# Patient Record
Sex: FEMALE | Race: WHITE | NOT HISPANIC OR LATINO | Employment: OTHER | URBAN - METROPOLITAN AREA
[De-identification: names, ages, dates, MRNs, and addresses within clinical notes are randomized per-mention and may not be internally consistent; named-entity substitution may affect disease eponyms.]

---

## 2024-07-03 ENCOUNTER — HOSPITAL ENCOUNTER (OUTPATIENT)
Dept: RADIOLOGY | Facility: CLINIC | Age: 84
Discharge: HOME | End: 2024-07-03
Payer: MEDICARE

## 2024-07-03 ENCOUNTER — OFFICE VISIT (OUTPATIENT)
Dept: ORTHOPEDIC SURGERY | Facility: CLINIC | Age: 84
End: 2024-07-03
Payer: MEDICARE

## 2024-07-03 DIAGNOSIS — M25.562 ACUTE PAIN OF LEFT KNEE: ICD-10-CM

## 2024-07-03 PROCEDURE — 99213 OFFICE O/P EST LOW 20 MIN: CPT | Performed by: ORTHOPAEDIC SURGERY

## 2024-07-03 PROCEDURE — 73564 X-RAY EXAM KNEE 4 OR MORE: CPT | Mod: LT

## 2024-07-03 PROCEDURE — 99204 OFFICE O/P NEW MOD 45 MIN: CPT | Performed by: ORTHOPAEDIC SURGERY

## 2024-07-03 NOTE — PROGRESS NOTES
"    History: Savita \"FILOMENA\" is here for her left knee.  She has had pain for several years.  She has done well from a right total knee arthroplasty 9 years ago.  She had a fall couple months ago and landed on the knee.  This is caused increased pain.  She has tried bracing, cortisone and viscosupplementation without significant relief.  She does take Tylenol and has used Voltaren gel.  She cannot take anti-inflammatories as she is on Eliquis.    Past medical history: Multiple  Medications: Multiple  Allergies: No known drug allergies    Please refer to the intake H&P regarding the patient's review of systems, family history and social history as was done today    HEENT: Normal  Lungs: Clear to auscultation  Heart: RRR  Abdomen: Soft, nontender  Skin: clear  Extremity: She has tenderness both medially and laterally to the knee.  There is 1+ crepitus with range of motion.  She has pain to palpation around the patella.  Mild laxity with varus stress.  Negative Lachman and posterior drawer.  She denies any numbness or tingling distally.  Contralateral exam is normal for strength, motion, stability and neurovascular assessment.    Radiographs: 4 view x-rays show moderate to severe medial joint space narrowing with spurring around the patella.  Alignment is fairly neutral.    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  She has not gotten significant relief with other modalities in the past but we did discuss repeat injection.  All questions were answered today with the patient.    For complete plan and/or surgical details, please refer to Dr. Clay's portion of this split/shared dictation.     Vita Gaines PA-C    In a face-to-face encounter today, MARILUZ Clay MD performed a history and physical examination, discussed pertinent diagnostic " studies if indicated, and discussed diagnosis and management strategies with both the patient and the midlevel provider.  I reviewed the midlevel's note and agree with the documented findings and plan of care.  Greater than 50% of the evaluation and treatment decision was performed by the physician myself during today's visit.    She is having persistent pain and feels like the knee wants to give out.  She has known arthritis.  We discussed several options including knee arthroplasty but she feels given her age she is not yet sure she wants to undergo surgery.  She understands extensive rehab needed with knee replacement.  She would like to pursue viscosupplementation again to try to get some improvement in the meantime.  She has a brace to wear for support.  Will see her back upon approval for her injections.  This note was generated with voice recognition software and may contain grammatical errors.

## 2024-07-15 ENCOUNTER — OFFICE VISIT (OUTPATIENT)
Dept: ORTHOPEDIC SURGERY | Facility: CLINIC | Age: 84
End: 2024-07-15
Payer: MEDICARE

## 2024-07-15 DIAGNOSIS — M17.12 PRIMARY OSTEOARTHRITIS OF LEFT KNEE: Primary | ICD-10-CM

## 2024-07-15 PROCEDURE — 99211 OFF/OP EST MAY X REQ PHY/QHP: CPT | Performed by: PHYSICIAN ASSISTANT

## 2024-07-15 PROCEDURE — 2500000004 HC RX 250 GENERAL PHARMACY W/ HCPCS (ALT 636 FOR OP/ED): Mod: JZ | Performed by: ORTHOPAEDIC SURGERY

## 2024-07-15 PROCEDURE — 20610 DRAIN/INJ JOINT/BURSA W/O US: CPT | Mod: LT | Performed by: ORTHOPAEDIC SURGERY

## 2024-07-15 NOTE — PROGRESS NOTES
"    Savita \"FILOMENA\" is here for a left knee Synvisc One injection.    L Inj/Asp: L knee on 7/15/2024 1:09 PM  Indications: pain  Details: 18 G needle, lateral approach  Medications: 48 mg hylan 48 mg/6 mL  Outcome: tolerated well, no immediate complications  Procedure, treatment alternatives, risks and benefits explained, specific risks discussed. Consent was given by the patient. Immediately prior to procedure a time out was called to verify the correct patient, procedure, equipment, support staff and site/side marked as required. Patient was prepped and draped in the usual sterile fashion.         She will ice and work on range of motion and exercises as directed.    She will follow up on an as-needed basis.  "

## 2024-08-26 DIAGNOSIS — M17.12 PRIMARY OSTEOARTHRITIS OF LEFT KNEE: ICD-10-CM

## 2024-09-06 ENCOUNTER — HOSPITAL ENCOUNTER (OUTPATIENT)
Facility: HOSPITAL | Age: 84
Setting detail: OUTPATIENT SURGERY
End: 2024-09-06
Attending: ORTHOPAEDIC SURGERY | Admitting: ORTHOPAEDIC SURGERY
Payer: MEDICARE

## 2024-09-06 PROBLEM — M17.12 UNILATERAL PRIMARY OSTEOARTHRITIS, LEFT KNEE: Status: ACTIVE | Noted: 2024-09-06

## 2024-09-06 PROBLEM — M25.562 PAIN IN LEFT KNEE: Status: ACTIVE | Noted: 2024-09-06

## 2024-09-12 ENCOUNTER — HOSPITAL ENCOUNTER (OUTPATIENT)
Dept: RADIOLOGY | Facility: CLINIC | Age: 84
Discharge: HOME | End: 2024-09-12
Payer: MEDICARE

## 2024-09-12 DIAGNOSIS — M17.12 PRIMARY OSTEOARTHRITIS OF LEFT KNEE: ICD-10-CM

## 2024-09-12 PROCEDURE — 73721 MRI JNT OF LWR EXTRE W/O DYE: CPT | Mod: LT

## 2024-10-02 RX ORDER — CEFAZOLIN SODIUM 2 G/50ML
2 SOLUTION INTRAVENOUS ONCE
OUTPATIENT
Start: 2024-10-02 | End: 2024-10-02

## 2024-10-02 RX ORDER — ACETAMINOPHEN 325 MG/1
975 TABLET ORAL ONCE
OUTPATIENT
Start: 2024-10-02 | End: 2024-10-02

## 2024-10-02 RX ORDER — SODIUM CHLORIDE, SODIUM LACTATE, POTASSIUM CHLORIDE, CALCIUM CHLORIDE 600; 310; 30; 20 MG/100ML; MG/100ML; MG/100ML; MG/100ML
20 INJECTION, SOLUTION INTRAVENOUS CONTINUOUS
OUTPATIENT
Start: 2024-10-02

## 2024-10-02 RX ORDER — GABAPENTIN 300 MG/1
600 CAPSULE ORAL ONCE
OUTPATIENT
Start: 2024-10-02 | End: 2024-10-02

## 2024-10-02 RX ORDER — TRANEXAMIC ACID 650 MG/1
1950 TABLET ORAL ONCE
OUTPATIENT
Start: 2024-10-02 | End: 2024-10-02

## 2024-10-02 RX ORDER — CELECOXIB 200 MG/1
200 CAPSULE ORAL ONCE
OUTPATIENT
Start: 2024-10-02 | End: 2024-10-02

## 2024-10-08 ENCOUNTER — PRE-ADMISSION TESTING (OUTPATIENT)
Dept: PREADMISSION TESTING | Facility: HOSPITAL | Age: 84
End: 2024-10-08
Payer: MEDICARE

## 2024-10-08 ENCOUNTER — HOSPITAL ENCOUNTER (OUTPATIENT)
Dept: CARDIOLOGY | Facility: HOSPITAL | Age: 84
Discharge: HOME | End: 2024-10-08
Payer: MEDICARE

## 2024-10-08 VITALS
BODY MASS INDEX: 36.01 KG/M2 | HEART RATE: 77 BPM | TEMPERATURE: 97.5 F | OXYGEN SATURATION: 97 % | WEIGHT: 196.87 LBS | RESPIRATION RATE: 20 BRPM | SYSTOLIC BLOOD PRESSURE: 143 MMHG | DIASTOLIC BLOOD PRESSURE: 57 MMHG

## 2024-10-08 DIAGNOSIS — M25.562 PAIN IN LEFT KNEE: ICD-10-CM

## 2024-10-08 DIAGNOSIS — M17.12 UNILATERAL PRIMARY OSTEOARTHRITIS, LEFT KNEE: ICD-10-CM

## 2024-10-08 LAB
ALBUMIN SERPL BCP-MCNC: 4 G/DL (ref 3.4–5)
ALP SERPL-CCNC: 57 U/L (ref 33–136)
ALT SERPL W P-5'-P-CCNC: 11 U/L (ref 7–45)
ANION GAP SERPL CALC-SCNC: 15 MMOL/L (ref 10–20)
AST SERPL W P-5'-P-CCNC: 11 U/L (ref 9–39)
BASOPHILS # BLD AUTO: 0.06 X10*3/UL (ref 0–0.1)
BASOPHILS NFR BLD AUTO: 0.7 %
BILIRUB SERPL-MCNC: 0.4 MG/DL (ref 0–1.2)
BUN SERPL-MCNC: 59 MG/DL (ref 6–23)
CALCIUM SERPL-MCNC: 8.1 MG/DL (ref 8.6–10.3)
CHLORIDE SERPL-SCNC: 109 MMOL/L (ref 98–107)
CO2 SERPL-SCNC: 22 MMOL/L (ref 21–32)
CREAT SERPL-MCNC: 2.45 MG/DL (ref 0.5–1.05)
EGFRCR SERPLBLD CKD-EPI 2021: 19 ML/MIN/1.73M*2
EOSINOPHIL # BLD AUTO: 0.23 X10*3/UL (ref 0–0.4)
EOSINOPHIL NFR BLD AUTO: 2.6 %
ERYTHROCYTE [DISTWIDTH] IN BLOOD BY AUTOMATED COUNT: 13.5 % (ref 11.5–14.5)
EST. AVERAGE GLUCOSE BLD GHB EST-MCNC: 123 MG/DL
GLUCOSE SERPL-MCNC: 155 MG/DL (ref 74–99)
HBA1C MFR BLD: 5.9 %
HCT VFR BLD AUTO: 35 % (ref 36–46)
HGB BLD-MCNC: 10.9 G/DL (ref 12–16)
IMM GRANULOCYTES # BLD AUTO: 0.05 X10*3/UL (ref 0–0.5)
IMM GRANULOCYTES NFR BLD AUTO: 0.6 % (ref 0–0.9)
INR PPP: 1.3 (ref 0.9–1.1)
LYMPHOCYTES # BLD AUTO: 0.73 X10*3/UL (ref 0.8–3)
LYMPHOCYTES NFR BLD AUTO: 8.2 %
MCH RBC QN AUTO: 30.3 PG (ref 26–34)
MCHC RBC AUTO-ENTMCNC: 31.1 G/DL (ref 32–36)
MCV RBC AUTO: 97 FL (ref 80–100)
MONOCYTES # BLD AUTO: 0.48 X10*3/UL (ref 0.05–0.8)
MONOCYTES NFR BLD AUTO: 5.4 %
NEUTROPHILS # BLD AUTO: 7.33 X10*3/UL (ref 1.6–5.5)
NEUTROPHILS NFR BLD AUTO: 82.5 %
NRBC BLD-RTO: 0 /100 WBCS (ref 0–0)
PLATELET # BLD AUTO: 143 X10*3/UL (ref 150–450)
POTASSIUM SERPL-SCNC: 4.4 MMOL/L (ref 3.5–5.3)
PROT SERPL-MCNC: 6.9 G/DL (ref 6.4–8.2)
PROTHROMBIN TIME: 14.5 SECONDS (ref 9.8–12.8)
RBC # BLD AUTO: 3.6 X10*6/UL (ref 4–5.2)
SODIUM SERPL-SCNC: 142 MMOL/L (ref 136–145)
WBC # BLD AUTO: 8.9 X10*3/UL (ref 4.4–11.3)

## 2024-10-08 PROCEDURE — 83036 HEMOGLOBIN GLYCOSYLATED A1C: CPT | Mod: ELYLAB

## 2024-10-08 PROCEDURE — 85025 COMPLETE CBC W/AUTO DIFF WBC: CPT

## 2024-10-08 PROCEDURE — 85610 PROTHROMBIN TIME: CPT

## 2024-10-08 PROCEDURE — 87081 CULTURE SCREEN ONLY: CPT | Mod: ELYLAB

## 2024-10-08 PROCEDURE — 93010 ELECTROCARDIOGRAM REPORT: CPT | Performed by: INTERNAL MEDICINE

## 2024-10-08 PROCEDURE — 84075 ASSAY ALKALINE PHOSPHATASE: CPT

## 2024-10-08 PROCEDURE — 93005 ELECTROCARDIOGRAM TRACING: CPT

## 2024-10-08 PROCEDURE — 36415 COLL VENOUS BLD VENIPUNCTURE: CPT

## 2024-10-08 RX ORDER — FUROSEMIDE 20 MG/1
40 TABLET ORAL
COMMUNITY
Start: 2024-07-31

## 2024-10-08 RX ORDER — SODIUM ZIRCONIUM CYCLOSILICATE 5 G/5G
5 POWDER, FOR SUSPENSION ORAL DAILY
COMMUNITY

## 2024-10-08 RX ORDER — PREGABALIN 75 MG/1
75 CAPSULE ORAL 3 TIMES DAILY
COMMUNITY
Start: 2023-12-19 | End: 2024-11-17

## 2024-10-08 RX ORDER — LOSARTAN POTASSIUM 25 MG/1
25 TABLET ORAL 2 TIMES DAILY
COMMUNITY
Start: 2024-03-25

## 2024-10-08 RX ORDER — DAPAGLIFLOZIN 5 MG/1
5 TABLET, FILM COATED ORAL DAILY
COMMUNITY
Start: 2024-06-25

## 2024-10-08 RX ORDER — ACETAMINOPHEN 500 MG
2000 TABLET ORAL DAILY
COMMUNITY

## 2024-10-08 RX ORDER — AMLODIPINE BESYLATE 5 MG/1
5 TABLET ORAL
COMMUNITY
Start: 2024-01-17

## 2024-10-08 RX ORDER — ACETAMINOPHEN 500 MG
500 TABLET ORAL EVERY 8 HOURS PRN
COMMUNITY

## 2024-10-08 RX ORDER — METOPROLOL TARTRATE 25 MG/1
12.5 TABLET, FILM COATED ORAL 2 TIMES DAILY
COMMUNITY
Start: 2023-12-19

## 2024-10-08 RX ORDER — LATANOPROST 50 UG/ML
1 SOLUTION/ DROPS OPHTHALMIC
COMMUNITY
Start: 2024-01-10

## 2024-10-08 RX ORDER — LANOLIN ALCOHOL/MO/W.PET/CERES
1000 CREAM (GRAM) TOPICAL
COMMUNITY

## 2024-10-08 ASSESSMENT — PAIN - FUNCTIONAL ASSESSMENT: PAIN_FUNCTIONAL_ASSESSMENT: 0-10

## 2024-10-08 ASSESSMENT — PAIN SCALES - GENERAL: PAINLEVEL_OUTOF10: 8

## 2024-10-08 NOTE — PREPROCEDURE INSTRUCTIONS

## 2024-10-10 LAB
ATRIAL RATE: 71 BPM
P AXIS: 68 DEGREES
P OFFSET: 208 MS
P ONSET: 138 MS
PR INTERVAL: 172 MS
Q ONSET: 224 MS
QRS COUNT: 12 BEATS
QRS DURATION: 78 MS
QT INTERVAL: 388 MS
QTC CALCULATION(BAZETT): 421 MS
QTC FREDERICIA: 410 MS
R AXIS: 75 DEGREES
STAPHYLOCOCCUS SPEC CULT: ABNORMAL
T AXIS: 37 DEGREES
T OFFSET: 418 MS
VENTRICULAR RATE: 71 BPM

## 2024-10-12 DIAGNOSIS — Z01.818 PRE-OPERATIVE CLEARANCE: Primary | ICD-10-CM

## 2024-10-12 RX ORDER — CHLORHEXIDINE GLUCONATE ORAL RINSE 1.2 MG/ML
15 SOLUTION DENTAL DAILY
Qty: 30 ML | Refills: 0 | Status: SHIPPED | OUTPATIENT
Start: 2024-10-12 | End: 2024-10-14

## 2024-10-14 ENCOUNTER — APPOINTMENT (OUTPATIENT)
Dept: ORTHOPEDIC SURGERY | Facility: CLINIC | Age: 84
End: 2024-10-14
Payer: MEDICARE

## 2024-10-14 ENCOUNTER — OFFICE VISIT (OUTPATIENT)
Dept: ORTHOPEDIC SURGERY | Facility: CLINIC | Age: 84
End: 2024-10-14
Payer: MEDICARE

## 2024-10-14 DIAGNOSIS — M17.12 PRIMARY OSTEOARTHRITIS OF LEFT KNEE: Primary | ICD-10-CM

## 2024-10-14 PROCEDURE — 99211 OFF/OP EST MAY X REQ PHY/QHP: CPT | Performed by: PHYSICIAN ASSISTANT

## 2024-10-14 NOTE — PROGRESS NOTES
Savita Snell is here today for a pre-op visit of her left knee.  She is scheduled for left total knee arthroplasty.  She has developed a venous ulcer which she has a history of.    This is a pre-op visit to discuss the risks and benefits of surgery. The risks and benefits were fully explained to the patient. The patient wishes to proceed.     With any surgery, infection is a risk; usually 1-2%. It can become higher for individuals with diabetes, rheumatoid arthritis, previous surgery, individuals on oral steroids or immunosuppressants, or obese individuals. All of these issues were properly addressed and considered. Reassured all sterile techniques would be followed.  Severe infections may require removal of any prosthesis (if applicable). Cadaver/allograft tissues may be used for certain surgeries.  Although extremely rare, there is a less than 0.5% risk of disease transmission.     The patient will be given preop antibiotics. It was also explained to the patient that there will be some blood loss during the procedure, but that blood transfusion is usually not necessary.  If applicable, it is also noted that a tourniquet may be applied to lower the amount of blood loss during the case.    Pulmonary embolism and blood clots were also discussed with the patient. These can have fatal complications. It is explained to the patient that for certain surgeries the use of GLORIA hose, foot pumps, incentive spirometers, quick mobility and the use of blood thinners/Aspirin is the standard preventative care.     It was explained that surgery is often used to decrease pain, provide stability, and improve strength but individuals will have varying results postoperatively. There can be variation in motion and a risk of stiffness. It is also stated that occasionally we will have to manipulate an extremity if pain and stiffness persist. We also discussed there are limitations with some motions after certain surgeries.      Fracture, though rare, may also occur intraoperatively. There is also the possibility of nerve or vascular injuries as well. There is potential for continued numbness or tingling. The benefits of anesthesia were explained to the patient.     All of the patient's questions were answered.     Results/Imaging: Previous x-rays show moderate to severe medial joint space narrowing with spurring around the patella.    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 This report is scanned into the electronic medical record. I have considered the risks of abuse, dependence, addiction, and diversion.     She has a 3 x 3 cm venous ulcer on the medial side of her lower leg.  She typically sees her podiatrist for treatment of this area and she is going to get back into see them as we cannot proceed with a  total knee with an open wound on her leg.  She can call back to reschedule once her wound is healed.    This note was generated with voice recognition software and may contain grammatical errors.

## 2024-11-04 ENCOUNTER — APPOINTMENT (OUTPATIENT)
Dept: ORTHOPEDIC SURGERY | Facility: CLINIC | Age: 84
End: 2024-11-04
Payer: MEDICARE

## 2024-11-05 NOTE — PROGRESS NOTES
"    History: Savita \"FILOMENA\" is here for her left leg.  She was scheduled for total knee arthroplasty but was found to have a venous ulcer at her preop visit.  We had to postpone the surgery given her open wound and she has been treating this with zinc oxide and petroleum dressing changes.  It is healing nicely and she is ready to proceed.    Past medical history: Multiple  Medications: Multiple  Allergies: No known drug allergies    Please refer to the intake H&P regarding the patient's review of systems, family history and social history as was done today    HEENT: Normal  Lungs: Clear to auscultation  Heart: RRR  Abdomen: Soft, nontender  Skin: clear  Extremity: The previous ulcer in the medial lower leg has resolved.  There is some mild erythema over the healed area with no open wound or drainage.  Swelling in the lower extremity has improved as well.  She denies any numbness or tingling.  Contralateral exam is normal for strength, motion, stability and neurovascular assessment.    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  The ulcer has resolved and she is now ready to proceed with surgery.  Risks and benefits were again discussed with the patient.  We did discuss given her history of swelling and ulcers the increased risk of infection postoperatively.  We will see her back 2 weeks postoperatively for a wound check with 2 view left knee x-rays.  All questions were answered today with the patient.    For complete plan and/or surgical details, please refer to Dr. Clay's portion of this split/shared dictation.     Vita Gaines PA-C    In a face-to-face encounter today, MARILUZ Clay MD performed a history and physical examination, discussed pertinent diagnostic studies if indicated, and " discussed diagnosis and management strategies with both the patient and the midlevel provider.  I reviewed the midlevel's note and agree with the documented findings and plan of care.  Greater than 50% of the evaluation and treatment decision was performed by the physician myself during today's visit.    Cristiane has done a good job with her lower extremity edema.  Her open wound is now healed and her skin is intact.  She has known arthritis and would like to proceed with knee arthroplasty.  We can set this up at her discretion.  She is already done her preoperative visit and we can see her back 2 weeks postoperatively for wound check with x-rays.  She understands need to continue with her edema program and minimize swelling and skin changes even after the knee replacement surgery as it does increase her risk of infection and complication.      This note was generated with voice recognition software and may contain grammatical errors.

## 2024-11-06 ENCOUNTER — OFFICE VISIT (OUTPATIENT)
Dept: ORTHOPEDIC SURGERY | Facility: CLINIC | Age: 84
End: 2024-11-06
Payer: MEDICARE

## 2024-11-06 DIAGNOSIS — M17.12 PRIMARY OSTEOARTHRITIS OF LEFT KNEE: Primary | ICD-10-CM

## 2024-11-06 PROCEDURE — 99213 OFFICE O/P EST LOW 20 MIN: CPT | Performed by: ORTHOPAEDIC SURGERY

## 2024-11-06 PROCEDURE — 1036F TOBACCO NON-USER: CPT | Performed by: ORTHOPAEDIC SURGERY

## 2024-11-06 PROCEDURE — 1159F MED LIST DOCD IN RCRD: CPT | Performed by: ORTHOPAEDIC SURGERY

## 2024-11-12 NOTE — H&P (VIEW-ONLY)
PRE-OPERATIVE EVALUATION    SURGEON:  Dr. MIRANDA Clay  PRE-OP DATE:  November 12, 2024  SURGERY DATE:  November 26, 2024L TKR,     PRE-OPERATIVE DIAGNOSIS:     L knee OA  PRIMARY PROCEDURE:  L TKR    Recommendations provided at the request of the above surgeon and will be communicated via shared electronic record.     HPI: Savita Snell is a 84 year old female here for evaluation prior to L TKR, completed pre-op labs on f.   UTD lab monitoring and specialist care/stable, DM2 A1C 6.1, CKD stage 4 GFR 19, anemia HGB 11.  3/22/24 Routine EKG NSR  3/6/24 perfusion stress EF 67%, negative.     Latest Ref Rng 10/17/2024   Albumin 3.9 - 4.9 g/dL 4.1    Calcium 8.5 - 10.2 mg/dL 8.1 (L)    Phosphorus 2.7 - 4.8 mg/dL 5.7 (H)    Glucose 74 - 99 mg/dL 121 (H)    BUN 7 - 21 mg/dL 59 (H)    Creatinine 0.58 - 0.96 mg/dL 2.45 (H)    Sodium 136 - 144 mmol/L 143    Potassium 3.7 - 5.1 mmol/L 4.7    Chloride 98 - 107 mmol/L 107    CO2 22 - 30 mmol/L 22    Anion Gap 8 - 15 mmol/L 14    eGFR >=60 mL/min/1.73m² 19 (L)    WBC 3.70 - 11.00 k/uL 8.92    RBC 3.90 - 5.20 m/uL 3.63 (L)    Hemoglobin 11.5 - 15.5 g/dL 11.0 (L)    Hematocrit 36.0 - 46.0 % 35.7 (L)    MCV 80.0 - 100.0 fL 98.3    MCH 26.0 - 34.0 pg 30.3    MCHC 30.5 - 36.0 g/dL 30.8    RDW-CV 11.5 - 15.0 % 13.5    Platelet Count 150 - 400 k/uL 190    MPV 9.0 - 12.7 fL 12.1    Absolute nRBC <0.01 k/uL <0.01    Cholesterol, Total <200 mg/dL 142    Triglyceride <150 mg/dL 140    HDL Cholesterol >39 mg/dL 37 (L)    Non HDL Cholesterol <130 mg/dL 105    Fasting Time hrs 12    VLDL Cholesterol <30 mg/dL 28    TC:HDL Ratio <5.10  3.84    LDL Cholesterol <100 mg/dL 77    LDL:HDL Ratio <2.54  2.08    Hemoglobin A1C 4.3 - 5.6 % 6.1 (H)    Estimated Average Glucose mg/dL 128    PTH, Intact 15 - 65 pg/mL 87 (H)    Vitamin D 25 Hydroxy 31.0 - 80.0 ng/mL 43.5       Functional Capacity:  Do heavy work around the house, such as scrubbing floors, lifting or moving heavy furniture (8.00 METs) ,  limited by L knee pain.     Last NSAID use: none  Last ASA use:  none   Patient takes herbal medications:  No  ANESTHESIA COMPLICATIONS: no reported complications    PMH:   PAST MEDICAL HISTORY   Diagnosis Date   • Asymptomatic postmenopausal status (age-related) (natural)     2013 DEXA Tscore WNL, 2008 DEXA no sign change except R total hip, Tscore spine -0.5/hip -1.4, DEXA 2006 Tscore -1.3, Age late 40's, no HRT   • CKD (chronic kidney disease) stage 3, GFR 30-59 ml/min ([facility])     Dr. 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 knee, needs TKR 11/2015   • Dry eye syndrome 01/03/2023   • Essential hypertension, benign 2002 2/2002 EKG WNL   • Other specified glaucoma 2002   • Other specified idiopathic peripheral neuropathy 1990's    Dr. Wilson/annually, 2/13 EMG no evidence overlying Cervical root or CTS/mild progression? neuropathy, Dr. Wilson, used to see Bej, neuropathy painful/numbness/tingling feet>hands, reports testing-controlled with pm neurontin   • Personal history of malignant neoplasm of other female genital organs 2002    Endometrial cancer, no longer sees GYN   • Stasis dermatitis     no longer sees Dr. Nieves   • Type II or unspecified type diabetes mellitus without mention of complication, not stated as uncontrolled 2004    Dr. Lala, Dr. Wilson, Dr. Gao, dx'd 1998, neuropathy, no retinopathy     ACTIVE PROBLEM LIST  Neuropathy  Essential Hypertension, Benign  Asymptomatic Postmenopausal Status (Age-Related) (Natural)  Personal History of Malignant Neoplasm of Other Female Genital Organs  Dizziness and Giddiness  Other Specified Glaucoma  Lumbago  Abnormality of Gait  Achilles Tendon Tear  Preglaucoma, unspecified - Both Eyes  Lens replaced by other means - Both Eyes  Primary Osteoarthritis of Right Knee  Glaucoma Suspect of Both Eyes  Ptosis of  Both Eyelids  Pseudophakia of Both Eyes  History of Strabismus Surgery  Cogan's Corneal Dystrophy  Dry Eye Syndrome  Mixed Hyperlipidemia  Hammer Toes of Both Feet  Secondary Renal Hyperparathyroidism (Hcc)  Hyperkalemia  Anemia in Stage 4 Chronic Kidney Disease (Hcc)  (Hcc)  Type 2 Dm With Ckd Stage 4 and Hypertension (Hcc)  Right-Sided Low Back Pain Without Sciatica  Esophageal Dysphagia  Oag (Open Angle Glaucoma) Suspect, Low Risk, Bilateral  Cup to Disc Asymmetry, Bilateral  Family History of Glaucoma in Mother  After-Cataract Obscuring Vision, Bilateral  Pvd (Posterior Vitreous Detachment), Bilateral  Ckd (Chronic Kidney Disease) Stage 4, Gfr 15-29 Ml/Min (Hcc)  Type 2 Diabetes Mellitus With Stage 4 Chronic Kidney Disease, With Long-Term Current Use of Insulin (Hcc)  Obesity (Bmi 30-39.9)  B12 Neuropathy (Hcc)  Osteopenia, Senile  C. Difficile Colitis  Atrial Fibrillation (Hcc)  (Hfpef) Heart Failure With Preserved Ejection Fraction ([facility])      PSH:   PAST SURGICAL HISTORY   Procedure Laterality Date   • CHOLECYSTECTOMY  2002    Cholecystectomy   • COLONOSCOPY  6/06    Cscope Dr. Vasquez neg/done for diarrhea   • COLONOSCOPY  10/12/16    /Hemorrhoids/Normal/Rpt in 10 yrs   • CORNEAL EPITHELIAL DEBRIDEMENT (THERAPEUTIC) OD (RIGHT EYE) Right 1/2010    for cogans   • CORNEAL EPITHELIAL DEBRIDEMENT (THERAPEUTIC) OS (LEFT EYE) Left 2/2010    for cogans   • EGD  09/26/2018    Speedy- normal, dilation   • OOPHORECTOMY PARTIAL/TOTAL UNI/BI  1967    Oophorectomy, ovarian rupture   • PAST SURGICAL HISTORY OF  at age 5    strabismus sx at age 5 done in [facility] D.Giovanni   • PAST SURGICAL HISTORY OF  11/5/15    R TKR, Dr. Tejada   • SALPINGO-OOPHORECTOMY COMPL/PRTL UNI/BI SPX  2002    Salpingo-oophorectomy   • TENOTOMY OPN HAMSTRING KNEE HIP MULTIPLE 1 LEG  1975    Injury to tendon, had subsequent R knee sg for tendonitis   • TOTAL ABDOMINAL HYSTERECT W/WO RMVL TUBE OVARY  2002    Hysterectomy, ADENIKE   • XCAPSL CTRC RMVL  "INSJ IO LENS PROSTH W/O ECP  OD 4/15/10    Cataract Extraction with PC IOL  OD by Dr. Gao   • XCAPSL CTRC RMVL INSJ IO LENS PROSTH W/O ECP  OS 4/22/10    Cataract Extraction with PC IOL  OS by Dr. Gao     Family History:   FAMILY HISTORY    Problem Relation Age of Onset   • Coronary Artery Disease Mother          CHF/?MI, dementia, possibly neuropathy   • Glaucoma Mother    • Macular Degen Mother    • Stroke Father          CVA age 77, HTN   • Hypertension Sister         Stroke with complications, HTN, Hyperlipidemia, neuropathy, fibromyalgia,   • Blood Disease Sister         Mutation Detected heterozygous   • Glaucoma Sister    • Diabetes Maternal Grandmother         DM2     Social History:   Social History     Tobacco Use   • Smoking status: Former     Current packs/day: 0.00     Average packs/day: 0.5 packs/day for 18.0 years (9.0 ttl pk-yrs)     Types: Cigarettes     Start date: 1958     Quit date: 1976     Years since quittin.8     Passive exposure: Never   • Smokeless tobacco: Never   Substance Use Topics   • Alcohol use: No   • Drug use: No     ALLERGIES:    ALLERGIES   Allergen Reactions   • Bactrim [Sulfametho* Contraindication-Medical Surgical     Increase in cr and hyperkalemia   • Keflex [Cephalexin]  Other: See Comments     Caused C-diff   • Penicillins               States severe FH PCN allergy-has never had PCN herself     LATEX ALLERGY: No    REVIEW OF SYSTEMS:    REVIEW OF SYSTEMS  As per hpi.    Last 3 Encounter BP Readings:     Date:        BP:     2024   152/76     10/28/2024   138/70     10/14/2024   144/77      OBJECTIVE:    /76 (BP Site: Right Arm, BP Position: Sitting, BP Cuff Size: Large Adult)   Pulse (!) 57   Resp 18   Ht 157.5 cm (5' 2\")   Wt 89.3 kg (196 lb 13.9 oz)   SpO2 100%   BMI 36.01 kg/m²   Repeat BP  /79   Pulse (!) 57   Resp 18   Ht 157.5 cm (5' 2\")   Wt 89.3 kg (196 lb 13.9 oz)   SpO2 100%   BMI 36.01 kg/m²    Lungs clear " to auscultation  Heart regular rate and rhythm     Latest Ref Rn 10/17/2024   Albumin 3.9 - 4.9 g/dL 4.1    Calcium 8.5 - 10.2 mg/dL 8.1 (L)    Phosphorus 2.7 - 4.8 mg/dL 5.7 (H)    Glucose 74 - 99 mg/dL 121 (H)    BUN 7 - 21 mg/dL 59 (H)    Creatinine 0.58 - 0.96 mg/dL 2.45 (H)    Sodium 136 - 144 mmol/L 143    Potassium 3.7 - 5.1 mmol/L 4.7    Chloride 98 - 107 mmol/L 107    CO2 22 - 30 mmol/L 22    Anion Gap 8 - 15 mmol/L 14    eGFR >=60 mL/min/1.73m² 19 (L)    WBC 3.70 - 11.00 k/uL 8.92    RBC 3.90 - 5.20 m/uL 3.63 (L)    Hemoglobin 11.5 - 15.5 g/dL 11.0 (L)    Hematocrit 36.0 - 46.0 % 35.7 (L)    MCV 80.0 - 100.0 fL 98.3    MCH 26.0 - 34.0 pg 30.3    MCHC 30.5 - 36.0 g/dL 30.8    RDW-CV 11.5 - 15.0 % 13.5    Platelet Count 150 - 400 k/uL 190    MPV 9.0 - 12.7 fL 12.1    Absolute nRBC <0.01 k/uL <0.01    Cholesterol, Total <200 mg/dL 142    Triglyceride <150 mg/dL 140    HDL Cholesterol >39 mg/dL 37 (L)    Non HDL Cholesterol <130 mg/dL 105    Fasting Time hrs 12    VLDL Cholesterol <30 mg/dL 28    TC:HDL Ratio <5.10  3.84    LDL Cholesterol <100 mg/dL 77    LDL:HDL Ratio <2.54  2.08    Hemoglobin A1C 4.3 - 5.6 % 6.1 (H)    Estimated Average Glucose mg/dL 128    PTH, Intact 15 - 65 pg/mL 87 (H)    Vitamin D 25 Hydroxy 31.0 - 80.0 ng/mL 43.5      ASSESSMENT/PLAN:   - Savita Snell is a 84 year old female who is acceptable risk for surgery.   - Savita Snell is optimally prepared for surgery.      No new symptoms for infectious etiology, compliant with and stable with medications and lab testing, well controlled blood sugars, and blood pressures stable GFR 19, chronic mild anemia HGB 11.  Plan anticoagulation hold and management per Surgery (eliquis).  Advised to hold farxiga 3-4 days prior to surgery-last dose on 11/22, and ok to continue use of januvia.     Jeanna Quintanilla MD  Medical Decision Making:     Problems:   Moderate:  2+ stable chronic illnesses  Data:   Unique test result(s) reviewed:   3+  Risk:   Moderate:  Drug management    Medical Decision Making  Level: 4 - Moderate

## 2024-11-19 ENCOUNTER — LAB (OUTPATIENT)
Dept: LAB | Facility: HOSPITAL | Age: 84
End: 2024-11-19
Payer: MEDICARE

## 2024-11-19 DIAGNOSIS — M17.12 UNILATERAL PRIMARY OSTEOARTHRITIS, LEFT KNEE: ICD-10-CM

## 2024-11-19 LAB
ALBUMIN SERPL BCP-MCNC: 4.1 G/DL (ref 3.4–5)
ALP SERPL-CCNC: 64 U/L (ref 33–136)
ALT SERPL W P-5'-P-CCNC: 13 U/L (ref 7–45)
ANION GAP SERPL CALC-SCNC: 13 MMOL/L (ref 10–20)
APTT PPP: 36 SECONDS (ref 27–38)
AST SERPL W P-5'-P-CCNC: 12 U/L (ref 9–39)
BASOPHILS # BLD AUTO: 0.03 X10*3/UL (ref 0–0.1)
BASOPHILS NFR BLD AUTO: 0.3 %
BILIRUB SERPL-MCNC: 0.3 MG/DL (ref 0–1.2)
BUN SERPL-MCNC: 61 MG/DL (ref 6–23)
CALCIUM SERPL-MCNC: 8.2 MG/DL (ref 8.6–10.3)
CHLORIDE SERPL-SCNC: 105 MMOL/L (ref 98–107)
CO2 SERPL-SCNC: 27 MMOL/L (ref 21–32)
CREAT SERPL-MCNC: 2.34 MG/DL (ref 0.5–1.05)
EGFRCR SERPLBLD CKD-EPI 2021: 20 ML/MIN/1.73M*2
EOSINOPHIL # BLD AUTO: 0.24 X10*3/UL (ref 0–0.4)
EOSINOPHIL NFR BLD AUTO: 2.7 %
ERYTHROCYTE [DISTWIDTH] IN BLOOD BY AUTOMATED COUNT: 13.8 % (ref 11.5–14.5)
GLUCOSE SERPL-MCNC: 125 MG/DL (ref 74–99)
HCT VFR BLD AUTO: 37.7 % (ref 36–46)
HGB BLD-MCNC: 11.6 G/DL (ref 12–16)
IMM GRANULOCYTES # BLD AUTO: 0.04 X10*3/UL (ref 0–0.5)
IMM GRANULOCYTES NFR BLD AUTO: 0.5 % (ref 0–0.9)
INR PPP: 1.1 (ref 0.9–1.1)
LYMPHOCYTES # BLD AUTO: 1.21 X10*3/UL (ref 0.8–3)
LYMPHOCYTES NFR BLD AUTO: 13.8 %
MCH RBC QN AUTO: 29.9 PG (ref 26–34)
MCHC RBC AUTO-ENTMCNC: 30.8 G/DL (ref 32–36)
MCV RBC AUTO: 97 FL (ref 80–100)
MONOCYTES # BLD AUTO: 0.57 X10*3/UL (ref 0.05–0.8)
MONOCYTES NFR BLD AUTO: 6.5 %
NEUTROPHILS # BLD AUTO: 6.7 X10*3/UL (ref 1.6–5.5)
NEUTROPHILS NFR BLD AUTO: 76.2 %
NRBC BLD-RTO: 0 /100 WBCS (ref 0–0)
PLATELET # BLD AUTO: 184 X10*3/UL (ref 150–450)
POTASSIUM SERPL-SCNC: 4.3 MMOL/L (ref 3.5–5.3)
PROT SERPL-MCNC: 6.9 G/DL (ref 6.4–8.2)
PROTHROMBIN TIME: 12.2 SECONDS (ref 9.8–12.8)
RBC # BLD AUTO: 3.88 X10*6/UL (ref 4–5.2)
SODIUM SERPL-SCNC: 141 MMOL/L (ref 136–145)
WBC # BLD AUTO: 8.8 X10*3/UL (ref 4.4–11.3)

## 2024-11-19 PROCEDURE — 36415 COLL VENOUS BLD VENIPUNCTURE: CPT

## 2024-11-19 PROCEDURE — 84075 ASSAY ALKALINE PHOSPHATASE: CPT

## 2024-11-19 PROCEDURE — 85025 COMPLETE CBC W/AUTO DIFF WBC: CPT

## 2024-11-19 PROCEDURE — 85610 PROTHROMBIN TIME: CPT

## 2024-11-19 PROCEDURE — 87081 CULTURE SCREEN ONLY: CPT | Mod: ELYLAB

## 2024-11-19 RX ORDER — CHLORHEXIDINE GLUCONATE ORAL RINSE 1.2 MG/ML
SOLUTION DENTAL
Status: ON HOLD | COMMUNITY
Start: 2024-10-13 | End: 2024-11-26 | Stop reason: ALTCHOICE

## 2024-11-19 NOTE — PREPROCEDURE INSTRUCTIONS
KNEE & HIP JOINT REPLACEMENT PRE-OPERATIVE INSTRUCTIONS     You will receive notification one business day prior to your surgery to confirm your arrival time and any additional information between 2 P.M. - 5 P.M. It is important that you answer your phone and/or check your messages during this time.     You may see in [facility] your surgery start time change several times even up to the day before your procedure. Please disregard those times and only follow the time given by the  who will be notifying you via phone and not a text.     Please arrive at your scheduled time to avoid delay or cancelled surgery.     Please enter the building through either the Main Entrance in front of the hospital or from the Parking Garage Walk Way Bridge. From the parking garage, which is free, take the 2nd Floor Walkway Bridge into the hospital and check in at the [facility] Outpatient Desk as you enter the hospital directly in front of you. If you enter through the Main Entrance take the elevator off the lobby on the right labeled “A” to the 2nd floor and check in at the [facility] Outpatient Surgery desk as you exit the elevator. Wheelchairs are available for use if using the Main Entrance.     Handicap parking in the land lot, in front of hospital by main entrance, wheelchairs are available at this entrance     ?   INSTRUCTIONS:   Please contact your doctor’s office, who is doing procedure, about any changes in your health, bad cold, fever, sore throat, or COVID within last 4 weeks     Talk to your surgeon for instructions if you should stop your aspirin, blood thinner, diabetes medicines, weight loss medications, multivitamins or over the counter supplements since many surgeons have you adjust or stop these medications prior to procedure. The doctor’s office may have you contact the prescribing doctor for medication adjustments for your surgery.     If you take certain medications like Beta Blocker or Anti-Seizure medication, you may have  to take them the morning of procedure with a sip of water. If this is the case your surgeons office should let you know, and the PAT nurses will follow up when they speak to you to verify you are aware.     If not staying overnight after your surgery, and you are receiving any type of anesthesia with your surgery, you must have an adult (age 18 or older) immediately available to drive you home after surgery or your procedure will be cancelled. You may be discharged home after surgery per an Uber, Lyft, Taxi or any other transportation service as long as the responsible adult (18 or older) is in the vehicle with you at time of discharge. The  of these transportation services is not responsible for your care and cannot be consider a responsible adult. We also highly recommend you have someone stay with you for the entire day and night of your surgery.     All jewelry and piercings must be removed. If you are unable to remove an item or have a dermal piercing, please be sure to tell the nurse when you arrive for surgery.     Nail polish must be removed off one finger of each hand     Make-up or other beauty products (lotion, deodorant, hairspray, perfume, etc.) must be removed or not used for day of surgery.     Avoid smoking, consuming alcohol, or any medical or recreational drug use for 24 hours before surgery.     Do not wear contacts to hospital, bring your glasses and a case     Leave valuables at home except photo ID, insurance card and any co-payment that has been requested by hospital.     For adults who are unable to consent or make medical decisions for themselves, a legal guardian or Power of  must accompany them to the surgery center. If this is not possible, please call [phone] to make additional arrangements.  If there is any guardianship or legal Power of  paperwork, please bring them the day of surgery.     Wear comfortable, loose fitting clothing into the procedure.  While  your admitted you are asked to bring short sleeve shirts, shorts (loose like pajamas, sweat shorts), and tennis shoes/sneakers.  No slip on shoes.     Please bring your 2-Wheeled Walker with you the day of surgery and the [place] [facility] Booklet that was given to you during your PAT appointment.     The nurse practitioners, , , physical therapist, and occupational therapist will all discuss and work with you during your stay to help with discharge, physical therapy and any other needs. They also may discuss a program called Meds to Beds, where postoperative medications prescribed to you after surgery will be filled and delivered to your beside before you leave, so that you do not need to stop at the pharmacy on the way home    If you use a CPAP or BIPAP, please make sure the PAT nurse was able to get the settings from you, if not please inform the nurses the day of surgery of your settings you use at home.     Additional instructions about eating and drinking before surgery:     Do not eat any solid foods?or drink anything after midnight the night prior to your procedure. Milk, nutritional drinks/supplements, and infant formula are considered solid foods.  This also includes no gum, candy, lozenges, ice, or any other oral consumption.     CHG SOAP & ORAL RINSE   In regards to bathing, please follow the instructions explained to you at the PAT appointment about taking a showering with the CHG soap the 5 nights prior to your surgery.       During your PAT Appointment you were given [employer] CHG Wash Soap (bottles of bubble gum pink soap) to use before procedure.  Begin using the CHG soap 5 days before your scheduled surgery.  Be sure to sleep on clean sheets - change your sheets the first night you do this cleansing process (you don't not need to change them every night).     CHG SOAP INSTRUCTIONS:  Begin using the CHG soap 5 days prior to your scheduled surgery.  You will wash and  rinse your face and genitals with normal soap.  The night before surgery is the ONLY TIME you use the CHG SOAP for your hair, and do NOT use conditioner after washing with the CHG Soap.  For the rest of the showers the 5 days prior to surgery you will use normal shampoo.  Hair extensions should be removed.  Then apply CHG soap solution to a clean wet washcloth.     Turn the water off or move away from the water spray to avoid premature rinsing of the CHG soap as you apply it.   Avoid getting the CHG soap in your ears, eyes, and mouth.  Apply the CHG soap to entire body from the neck down EXCEPT your face and genitals.  Allow the CHG soap to sit for 3 minutes on your skin.  Then turn on water and rinse the CHG solution off your body completely.    DO NOT wash with regular soap after you have used the CHG soap   Pat yourself dry with a clean, fresh-laundered towel when you get out of the shower and clean Pj's  DO NOT apply any powders, deodorants, or lotions after shower   Be aware the CHG soap will stain fabrics if you wash them with bleach after use.   Make sure to pay special attention to cleaning area(s) where your incision(s) will be located. Avoid scrubbing your skin to hard.  You will repeat this same process steps 1-12 for each shower you take prior to surgery.  The morning of  the surgery is the fifth day.      ORAL RINSE   You will receive a call or notification from your pharmacy to  a prescription prior to procedure.  Be sure to  the prescription oral rinse from your local pharmacy.   You will be using the oral rinse the night before and the morning of surgery.    Take a cap full of the solution, 15mL   Swish (gargle if you can) the mouthwash in your mouth for at least 30 seconds, (DO NOT SWALLOW), and spit out   After the oral CHG rinse, do not rinse your mouth with water, drink, or eat.      If you have to take a medication the morning of surgery as instructed by your surgeon- please take  that medication with a sip of water prior to doing the oral rinse the day of surgery.       ?If you have any questions or concerns, please call Pre-Admission Testing at [phone] or your Physician’s office Dr. Hi Clay  [phone]  [place] [facility] General Line: [phone]

## 2024-11-21 LAB — STAPHYLOCOCCUS SPEC CULT: NORMAL

## 2024-11-25 ENCOUNTER — ANESTHESIA EVENT (OUTPATIENT)
Dept: OPERATING ROOM | Facility: HOSPITAL | Age: 84
End: 2024-11-25
Payer: MEDICARE

## 2024-11-26 ENCOUNTER — APPOINTMENT (OUTPATIENT)
Dept: RADIOLOGY | Facility: HOSPITAL | Age: 84
End: 2024-11-26
Payer: MEDICARE

## 2024-11-26 ENCOUNTER — ANESTHESIA (OUTPATIENT)
Dept: OPERATING ROOM | Facility: HOSPITAL | Age: 84
End: 2024-11-26
Payer: MEDICARE

## 2024-11-26 ENCOUNTER — HOSPITAL ENCOUNTER (OUTPATIENT)
Facility: HOSPITAL | Age: 84
Discharge: NURSING, REHAB OR LONG-TERM CARE | End: 2024-11-27
Attending: ORTHOPAEDIC SURGERY | Admitting: ORTHOPAEDIC SURGERY
Payer: MEDICARE

## 2024-11-26 DIAGNOSIS — M17.12 UNILATERAL PRIMARY OSTEOARTHRITIS, LEFT KNEE: Primary | ICD-10-CM

## 2024-11-26 PROBLEM — Z96.652 S/P TKR (TOTAL KNEE REPLACEMENT), LEFT: Status: ACTIVE | Noted: 2024-11-26

## 2024-11-26 LAB
GLUCOSE BLD MANUAL STRIP-MCNC: 110 MG/DL (ref 74–99)
GLUCOSE BLD MANUAL STRIP-MCNC: 144 MG/DL (ref 74–99)

## 2024-11-26 PROCEDURE — 7100000011 HC EXTENDED STAY RECOVERY HOURLY - NURSING UNIT

## 2024-11-26 PROCEDURE — 2500000005 HC RX 250 GENERAL PHARMACY W/O HCPCS: Performed by: PHYSICIAN ASSISTANT

## 2024-11-26 PROCEDURE — 2500000004 HC RX 250 GENERAL PHARMACY W/ HCPCS (ALT 636 FOR OP/ED): Performed by: ANESTHESIOLOGY

## 2024-11-26 PROCEDURE — 73560 X-RAY EXAM OF KNEE 1 OR 2: CPT | Mod: LEFT SIDE | Performed by: RADIOLOGY

## 2024-11-26 PROCEDURE — 27447 TOTAL KNEE ARTHROPLASTY: CPT | Performed by: ORTHOPAEDIC SURGERY

## 2024-11-26 PROCEDURE — 3600000017 HC OR TIME - EACH INCREMENTAL 1 MINUTE - PROCEDURE LEVEL SIX: Performed by: ORTHOPAEDIC SURGERY

## 2024-11-26 PROCEDURE — 2500000004 HC RX 250 GENERAL PHARMACY W/ HCPCS (ALT 636 FOR OP/ED): Performed by: PHYSICIAN ASSISTANT

## 2024-11-26 PROCEDURE — 2500000001 HC RX 250 WO HCPCS SELF ADMINISTERED DRUGS (ALT 637 FOR MEDICARE OP)

## 2024-11-26 PROCEDURE — 2780000003 HC OR 278 NO HCPCS: Performed by: ORTHOPAEDIC SURGERY

## 2024-11-26 PROCEDURE — 2500000004 HC RX 250 GENERAL PHARMACY W/ HCPCS (ALT 636 FOR OP/ED): Mod: JZ

## 2024-11-26 PROCEDURE — 2500000001 HC RX 250 WO HCPCS SELF ADMINISTERED DRUGS (ALT 637 FOR MEDICARE OP): Performed by: PHYSICIAN ASSISTANT

## 2024-11-26 PROCEDURE — 82947 ASSAY GLUCOSE BLOOD QUANT: CPT

## 2024-11-26 PROCEDURE — 2500000004 HC RX 250 GENERAL PHARMACY W/ HCPCS (ALT 636 FOR OP/ED): Performed by: NURSE ANESTHETIST, CERTIFIED REGISTERED

## 2024-11-26 PROCEDURE — 2500000004 HC RX 250 GENERAL PHARMACY W/ HCPCS (ALT 636 FOR OP/ED): Performed by: ORTHOPAEDIC SURGERY

## 2024-11-26 PROCEDURE — 73560 X-RAY EXAM OF KNEE 1 OR 2: CPT | Mod: LT

## 2024-11-26 PROCEDURE — 7100000001 HC RECOVERY ROOM TIME - INITIAL BASE CHARGE: Performed by: ORTHOPAEDIC SURGERY

## 2024-11-26 PROCEDURE — 2500000005 HC RX 250 GENERAL PHARMACY W/O HCPCS: Performed by: ORTHOPAEDIC SURGERY

## 2024-11-26 PROCEDURE — 2500000002 HC RX 250 W HCPCS SELF ADMINISTERED DRUGS (ALT 637 FOR MEDICARE OP, ALT 636 FOR OP/ED): Mod: MUE | Performed by: ORTHOPAEDIC SURGERY

## 2024-11-26 PROCEDURE — C1713 ANCHOR/SCREW BN/BN,TIS/BN: HCPCS | Performed by: ORTHOPAEDIC SURGERY

## 2024-11-26 PROCEDURE — 2500000001 HC RX 250 WO HCPCS SELF ADMINISTERED DRUGS (ALT 637 FOR MEDICARE OP): Performed by: ORTHOPAEDIC SURGERY

## 2024-11-26 PROCEDURE — C1776 JOINT DEVICE (IMPLANTABLE): HCPCS | Performed by: ORTHOPAEDIC SURGERY

## 2024-11-26 PROCEDURE — 27447 TOTAL KNEE ARTHROPLASTY: CPT | Performed by: PHYSICIAN ASSISTANT

## 2024-11-26 PROCEDURE — 7100000002 HC RECOVERY ROOM TIME - EACH INCREMENTAL 1 MINUTE: Performed by: ORTHOPAEDIC SURGERY

## 2024-11-26 PROCEDURE — 2500000004 HC RX 250 GENERAL PHARMACY W/ HCPCS (ALT 636 FOR OP/ED): Mod: JZ | Performed by: PHYSICIAN ASSISTANT

## 2024-11-26 PROCEDURE — 2720000007 HC OR 272 NO HCPCS: Performed by: ORTHOPAEDIC SURGERY

## 2024-11-26 PROCEDURE — 3700000001 HC GENERAL ANESTHESIA TIME - INITIAL BASE CHARGE: Performed by: ORTHOPAEDIC SURGERY

## 2024-11-26 PROCEDURE — 3600000018 HC OR TIME - INITIAL BASE CHARGE - PROCEDURE LEVEL SIX: Performed by: ORTHOPAEDIC SURGERY

## 2024-11-26 PROCEDURE — 3700000002 HC GENERAL ANESTHESIA TIME - EACH INCREMENTAL 1 MINUTE: Performed by: ORTHOPAEDIC SURGERY

## 2024-11-26 PROCEDURE — 2500000002 HC RX 250 W HCPCS SELF ADMINISTERED DRUGS (ALT 637 FOR MEDICARE OP, ALT 636 FOR OP/ED): Performed by: PHYSICIAN ASSISTANT

## 2024-11-26 DEVICE — IMPLANTABLE DEVICE
Type: IMPLANTABLE DEVICE | Site: KNEE | Status: FUNCTIONAL
Brand: PERSONA™

## 2024-11-26 DEVICE — IMPLANTABLE DEVICE
Type: IMPLANTABLE DEVICE | Site: KNEE | Status: FUNCTIONAL
Brand: PERSONA® NATURAL TIBIA®

## 2024-11-26 DEVICE — IMPLANTABLE DEVICE
Type: IMPLANTABLE DEVICE | Site: KNEE | Status: FUNCTIONAL
Brand: BIOMET® BONE CEMENT R

## 2024-11-26 DEVICE — IMPLANTABLE DEVICE: Type: IMPLANTABLE DEVICE | Site: KNEE | Status: FUNCTIONAL

## 2024-11-26 DEVICE — IMPLANTABLE DEVICE
Type: IMPLANTABLE DEVICE | Site: KNEE | Status: FUNCTIONAL
Brand: PERSONA®

## 2024-11-26 DEVICE — DRILL PIN, TROCAR, HEADLESS: Type: IMPLANTABLE DEVICE | Site: KNEE | Status: NON-FUNCTIONAL

## 2024-11-26 DEVICE — SCREW, FEMALE 25MM, PSN 2.5MM: Type: IMPLANTABLE DEVICE | Site: KNEE | Status: NON-FUNCTIONAL

## 2024-11-26 RX ORDER — AMLODIPINE BESYLATE 5 MG/1
5 TABLET ORAL
Status: DISCONTINUED | OUTPATIENT
Start: 2024-11-27 | End: 2024-11-27 | Stop reason: HOSPADM

## 2024-11-26 RX ORDER — OXYCODONE HYDROCHLORIDE 5 MG/1
5 TABLET ORAL EVERY 4 HOURS PRN
Status: DISCONTINUED | OUTPATIENT
Start: 2024-11-26 | End: 2024-11-27 | Stop reason: HOSPADM

## 2024-11-26 RX ORDER — METOPROLOL TARTRATE 1 MG/ML
5 INJECTION, SOLUTION INTRAVENOUS ONCE
Status: DISCONTINUED | OUTPATIENT
Start: 2024-11-26 | End: 2024-11-26 | Stop reason: HOSPADM

## 2024-11-26 RX ORDER — ROPIVACAINE/EPI/CLONIDINE/KET 2.46-0.005
SYRINGE (ML) INJECTION AS NEEDED
Status: DISCONTINUED | OUTPATIENT
Start: 2024-11-26 | End: 2024-11-26 | Stop reason: HOSPADM

## 2024-11-26 RX ORDER — SODIUM CHLORIDE, SODIUM LACTATE, POTASSIUM CHLORIDE, CALCIUM CHLORIDE 600; 310; 30; 20 MG/100ML; MG/100ML; MG/100ML; MG/100ML
50 INJECTION, SOLUTION INTRAVENOUS CONTINUOUS
Status: DISCONTINUED | OUTPATIENT
Start: 2024-11-26 | End: 2024-11-27

## 2024-11-26 RX ORDER — ASPIRIN 81 MG/1
81 TABLET ORAL 2 TIMES DAILY
Status: DISCONTINUED | OUTPATIENT
Start: 2024-11-26 | End: 2024-11-27

## 2024-11-26 RX ORDER — LATANOPROST 50 UG/ML
1 SOLUTION/ DROPS OPHTHALMIC NIGHTLY
Status: DISCONTINUED | OUTPATIENT
Start: 2024-11-26 | End: 2024-11-27 | Stop reason: HOSPADM

## 2024-11-26 RX ORDER — PROPOFOL 10 MG/ML
INJECTION, EMULSION INTRAVENOUS CONTINUOUS PRN
Status: DISCONTINUED | OUTPATIENT
Start: 2024-11-26 | End: 2024-11-26

## 2024-11-26 RX ORDER — OXYCODONE HYDROCHLORIDE 5 MG/1
5 TABLET ORAL EVERY 4 HOURS PRN
Status: DISCONTINUED | OUTPATIENT
Start: 2024-11-26 | End: 2024-11-26 | Stop reason: HOSPADM

## 2024-11-26 RX ORDER — SODIUM CHLORIDE 0.9 G/100ML
IRRIGANT IRRIGATION AS NEEDED
Status: DISCONTINUED | OUTPATIENT
Start: 2024-11-26 | End: 2024-11-26 | Stop reason: HOSPADM

## 2024-11-26 RX ORDER — ACETAMINOPHEN 325 MG/1
650 TABLET ORAL EVERY 4 HOURS PRN
Status: DISCONTINUED | OUTPATIENT
Start: 2024-11-26 | End: 2024-11-26 | Stop reason: HOSPADM

## 2024-11-26 RX ORDER — GABAPENTIN 300 MG/1
600 CAPSULE ORAL ONCE
Status: COMPLETED | OUTPATIENT
Start: 2024-11-26 | End: 2024-11-26

## 2024-11-26 RX ORDER — ACETAMINOPHEN 325 MG/1
975 TABLET ORAL ONCE
Status: COMPLETED | OUTPATIENT
Start: 2024-11-26 | End: 2024-11-26

## 2024-11-26 RX ORDER — BUPIVACAINE HYDROCHLORIDE 7.5 MG/ML
INJECTION, SOLUTION INTRASPINAL AS NEEDED
Status: DISCONTINUED | OUTPATIENT
Start: 2024-11-26 | End: 2024-11-26

## 2024-11-26 RX ORDER — CYCLOBENZAPRINE HCL 10 MG
10 TABLET ORAL 3 TIMES DAILY PRN
Status: DISCONTINUED | OUTPATIENT
Start: 2024-11-26 | End: 2024-11-27 | Stop reason: HOSPADM

## 2024-11-26 RX ORDER — POLYETHYLENE GLYCOL 3350 17 G/17G
17 POWDER, FOR SOLUTION ORAL DAILY
Status: DISCONTINUED | OUTPATIENT
Start: 2024-11-26 | End: 2024-11-27 | Stop reason: HOSPADM

## 2024-11-26 RX ORDER — FUROSEMIDE 40 MG/1
40 TABLET ORAL
Status: DISCONTINUED | OUTPATIENT
Start: 2024-11-27 | End: 2024-11-27 | Stop reason: HOSPADM

## 2024-11-26 RX ORDER — CEFAZOLIN SODIUM 2 G/100ML
2 INJECTION, SOLUTION INTRAVENOUS ONCE
Status: COMPLETED | OUTPATIENT
Start: 2024-11-26 | End: 2024-11-26

## 2024-11-26 RX ORDER — DIPHENHYDRAMINE HYDROCHLORIDE 50 MG/ML
12.5 INJECTION INTRAMUSCULAR; INTRAVENOUS ONCE AS NEEDED
Status: DISCONTINUED | OUTPATIENT
Start: 2024-11-26 | End: 2024-11-26 | Stop reason: HOSPADM

## 2024-11-26 RX ORDER — PROPOFOL 10 MG/ML
INJECTION, EMULSION INTRAVENOUS AS NEEDED
Status: DISCONTINUED | OUTPATIENT
Start: 2024-11-26 | End: 2024-11-26

## 2024-11-26 RX ORDER — TRANEXAMIC ACID 650 MG/1
1950 TABLET ORAL ONCE
Status: COMPLETED | OUTPATIENT
Start: 2024-11-26 | End: 2024-11-26

## 2024-11-26 RX ORDER — SODIUM CHLORIDE, SODIUM LACTATE, POTASSIUM CHLORIDE, CALCIUM CHLORIDE 600; 310; 30; 20 MG/100ML; MG/100ML; MG/100ML; MG/100ML
100 INJECTION, SOLUTION INTRAVENOUS CONTINUOUS
Status: DISCONTINUED | OUTPATIENT
Start: 2024-11-26 | End: 2024-11-26 | Stop reason: HOSPADM

## 2024-11-26 RX ORDER — ONDANSETRON HYDROCHLORIDE 2 MG/ML
4 INJECTION, SOLUTION INTRAVENOUS ONCE AS NEEDED
Status: DISCONTINUED | OUTPATIENT
Start: 2024-11-26 | End: 2024-11-26 | Stop reason: HOSPADM

## 2024-11-26 RX ORDER — MEPERIDINE HYDROCHLORIDE 25 MG/ML
12.5 INJECTION INTRAMUSCULAR; INTRAVENOUS; SUBCUTANEOUS EVERY 10 MIN PRN
Status: DISCONTINUED | OUTPATIENT
Start: 2024-11-26 | End: 2024-11-26 | Stop reason: HOSPADM

## 2024-11-26 RX ORDER — CEFAZOLIN SODIUM 2 G/50ML
2 SOLUTION INTRAVENOUS EVERY 8 HOURS
Status: DISCONTINUED | OUTPATIENT
Start: 2024-11-26 | End: 2024-11-26

## 2024-11-26 RX ORDER — FENTANYL CITRATE 50 UG/ML
25 INJECTION, SOLUTION INTRAMUSCULAR; INTRAVENOUS EVERY 5 MIN PRN
Status: DISCONTINUED | OUTPATIENT
Start: 2024-11-26 | End: 2024-11-26 | Stop reason: HOSPADM

## 2024-11-26 RX ORDER — CEFAZOLIN SODIUM 2 G/100ML
2 INJECTION, SOLUTION INTRAVENOUS EVERY 8 HOURS
Status: COMPLETED | OUTPATIENT
Start: 2024-11-26 | End: 2024-11-27

## 2024-11-26 RX ORDER — LIDOCAINE HYDROCHLORIDE 10 MG/ML
0.1 INJECTION, SOLUTION EPIDURAL; INFILTRATION; INTRACAUDAL; PERINEURAL ONCE
Status: DISCONTINUED | OUTPATIENT
Start: 2024-11-26 | End: 2024-11-26 | Stop reason: HOSPADM

## 2024-11-26 RX ORDER — LOSARTAN POTASSIUM 25 MG/1
25 TABLET ORAL DAILY
Status: DISCONTINUED | OUTPATIENT
Start: 2024-11-26 | End: 2024-11-27 | Stop reason: HOSPADM

## 2024-11-26 RX ORDER — NALOXONE HYDROCHLORIDE 1 MG/ML
0.2 INJECTION INTRAMUSCULAR; INTRAVENOUS; SUBCUTANEOUS EVERY 5 MIN PRN
Status: DISCONTINUED | OUTPATIENT
Start: 2024-11-26 | End: 2024-11-27 | Stop reason: HOSPADM

## 2024-11-26 RX ORDER — VIT C/E/ZN/COPPR/LUTEIN/ZEAXAN 250MG-90MG
1000 CAPSULE ORAL DAILY
Status: DISCONTINUED | OUTPATIENT
Start: 2024-11-27 | End: 2024-11-27 | Stop reason: HOSPADM

## 2024-11-26 RX ORDER — OXYCODONE HYDROCHLORIDE 5 MG/1
10 TABLET ORAL EVERY 4 HOURS PRN
Status: DISCONTINUED | OUTPATIENT
Start: 2024-11-26 | End: 2024-11-26 | Stop reason: HOSPADM

## 2024-11-26 RX ORDER — ONDANSETRON 4 MG/1
4 TABLET, ORALLY DISINTEGRATING ORAL EVERY 8 HOURS PRN
Status: DISCONTINUED | OUTPATIENT
Start: 2024-11-26 | End: 2024-11-27 | Stop reason: HOSPADM

## 2024-11-26 RX ORDER — MORPHINE SULFATE 2 MG/ML
2 INJECTION, SOLUTION INTRAMUSCULAR; INTRAVENOUS EVERY 2 HOUR PRN
Status: DISCONTINUED | OUTPATIENT
Start: 2024-11-26 | End: 2024-11-27 | Stop reason: HOSPADM

## 2024-11-26 RX ORDER — METOPROLOL TARTRATE 25 MG/1
12.5 TABLET, FILM COATED ORAL 2 TIMES DAILY
Status: DISCONTINUED | OUTPATIENT
Start: 2024-11-26 | End: 2024-11-27 | Stop reason: HOSPADM

## 2024-11-26 RX ORDER — OXYCODONE HYDROCHLORIDE 5 MG/1
10 TABLET ORAL EVERY 6 HOURS PRN
Status: DISCONTINUED | OUTPATIENT
Start: 2024-11-26 | End: 2024-11-27 | Stop reason: HOSPADM

## 2024-11-26 RX ORDER — ONDANSETRON HYDROCHLORIDE 2 MG/ML
4 INJECTION, SOLUTION INTRAVENOUS EVERY 8 HOURS PRN
Status: DISCONTINUED | OUTPATIENT
Start: 2024-11-26 | End: 2024-11-27 | Stop reason: HOSPADM

## 2024-11-26 RX ORDER — CHOLECALCIFEROL (VITAMIN D3) 25 MCG
2000 TABLET ORAL DAILY
Status: DISCONTINUED | OUTPATIENT
Start: 2024-11-27 | End: 2024-11-27 | Stop reason: HOSPADM

## 2024-11-26 RX ORDER — DIPHENHYDRAMINE HCL 12.5MG/5ML
12.5 LIQUID (ML) ORAL EVERY 6 HOURS PRN
Status: DISCONTINUED | OUTPATIENT
Start: 2024-11-26 | End: 2024-11-27 | Stop reason: HOSPADM

## 2024-11-26 RX ORDER — TRANEXAMIC ACID 650 MG/1
1950 TABLET ORAL ONCE
Status: COMPLETED | OUTPATIENT
Start: 2024-11-27 | End: 2024-11-27

## 2024-11-26 RX ORDER — DAPAGLIFLOZIN 5 MG/1
5 TABLET, FILM COATED ORAL DAILY
Status: DISCONTINUED | OUTPATIENT
Start: 2024-11-26 | End: 2024-11-27

## 2024-11-26 RX ORDER — ALBUTEROL SULFATE 0.83 MG/ML
2.5 SOLUTION RESPIRATORY (INHALATION) ONCE AS NEEDED
Status: DISCONTINUED | OUTPATIENT
Start: 2024-11-26 | End: 2024-11-26 | Stop reason: HOSPADM

## 2024-11-26 RX ORDER — BISACODYL 5 MG
10 TABLET, DELAYED RELEASE (ENTERIC COATED) ORAL DAILY PRN
Status: DISCONTINUED | OUTPATIENT
Start: 2024-11-26 | End: 2024-11-27 | Stop reason: HOSPADM

## 2024-11-26 RX ORDER — ACETAMINOPHEN 325 MG/1
650 TABLET ORAL EVERY 6 HOURS SCHEDULED
Status: DISCONTINUED | OUTPATIENT
Start: 2024-11-26 | End: 2024-11-27 | Stop reason: HOSPADM

## 2024-11-26 SDOH — ECONOMIC STABILITY: HOUSING INSECURITY: AT ANY TIME IN THE PAST 12 MONTHS, WERE YOU HOMELESS OR LIVING IN A SHELTER (INCLUDING NOW)?: NO

## 2024-11-26 SDOH — SOCIAL STABILITY: SOCIAL INSECURITY: WITHIN THE LAST YEAR, HAVE YOU BEEN AFRAID OF YOUR PARTNER OR EX-PARTNER?: PATIENT DECLINED

## 2024-11-26 SDOH — ECONOMIC STABILITY: HOUSING INSECURITY: IN THE LAST 12 MONTHS, WAS THERE A TIME WHEN YOU WERE NOT ABLE TO PAY THE MORTGAGE OR RENT ON TIME?: NO

## 2024-11-26 SDOH — ECONOMIC STABILITY: INCOME INSECURITY
IN THE PAST 12 MONTHS HAS THE ELECTRIC, GAS, OIL, OR WATER COMPANY THREATENED TO SHUT OFF SERVICES IN YOUR HOME?: PATIENT DECLINED

## 2024-11-26 SDOH — HEALTH STABILITY: MENTAL HEALTH: CURRENT SMOKER: 0

## 2024-11-26 SDOH — SOCIAL STABILITY: SOCIAL INSECURITY: DOES ANYONE TRY TO KEEP YOU FROM HAVING/CONTACTING OTHER FRIENDS OR DOING THINGS OUTSIDE YOUR HOME?: NO

## 2024-11-26 SDOH — SOCIAL STABILITY: SOCIAL INSECURITY: DO YOU FEEL ANYONE HAS EXPLOITED OR TAKEN ADVANTAGE OF YOU FINANCIALLY OR OF YOUR PERSONAL PROPERTY?: NO

## 2024-11-26 SDOH — SOCIAL STABILITY: SOCIAL INSECURITY
WITHIN THE LAST YEAR, HAVE YOU BEEN HUMILIATED OR EMOTIONALLY ABUSED IN OTHER WAYS BY YOUR PARTNER OR EX-PARTNER?: PATIENT DECLINED

## 2024-11-26 SDOH — ECONOMIC STABILITY: FOOD INSECURITY: HOW HARD IS IT FOR YOU TO PAY FOR THE VERY BASICS LIKE FOOD, HOUSING, MEDICAL CARE, AND HEATING?: NOT HARD AT ALL

## 2024-11-26 SDOH — ECONOMIC STABILITY: FOOD INSECURITY
WITHIN THE PAST 12 MONTHS, YOU WORRIED THAT YOUR FOOD WOULD RUN OUT BEFORE YOU GOT THE MONEY TO BUY MORE.: PATIENT DECLINED

## 2024-11-26 SDOH — SOCIAL STABILITY: SOCIAL INSECURITY: WERE YOU ABLE TO COMPLETE ALL THE BEHAVIORAL HEALTH SCREENINGS?: YES

## 2024-11-26 SDOH — SOCIAL STABILITY: SOCIAL INSECURITY: ARE YOU OR HAVE YOU BEEN THREATENED OR ABUSED PHYSICALLY, EMOTIONALLY, OR SEXUALLY BY ANYONE?: NO

## 2024-11-26 SDOH — SOCIAL STABILITY: SOCIAL INSECURITY: ABUSE: ADULT

## 2024-11-26 SDOH — ECONOMIC STABILITY: HOUSING INSECURITY: IN THE PAST 12 MONTHS, HOW MANY TIMES HAVE YOU MOVED WHERE YOU WERE LIVING?: 0

## 2024-11-26 SDOH — ECONOMIC STABILITY: TRANSPORTATION INSECURITY: IN THE PAST 12 MONTHS, HAS LACK OF TRANSPORTATION KEPT YOU FROM MEDICAL APPOINTMENTS OR FROM GETTING MEDICATIONS?: NO

## 2024-11-26 SDOH — SOCIAL STABILITY: SOCIAL INSECURITY: HAS ANYONE EVER THREATENED TO HURT YOUR FAMILY OR YOUR PETS?: NO

## 2024-11-26 SDOH — SOCIAL STABILITY: SOCIAL INSECURITY
WITHIN THE LAST YEAR, HAVE YOU BEEN RAPED OR FORCED TO HAVE ANY KIND OF SEXUAL ACTIVITY BY YOUR PARTNER OR EX-PARTNER?: PATIENT DECLINED

## 2024-11-26 SDOH — SOCIAL STABILITY: SOCIAL INSECURITY: HAVE YOU HAD ANY THOUGHTS OF HARMING ANYONE ELSE?: NO

## 2024-11-26 SDOH — ECONOMIC STABILITY: FOOD INSECURITY: WITHIN THE PAST 12 MONTHS, THE FOOD YOU BOUGHT JUST DIDN'T LAST AND YOU DIDN'T HAVE MONEY TO GET MORE.: PATIENT DECLINED

## 2024-11-26 SDOH — SOCIAL STABILITY: SOCIAL INSECURITY: HAVE YOU HAD THOUGHTS OF HARMING ANYONE ELSE?: NO

## 2024-11-26 SDOH — SOCIAL STABILITY: SOCIAL INSECURITY: ARE THERE ANY APPARENT SIGNS OF INJURIES/BEHAVIORS THAT COULD BE RELATED TO ABUSE/NEGLECT?: NO

## 2024-11-26 SDOH — SOCIAL STABILITY: SOCIAL INSECURITY: DO YOU FEEL UNSAFE GOING BACK TO THE PLACE WHERE YOU ARE LIVING?: NO

## 2024-11-26 SDOH — SOCIAL STABILITY: SOCIAL INSECURITY
WITHIN THE LAST YEAR, HAVE YOU BEEN KICKED, HIT, SLAPPED, OR OTHERWISE PHYSICALLY HURT BY YOUR PARTNER OR EX-PARTNER?: PATIENT DECLINED

## 2024-11-26 ASSESSMENT — PAIN DESCRIPTION - DESCRIPTORS
DESCRIPTORS: ACHING
DESCRIPTORS: SORE
DESCRIPTORS: SORE;ACHING

## 2024-11-26 ASSESSMENT — PAIN - FUNCTIONAL ASSESSMENT
PAIN_FUNCTIONAL_ASSESSMENT: 0-10
PAIN_FUNCTIONAL_ASSESSMENT: WONG-BAKER FACES
PAIN_FUNCTIONAL_ASSESSMENT: UNABLE TO SELF-REPORT
PAIN_FUNCTIONAL_ASSESSMENT: 0-10
PAIN_FUNCTIONAL_ASSESSMENT: UNABLE TO SELF-REPORT
PAIN_FUNCTIONAL_ASSESSMENT: 0-10
PAIN_FUNCTIONAL_ASSESSMENT: UNABLE TO SELF-REPORT
PAIN_FUNCTIONAL_ASSESSMENT: 0-10

## 2024-11-26 ASSESSMENT — ACTIVITIES OF DAILY LIVING (ADL)
TOILETING: INDEPENDENT
BATHING: INDEPENDENT
GROOMING: INDEPENDENT
FEEDING YOURSELF: INDEPENDENT
HEARING - RIGHT EAR: FUNCTIONAL
JUDGMENT_ADEQUATE_SAFELY_COMPLETE_DAILY_ACTIVITIES: YES
LACK_OF_TRANSPORTATION: NO
HEARING - LEFT EAR: FUNCTIONAL
ADEQUATE_TO_COMPLETE_ADL: YES
WALKS IN HOME: INDEPENDENT
ASSISTIVE_DEVICE: WALKER
PATIENT'S MEMORY ADEQUATE TO SAFELY COMPLETE DAILY ACTIVITIES?: YES
DRESSING YOURSELF: INDEPENDENT
LACK_OF_TRANSPORTATION: NO

## 2024-11-26 ASSESSMENT — COGNITIVE AND FUNCTIONAL STATUS - GENERAL
TOILETING: A LOT
DRESSING REGULAR LOWER BODY CLOTHING: A LOT
WALKING IN HOSPITAL ROOM: TOTAL
DRESSING REGULAR UPPER BODY CLOTHING: A LITTLE
MOBILITY SCORE: 10
PERSONAL GROOMING: A LITTLE
TURNING FROM BACK TO SIDE WHILE IN FLAT BAD: A LOT
CLIMB 3 TO 5 STEPS WITH RAILING: TOTAL
EATING MEALS: A LITTLE
MOVING TO AND FROM BED TO CHAIR: A LOT
MOVING FROM LYING ON BACK TO SITTING ON SIDE OF FLAT BED WITH BEDRAILS: A LOT
DAILY ACTIVITIY SCORE: 16
PATIENT BASELINE BEDBOUND: NO
STANDING UP FROM CHAIR USING ARMS: A LOT
HELP NEEDED FOR BATHING: A LITTLE

## 2024-11-26 ASSESSMENT — PAIN SCALES - GENERAL
PAINLEVEL_OUTOF10: 9
PAINLEVEL_OUTOF10: 0 - NO PAIN
PAINLEVEL_OUTOF10: 7
PAINLEVEL_OUTOF10: 5 - MODERATE PAIN
PAINLEVEL_OUTOF10: 5 - MODERATE PAIN
PAINLEVEL_OUTOF10: 8
PAINLEVEL_OUTOF10: 0 - NO PAIN
PAINLEVEL_OUTOF10: 0 - NO PAIN
PAINLEVEL_OUTOF10: 5 - MODERATE PAIN
PAINLEVEL_OUTOF10: 7

## 2024-11-26 ASSESSMENT — COLUMBIA-SUICIDE SEVERITY RATING SCALE - C-SSRS
2. HAVE YOU ACTUALLY HAD ANY THOUGHTS OF KILLING YOURSELF?: NO
6. HAVE YOU EVER DONE ANYTHING, STARTED TO DO ANYTHING, OR PREPARED TO DO ANYTHING TO END YOUR LIFE?: NO
1. IN THE PAST MONTH, HAVE YOU WISHED YOU WERE DEAD OR WISHED YOU COULD GO TO SLEEP AND NOT WAKE UP?: NO

## 2024-11-26 ASSESSMENT — PATIENT HEALTH QUESTIONNAIRE - PHQ9
SUM OF ALL RESPONSES TO PHQ9 QUESTIONS 1 & 2: 0
1. LITTLE INTEREST OR PLEASURE IN DOING THINGS: NOT AT ALL
2. FEELING DOWN, DEPRESSED OR HOPELESS: NOT AT ALL

## 2024-11-26 ASSESSMENT — LIFESTYLE VARIABLES
HOW MANY STANDARD DRINKS CONTAINING ALCOHOL DO YOU HAVE ON A TYPICAL DAY: PATIENT DOES NOT DRINK
HOW OFTEN DO YOU HAVE 6 OR MORE DRINKS ON ONE OCCASION: NEVER
AUDIT-C TOTAL SCORE: 0
SKIP TO QUESTIONS 9-10: 1
AUDIT-C TOTAL SCORE: 0
HOW OFTEN DO YOU HAVE A DRINK CONTAINING ALCOHOL: NEVER

## 2024-11-26 ASSESSMENT — PAIN DESCRIPTION - ORIENTATION
ORIENTATION: LEFT
ORIENTATION: LEFT

## 2024-11-26 ASSESSMENT — PAIN DESCRIPTION - LOCATION
LOCATION: KNEE
LOCATION: KNEE

## 2024-11-26 NOTE — ANESTHESIA PROCEDURE NOTES
Peripheral Block    Patient location during procedure: pre-op  Start time: 11/26/2024 1:59 PM  Reason for block: procedure for pain, at surgeon's request and post-op pain management  Staffing  Performed: attending   Authorized by: Annika Corrales MD    Performed by: Annika Corrales MD  Preanesthetic Checklist  Completed: patient identified, IV checked, site marked, risks and benefits discussed, surgical consent, monitors and equipment checked, pre-op evaluation and timeout performed   Timeout performed at: 11/26/2024 1:54 AM  Peripheral Block  Prep: ChloraPrep  Patient monitoring: cardiac monitor, continuous pulse ox and heart rate  Block type: other  Laterality: left  Injection technique: single-shot  Guidance: ultrasound guided  Local infiltration: lidocaine  Infiltration strength: 1 %  Dose: 4 mL  Needle  Needle gauge: 21 G  Needle localization: ultrasound guidance  Needle insertion depth: 7 cm  Test dose: negative  Assessment  Injection assessment: negative aspiration for heme, no paresthesia on injection, incremental injection and local visualized surrounding nerve on ultrasound  Heart rate change: no  Slow fractionated injection: no  Additional Notes  20 Ccs 0.5% ropivicaine + epi 1:200,000 + 5 mg dexamethasone

## 2024-11-26 NOTE — ANESTHESIA PROCEDURE NOTES
Peripheral Block    Start time: 11/26/2024 3:05 PM  Reason for block: procedure for pain, at surgeon's request and post-op pain management  Staffing  Performed: attending   Authorized by: Annika Corrales MD    Performed by: Annika Corrales MD  Preanesthetic Checklist  Completed: patient identified, IV checked, site marked, risks and benefits discussed, surgical consent, monitors and equipment checked, pre-op evaluation and timeout performed   Timeout performed at: 11/26/2024 1:54 PM  Peripheral Block  Patient position: laying flat  Prep: ChloraPrep  Patient monitoring: heart rate, cardiac monitor and continuous pulse ox  Block type: adductor canal  Laterality: left  Injection technique: single-shot  Guidance: Doppler guided  Local infiltration: lidocaine  Infiltration strength: 1 %  Dose: 4 mL  Needle  Needle gauge: 21 G  Needle length: 10 cm  Needle localization: ultrasound guidance  Needle insertion depth: 7 cm  Test dose: negative  Assessment  Injection assessment: negative aspiration for heme, no paresthesia on injection, incremental injection and local visualized surrounding nerve on ultrasound  Paresthesia pain: none  Heart rate change: no  Slow fractionated injection: no  Additional Notes  20 Ccs 0.5% ropivicaine + epi 1:200,000 + 5 mg dexamethasone

## 2024-11-26 NOTE — ANESTHESIA POSTPROCEDURE EVALUATION
"Patient: Savita Snell \"FILOMENA\"    Procedure Summary       Date: 11/26/24 Room / Location: [facility] / [facility]    Anesthesia Start: 1407 Anesthesia Stop: 1547    Procedure: UNICOMPARTMENTAL KNEE VERSES TOTAL KNEE ARTHROPLASTY (Left: Knee) Diagnosis:       Pain in left knee      Unilateral primary osteoarthritis, left knee      (Pain in left knee [M25.562])      (Unilateral primary osteoarthritis, left knee [M17.12])    Surgeons: Hi Clay MD Responsible Provider: Qamar Johnson MD    Anesthesia Type: general ASA Status: 3            Anesthesia Type: general    Anesthesia Post Evaluation    Patient location during evaluation: PACU  Patient participation: complete - patient participated  Level of consciousness: awake and alert  Pain management: adequate  Airway patency: patent  Cardiovascular status: acceptable  Respiratory status: acceptable  Hydration status: acceptable  Postoperative Nausea and Vomiting: none    No notable events documented.    "

## 2024-11-26 NOTE — ANESTHESIA PREPROCEDURE EVALUATION
"Patient: Savita Snell \"FILOMENA\"    Procedure Information       Date/Time: 11/26/24 1245    Procedure: UNICOMPARTMENTAL KNEE VERSES TOTAL KNEE ARTHROPLASTY (Left: Knee) - 23HR/OBS BEDDED, DIABETIC, ANDREW PSI    Location: [facility]    Surgeons: Hi Clay MD            Relevant Problems   Musculoskeletal   (+) Unilateral primary osteoarthritis, left knee       Clinical information reviewed:   Tobacco  Allergies  Meds  Problems  Med Hx  Surg Hx   Fam Hx  Soc   Hx        NPO Detail:  NPO/Void Status  Carbohydrate Drink Given Prior to Surgery? : N  Date of Last Liquid: 11/25/24  Time of Last Liquid: 1800  Date of Last Solid: 11/25/24  Time of Last Solid: 1800  Last Intake Type: Clear fluids  Time of Last Void: 0630         Physical Exam    Airway  Mallampati: II  TM distance: >3 FB  Neck ROM: full     Cardiovascular - normal exam     Dental    Pulmonary - normal exam     Abdominal - normal exam             Anesthesia Plan    History of general anesthesia?: yes  History of complications of general anesthesia?: no    ASA 3     general   (Adductor canal and IPACK blocks for postop pain management)  The patient is not a current smoker.  Patient did not smoke on day of procedure.    intravenous induction   Anesthetic plan and risks discussed with patient.    Plan discussed with CRNA and attending.      "

## 2024-11-26 NOTE — PERIOPERATIVE NURSING NOTE
Patient sleeping. When she is awakened, she states her knee hurts-falls back to sleep with no intervention. Explained to her that no further IV pain med will be given in PACU and suggested oral pain meds to nurse with report. She agrees with plan.

## 2024-11-26 NOTE — ANESTHESIA PROCEDURE NOTES
Spinal Block    Patient location during procedure: OR  Start time: 11/26/2024 2:10 PM  End time: 11/26/2024 2:16 PM  Reason for block: primary anesthetic  Staffing  Performed: SRNA   Authorized by: YAHIR Davis    Performed by: YAHIR Davis    Preanesthetic Checklist  Completed: patient identified, IV checked, risks and benefits discussed, surgical consent, monitors and equipment checked, pre-op evaluation, timeout performed and sterile techniques followed  Block Timeout  RN/Licensed healthcare professional reads aloud to the Anesthesia provider and entire team: Patient identity, procedure with side and site, patient position, and as applicable the availability of implants/special equipment/special requirements.  Patient on coagulant treatment: no  Timeout performed at: 11/26/2024 2:09 PM  Spinal Block  Patient position: sitting  Prep: Betadine  Sterility prep: gloves, hand hygiene, mask, drape and cap  Sedation level: no sedation  Patient monitoring: blood pressure, continuous pulse oximetry, EKG, ETCO2 and heart rate  Approach: midline  Vertebral space: L4-5  Injection technique: single-shot  Needle  Needle type: Quincke   Needle gauge: 22 G  Needle length: 3.5 in  Free flowing CSF: yes    Assessment  Sensory level: T10  Block outcome: patient comfortable  Procedure assessment: patient tolerated procedure well with no immediate complications

## 2024-11-26 NOTE — OP NOTE
UNICOMPARTMENTAL KNEE VERSES TOTAL KNEE ARTHROPLASTY (L) Operative Note    Preop diagnosis: Left knee DJD    Postop diagnosis: Same    Procedure: Left total knee arthroplasty using a Regla size 5 cemented persona posterior stabilized femoral component, size D cemented persona tibial tray with 12 mm persona posterior stabilized polyethylene spacer and 26 mm all poly patellar inset button    Surgeon: Hi Clay MD  Assistant: Vita Gaines PA-C      Findings: see procedure details    EBL: minimal    Procedure Details:     INDICATIONS:   The patient had progressive knee pain, which has been refractory to conservative treatment. The patient has decided to undergo elective left total knee replacement using a cemented total knee replacement. The risks, benefits, outcomes and postoperative course were fully explained to the patient. We discussed wear, loosening, infection, blood clot, loss of life, loss of limb, nerve damage, numbness, failure of procedure, stiffness, progressive arthritis and the need for potential revision knee replacement. The patient understands the procedure, risks and benefits, and consents to the surgical intervention.     OPERATION AND FINDINGS:   The patient was brought to the operating room and placed on the surgical table in supine position whereupon adequate anesthesia was then obtained. Surgical time-out was performed. The patient received preoperative antibiotics. The patient was prepped and draped in the usual sterile manner. The left leg was exsanguinated using Esmarch bandage and a tourniquet was applied at 300 mmHg. A linear midline incision made from the superior pole of the patella to the tibial tubercle identifying the entire extensor mechanism. A medial parapatellar arthrotomy was performed and the patella was everted. Fat pad was excised and once this was complete the patient was noted to have significant hypertrophic synovium with degenerative joint disease and large  osteophyte formation. Medial and lateral femoral retractors were inserted. intramedullary femoral guide was placed into position. Once this was complete the guide was removed and the distal femoral resection was performed. With the distal femoral section complete the pins were inserted over the distal femur and the multipurpose cutting guide was applied and screwed in position. With the guide in position, the anterior, posterior and chamfer cuts were made without difficulty. Once the femoral cuts were complete a posterior retractor was inserted. The medial and lateral tibial retractors were inserted and the meniscal remnants were excised. The extramedullary tibial guide for the tibia was pinned in position. The guide was removed and the tibial resection was made after checking the extramedullary alignment with the extramedullary alignment device. Once the tibial resection was complete, the trial components were inserted. Patellar reaming was performed. Peg holes were drilled in the patella and trial patella was applied. The knee was placed through range of motion and once appropriate stability was obtained, trial components were removed. All bony surfaces were irrigated with a copious amount of saline irrigation. Components were cemented in position as indicated. All marginal cement was removed and deep drain placed in the knee joint. The capsule was closed using interrupted #1 Vicryl and additional #2 Ethibond sutures. Subcutaneous tissues were closed using 2-0 Vicryl. Skin was closed with absorbable suture and surgical glue. A dry sterile bulky dressing was applied. Patient tolerated the procedure well and was taken to the PACU in good condition without complication.     Vita Gaines PA-C was present throughout the entire case. Given the nature of the disease process and the procedure, a skilled surgical first assistant was necessary during the case. The assistant was necessary to hold retractors and to  manipulate the extremity during the procedure. A certified scrub tech was at the back table managing the instruments and supplies for the surgical case.    Complications:  None; patient tolerated the procedure well.    Disposition: PACU - hemodynamically stable.  Condition: stable         Hi Clay  Phone Number: [phone]

## 2024-11-26 NOTE — PERIOPERATIVE NURSING NOTE
Patient very sleepy after IV pain meds. When she awakens she rates her pain a 7 but falls back to sleep with no intervention. Will hold IV pain  meds at this time and report to floor to start oral meds.

## 2024-11-27 ENCOUNTER — PHARMACY VISIT (OUTPATIENT)
Dept: PHARMACY | Facility: CLINIC | Age: 84
End: 2024-11-27
Payer: COMMERCIAL

## 2024-11-27 VITALS
HEIGHT: 62 IN | RESPIRATION RATE: 16 BRPM | WEIGHT: 193.78 LBS | BODY MASS INDEX: 35.66 KG/M2 | DIASTOLIC BLOOD PRESSURE: 73 MMHG | OXYGEN SATURATION: 95 % | TEMPERATURE: 97.2 F | SYSTOLIC BLOOD PRESSURE: 168 MMHG | HEART RATE: 70 BPM

## 2024-11-27 LAB
ANION GAP SERPL CALC-SCNC: 12 MMOL/L (ref 10–20)
BUN SERPL-MCNC: 66 MG/DL (ref 6–23)
CALCIUM SERPL-MCNC: 7.9 MG/DL (ref 8.6–10.3)
CHLORIDE SERPL-SCNC: 106 MMOL/L (ref 98–107)
CO2 SERPL-SCNC: 26 MMOL/L (ref 21–32)
CREAT SERPL-MCNC: 2.46 MG/DL (ref 0.5–1.05)
EGFRCR SERPLBLD CKD-EPI 2021: 19 ML/MIN/1.73M*2
ERYTHROCYTE [DISTWIDTH] IN BLOOD BY AUTOMATED COUNT: 13.5 % (ref 11.5–14.5)
GLUCOSE SERPL-MCNC: 171 MG/DL (ref 74–99)
HCT VFR BLD AUTO: 34.5 % (ref 36–46)
HGB BLD-MCNC: 10.7 G/DL (ref 12–16)
MCH RBC QN AUTO: 30.5 PG (ref 26–34)
MCHC RBC AUTO-ENTMCNC: 31 G/DL (ref 32–36)
MCV RBC AUTO: 98 FL (ref 80–100)
NRBC BLD-RTO: 0 /100 WBCS (ref 0–0)
PLATELET # BLD AUTO: 147 X10*3/UL (ref 150–450)
POTASSIUM SERPL-SCNC: 5.4 MMOL/L (ref 3.5–5.3)
RBC # BLD AUTO: 3.51 X10*6/UL (ref 4–5.2)
SODIUM SERPL-SCNC: 139 MMOL/L (ref 136–145)
WBC # BLD AUTO: 10.6 X10*3/UL (ref 4.4–11.3)

## 2024-11-27 PROCEDURE — 36415 COLL VENOUS BLD VENIPUNCTURE: CPT | Performed by: ORTHOPAEDIC SURGERY

## 2024-11-27 PROCEDURE — 97110 THERAPEUTIC EXERCISES: CPT | Mod: GP,CQ

## 2024-11-27 PROCEDURE — 2500000004 HC RX 250 GENERAL PHARMACY W/ HCPCS (ALT 636 FOR OP/ED): Performed by: PHYSICIAN ASSISTANT

## 2024-11-27 PROCEDURE — 51701 INSERT BLADDER CATHETER: CPT

## 2024-11-27 PROCEDURE — 7100000011 HC EXTENDED STAY RECOVERY HOURLY - NURSING UNIT

## 2024-11-27 PROCEDURE — 2500000002 HC RX 250 W HCPCS SELF ADMINISTERED DRUGS (ALT 637 FOR MEDICARE OP, ALT 636 FOR OP/ED): Performed by: ORTHOPAEDIC SURGERY

## 2024-11-27 PROCEDURE — 97116 GAIT TRAINING THERAPY: CPT | Mod: GP,CQ

## 2024-11-27 PROCEDURE — 2500000004 HC RX 250 GENERAL PHARMACY W/ HCPCS (ALT 636 FOR OP/ED): Mod: JZ

## 2024-11-27 PROCEDURE — 97161 PT EVAL LOW COMPLEX 20 MIN: CPT | Mod: GP | Performed by: PHYSICAL THERAPIST

## 2024-11-27 PROCEDURE — 2500000001 HC RX 250 WO HCPCS SELF ADMINISTERED DRUGS (ALT 637 FOR MEDICARE OP)

## 2024-11-27 PROCEDURE — 2500000004 HC RX 250 GENERAL PHARMACY W/ HCPCS (ALT 636 FOR OP/ED): Performed by: ORTHOPAEDIC SURGERY

## 2024-11-27 PROCEDURE — 2500000001 HC RX 250 WO HCPCS SELF ADMINISTERED DRUGS (ALT 637 FOR MEDICARE OP): Performed by: ORTHOPAEDIC SURGERY

## 2024-11-27 PROCEDURE — 2500000001 HC RX 250 WO HCPCS SELF ADMINISTERED DRUGS (ALT 637 FOR MEDICARE OP): Performed by: PHYSICIAN ASSISTANT

## 2024-11-27 PROCEDURE — 80048 BASIC METABOLIC PNL TOTAL CA: CPT | Performed by: ORTHOPAEDIC SURGERY

## 2024-11-27 PROCEDURE — 2500000002 HC RX 250 W HCPCS SELF ADMINISTERED DRUGS (ALT 637 FOR MEDICARE OP, ALT 636 FOR OP/ED)

## 2024-11-27 PROCEDURE — 97165 OT EVAL LOW COMPLEX 30 MIN: CPT | Mod: GO

## 2024-11-27 PROCEDURE — 85027 COMPLETE CBC AUTOMATED: CPT | Performed by: ORTHOPAEDIC SURGERY

## 2024-11-27 PROCEDURE — 97530 THERAPEUTIC ACTIVITIES: CPT | Mod: GP,CQ

## 2024-11-27 RX ORDER — CYCLOBENZAPRINE HCL 10 MG
10 TABLET ORAL 3 TIMES DAILY PRN
Qty: 21 TABLET | Refills: 0 | Status: ON HOLD | OUTPATIENT
Start: 2024-11-27

## 2024-11-27 RX ORDER — DAPAGLIFLOZIN 5 MG/1
5 TABLET, FILM COATED ORAL DAILY
Status: DISCONTINUED | OUTPATIENT
Start: 2024-11-28 | End: 2024-11-27 | Stop reason: HOSPADM

## 2024-11-27 RX ORDER — OXYCODONE HYDROCHLORIDE 5 MG/1
5 TABLET ORAL EVERY 6 HOURS PRN
Qty: 28 TABLET | Refills: 0 | Status: ON HOLD | OUTPATIENT
Start: 2024-11-27

## 2024-11-27 RX ORDER — ONDANSETRON 4 MG/1
4 TABLET, ORALLY DISINTEGRATING ORAL EVERY 8 HOURS PRN
Qty: 20 TABLET | Refills: 0 | Status: ON HOLD | OUTPATIENT
Start: 2024-11-27

## 2024-11-27 ASSESSMENT — COGNITIVE AND FUNCTIONAL STATUS - GENERAL
MOVING FROM LYING ON BACK TO SITTING ON SIDE OF FLAT BED WITH BEDRAILS: A LOT
DAILY ACTIVITIY SCORE: 15
STANDING UP FROM CHAIR USING ARMS: A LITTLE
WALKING IN HOSPITAL ROOM: A LOT
MOBILITY SCORE: 14
STANDING UP FROM CHAIR USING ARMS: A LITTLE
HELP NEEDED FOR BATHING: A LITTLE
DRESSING REGULAR LOWER BODY CLOTHING: A LITTLE
EATING MEALS: A LITTLE
DRESSING REGULAR UPPER BODY CLOTHING: A LITTLE
MOVING TO AND FROM BED TO CHAIR: A LITTLE
PERSONAL GROOMING: A LITTLE
PERSONAL GROOMING: A LITTLE
TURNING FROM BACK TO SIDE WHILE IN FLAT BAD: A LITTLE
CLIMB 3 TO 5 STEPS WITH RAILING: TOTAL
CLIMB 3 TO 5 STEPS WITH RAILING: A LITTLE
TOILETING: A LOT
TURNING FROM BACK TO SIDE WHILE IN FLAT BAD: A LOT
MOVING FROM LYING ON BACK TO SITTING ON SIDE OF FLAT BED WITH BEDRAILS: A LOT
STANDING UP FROM CHAIR USING ARMS: A LOT
MOVING FROM LYING ON BACK TO SITTING ON SIDE OF FLAT BED WITH BEDRAILS: A LITTLE
DAILY ACTIVITIY SCORE: 18
TOILETING: A LITTLE
DRESSING REGULAR LOWER BODY CLOTHING: A LOT
MOBILITY SCORE: 11
MOVING TO AND FROM BED TO CHAIR: A LITTLE
CLIMB 3 TO 5 STEPS WITH RAILING: TOTAL
TURNING FROM BACK TO SIDE WHILE IN FLAT BAD: A LOT
MOVING TO AND FROM BED TO CHAIR: A LOT
HELP NEEDED FOR BATHING: A LOT
MOBILITY SCORE: 18
WALKING IN HOSPITAL ROOM: A LITTLE
DRESSING REGULAR UPPER BODY CLOTHING: A LOT
WALKING IN HOSPITAL ROOM: A LITTLE

## 2024-11-27 ASSESSMENT — PAIN SCALES - GENERAL
PAINLEVEL_OUTOF10: 2
PAINLEVEL_OUTOF10: 7
PAINLEVEL_OUTOF10: 3
PAINLEVEL_OUTOF10: 2
PAINLEVEL_OUTOF10: 5 - MODERATE PAIN
PAINLEVEL_OUTOF10: 2

## 2024-11-27 ASSESSMENT — PAIN - FUNCTIONAL ASSESSMENT
PAIN_FUNCTIONAL_ASSESSMENT: 0-10

## 2024-11-27 ASSESSMENT — ACTIVITIES OF DAILY LIVING (ADL)
LACK_OF_TRANSPORTATION: NO
BATHING_ASSISTANCE: MODERATE

## 2024-11-27 NOTE — PROGRESS NOTES
"Occupational Therapy    Evaluation/Treatment    Patient Name: Savita Snell \"FILOMENA\"  MRN: [id number]  : 1940  Today's Date: 24  Time Calculation  Start Time: 723  Stop Time: 754  Time Calculation (min): 31 min     602/602-A    Assessment:  OT Assessment: pt presentes with decreased safety with functional mobility, decreased indep with aDLS, will benefit from con't skilled OT to address impairments  Prognosis: Good  Barriers to Discharge: None  OT Assessment Results: Decreased ADL status, Decreased endurance, Decreased functional mobility  Prognosis: Good  Barriers to Discharge: None    Plan:  Treatment Interventions: ADL retraining, Functional transfer training, Endurance training, Patient/family training  OT Frequency: 2 times per week  OT Discharge Recommendations: Moderate intensity level of continued care  OT Recommended Transfer Status: Moderate assist  OT - OK to Discharge: Yes  Treatment Interventions: ADL retraining, Functional transfer training, Endurance training, Patient/family training  Subjective     Current Problem:  1. Unilateral primary osteoarthritis, left knee              General:   OT Received On: 24  General  Reason for Referral: L TKA 24  Referred By: MERLY Clay PT/OT eval and tx 24  Past Medical History Relevant to Rehab: HTN, neuropathy, arthritis, afib, DM, falls, cdiff, appey, covid 19, glaucoma, denisha, hyster  Family/Caregiver Present: No  Co-Treatment: PT  Co-Treatment Reason: to maximize pt/staff safety  Patient Position Received: Bed, 3 rail up, Alarm on (purewick external catheter, 2L02)  General Comment: pt adm for elective L TKA 24    Precautions:  LE Weight Bearing Status:  (WBAT L LE)  Medical Precautions: Fall precautions  Precautions Comment: KI until + SLR,  KI maintained throughout eval    Vital Signs:  SpO2:  (pt on 2Lo2, spo2 88% on room air after transfer to BSC and chair, 2lo2 reapplied. Spo2 increased to 93% after O2 reapplied. Pt " does not use O2 at home)    Pain:  Pain Assessment  Pain Assessment: 0-10  0-10 (Numeric) Pain Score: 2  Pain Type: Surgical pain  Pain Location: Knee  Pain Orientation: Left  Pain Interventions: Cold applied  Objective     Cognition:  Overall Cognitive Status: Impaired             Home Living:  Home Living Comments: Pt lives with spouse, 1 story, 2 RAJENDRA with HR, walk in shower with seat and grab bar.    Prior Function:  Prior Function Comments: pt reports indep with aDLS, med mgmt, shares IADLS with spouse. Does not drive, spouse drives, denies falls in past 3 months. Amb with rollator         Activities of Daily Living:   Eating Assistance: Independent  Grooming Assistance: Stand by  Bathing Assistance: Moderate  UE Dressing Assistance: Stand by  LE Dressing Assistance: Moderate  Toileting Assistance with Device: Moderate  Functional Assistance:  (pt ambulated with mod A with ww 3' x 2. Pt limited by myoclonic jerking in B LE's)                         Activity Tolerance:  Endurance:  (fair)           Bed Mobility/Transfers: Bed Mobility  Bed Mobility:  (sup to sit min A to initiate, max A to scoot hips to EOB)  Transfers  Transfer:  (sit to stand from EOB mod A x 2)                Balance:  Static Sitting: good  Dynamic Sitting: good  Static Standing: fair   Dynamic Standing: fair -         Vision:Vision - Basic Assessment  Current Vision: Wears glasses all the time        Sensation:  Light Touch: No apparent deficits    Strength:  Strength Comments: B UE 4/5 throughout          Extremities: RUE   RUE : Within Functional Limits and LUE   LUE: Within Functional Limits    Outcome Measures: [facility] Daily Activity  Putting on and taking off regular lower body clothing: A lot  Bathing (including washing, rinsing, drying): A lot  Putting on and taking off regular upper body clothing: A lot  Toileting, which includes using toilet, bedpan or urinal: A lot  Taking care of personal grooming such as brushing teeth: A  little  Eating Meals: None  Daily Activity - Total Score: 15    Education Documentation  ADL Training, taught by Tea Khan, OT at 11/27/2024  9:48 AM.  Learner: Patient  Readiness: Acceptance  Method: Explanation  Response: Verbalizes Understanding, Needs Reinforcement          EDUCATION:  Education  Education Comment: OT educated pt on LB dressign strategies and safe toilet transfers, pt receptive to education, will benefit from con't skilled OT at dc    Goals:  Encounter Problems       Encounter Problems (Active)       OT Goals       pt will dress LB with modified indep (Progressing)       Start:  11/27/24    Expected End:  12/11/24            Pt will demo fair + dyn std balance with aDLS (Progressing)       Start:  11/27/24    Expected End:  12/11/24            Pt will transfer to bed, chair, toilet with modified indep (Progressing)       Start:  11/27/24    Expected End:  12/11/24            Pt will tolerate 10 minutes of functional activity with one rest break (Progressing)       Start:  11/27/24    Expected End:  12/11/24

## 2024-11-27 NOTE — PROGRESS NOTES
"Physical Therapy    Physical Therapy    Physical Therapy Treatment    Patient Name: Savita Snell \"FILOMENA\"  MRN: [id number]  Today's Date: 11/27/2024  Time Calculation  Start Time: 0930  Stop Time: 1010  Time Calculation (min): 40 min       Assessment/Plan   PT Assessment  PT Assessment Results: Decreased range of motion, Decreased strength, Impaired balance, Decreased mobility, Decreased cognition, Impaired judgement, Decreased safety awareness  Rehab Prognosis: Good  Barriers to Discharge: Limted assistance at home  Evaluation/Treatment Tolerance: Patient tolerated treatment well  End of Session Communication: Bedside nurse  Assessment Comment:  (Patient will benefti from additional PT to increase ROM, strength, mobiltiy and activity tolerance. Progress ambulation)  End of Session Patient Position: Up in chair, Alarm on     PT Plan  Treatment/Interventions: Bed mobility, Transfer training, Gait training, Strengthening, Range of motion, Therapeutic activity, Home exercise program  PT Frequency: BID  PT Discharge Recommendations:  (Moderate intensity at moderate frequency with 24/7 care)  Equipment Recommended upon Discharge: Wheeled walker  PT Recommended Transfer Status: Assist x2      Current Problem:  1. Unilateral primary osteoarthritis, left knee  cyclobenzaprine (Flexeril) 10 mg tablet    ondansetron ODT (Zofran-ODT) 4 mg disintegrating tablet    oxyCODONE (Roxicodone) 5 mg immediate release tablet          General Visit Information:   PT  Visit  PT Received On: 11/27/24  General  Reason for Referral: L TKR  Prior to Session Communication: Bedside nurse  Patient Position Received: Up in chair, Alarm on  Preferred Learning Style: verbal, written  General Comment:  (Pt sitting up in recliner- ice to L knee with KI in place. Tremors noted in UEs throughout session.  Pt demonstrates delay process with instructions requiring v/c for performing tasks.)  Subjective     Precautions:  Precautions  LE Weight Bearing " Status: Weight Bearing as Tolerated  Medical Precautions: Fall precautions  Braces Applied: Knee immobilizer in place  Precautions Comment: knee immobilizer until SLR and/or no buckling         Objective     Pain:  Pain Assessment  Pain Assessment: 0-10  0-10 (Numeric) Pain Score: 5 - Moderate pain  Pain Type: Surgical pain, Acute pain  Pain Location: Knee  Pain Orientation: Left  Pain Interventions: Cold applied      Extremity/Trunk Assessments:           AROM LLE (degrees)  L Knee Flexion 0-130: 75 (Heel slides on EOC   +  SLR without Ki)  L Knee Extension 0-130: 5 (Passive stretching in longsitting position.)      Treatments:  Therapeutic Exercise  Therapeutic Exercise Performed: Yes  Therapeutic Exercise Activity 1: Pt performed supine ther ex including ankle pumps, quad sets, glut sets B/L LEs and heel slides and SAQ L  LE 10 reps x1, SLR 5 reps x2. Cues for technique and breathing.  Therapeutic Exercise Activity 2: Pt performed seated ther ex including heel/toe raises and hip ABD, LAQ, heel slides, and marches 10 reps x1. Reviewed home exercise program. Educated on goal for 2-3 sets of 20 reps to tolerance. Pt with good understanding.           Ambulation/Gait Training  Ambulation/Gait Training Performed: Yes  Ambulation/Gait Training 1  Surface 1: Level tile  Device 1: Rolling walker  Assistance 1: Minimum assistance  Quality of Gait 1: Inconsistent stride length, Decreased step length  Comments/Distance (ft) 1:  (Pt ambulates with WW  WBAT without Ki with small, slow uneven strides with tendency to slide feet forward instead of picking up  her  feet clayton 15 ft with min A.  Pt require v/c for proper technique.)  Transfers  Transfer: Yes  Transfer 1  Technique 1: Sit to stand, Stand to sit  Transfer Device 1: Walker  Transfer Level of Assistance 1: Minimum assistance  Trials/Comments 1:  (Pt transfers with WW  WBQAT without Ki with min A with v/c for proper technique/ hand placement- tremors noted in UEs  throughout session.)          Outcome Measures:  [facility] Basic Mobility  Turning from your back to your side while in a flat bed without using bedrails: A lot  Moving from lying on your back to sitting on the side of a flat bed without using bedrails: A lot  Moving to and from bed to chair (including a wheelchair): A little  Standing up from a chair using your arms (e.g. wheelchair or bedside chair): A little  To walk in hospital room: A little  Climbing 3-5 steps with railing: Total  Basic Mobility - Total Score: 14  Education Documentation  No documentation found.  Education Comments  No comments found.          Individual(s) Educated: Patient  Education Provided: Body Mechanics, Fall Risk, Home Exercise Program, Home Safety  Education Comment:  (Pt issued written HEP for discharge.)  Encounter Problems       Encounter Problems (Active)       PT Problem       AAROM Left knee 5-90 (Progressing)       Start:  11/27/24    Expected End:  12/11/24            Slr LLE x 5 independent  (Met)       Start:  11/27/24    Expected End:  12/11/24    Resolved:  11/27/24         Bed mobility supine <> sit + 1 minimal assist (Not Progressing)       Start:  11/27/24    Expected End:  12/11/24            Transfers sit <> stand at ww + 1 minimal assist (Progressing)       Start:  11/27/24    Expected End:  12/11/24            Patient to ambulate with 50' + 1 minimal assist  (Progressing)       Start:  11/27/24    Expected End:  12/11/24

## 2024-11-27 NOTE — PROGRESS NOTES
"Physical Therapy    Physical Therapy Evaluation    Patient Name: Savita Snell \"FILOMENA\"  MRN: [id number]  Department: [facility]  Room: [address] [place] [address]  Today's Date: 11/27/2024   Time Calculation  Start Time: 0722  Stop Time: 0754  Time Calculation (min): 32 min    Assessment/Plan   PT Assessment  PT Assessment Results: Decreased range of motion, Decreased strength, Impaired balance, Decreased mobility, Decreased cognition, Impaired judgement, Decreased safety awareness  Rehab Prognosis: Good  Barriers to Discharge: Limted assistance at home  Evaluation/Treatment Tolerance: Patient tolerated treatment well  Assessment Comment:  (Patient will benefti from additional PT to increase ROM, strength, mobiltiy and activity tolerance. Progress ambulation)  End of Session Patient Position: Up in chair, Alarm on (LEs elevated)  IP OR SWING BED PT PLAN  Inpatient or Swing Bed: Inpatient  PT Plan  Treatment/Interventions: Bed mobility, Transfer training, Gait training, Strengthening, Range of motion, Therapeutic activity, Home exercise program  PT Frequency: BID  PT Discharge Recommendations:  (Moderate intensity at moderate frequency with 24/7 care)  Equipment Recommended upon Discharge: Wheeled walker  PT Recommended Transfer Status: Assist x2  PT - OK to Discharge:  (once deemed medically appropriate with cointinued PT in 24/7 setting)    Subjective   General Visit Information:  General  Reason for Referral: s/p Left TKA 11/26/24  Referred By: PT/OT 11/26/24 MIRANDA NIÑO  Past Medical History Relevant to Rehab: HTN, neuropathy, arthritis, afib, DM, falls, cdiff, appey, covid 19, glaucoma, denisha, hyster  Co-Treatment: OT  Co-Treatment Reason: to maximize patietnt and staff safety  Patient Position Received: Bed, 3 rail up, Alarm on  General Comment: Elective surgery  Home Living:  Home Living  Home Living Comments: Patient reports resding with spouse 1 floor with 3 steps to enter with handail. Walk in shower wth seat and grab " bar.  Prior Level of Function:  Prior Function Per Pt/Caregiver Report  Prior Function Comments: Per pateint report independent in mobilty, ADLs and IADLs with rollator. Denies any falls. Does nto drive spouse does  Precautions:  Precautions  LE Weight Bearing Status:  (WBAT LLE)  Medical Precautions: Fall precautions  Braces Applied: Knee immobilizer in place           Objective   Pain:  Pain Assessment  0-10 (Numeric) Pain Score: 3  Pain Type: Surgical pain  Pain Location: Knee  Pain Orientation: Left  Cognition:  Cognition  Overall Cognitive Status:  (Confused,)  Orientation Level: Oriented X4    General Assessments:  General Observation  General Observation: Patient lying in bed. Agreeabel to PT/OT. kathy  discontinued       Activity Tolerance  Endurance:  (Fair)    Static Sitting Balance  Static Sitting- good  Dynamic Sitting Balance  Dynamic Sitting- Fair +    Static Standing Balance  Static Standing-: Fair  Dynamic Standing Balance  Dynamic Standing: Poor  Functional Assessments:  Bed Mobility  Bed Mobility:  (Supine > sit with HOB 30 degrees + 2 moderate assist, Patient has difficulty scooting hips toward EOB, Assist for LLE)    Transfers  Transfer:  (Sit > stand at bed level + 2 moderate assist  Feet blocked SIt <> stand at BSC + 2 moderate assist with feet blocked. Retropulsive, Poor motor planning)    Ambulation/Gait Training  Ambulation/Gait Training Performed:  (Patient to ambulate with ww 3' x2 + 2 moderate assist Difficulty  with sequencing. knee immobililzer in place)  Extremity/Trunk Assessments:  RUE   RUE : Within Functional Limits  LUE   LUE: Within Functional Limits  RLE   RLE :  (AROm Hip/knee/ankle WFL MMT 4/5 grossly)  LLE   LLE :  (AAROM knee 10-45, hip/ankle WFL MMT ankle dorsiflexors 3+/5; (-) SLR)  Outcome Measures:  [facility] Basic Mobility  Turning from your back to your side while in a flat bed without using bedrails: A lot  Moving from lying on your back to sitting on the side of a  flat bed without using bedrails: A lot  Moving to and from bed to chair (including a wheelchair): A lot  Standing up from a chair using your arms (e.g. wheelchair or bedside chair): A lot  To walk in hospital room: A lot  Climbing 3-5 steps with railing: Total  Basic Mobility - Total Score: 11    Encounter Problems       Encounter Problems (Active)       PT Problem       AAROM Left knee 5-90 (Progressing)       Start:  11/27/24    Expected End:  12/11/24            Slr LLE x 5 independent  (Progressing)       Start:  11/27/24    Expected End:  12/11/24            Bed mobility supine <> sit + 1 minimal assist (Progressing)       Start:  11/27/24    Expected End:  12/11/24            Transfers sit <> stand at ww + 1 minimal assist (Progressing)       Start:  11/27/24    Expected End:  12/11/24            Patient to ambulate with 50' + 1 minimal assist  (Not Progressing)       Start:  11/27/24    Expected End:  12/11/24                   Education Documentation  Precautions, taught by Megan Mueller PT at 11/27/2024  9:55 AM.  Learner: Patient  Readiness: Acceptance  Method: Explanation, Demonstration  Response: Needs Reinforcement    Mobility Training, taught by Megan Mueller PT at 11/27/2024  9:55 AM.  Learner: Patient  Readiness: Acceptance  Method: Explanation, Demonstration  Response: Needs Reinforcement

## 2024-11-27 NOTE — CARE PLAN
Problem: Chronic Conditions and Co-morbidities  Goal: Patient's chronic conditions and co-morbidity symptoms are monitored and maintained or improved  Outcome: Adequate for Discharge     Problem: Skin  Goal: Decreased wound size/increased tissue granulation at next dressing change  Outcome: Adequate for Discharge  Goal: Participates in plan/prevention/treatment measures  Outcome: Adequate for Discharge  Goal: Prevent/manage excess moisture  Outcome: Adequate for Discharge  Goal: Prevent/minimize sheer/friction injuries  Outcome: Adequate for Discharge  Goal: Promote/optimize nutrition  Outcome: Adequate for Discharge  Goal: Promote skin healing  Outcome: Adequate for Discharge     Problem: Diabetes  Goal: Achieve decreasing blood glucose levels by end of shift  Outcome: Adequate for Discharge  Goal: Increase stability of blood glucose readings by end of shift  Outcome: Adequate for Discharge  Goal: Decrease in ketones present in urine by end of shift  Outcome: Adequate for Discharge  Goal: Maintain electrolyte levels within acceptable range throughout shift  Outcome: Adequate for Discharge  Goal: Maintain glucose levels >70mg/dl to <250mg/dl throughout shift  Outcome: Adequate for Discharge  Goal: No changes in neurological exam by end of shift  Outcome: Adequate for Discharge  Goal: Learn about and adhere to nutrition recommendations by end of shift  Outcome: Adequate for Discharge  Goal: Vital signs within normal range for age by end of shift  Outcome: Adequate for Discharge  Goal: Increase self care and/or family involovement by end of shift  Outcome: Adequate for Discharge  Goal: Receive DSME education by end of shift  Outcome: Adequate for Discharge     Problem: Pain  Goal: Takes deep breaths with improved pain control throughout the shift  Outcome: Adequate for Discharge  Goal: Turns in bed with improved pain control throughout the shift  Outcome: Adequate for Discharge  Goal: Walks with improved pain  control throughout the shift  Outcome: Adequate for Discharge  Goal: Performs ADL's with improved pain control throughout shift  Outcome: Adequate for Discharge  Goal: Participates in PT with improved pain control throughout the shift  Outcome: Adequate for Discharge  Goal: Free from opioid side effects throughout the shift  Outcome: Adequate for Discharge  Goal: Free from acute confusion related to pain meds throughout the shift  Outcome: Adequate for Discharge   The patient's goals for the shift include      The clinical goals for the shift include Pain management

## 2024-11-27 NOTE — CARE PLAN
The patient's goals for the shift include  rest and comfort    The clinical goals for the shift include pain management    Over the shift, the patient did  make progress toward the following goals. Barriers to progression include none. Recommendations to address these barriers include none.

## 2024-11-27 NOTE — DISCHARGE SUMMARY
Discharge Diagnosis  S/P TKR (total knee replacement), left    Issues Requiring Follow-Up  Follow up Dr Hi Clay Dec 9th    Test Results Pending At Discharge  Pending Labs       No current pending labs.            Hospital Course   84 year old female with chronic Lt knee osteoarthritis was taken to the operating room on 11/27/24 and underwent LTKA under spinal anesthesia.  Pt tolerated the procedure well.  Pain was well controlled with oxycodone, flexeril, tylenol and ice.  DVT prophylaxis was achieved with Eliquis, GLORIA hose, pneumatic compression device.  Pt was able to tolerate diet without N/V.  Pt was evaluated by PT/OT - was WBAT and ambulated with assistance of walker.  PT/OT determined pt was safe for discharge home.  [facility] coordinated home care with [facility].  Pt was provided prescriptions for oxycodone, flexeril, zofran.  She was advised to resume Eliquis and keep GLORIA hose on for 3 weeks.      Pertinent Physical Exam At Time of Discharge  Physical Exam  Musculoskeletal:         General: No swelling.      Comments: Lt knee with minimal swelling.  Hip flexion intact.  Strong, equal DF/PF bilaterally.  Distal pulses 2+/=   Neurological:      Mental Status: She is alert.         Home Medications     Medication List      ASK your doctor about these medications     amLODIPine 5 mg tablet; Commonly known as: Norvasc   apixaban 2.5 mg tablet; Commonly known as: Eliquis   cyanocobalamin 1,000 mcg tablet; Commonly known as: Vitamin B-12   dapagliflozin propanediol 5 mg; Commonly known as: Farxiga   furosemide 20 mg tablet; Commonly known as: Lasix   Januvia 25 mg tablet; Generic drug: SITagliptin phosphate   latanoprost 0.005 % ophthalmic solution; Commonly known as: Xalatan   Lokelma 5 gram packet; Generic drug: sodium zirconium cyclosilicate   losartan 25 mg tablet; Commonly known as: Cozaar   metoprolol tartrate 25 mg tablet; Commonly known as: Lopressor   pregabalin 75 mg capsule; Commonly known as: Lyrica    Vitamin D3 50 mcg (2,000 unit) capsule; Generic drug: cholecalciferol       Outpatient Follow-Up  Future Appointments   Date Time Provider Department Center   12/9/2024  2:45 PM Hi Clay MD [id number] [place]       Jeannine Tejeda PA-C

## 2024-11-27 NOTE — DISCHARGE INSTRUCTIONS
Total Knee Replacement  Discharge Instructions    To prevent Clot formation, you have been placed on the following medication:  Resume Eliquis 2.5 mg daily as you were preoperatively    Surgical Site Care:  Change dressing once a day and PRN (as needed). Apply 4 x 4 sponge and light tape. If glue present, leave open to air.  You may leave wound open to air after initial dressing removal, if wound is clean, dry and intact  If Aquacel Ag dressing is present, do not remove dressing for 7 days, unless heavily saturated. If heavily saturated, remove dressing and start using instructions above  Staples will be removed on post-operative day 14 and steri-strips applied  Showering is permitted starting POD1 if waterproof Aquacel dressing is present or when the incision is covered with 4 x 4 and Tegaderm waterproof dressing  Until all areas of incision are healed.    Physical Therapy:  Weight Bearing Status:  Weight-bearing as tolerated  Precautions, Per Physical Therapy Handout    Pain Medications  You were given  Oxycodone  Wean off pain medications as you deem appropriate as long as pain is under control  Do not exceed 4,000 mg of acetaminophen from all sources in a 24 hour period    Cold packs/Ice packs/Machine  May be used 5 times daily for 15-30 minutes as necessary  Be sure to have a barrier (cloth, clothing, towel) between the site and the ice pack to prevent frostbite    Contact [facility] office if  Increased redness, swelling, drainage of any kind, and/or pain to surgery site.  As well as new onset fevers and or chills.  These could signify an infection.  Calf or thigh tenderness to touch as well as increased swelling or redness.  This could signify a clot formation.  Numbness or tingling to an area around the incision site or below the incision site (toes).  Any rash appears, increased  or new onset nausea/vomiting occur.  This may indicate a reaction to a medication.  Phone # [phone].  Follow  up with Surgeon in 2 weeks  I acknowledge that I have received gloria hose and understand the instructions on how and when to wear them (on during the day, off at night)   Discharging RN who has gone over instructions and acknowledges gloria hose have been received     Ice 5 times a day for 20 minutes each session to operative extremity for two weeks.   GLORIA hose to be worn for three weeks. Can be removed for skin care and hygiene.   Incentive spirometer 10 times every hour while awake for two weeks.

## 2024-11-27 NOTE — PROGRESS NOTES
11/27/24 1124   Discharge Planning   Living Arrangements Spouse/significant other   Support Systems Spouse/significant other;Children   Assistance Needed none PTA, Pt indepdendent ADLs and spouse assists/completes IADLs, ambulation with rollator. Denies any falls. Doesn't drive- spouse does. Pt owns rollator, Walk in shower wth seat and grab bar.   Type of Residence   (1 level home)   Number of Stairs to Enter Residence 3   Number of Stairs Within Residence 0   Do you have animals or pets at home? Yes   Type of Animals or Pets 3 Cats   Home or Post Acute Services Post acute facilities (Rehab/SNF/etc)   Type of Post Acute Facility Services Rehab;Skilled nursing   Expected Discharge Disposition SNF  ([facility])   Does the patient need discharge transport arranged? Yes   RoundTrip coordination needed? Yes   Has discharge transport been arranged? Yes   What day is the transport expected? 11/27/24   What time is the transport expected? 1400   Financial Resource Strain   How hard is it for you to pay for the very basics like food, housing, medical care, and heating? Not hard   Housing Stability   In the last 12 months, was there a time when you were not able to pay the mortgage or rent on time? N   In the past 12 months, how many times have you moved where you were living? 0   At any time in the past 12 months, were you homeless or living in a shelter (including now)? N   Transportation Needs   In the past 12 months, has lack of transportation kept you from medical appointments or from getting medications? no   In the past 12 months, has lack of transportation kept you from meetings, work, or from getting things needed for daily living? No   Patient Choice   Provider Choice list and CMS website (https://medicare.gov/care-compare#search) for post-acute Quality and Resource Measure Data were provided and reviewed with: Patient   Patient / Family choosing to utilize agency / facility established prior to  hospitalization No   Stroke Family Assessment   Stroke Family Assessment Needed No   Intensity of Service   Intensity of Service 0-30 min     Pt is s/p Elective Left total knee arthroplasty 11/27/24 with Dr. Hi Clay. [facility] scores are PT (11) OT (15) recommending continued therapy at moderate level/intensity. Pt prefers SNF and FOC is [facility] where she has been in past. Referral built and sent to [facility] who confirms acceptance. Team notified and instant insurance auth received. Per CNP pt medically ready to DC today. DSC notified and sent PASSR in [place]. GF, DC summary and AVS attached to referral. Pt states her spouse can transport her to SNF at 2 PM. Pt RN given report number [phone] for [facility].

## 2024-12-04 ENCOUNTER — APPOINTMENT (OUTPATIENT)
Dept: RADIOLOGY | Facility: HOSPITAL | Age: 84
DRG: 871 | End: 2024-12-04
Payer: MEDICARE

## 2024-12-04 ENCOUNTER — APPOINTMENT (OUTPATIENT)
Dept: CARDIOLOGY | Facility: HOSPITAL | Age: 84
DRG: 871 | End: 2024-12-04
Payer: MEDICARE

## 2024-12-04 ENCOUNTER — HOSPITAL ENCOUNTER (INPATIENT)
Facility: HOSPITAL | Age: 84
End: 2024-12-04
Attending: EMERGENCY MEDICINE | Admitting: INTERNAL MEDICINE
Payer: MEDICARE

## 2024-12-04 DIAGNOSIS — M79.662 PAIN IN LEFT LOWER LEG: ICD-10-CM

## 2024-12-04 DIAGNOSIS — R65.20 SEPSIS WITH ENCEPHALOPATHY WITHOUT SEPTIC SHOCK, DUE TO UNSPECIFIED ORGANISM (MULTI): Primary | ICD-10-CM

## 2024-12-04 DIAGNOSIS — G93.41 SEPSIS WITH ENCEPHALOPATHY WITHOUT SEPTIC SHOCK, DUE TO UNSPECIFIED ORGANISM (MULTI): Primary | ICD-10-CM

## 2024-12-04 DIAGNOSIS — R13.10 DYSPHAGIA, UNSPECIFIED TYPE: ICD-10-CM

## 2024-12-04 DIAGNOSIS — M25.562 ACUTE PAIN OF LEFT KNEE: ICD-10-CM

## 2024-12-04 DIAGNOSIS — M79.605 PAIN OF LEFT LOWER EXTREMITY: ICD-10-CM

## 2024-12-04 DIAGNOSIS — N18.9 ACUTE KIDNEY INJURY SUPERIMPOSED ON CKD (CMS-HCC): ICD-10-CM

## 2024-12-04 DIAGNOSIS — N17.9 ACUTE RENAL FAILURE, UNSPECIFIED ACUTE RENAL FAILURE TYPE (CMS-HCC): ICD-10-CM

## 2024-12-04 DIAGNOSIS — N17.9 ACUTE KIDNEY INJURY SUPERIMPOSED ON CKD (CMS-HCC): ICD-10-CM

## 2024-12-04 DIAGNOSIS — I10 PRIMARY HYPERTENSION: ICD-10-CM

## 2024-12-04 DIAGNOSIS — A41.9 SEPSIS WITH ENCEPHALOPATHY WITHOUT SEPTIC SHOCK, DUE TO UNSPECIFIED ORGANISM (MULTI): Primary | ICD-10-CM

## 2024-12-04 DIAGNOSIS — A04.72 C. DIFFICILE COLITIS: ICD-10-CM

## 2024-12-04 PROBLEM — R79.89 ELEVATED LFTS: Status: ACTIVE | Noted: 2024-12-04

## 2024-12-04 PROBLEM — R65.21 SEPSIS WITH ACUTE RENAL FAILURE AND SEPTIC SHOCK (MULTI): Status: ACTIVE | Noted: 2024-12-04

## 2024-12-04 PROBLEM — J96.01 ACUTE HYPOXIC RESPIRATORY FAILURE (MULTI): Status: ACTIVE | Noted: 2024-12-04

## 2024-12-04 PROBLEM — Z96.652 TOTAL KNEE REPLACEMENT STATUS, LEFT: Status: ACTIVE | Noted: 2024-12-04

## 2024-12-04 PROBLEM — K52.9 COLITIS: Status: ACTIVE | Noted: 2024-12-04

## 2024-12-04 PROBLEM — E87.20 METABOLIC ACIDOSIS: Status: ACTIVE | Noted: 2024-12-04

## 2024-12-04 LAB
ALBUMIN SERPL BCP-MCNC: 3.5 G/DL (ref 3.4–5)
ALP SERPL-CCNC: 107 U/L (ref 33–136)
ALT SERPL W P-5'-P-CCNC: 72 U/L (ref 7–45)
AMMONIA PLAS-SCNC: 20 UMOL/L (ref 16–53)
ANION GAP BLDV CALCULATED.4IONS-SCNC: 16 MMOL/L (ref 10–25)
ANION GAP SERPL CALC-SCNC: 22 MMOL/L (ref 10–20)
APPEARANCE UR: CLEAR
APTT PPP: 38 SECONDS (ref 27–38)
AST SERPL W P-5'-P-CCNC: 98 U/L (ref 9–39)
B-OH-BUTYR SERPL-SCNC: 0.18 MMOL/L (ref 0.02–0.27)
BASE EXCESS BLDV CALC-SCNC: -4.3 MMOL/L (ref -2–3)
BASOPHILS # BLD AUTO: 0.06 X10*3/UL (ref 0–0.1)
BASOPHILS NFR BLD AUTO: 0.3 %
BILIRUB SERPL-MCNC: 0.9 MG/DL (ref 0–1.2)
BILIRUB UR STRIP.AUTO-MCNC: NEGATIVE MG/DL
BODY TEMPERATURE: ABNORMAL
BUN SERPL-MCNC: 79 MG/DL (ref 6–23)
CA-I BLDV-SCNC: 1.03 MMOL/L (ref 1.1–1.33)
CALCIUM SERPL-MCNC: 7.4 MG/DL (ref 8.6–10.3)
CARDIAC TROPONIN I PNL SERPL HS: 26 NG/L (ref 0–13)
CHLORIDE BLDV-SCNC: 101 MMOL/L (ref 98–107)
CHLORIDE SERPL-SCNC: 101 MMOL/L (ref 98–107)
CO2 SERPL-SCNC: 20 MMOL/L (ref 21–32)
COLOR UR: ABNORMAL
CREAT SERPL-MCNC: 4.51 MG/DL (ref 0.5–1.05)
EGFRCR SERPLBLD CKD-EPI 2021: 9 ML/MIN/1.73M*2
EOSINOPHIL # BLD AUTO: 0.05 X10*3/UL (ref 0–0.4)
EOSINOPHIL NFR BLD AUTO: 0.3 %
ERYTHROCYTE [DISTWIDTH] IN BLOOD BY AUTOMATED COUNT: 14.1 % (ref 11.5–14.5)
FLUAV RNA RESP QL NAA+PROBE: NOT DETECTED
FLUBV RNA RESP QL NAA+PROBE: NOT DETECTED
GIANT PLATELETS BLD QL SMEAR: NORMAL
GLUCOSE BLD MANUAL STRIP-MCNC: 169 MG/DL (ref 74–99)
GLUCOSE BLD MANUAL STRIP-MCNC: 211 MG/DL (ref 74–99)
GLUCOSE BLD MANUAL STRIP-MCNC: 225 MG/DL (ref 74–99)
GLUCOSE BLDV-MCNC: 239 MG/DL (ref 74–99)
GLUCOSE SERPL-MCNC: 239 MG/DL (ref 74–99)
GLUCOSE UR STRIP.AUTO-MCNC: ABNORMAL MG/DL
HCO3 BLDV-SCNC: 23 MMOL/L (ref 22–26)
HCT VFR BLD AUTO: 39.5 % (ref 36–46)
HCT VFR BLD EST: 39 % (ref 36–46)
HGB BLD-MCNC: 12.2 G/DL (ref 12–16)
HGB BLDV-MCNC: 13 G/DL (ref 12–16)
HOLD SPECIMEN: NORMAL
HOLD SPECIMEN: NORMAL
IMM GRANULOCYTES # BLD AUTO: 0.18 X10*3/UL (ref 0–0.5)
IMM GRANULOCYTES NFR BLD AUTO: 0.9 % (ref 0–0.9)
INHALED O2 CONCENTRATION: 21 %
INR PPP: 2.1 (ref 0.9–1.1)
KETONES UR STRIP.AUTO-MCNC: NEGATIVE MG/DL
LACTATE BLDV-SCNC: 3.5 MMOL/L (ref 0.4–2)
LACTATE SERPL-SCNC: 2.6 MMOL/L (ref 0.4–2)
LACTATE SERPL-SCNC: 2.6 MMOL/L (ref 0.4–2)
LACTATE SERPL-SCNC: 2.9 MMOL/L (ref 0.4–2)
LEUKOCYTE ESTERASE UR QL STRIP.AUTO: NEGATIVE
LYMPHOCYTES # BLD AUTO: 0.16 X10*3/UL (ref 0.8–3)
LYMPHOCYTES NFR BLD AUTO: 0.8 %
MCH RBC QN AUTO: 29.5 PG (ref 26–34)
MCHC RBC AUTO-ENTMCNC: 30.9 G/DL (ref 32–36)
MCV RBC AUTO: 95 FL (ref 80–100)
MONOCYTES # BLD AUTO: 0.67 X10*3/UL (ref 0.05–0.8)
MONOCYTES NFR BLD AUTO: 3.4 %
MUCOUS THREADS #/AREA URNS AUTO: NORMAL /LPF
NEUTROPHILS # BLD AUTO: 18.71 X10*3/UL (ref 1.6–5.5)
NEUTROPHILS NFR BLD AUTO: 94.3 %
NITRITE UR QL STRIP.AUTO: NEGATIVE
NRBC BLD-RTO: 0.1 /100 WBCS (ref 0–0)
OXYHGB MFR BLDV: 22.4 % (ref 45–75)
PCO2 BLDV: 50 MM HG (ref 41–51)
PH BLDV: 7.27 PH (ref 7.33–7.43)
PH UR STRIP.AUTO: 5 [PH]
PLATELET # BLD AUTO: 199 X10*3/UL (ref 150–450)
PO2 BLDV: 22 MM HG (ref 35–45)
POTASSIUM BLDV-SCNC: 4.8 MMOL/L (ref 3.5–5.3)
POTASSIUM SERPL-SCNC: 5 MMOL/L (ref 3.5–5.3)
PROT SERPL-MCNC: 6.2 G/DL (ref 6.4–8.2)
PROT UR STRIP.AUTO-MCNC: ABNORMAL MG/DL
PROTHROMBIN TIME: 23.8 SECONDS (ref 9.8–12.8)
RBC # BLD AUTO: 4.14 X10*6/UL (ref 4–5.2)
RBC # UR STRIP.AUTO: ABNORMAL /UL
RBC #/AREA URNS AUTO: NORMAL /HPF
RBC MORPH BLD: NORMAL
SAO2 % BLDV: 23 % (ref 45–75)
SARS-COV-2 RNA RESP QL NAA+PROBE: NOT DETECTED
SODIUM BLDV-SCNC: 135 MMOL/L (ref 136–145)
SODIUM SERPL-SCNC: 138 MMOL/L (ref 136–145)
SP GR UR STRIP.AUTO: 1.01
UROBILINOGEN UR STRIP.AUTO-MCNC: NORMAL MG/DL
WBC # BLD AUTO: 19.8 X10*3/UL (ref 4.4–11.3)
WBC #/AREA URNS AUTO: NORMAL /HPF

## 2024-12-04 PROCEDURE — 93005 ELECTROCARDIOGRAM TRACING: CPT

## 2024-12-04 PROCEDURE — 2500000004 HC RX 250 GENERAL PHARMACY W/ HCPCS (ALT 636 FOR OP/ED): Performed by: NURSE PRACTITIONER

## 2024-12-04 PROCEDURE — 99285 EMERGENCY DEPT VISIT HI MDM: CPT | Mod: 25 | Performed by: EMERGENCY MEDICINE

## 2024-12-04 PROCEDURE — 87636 SARSCOV2 & INF A&B AMP PRB: CPT | Performed by: NURSE PRACTITIONER

## 2024-12-04 PROCEDURE — 93010 ELECTROCARDIOGRAM REPORT: CPT | Performed by: EMERGENCY MEDICINE

## 2024-12-04 PROCEDURE — 70496 CT ANGIOGRAPHY HEAD: CPT | Performed by: RADIOLOGY

## 2024-12-04 PROCEDURE — 84132 ASSAY OF SERUM POTASSIUM: CPT

## 2024-12-04 PROCEDURE — 87506 IADNA-DNA/RNA PROBE TQ 6-11: CPT | Mod: STJLAB

## 2024-12-04 PROCEDURE — 70450 CT HEAD/BRAIN W/O DYE: CPT | Performed by: RADIOLOGY

## 2024-12-04 PROCEDURE — 2500000005 HC RX 250 GENERAL PHARMACY W/O HCPCS: Performed by: EMERGENCY MEDICINE

## 2024-12-04 PROCEDURE — 81001 URINALYSIS AUTO W/SCOPE: CPT

## 2024-12-04 PROCEDURE — 2060000001 HC INTERMEDIATE ICU ROOM DAILY

## 2024-12-04 PROCEDURE — 2500000001 HC RX 250 WO HCPCS SELF ADMINISTERED DRUGS (ALT 637 FOR MEDICARE OP): Performed by: NURSE PRACTITIONER

## 2024-12-04 PROCEDURE — 2500000001 HC RX 250 WO HCPCS SELF ADMINISTERED DRUGS (ALT 637 FOR MEDICARE OP)

## 2024-12-04 PROCEDURE — 74176 CT ABD & PELVIS W/O CONTRAST: CPT | Mod: FOREIGN READ | Performed by: RADIOLOGY

## 2024-12-04 PROCEDURE — 83605 ASSAY OF LACTIC ACID: CPT

## 2024-12-04 PROCEDURE — 2500000005 HC RX 250 GENERAL PHARMACY W/O HCPCS: Performed by: NURSE PRACTITIONER

## 2024-12-04 PROCEDURE — 96365 THER/PROPH/DIAG IV INF INIT: CPT | Mod: 59

## 2024-12-04 PROCEDURE — 99223 1ST HOSP IP/OBS HIGH 75: CPT | Performed by: NURSE PRACTITIONER

## 2024-12-04 PROCEDURE — 87493 C DIFF AMPLIFIED PROBE: CPT | Mod: STJLAB

## 2024-12-04 PROCEDURE — 99285 EMERGENCY DEPT VISIT HI MDM: CPT | Performed by: EMERGENCY MEDICINE

## 2024-12-04 PROCEDURE — 85730 THROMBOPLASTIN TIME PARTIAL: CPT

## 2024-12-04 PROCEDURE — 85610 PROTHROMBIN TIME: CPT

## 2024-12-04 PROCEDURE — 73560 X-RAY EXAM OF KNEE 1 OR 2: CPT | Mod: LEFT SIDE | Performed by: RADIOLOGY

## 2024-12-04 PROCEDURE — 85025 COMPLETE CBC W/AUTO DIFF WBC: CPT

## 2024-12-04 PROCEDURE — 87324 CLOSTRIDIUM AG IA: CPT | Mod: STJLAB

## 2024-12-04 PROCEDURE — 82947 ASSAY GLUCOSE BLOOD QUANT: CPT

## 2024-12-04 PROCEDURE — 70498 CT ANGIOGRAPHY NECK: CPT | Performed by: RADIOLOGY

## 2024-12-04 PROCEDURE — 84484 ASSAY OF TROPONIN QUANT: CPT

## 2024-12-04 PROCEDURE — 2500000002 HC RX 250 W HCPCS SELF ADMINISTERED DRUGS (ALT 637 FOR MEDICARE OP, ALT 636 FOR OP/ED): Performed by: NURSE PRACTITIONER

## 2024-12-04 PROCEDURE — 82140 ASSAY OF AMMONIA: CPT | Performed by: EMERGENCY MEDICINE

## 2024-12-04 PROCEDURE — 36415 COLL VENOUS BLD VENIPUNCTURE: CPT

## 2024-12-04 PROCEDURE — 51702 INSERT TEMP BLADDER CATH: CPT

## 2024-12-04 PROCEDURE — 2500000004 HC RX 250 GENERAL PHARMACY W/ HCPCS (ALT 636 FOR OP/ED)

## 2024-12-04 PROCEDURE — 70450 CT HEAD/BRAIN W/O DYE: CPT

## 2024-12-04 PROCEDURE — 71250 CT THORAX DX C-: CPT

## 2024-12-04 PROCEDURE — 70496 CT ANGIOGRAPHY HEAD: CPT

## 2024-12-04 PROCEDURE — 96361 HYDRATE IV INFUSION ADD-ON: CPT | Mod: 59

## 2024-12-04 PROCEDURE — 93010 ELECTROCARDIOGRAM REPORT: CPT | Performed by: INTERNAL MEDICINE

## 2024-12-04 PROCEDURE — 87040 BLOOD CULTURE FOR BACTERIA: CPT | Mod: STJLAB

## 2024-12-04 PROCEDURE — 71250 CT THORAX DX C-: CPT | Mod: FOREIGN READ | Performed by: RADIOLOGY

## 2024-12-04 PROCEDURE — 82010 KETONE BODYS QUAN: CPT

## 2024-12-04 PROCEDURE — 2500000004 HC RX 250 GENERAL PHARMACY W/ HCPCS (ALT 636 FOR OP/ED): Mod: JZ | Performed by: INTERNAL MEDICINE

## 2024-12-04 PROCEDURE — 2550000001 HC RX 255 CONTRASTS: Performed by: EMERGENCY MEDICINE

## 2024-12-04 PROCEDURE — 73560 X-RAY EXAM OF KNEE 1 OR 2: CPT | Mod: LT

## 2024-12-04 PROCEDURE — 84075 ASSAY ALKALINE PHOSPHATASE: CPT

## 2024-12-04 RX ORDER — ACETAMINOPHEN 325 MG/1
650 TABLET ORAL EVERY 4 HOURS PRN
Status: ACTIVE | OUTPATIENT
Start: 2024-12-04

## 2024-12-04 RX ORDER — ACETAMINOPHEN 650 MG/1
650 SUPPOSITORY RECTAL EVERY 4 HOURS PRN
Status: ACTIVE | OUTPATIENT
Start: 2024-12-04

## 2024-12-04 RX ORDER — LOSARTAN POTASSIUM 25 MG/1
25 TABLET ORAL 2 TIMES DAILY
Status: ACTIVE | OUTPATIENT
Start: 2024-12-04

## 2024-12-04 RX ORDER — OXYCODONE HYDROCHLORIDE 5 MG/1
5 TABLET ORAL EVERY 6 HOURS PRN
Status: ACTIVE | OUTPATIENT
Start: 2024-12-04

## 2024-12-04 RX ORDER — ACETAMINOPHEN 160 MG/5ML
650 SOLUTION ORAL EVERY 4 HOURS PRN
Status: DISPENSED | OUTPATIENT
Start: 2024-12-04

## 2024-12-04 RX ORDER — VANCOMYCIN HCL 50 MG/ML
125 SOLUTION, RECONSTITUTED, ORAL ORAL EVERY 6 HOURS
Status: DISCONTINUED | OUTPATIENT
Start: 2024-12-04 | End: 2024-12-04

## 2024-12-04 RX ORDER — FUROSEMIDE 40 MG/1
40 TABLET ORAL
Status: ACTIVE | OUTPATIENT
Start: 2024-12-05

## 2024-12-04 RX ORDER — DAPAGLIFLOZIN 10 MG/1
5 TABLET, FILM COATED ORAL DAILY
Status: ACTIVE | OUTPATIENT
Start: 2024-12-05

## 2024-12-04 RX ORDER — PREGABALIN 75 MG/1
75 CAPSULE ORAL DAILY
Status: ACTIVE | OUTPATIENT
Start: 2024-12-04

## 2024-12-04 RX ORDER — INSULIN LISPRO 100 [IU]/ML
0-10 INJECTION, SOLUTION INTRAVENOUS; SUBCUTANEOUS
Status: DISCONTINUED | OUTPATIENT
Start: 2024-12-04 | End: 2024-12-05

## 2024-12-04 RX ORDER — VANCOMYCIN HCL 50 MG/ML
500 SOLUTION, RECONSTITUTED, ORAL ORAL EVERY 6 HOURS
Status: DISCONTINUED | OUTPATIENT
Start: 2024-12-04 | End: 2024-12-05

## 2024-12-04 RX ORDER — LATANOPROST 50 UG/ML
1 SOLUTION/ DROPS OPHTHALMIC NIGHTLY
Status: DISPENSED | OUTPATIENT
Start: 2024-12-04

## 2024-12-04 RX ORDER — CYCLOBENZAPRINE HCL 10 MG
10 TABLET ORAL 3 TIMES DAILY PRN
Status: ACTIVE | OUTPATIENT
Start: 2024-12-04

## 2024-12-04 RX ORDER — AMLODIPINE BESYLATE 5 MG/1
5 TABLET ORAL
Status: ACTIVE | OUTPATIENT
Start: 2024-12-05

## 2024-12-04 RX ORDER — METOPROLOL TARTRATE 25 MG/1
12.5 TABLET, FILM COATED ORAL 2 TIMES DAILY
Status: ACTIVE | OUTPATIENT
Start: 2024-12-04

## 2024-12-04 RX ORDER — ACETAMINOPHEN 650 MG/1
650 SUPPOSITORY RECTAL ONCE
Status: COMPLETED | OUTPATIENT
Start: 2024-12-04 | End: 2024-12-04

## 2024-12-04 RX ORDER — SODIUM CHLORIDE 9 MG/ML
100 INJECTION, SOLUTION INTRAVENOUS CONTINUOUS
Status: ACTIVE | OUTPATIENT
Start: 2024-12-04 | End: 2024-12-05

## 2024-12-04 RX ORDER — METRONIDAZOLE 500 MG/100ML
500 INJECTION, SOLUTION INTRAVENOUS EVERY 8 HOURS
Status: DISCONTINUED | OUTPATIENT
Start: 2024-12-04 | End: 2024-12-05

## 2024-12-04 RX ORDER — ACETAMINOPHEN 650 MG/1
SUPPOSITORY RECTAL
Status: COMPLETED
Start: 2024-12-04 | End: 2024-12-04

## 2024-12-04 RX ORDER — HEPARIN SODIUM 5000 [USP'U]/ML
5000 INJECTION, SOLUTION INTRAVENOUS; SUBCUTANEOUS EVERY 8 HOURS
Status: DISPENSED | OUTPATIENT
Start: 2024-12-05

## 2024-12-04 SDOH — SOCIAL STABILITY: SOCIAL INSECURITY: HAVE YOU HAD ANY THOUGHTS OF HARMING ANYONE ELSE?: UNABLE TO ASSESS

## 2024-12-04 SDOH — SOCIAL STABILITY: SOCIAL INSECURITY: DOES ANYONE TRY TO KEEP YOU FROM HAVING/CONTACTING OTHER FRIENDS OR DOING THINGS OUTSIDE YOUR HOME?: UNABLE TO ASSESS

## 2024-12-04 SDOH — SOCIAL STABILITY: SOCIAL INSECURITY
WITHIN THE LAST YEAR, HAVE YOU BEEN KICKED, HIT, SLAPPED, OR OTHERWISE PHYSICALLY HURT BY YOUR PARTNER OR EX-PARTNER?: PATIENT UNABLE TO ANSWER

## 2024-12-04 SDOH — ECONOMIC STABILITY: INCOME INSECURITY
IN THE PAST 12 MONTHS HAS THE ELECTRIC, GAS, OIL, OR WATER COMPANY THREATENED TO SHUT OFF SERVICES IN YOUR HOME?: PATIENT UNABLE TO ANSWER

## 2024-12-04 SDOH — SOCIAL STABILITY: SOCIAL INSECURITY: ARE YOU OR HAVE YOU BEEN THREATENED OR ABUSED PHYSICALLY, EMOTIONALLY, OR SEXUALLY BY ANYONE?: UNABLE TO ASSESS

## 2024-12-04 SDOH — SOCIAL STABILITY: SOCIAL INSECURITY: DO YOU FEEL UNSAFE GOING BACK TO THE PLACE WHERE YOU ARE LIVING?: UNABLE TO ASSESS

## 2024-12-04 SDOH — ECONOMIC STABILITY: FOOD INSECURITY
WITHIN THE PAST 12 MONTHS, THE FOOD YOU BOUGHT JUST DIDN'T LAST AND YOU DIDN'T HAVE MONEY TO GET MORE.: PATIENT UNABLE TO ANSWER

## 2024-12-04 SDOH — SOCIAL STABILITY: SOCIAL INSECURITY: ABUSE: ADULT

## 2024-12-04 SDOH — SOCIAL STABILITY: SOCIAL INSECURITY
WITHIN THE LAST YEAR, HAVE YOU BEEN RAPED OR FORCED TO HAVE ANY KIND OF SEXUAL ACTIVITY BY YOUR PARTNER OR EX-PARTNER?: PATIENT UNABLE TO ANSWER

## 2024-12-04 SDOH — SOCIAL STABILITY: SOCIAL INSECURITY: HAS ANYONE EVER THREATENED TO HURT YOUR FAMILY OR YOUR PETS?: UNABLE TO ASSESS

## 2024-12-04 SDOH — SOCIAL STABILITY: SOCIAL INSECURITY: HAVE YOU HAD THOUGHTS OF HARMING ANYONE ELSE?: UNABLE TO ASSESS

## 2024-12-04 SDOH — SOCIAL STABILITY: SOCIAL INSECURITY
WITHIN THE LAST YEAR, HAVE YOU BEEN HUMILIATED OR EMOTIONALLY ABUSED IN OTHER WAYS BY YOUR PARTNER OR EX-PARTNER?: PATIENT UNABLE TO ANSWER

## 2024-12-04 SDOH — SOCIAL STABILITY: SOCIAL INSECURITY: WERE YOU ABLE TO COMPLETE ALL THE BEHAVIORAL HEALTH SCREENINGS?: NO

## 2024-12-04 SDOH — ECONOMIC STABILITY: FOOD INSECURITY
WITHIN THE PAST 12 MONTHS, YOU WORRIED THAT YOUR FOOD WOULD RUN OUT BEFORE YOU GOT THE MONEY TO BUY MORE.: PATIENT UNABLE TO ANSWER

## 2024-12-04 SDOH — SOCIAL STABILITY: SOCIAL INSECURITY: DO YOU FEEL ANYONE HAS EXPLOITED OR TAKEN ADVANTAGE OF YOU FINANCIALLY OR OF YOUR PERSONAL PROPERTY?: UNABLE TO ASSESS

## 2024-12-04 SDOH — SOCIAL STABILITY: SOCIAL INSECURITY: ARE THERE ANY APPARENT SIGNS OF INJURIES/BEHAVIORS THAT COULD BE RELATED TO ABUSE/NEGLECT?: UNABLE TO ASSESS

## 2024-12-04 SDOH — SOCIAL STABILITY: SOCIAL INSECURITY: WITHIN THE LAST YEAR, HAVE YOU BEEN AFRAID OF YOUR PARTNER OR EX-PARTNER?: PATIENT UNABLE TO ANSWER

## 2024-12-04 SDOH — ECONOMIC STABILITY: FOOD INSECURITY
HOW HARD IS IT FOR YOU TO PAY FOR THE VERY BASICS LIKE FOOD, HOUSING, MEDICAL CARE, AND HEATING?: PATIENT UNABLE TO ANSWER

## 2024-12-04 ASSESSMENT — PAIN SCALES - PAIN ASSESSMENT IN ADVANCED DEMENTIA (PAINAD)
BREATHING: NORMAL
TOTALSCORE: 0
BODYLANGUAGE: RELAXED
CONSOLABILITY: NO NEED TO CONSOLE
FACIALEXPRESSION: SMILING OR INEXPRESSIVE

## 2024-12-04 ASSESSMENT — PAIN SCALES - GENERAL
PAINLEVEL_OUTOF10: 0 - NO PAIN

## 2024-12-04 ASSESSMENT — PAIN - FUNCTIONAL ASSESSMENT
PAIN_FUNCTIONAL_ASSESSMENT: 0-10
PAIN_FUNCTIONAL_ASSESSMENT: 0-10
PAIN_FUNCTIONAL_ASSESSMENT: UNABLE TO SELF-REPORT
PAIN_FUNCTIONAL_ASSESSMENT: 0-10
PAIN_FUNCTIONAL_ASSESSMENT: 0-10

## 2024-12-04 ASSESSMENT — LIFESTYLE VARIABLES
HOW MANY STANDARD DRINKS CONTAINING ALCOHOL DO YOU HAVE ON A TYPICAL DAY: PATIENT UNABLE TO ANSWER
AUDIT-C TOTAL SCORE: -1
TOTAL SCORE: 0
HOW OFTEN DO YOU HAVE 6 OR MORE DRINKS ON ONE OCCASION: PATIENT UNABLE TO ANSWER
HOW OFTEN DO YOU HAVE A DRINK CONTAINING ALCOHOL: PATIENT UNABLE TO ANSWER
AUDIT-C TOTAL SCORE: -1
SKIP TO QUESTIONS 9-10: 0
EVER HAD A DRINK FIRST THING IN THE MORNING TO STEADY YOUR NERVES TO GET RID OF A HANGOVER: NO
HAVE PEOPLE ANNOYED YOU BY CRITICIZING YOUR DRINKING: NO
EVER FELT BAD OR GUILTY ABOUT YOUR DRINKING: NO
HAVE YOU EVER FELT YOU SHOULD CUT DOWN ON YOUR DRINKING: NO

## 2024-12-04 ASSESSMENT — COLUMBIA-SUICIDE SEVERITY RATING SCALE - C-SSRS
2. HAVE YOU ACTUALLY HAD ANY THOUGHTS OF KILLING YOURSELF?: NO
1. IN THE PAST MONTH, HAVE YOU WISHED YOU WERE DEAD OR WISHED YOU COULD GO TO SLEEP AND NOT WAKE UP?: NO
6. HAVE YOU EVER DONE ANYTHING, STARTED TO DO ANYTHING, OR PREPARED TO DO ANYTHING TO END YOUR LIFE?: NO

## 2024-12-04 ASSESSMENT — COGNITIVE AND FUNCTIONAL STATUS - GENERAL
MOVING FROM LYING ON BACK TO SITTING ON SIDE OF FLAT BED WITH BEDRAILS: TOTAL
PATIENT BASELINE BEDBOUND: UNABLE TO ASSESS AT THIS TIME
TOILETING: TOTAL
PERSONAL GROOMING: TOTAL
DAILY ACTIVITIY SCORE: 6
STANDING UP FROM CHAIR USING ARMS: TOTAL
MOVING TO AND FROM BED TO CHAIR: TOTAL
WALKING IN HOSPITAL ROOM: TOTAL
TURNING FROM BACK TO SIDE WHILE IN FLAT BAD: TOTAL
MOBILITY SCORE: 6
CLIMB 3 TO 5 STEPS WITH RAILING: TOTAL
DRESSING REGULAR UPPER BODY CLOTHING: TOTAL
HELP NEEDED FOR BATHING: TOTAL
EATING MEALS: TOTAL
DRESSING REGULAR LOWER BODY CLOTHING: TOTAL

## 2024-12-04 ASSESSMENT — ACTIVITIES OF DAILY LIVING (ADL)
ADEQUATE_TO_COMPLETE_ADL: UNABLE TO ASSESS
GROOMING: UNABLE TO ASSESS
ASSISTIVE_DEVICE: WALKER
LACK_OF_TRANSPORTATION: PATIENT UNABLE TO ANSWER
HEARING - LEFT EAR: UNABLE TO ASSESS
FEEDING YOURSELF: UNABLE TO ASSESS
BATHING: UNABLE TO ASSESS
WALKS IN HOME: UNABLE TO ASSESS
JUDGMENT_ADEQUATE_SAFELY_COMPLETE_DAILY_ACTIVITIES: UNABLE TO ASSESS
TOILETING: UNABLE TO ASSESS
DRESSING YOURSELF: UNABLE TO ASSESS
PATIENT'S MEMORY ADEQUATE TO SAFELY COMPLETE DAILY ACTIVITIES?: UNABLE TO ASSESS
HEARING - RIGHT EAR: UNABLE TO ASSESS

## 2024-12-04 NOTE — ASSESSMENT & PLAN NOTE
-Patient with acute hypoxia in the emergency room requiring oxygen no  prior oxygen use  -CT of the chest is negative for pneumonia received IV contrast for CT head and with acute kidney injury will not order any further CT with contrast  -Can consider VQ scan if necessary we will hold at this time patient has been compliant with her Eliquis postoperatively

## 2024-12-04 NOTE — ASSESSMENT & PLAN NOTE
-Admit to medical stepdown bed Q4 vital signs monitor on telemetry  -Infectious disease consult  -Received Zosyn in the emergency room at this point we will hold on additional Zosyn.  No notable pneumonia, UTI, or post op infection   -Patient with very foul-smelling stool likely C. Difficile.  Stool sent for pathogen and C. difficile pending  -ID consult recommended high-dose oral vancomycin and IV Flagyl.

## 2024-12-04 NOTE — ASSESSMENT & PLAN NOTE
-Recent total knee replacement left at [facility]  -On Eliquis 2.5 mg twice daily postoperatively, continue on admit  -May have weightbearing as tolerated when awake and alert  -X-ray of the left knee no effusion or abscess noted.  Surgical site no signs of infection  -PT OT consult placed

## 2024-12-04 NOTE — CONSULTS
Infectious Disease Consult    PATIENT NAME: Savita Snell    MRN: [id number]  SERVICE DATE:  12/4/2024   SERVICE TIME:  3:31 PM    SIGNATURE: Arben Cardoso MD    PRIMARY CARE PHYSICIAN: Jeanna Quintanilla MD  REASON FOR CONSULT: Sepsis  REQUESTING PHYSICIAN: Dr.Alzoubi TORRES  84-year-old female with past medical history as listed below who presented from nursing home due to change in mental status, found to have watery diarrhea and evidence of colitis on CT abdomen and pelvis concerning for severe C. difficile infection given the leukocytosis and acute renal failure.  The patient is allergic to penicillin however she received a dose of Zosyn this admission with no side effects.  Currently on p.o. vancomycin    PAST MEDICAL HISTORY:   Past Medical History:   Diagnosis Date    Afib (Multi)     Arthritis     Balance problem     C. difficile colitis     Cataract     CKD (chronic kidney disease), stage III (Multi)     COVID-19     VACCINATED    Diabetic neuropathy (Multi)     Endometrial cancer (Multi)     Falls     HAS FALLEN WITHIN LAST 6 MONTHS    Glaucoma     Heart failure with preserved ejection fraction     Hypertension     Hypokalemia     Obesity     Type 2 diabetes mellitus     Venous ulcer (Multi)      PAST SURGICAL HISTORY:   Past Surgical History:   Procedure Laterality Date    APPENDECTOMY      CATARACT EXTRACTION      CHOLECYSTECTOMY      COLONOSCOPY      HYSTERECTOMY      JOINT REPLACEMENT      RIGHT KNEE    OTHER SURGICAL HISTORY      TAILBONE REMOVED DUE TO FRACTURE    OTHER SURGICAL HISTORY Bilateral     DOUBLE MUSCLE EYE SURGERY    TONSILLECTOMY      TOTAL KNEE ARTHROPLASTY Left     UPPER GASTROINTESTINAL ENDOSCOPY       FAMILY HISTORY:   Family History   Problem Relation Name Age of Onset    Heart failure Mother      Other (MI) Mother      Stroke Father      Hyperlipidemia Sister      Hypertension Sister       SOCIAL HISTORY:   Social History     Tobacco Use    Smoking status: Former     Types:  Cigarettes    Smokeless tobacco: Never   Vaping Use    Vaping status: Never Used   Substance Use Topics    Alcohol use: Not Currently    Drug use: Never     CURRENT ALLERGIES:   Allergies as of 12/04/2024 - Reviewed 12/04/2024   Allergen Reaction Noted    Penicillins Other 09/02/2004    Cephalexin Diarrhea 02/13/2024    Sulfamethoxazole-trimethoprim Other 12/12/2023     MEDICATIONS:    Current Facility-Administered Medications:     metroNIDAZOLE (Flagyl) 500 mg in sodium chloride (iso)  mL, 500 mg, intravenous, q8h, Arben Cardoso MD    oxygen (O2) therapy, , inhalation, Continuous PRN - O2/gases, Lake Dillon DO, 2 L/min at 12/04/24 1348    vancomycin (Firvanq) solution 500 mg, 500 mg, oral, q6h, Arben Cardoso MD       COMPLETE REVIEW OF SYSTEMS:    Unable to obtain due to patient's illness sepsis      PHYSICAL EXAM:  Patient Vitals for the past 24 hrs:   BP Temp Temp src Pulse Resp SpO2 Height Weight   12/04/24 1445 120/60 -- -- 87 19 97 % -- --   12/04/24 1415 110/55 -- -- 87 18 97 % -- --   12/04/24 1400 106/55 -- -- 89 18 97 % -- --   12/04/24 1345 (!) 101/49 37.6 °C (99.7 °F) Temporal 87 17 97 % -- --   12/04/24 1315 122/57 -- -- 94 20 98 % -- --   12/04/24 1300 119/58 -- -- 94 19 99 % -- --   12/04/24 1245 133/64 -- -- 98 20 99 % -- --   12/04/24 1244 -- -- -- -- -- 98 % -- --   12/04/24 1236 145/66 (!) 38.1 °C (100.6 °F) Temporal (!) 105 18 (!) 86 % -- --   12/04/24 1230 -- -- -- (!) 108 (!) 25 (!) 85 % -- --   12/04/24 1215 137/57 -- -- (!) 110 (!) 26 (!) 84 % -- --   12/04/24 1200 138/66 -- -- (!) 106 20 95 % -- --   12/04/24 1157 -- -- -- (!) 105 (!) 29 (!) 87 % -- --   12/04/24 1154 -- -- -- (!) 107 (!) 28 (!) 90 % -- --   12/04/24 1151 -- -- -- (!) 107 (!) 27 (!) 90 % -- --   12/04/24 1148 -- -- -- (!) 107 (!) 27 96 % -- --   12/04/24 1145 133/63 -- -- (!) 107 (!) 29 95 % -- --   12/04/24 1142 -- -- -- (!) 109 (!) 29 94 % -- --   12/04/24 1139 -- -- -- (!) 106 (!) 30 96 % -- --   12/04/24  "1136 -- -- -- (!) 108 (!) 31 96 % -- --   12/04/24 1135 118/67 (!) 39 °C (102.2 °F) Temporal (!) 107 20 95 % 1.676 m (5' 6\") 90.7 kg (200 lb)   12/04/24 1130 118/67 -- -- (!) 109 (!) 31 (!) 86 % -- --     Body mass index is 32.28 kg/m².  Gen: NAD  Neck: symmetric, no mass  Cardiovascular: RRR  Respiratory: No distress   GI: non-distended  Extremities: no leg edema      Labs:  Lab Results   Component Value Date    WBC 19.8 (H) 12/04/2024    HGB 12.2 12/04/2024    HCT 39.5 12/04/2024    MCV 95 12/04/2024     12/04/2024     Lab Results   Component Value Date    GLUCOSE 239 (H) 12/04/2024    CALCIUM 7.4 (L) 12/04/2024     12/04/2024    K 5.0 12/04/2024    CO2 20 (L) 12/04/2024     12/04/2024    BUN 79 (H) 12/04/2024    CREATININE 4.51 (H) 12/04/2024   ESR: --No results found for: \"SEDRATE\"No results found for: \"CRP\"  Lab Results   Component Value Date    ALT 72 (H) 12/04/2024    AST 98 (H) 12/04/2024    ALKPHOS 107 12/04/2024    BILITOT 0.9 12/04/2024       DATA:   Diagnostic tests reviewed for today's visit:    Labs this admission reviewed  Imagings this admission reviewed  Cultures: Reviewed        ASSESSMENT :   -Colitis rule out C. difficile colitis  -Leukocytosis   -AIDAN   -Acute encephalopathy  -Status post left TKA on 11/26  -History of hypertension, hyperlipidemia, A-fib, diabetes, CHF, CKD    PLAN:  -Start high-dose p.o. vancomycin 500 mg every 6 hours  -Start IV Flagyl 500 mg every 8 hours  -Follow-up C. difficile PCR  -Follow-up blood culture    Discussed with the primary team    Will continue to follow     Thank you so much for this consultation         Arben Cardoso MD.   Infectious Disease Attending        This note was partially created using voice recognition software and is inherently subject to errors including those of syntax and \"sound-alike\" substitutions which may escape proofreading. In such instances, original meaning may be extrapolated by contextual derivation       "

## 2024-12-04 NOTE — ED TRIAGE NOTES
84yr old f biba from NH. Per NH report pt unresponsive. Pt presents with lethargy and tremors.  1111 stroke alert activation and called off 1200. Pt able to answer name and . Pt confused  to place. Pt returned from CT placed on cardiac monitor, EKG, labs/septic work up complete. Mckeon placed due to urinary retention per verbal order from DR Gandhi. Pericare provided large amount of loose brown stool noted to be coming from urethral/vagina. Dr Gandhi informed. Bed alarm in place. Call bell within reach.     1236-Pt placed on 3L nasal cannula due to 86% room air. Verbal order from Dr Gandhi.     1348-Oxygen titrated down to 2L via NC.    1421-Family at bedside.    1422-Family requesting update from provider. Dr Gandhi and Dr Dillon messaged.

## 2024-12-04 NOTE — ED NOTES
1111 Stroke alert one push activation by BHARGAV Fry  Emergency overheasherice Galvan, EMT  12/04/24 1122

## 2024-12-04 NOTE — ED PROVIDER NOTES
History/Exam limitations: mental status.   Additional history was obtained from patient and EMS personnel.    HPI:       Savita Snell is a 84 y.o. with a history of A-fib on Eliquis (held since surgery), HTN, HLD, DM2, CHF, CKD presenting with altered mental status.  Last known well at an unknown time late last night.  Reportedly at the wound nurse looking after her left knee incision following a left total knee replacement on 11/26/2024 said that she seemed more confused than usual last night and was concerned that she was becoming overly sedated on oxycodone.  Upon arrival at the nurse facility, EMS states that patient was alert and oriented x 0, mumbling, not following commands.  They gave 2 of Narcan and she seemed to wake up a little bit.  At presentation, patient is still not following commands, is alert and oriented to person only.  Arms and legs flop to the bed.     Last Known Well Time: unspecified time last night per nursing facility        ------------------------------------------------------------------------------------------------------------------------------------------     Physical Exam:    ED Triage Vitals   Temp Pulse Resp BP   -- -- -- --      SpO2 Temp src Heart Rate Source Patient Position   -- -- -- --      BP Location FiO2 (%)     -- --          Gen: Not in acute distress  Head/Neck: NCAT  Eyes: Anicteric sclerae, noninjected conjunctivae  Nose: No rhinorrhea  Mouth:  dry mucous membranes  Heart: Regular rate and rhythm w/ no murmurs, rubs, gallops  Lungs: CTAB w/o wheezes, rales, rhonchi, no increased work of breathing  Abdomen: Soft, NT/ND  Musculoskeletal: No deformities  Extremities: No edema.  Neurologic: Please see below for NIHSS  Skin: No rashes noted  Psychological: Calm    Reperfusion exam 12/04/2024 @ 1500: <2 sec cap refill, mucous membranes still dry, normal HR/BP      Interval: Baseline  Time: 10:26 PM  Person Administering Scale: Oj Gandhi MD     1a  Level of  consciousness: 2=not alert, requires repeated stimulation to attend, or is obtunded and requires strong or painful stimulation to make movements (not stereotyped)   1b. LOC questions:  1=Performs one task correctly   1c. LOC commands: 2=Performs neither task correctly   2.  Best Gaze: 0=normal   3. Visual: 0=No visual loss   4. Facial Palsy: 0=Normal symmetric movement   5a. Motor left arm: 3=No effort against gravity, limb falls   5b.  Motor right arm: 3=No effort against gravity, limb falls   6a. motor left leg: 3=No effort against gravity, limb falls   6b  Motor right leg:  3=No effort against gravity, limb falls   7. Limb Ataxia: 0=Absent   8.  Sensory: 0=Normal; no sensory loss   9. Best Language:  1=Mild to moderate aphasia; some obvious loss of fluency or facility of comprehension without significant limitation on ideas expressed or form of expression.   10. Dysarthria: 1=Mild to moderate, patient slurs at least some words and at worst, can be understood with some difficulty   11. Extinction and Inattention: 0=No abnormality     Total:   19         VAN: DENISE: Positive     ------------------------------------------------------------------------------------------------------------------------------------------     Medical Decision Making:     ED Course as of 12/04/24 2226   Wed Dec 04, 2024   1307 This is an 84 years old female patient brought into the emergency department by squad with a chief complaint of altered mental status.  Last known well was last night but could not specify exactly the last known well time.  Patient recently had left knee replacement less than a week ago and currently on oxycodone.  She was given Narcan and route to the hospital with mild improvement in her confusion/altered mental status.  History of present illness as well as review of system are limited secondary to the patient's altered mental status/confusion.    Physical exam revealed an 84 years old female patient who appears to  be altered/confused/encephalopathic.  She is able to move upper and lower extremities.  Her GCS is 10.  Cardiac exam with normal S1, S2, no gallop, or murmur, no wheezes, rales, rhonchi or any signs of respiratory distress  Abdominal exam with diffuse abdominal distention and tenderness to the right lower quadrant as well as suprapubic region.  No guarding, rigidity, rebound.  Skin exam: Warm and dry in the surgical scar on the left knee appears healthy with no erythema or discharge.  Neurologic exam: Alert and oriented times self.  GCS of 10.  Asterixis noted.    Vital signs revealed fever of 38.1.  Will initiate sepsis workup given the patient tachycardia.  As well as fever.  Will obtain basic labs, CT brain given the altered mental status/confusion as well as CT chest, abdomen pelvis given the concern of sepsis trying to find a source of infection.  Obtain urinalysis and culture.  Patient will require admission [ME]   1549 Patient has acute kidney injury, CT abdomen pelvis revealed colitis.  She was covered empirically with Zosyn given the concern of sepsis.  Knee x-rays unremarkable.  Family were updated.  Patient is admitted to the medicine team given the acute kidney injury, sepsis, colitis, altered mental status/confusion. [ME]      ED Course User Index  [ME] Lake Dillon DO         Diagnoses as of 12/04/24 2226   Sepsis with encephalopathy without septic shock, due to unspecified organism (Multi)     History obtained from EMS.  Records contain labs, imaging, notes reviewed.  Patient initially came to the emergency department and was evaluated at the door, she was found to be slurring her speech, unintelligible, would not keep either of her arms or legs up.  Initial NIH of 19 for which a brain attack was called.  CT head and CT angio head and neck did not demonstrate any acute abnormalities.  Upon reassessment, it was clear that patient appeared to be more of a metabolic encephalopathy picture with no  focal findings.  She was febrile and tachycardic, sepsis workup was initiated.  Blood cultures and a lactate were drawn.  Patient started on intra-abdominal coverage with Zosyn with high suspicion given her tenderness, urinary retention for which a Mckeon catheter was placed.  Nursing noticed stool apparently coming from the vagina or urethra.  However, after the catheter was placed, urine ran clear.  That being said, urinalysis without evidence of UTI.  Viral swabs negative for influenza and COVID.  Initial lactate elevated at 2.9 which only decreased to 2.6 after a liter fluid bolus.  With concern for CHF, the full 30 cc/kg fluid bolus was not given.  CMP with acute on chronic renal insufficiency with a creatinine of 4.51, anion gap of 22, decreased bicarb of 20, elevated BUN of 79.  Potential component of uremic encephalopathy.  VBG demonstrated pH of 7.27, pCO2 of 50, no evidence for compensation of metabolic acidosis.  BHB unremarkable.  No evidence for DKA at this time.  CBC with significant leukocytosis of 19.8 and neutrophil predominant.  Troponin elevated at 26, no acute injury pattern on EKG.  Low suspicion for ACS at this time.  INR elevated at 2.1.  CT chest abdomen pelvis demonstrated findings consistent with colitis.  Patient with a history of C. difficile.  Unknown recent antibiotic use.  Stool pathogen panel PCR and C. difficile PCR were ordered which are pending at the time of admission to the internal medicine team for further evaluation and treatment.  We recommended they evaluated to the see the situation further to determine appropriate antibiotics.  Precautions suggested at this time.     EKG interpreted by myself: Sinus tachycardia rate of 110, normal intervals.  No acute injury pattern.  Auto read mistook Motion artifact for ST changes.    Repeat EKG demonstrated sinus tachycardia rate of 108, normal intervals.  No acute injury pattern.     Independent Interpretation of Studies: I  independently interpreted the CT head and see No obvious evidence of intracranial hemorrhage and No obvious evidence of skull fracture    Chronic Medical Conditions Significantly Affecting Care: N/A    Social Determinants of Health Significantly Affecting Care:  N/A     External Records Reviewed: I reviewed recent and relevant outside records including: labs, notes, imaging     Discussion of Management with Other Providers:   I discussed the patient/results with: the admitting team      IV Thrombolysis IV Thrombolysis Checklist        IV Thrombolysis Given: No; Thrombolysis contraindication reason: Working diagnosis is NOT a suspected ischemic stroke        Procedure  Procedures          Oj Gandhi MD  Resident  12/04/24 2013       Oj Gandhi MD  Resident  12/04/24 0634

## 2024-12-04 NOTE — ASSESSMENT & PLAN NOTE
-in the setting of sepsis.   -hold any hepatotoxic medications as appropriate  -Anticipate LFTs will return to normal with treatment of septic shock and IV hydration  -Repeat CMP a.m.

## 2024-12-04 NOTE — ASSESSMENT & PLAN NOTE
-Likely in the setting of C. difficile with sepsis  -CT head and CT angio head negative for any acute intracranial process  -At this time do not feel that an MRI or further neurologic workup is indicated given patient's current clinical picture

## 2024-12-04 NOTE — PROGRESS NOTES
PHARMACY STROKE RESPONSE      Patient Name: Savita Snell  MRN: [id number]  Location: [place] [address]    Patient Weight (kg):   Wt Readings from Last 1 Encounters:   11/26/24 87.9 kg (193 lb 12.6 oz)        An acute Brain Attack has been activated, pharmacy participated in multidisciplinary team bedside response for Savita Snell.  Contraindications for fibrinolytic therapy have been reviewed by pharmacy and any issues relating to medication therapy have been discussed directly with the provider(s) caring for this patient.     Pharmacy aided in the bedside response for fibrinolytic therapy. Patient did not receive fibrinolysis.    Orders Placed This Encounter      iohexol (OMNIPaque) 350 mg iodine/mL solution 70 mL    Thank you for allowing me to take part in the care of this patient.     Prema Ramon, PharmD, [facility]  12/4/2024 11:23 AM           References:    Neurological Skipwith Stroke Tools   Neurological Skipwith IV Thrombolysis Checklist

## 2024-12-04 NOTE — ASSESSMENT & PLAN NOTE
-Patient with CKD last creatinine noted to be 2.5.  -Hold any nephrotoxic agents  -Unfortunately patient did receive IVP contrast for CT angio of the head, will likely have a further decline in kidney function  -Received 1 L of IV fluid in the emergency room I have ordered normal saline at 100 cc/h for the next 24 hours.  Patient with history of heart failure with preserved EF.   -Hold all nephrotoxic agents as possible  -Nephrology consult  -Mckeon placed in the emergency room will keep Mckeon at this time for strict I's and O's.  Need to discontinue as soon as possible to prevent  CAUTI

## 2024-12-04 NOTE — ASSESSMENT & PLAN NOTE
-Likely C. Difficile  -High dose oral vancomycin and Flagyl initiated by infectious disease  -Stool for C. difficile and pathogen's pending  -Hold on any further antibiotics

## 2024-12-04 NOTE — Clinical Note
Venotomy site sutured and has steristrips; catheter insertion site sutured and covered with CHG tegaderm. Vital signs stable, patient has maintained baseline mentation and vitals throughout.

## 2024-12-04 NOTE — H&P
"History Of Present Illness  Savita Snell \"FILOMENA\" is a 84 y.o. female with medical history of paroxysmal atrial fibrillation on Eliquis, HFp EF, obesity, CKD stage III, DMII , glaucoma, history of C. difficile, hypertension and recent left total knee replacement presented to [facility] on 12/4/2024 with complaint of altered mental status.  Patient recently had left total knee replacement and was at a skilled nursing facility for physical therapy.  Patient is alert to herself and place however she is having difficulty answering questions.  Patient's son and  are at the bedside and assist with HPI.  Patient has a history of C. difficile last year.  Given patient's recent knee replacement likely received Ancef no other antibiotic use.  Patient's  says that her mental status has been altered since Sunday and as the days have progressed it has progressively worsened.  Generally patient is alert and oriented x 3.  Patient has been complaining of decreased appetite since Saturday.  Patient's  and son say she has barely taken any oral intake in.  Patient's  states that yesterday patient had complaint of abdominal pain and some nausea.  Unclear if she was having diarrhea or not.  Just prior to this H&P patient with large loose foul-smelling brown stool.  Patient is able to follow simple commands and is able to move all extremities.  Limited mobility noted to left lower extremity secondary to recent total knee replacement.  Patient unable to complete any type of ROS due to AMS.    ED course: CMP remarkable for bicarb 20, BUN/creat 79/4.51 with a GFR of 9, ALT 72, AST 98 T. bili 0.9.  Initial lactate 2.9 down to 2.6 after IV fluid.  Troponin 26.  INR 2.1 PTT 23.8.  CBC remarkable for leukocytosis 19.8 no anemia noted.  Chest X-ray no effusion or concern for infection noted.   CT head negative for any acute intracranial process.  CT angio head negative for any acute process.  CT chest abdomen " pelvis no acute process within chest. Colon and rectosigmoid  with inflammation consistent with colitis.  No free fluid or air noted.     Past Medical History  Past Medical History:   Diagnosis Date    Afib (Multi)     Arthritis     Balance problem     C. difficile colitis     Cataract     CKD (chronic kidney disease), stage III (Multi)     COVID-19     VACCINATED    Diabetic neuropathy (Multi)     Endometrial cancer (Multi)     Falls     HAS FALLEN WITHIN LAST 6 MONTHS    Glaucoma     Heart failure with preserved ejection fraction     Hypertension     Hypokalemia     Obesity     Type 2 diabetes mellitus     Venous ulcer (Multi)        Surgical History  Past Surgical History:   Procedure Laterality Date    APPENDECTOMY      CATARACT EXTRACTION      CHOLECYSTECTOMY      COLONOSCOPY      HYSTERECTOMY      JOINT REPLACEMENT      RIGHT KNEE    OTHER SURGICAL HISTORY      TAILBONE REMOVED DUE TO FRACTURE    OTHER SURGICAL HISTORY Bilateral     DOUBLE MUSCLE EYE SURGERY    TONSILLECTOMY      TOTAL KNEE ARTHROPLASTY Left     UPPER GASTROINTESTINAL ENDOSCOPY          Social History  Social History     Tobacco Use    Smoking status: Former     Types: Cigarettes    Smokeless tobacco: Never   Vaping Use    Vaping status: Never Used   Substance Use Topics    Alcohol use: Not Currently    Drug use: Never        Family History  Family History   Problem Relation Name Age of Onset    Heart failure Mother      Other (MI) Mother      Stroke Father      Hyperlipidemia Sister      Hypertension Sister          Allergies  Penicillins, Cephalexin, and Sulfamethoxazole-trimethoprim    Review of Systems   Reason unable to perform ROS: Altered mental status.       Physical Exam  Constitutional:       General: She is not in acute distress.     Appearance: She is obese. She is ill-appearing and toxic-appearing.   Eyes:      Pupils: Pupils are equal, round, and reactive to light.   Cardiovascular:      Rate and Rhythm: Regular rhythm.  "Tachycardia present.   Pulmonary:      Effort: Pulmonary effort is normal.      Breath sounds: Normal breath sounds.   Abdominal:      Tenderness: There is abdominal tenderness (Generalized tenderness times all 4 quadrants.). There is no right CVA tenderness, left CVA tenderness, guarding or rebound.   Skin:     General: Skin is warm and dry.      Capillary Refill: Capillary refill takes less than 2 seconds.      Findings: Bruising present. No rash.      Comments: Left total knee incision with Steri-Strips in place.  No drainage noted.  Wound open to air.  Wound edges well-approximated no erythema.  no concern for infection at this time   Neurological:      Mental Status: She is disoriented.         Last Recorded Vitals  Visit Vitals  /60   Pulse 87   Temp 37.6 °C (99.7 °F) (Temporal)   Resp 19   Ht 1.676 m (5' 6\")   Wt 90.7 kg (200 lb)   SpO2 97%   BMI 32.28 kg/m²   OB Status Hysterectomy   Smoking Status Former   BSA 2.05 m²        Relevant Results  Results for orders placed or performed during the hospital encounter of 12/04/24 (from the past 24 hours)   POCT GLUCOSE   Result Value Ref Range    POCT Glucose 225 (H) 74 - 99 mg/dL   ECG 12 lead   Result Value Ref Range    Ventricular Rate 110 BPM    Atrial Rate 110 BPM    NH Interval 178 ms    QRS Duration 78 ms    QT Interval 342 ms    QTC Calculation(Bazett) 462 ms    P Axis 80 degrees    R Axis 63 degrees    T Axis 91 degrees    QRS Count 18 beats    Q Onset 221 ms    P Onset 132 ms    P Offset 205 ms    T Offset 392 ms    QTC Fredericia 418 ms   Urinalysis with Reflex Culture and Microscopic   Result Value Ref Range    Color, Urine Light-Yellow Light-Yellow, Yellow, Dark-Yellow    Appearance, Urine Clear Clear    Specific Gravity, Urine 1.009 1.005 - 1.035    pH, Urine 5.0 5.0, 5.5, 6.0, 6.5, 7.0, 7.5, 8.0    Protein, Urine 20 (TRACE) NEGATIVE, 10 (TRACE), 20 (TRACE) mg/dL    Glucose, Urine 500 (3+) (A) Normal mg/dL    Blood, Urine 0.1 (1+) (A) NEGATIVE "    Ketones, Urine NEGATIVE NEGATIVE mg/dL    Bilirubin, Urine NEGATIVE NEGATIVE    Urobilinogen, Urine Normal Normal mg/dL    Nitrite, Urine NEGATIVE NEGATIVE    Leukocyte Esterase, Urine NEGATIVE NEGATIVE   Urinalysis Microscopic   Result Value Ref Range    WBC, Urine 1-5 1-5, NONE /HPF    RBC, Urine 1-2 NONE, 1-2, 3-5 /HPF    Mucus, Urine FEW Reference range not established. /LPF   CBC and Auto Differential   Result Value Ref Range    WBC 19.8 (H) 4.4 - 11.3 x10*3/uL    nRBC 0.1 (H) 0.0 - 0.0 /100 WBCs    RBC 4.14 4.00 - 5.20 x10*6/uL    Hemoglobin 12.2 12.0 - 16.0 g/dL    Hematocrit 39.5 36.0 - 46.0 %    MCV 95 80 - 100 fL    MCH 29.5 26.0 - 34.0 pg    MCHC 30.9 (L) 32.0 - 36.0 g/dL    RDW 14.1 11.5 - 14.5 %    Platelets 199 150 - 450 x10*3/uL    Neutrophils % 94.3 40.0 - 80.0 %    Immature Granulocytes %, Automated 0.9 0.0 - 0.9 %    Lymphocytes % 0.8 13.0 - 44.0 %    Monocytes % 3.4 2.0 - 10.0 %    Eosinophils % 0.3 0.0 - 6.0 %    Basophils % 0.3 0.0 - 2.0 %    Neutrophils Absolute 18.71 (H) 1.60 - 5.50 x10*3/uL    Immature Granulocytes Absolute, Automated 0.18 0.00 - 0.50 x10*3/uL    Lymphocytes Absolute 0.16 (L) 0.80 - 3.00 x10*3/uL    Monocytes Absolute 0.67 0.05 - 0.80 x10*3/uL    Eosinophils Absolute 0.05 0.00 - 0.40 x10*3/uL    Basophils Absolute 0.06 0.00 - 0.10 x10*3/uL   Protime-INR   Result Value Ref Range    Protime 23.8 (H) 9.8 - 12.8 seconds    INR 2.1 (H) 0.9 - 1.1   APTT   Result Value Ref Range    aPTT 38 27 - 38 seconds   Troponin I, High Sensitivity, Initial   Result Value Ref Range    Troponin I, High Sensitivity 26 (H) 0 - 13 ng/L   Morphology   Result Value Ref Range    RBC Morphology See Below     Giant Platelets Few    Comprehensive metabolic panel   Result Value Ref Range    Glucose 239 (H) 74 - 99 mg/dL    Sodium 138 136 - 145 mmol/L    Potassium 5.0 3.5 - 5.3 mmol/L    Chloride 101 98 - 107 mmol/L    Bicarbonate 20 (L) 21 - 32 mmol/L    Anion Gap 22 (H) 10 - 20 mmol/L    Urea Nitrogen  79 (H) 6 - 23 mg/dL    Creatinine 4.51 (H) 0.50 - 1.05 mg/dL    eGFR 9 (L) >60 mL/min/1.73m*2    Calcium 7.4 (L) 8.6 - 10.3 mg/dL    Albumin 3.5 3.4 - 5.0 g/dL    Alkaline Phosphatase 107 33 - 136 U/L    Total Protein 6.2 (L) 6.4 - 8.2 g/dL    AST 98 (H) 9 - 39 U/L    Bilirubin, Total 0.9 0.0 - 1.2 mg/dL    ALT 72 (H) 7 - 45 U/L   Lactate   Result Value Ref Range    Lactate 2.9 (H) 0.4 - 2.0 mmol/L   BLOOD GAS VENOUS FULL PANEL   Result Value Ref Range    POCT pH, Venous 7.27 (L) 7.33 - 7.43 pH    POCT pCO2, Venous 50 41 - 51 mm Hg    POCT pO2, Venous 22 (L) 35 - 45 mm Hg    POCT SO2, Venous 23 (L) 45 - 75 %    POCT Oxy Hemoglobin, Venous 22.4 (L) 45.0 - 75.0 %    POCT Hematocrit Calculated, Venous 39.0 36.0 - 46.0 %    POCT Sodium, Venous 135 (L) 136 - 145 mmol/L    POCT Potassium, Venous 4.8 3.5 - 5.3 mmol/L    POCT Chloride, Venous 101 98 - 107 mmol/L    POCT Ionized Calicum, Venous 1.03 (L) 1.10 - 1.33 mmol/L    POCT Glucose, Venous 239 (H) 74 - 99 mg/dL    POCT Lactate, Venous 3.5 (H) 0.4 - 2.0 mmol/L    POCT Base Excess, Venous -4.3 (L) -2.0 - 3.0 mmol/L    POCT HCO3 Calculated, Venous 23.0 22.0 - 26.0 mmol/L    POCT Hemoglobin, Venous 13.0 12.0 - 16.0 g/dL    POCT Anion Gap, Venous 16.0 10.0 - 25.0 mmol/L    Patient Temperature      FiO2 21 %   Beta Hydroxybutyrate   Result Value Ref Range    Beta-Hydroxybutyrate 0.18 0.02 - 0.27 mmol/L   Lactate   Result Value Ref Range    Lactate 2.6 (H) 0.4 - 2.0 mmol/L   Ammonia   Result Value Ref Range    Ammonia 20 16 - 53 umol/L   Lactate   Result Value Ref Range    Lactate 2.6 (H) 0.4 - 2.0 mmol/L      Imaging:  XR knee left 1-2 views   Final Result   No acute finding.   Signed by Sandor Mccracken MD      CT chest abdomen pelvis wo IV contrast   Final Result   Diffuse thickening of the colon and mild adjacent inflammation.   Similar thickening of the rectosigmoid colon. Findings consistent with   colitis. No bowel obstruction. No free air. No free fluid.   Cardiac  enlargement. Coronary artery calcifications.   Renal atrophic changes. No acute renal process.   Status post cholecystectomy.   Signed by Blaise Natarajan MD      CT brain attack angio head and neck W and WO IV contrast   Final Result   CTA neck:        No evidence for significant stenosis of the cervical vessels.        Calcified plaque at the carotid bifurcation without stenosis by   NASCET criteria        CTA head:        No evidence for significant stenosis or large branch vessel cutoffs   of the intracranial vessels.        MACRO:   None        Signed by: Starla Arias 12/4/2024 11:35 AM   Dictation workstation:   [id number]      CT brain attack head wo IV contrast   Final Result   No acute intracranial bleed or focal mass effect.        Mild volume loss.        Mild chronic white matter ischemic disease in the deep   periventricular regions.        MACRO:   Alvaro Dowell discussed the significance and urgency of this critical   finding by epic secure chat with  RACHELE MIGUEL on 12/4/2024 at 11:24   am.  (**-RCF-**) Findings:  See findings.        Signed by: Alvaro Dowell 12/4/2024 11:24 AM   Dictation workstation:   [id number]          EKG:  Encounter Date: 12/04/24   ECG 12 lead   Result Value    Ventricular Rate 110    Atrial Rate 110    NE Interval 178    QRS Duration 78    QT Interval 342    QTC Calculation(Bazett) 462    P Axis 80    R Axis 63    T Axis 91    QRS Count 18    Q Onset 221    P Onset 132    P Offset 205    T Offset 392    QTC Fredericia 418    Narrative    Sinus tachycardia  Biatrial enlargement  ST elevation, consider inferior injury or acute infarct  ** ** ACUTE MI / STEMI ** **  Consider right ventricular involvement in acute inferior infarct  Abnormal ECG  When compared with ECG of 08-OCT-2024 11:19,  Significant changes have occurred     Echo:  No results found for this or any previous visit.    Home Medications  Prior to Admission medications    Medication Sig Start Date End Date Taking?  Authorizing Provider   amLODIPine (Norvasc) 5 mg tablet Take 1 tablet (5 mg) by mouth once daily. 1/17/24  Yes Historical Provider, MD   apixaban (Eliquis) 2.5 mg tablet Take 1 tablet (2.5 mg) by mouth 2 times a day. 12/19/23  Yes Historical Provider, MD   cholecalciferol (Vitamin D3) 50 mcg (2,000 unit) capsule Take 1 capsule (50 mcg) by mouth early in the morning..   Yes Historical Provider, MD   cyanocobalamin (Vitamin B-12) 1,000 mcg tablet Take 1 tablet (1,000 mcg) by mouth once daily.   Yes Historical Provider, MD   cyclobenzaprine (Flexeril) 10 mg tablet Take 1 tablet (10 mg) by mouth 3 times a day as needed for muscle spasms. 11/27/24  Yes Jeannine Tejeda PA-C   dapagliflozin propanediol (Farxiga) 5 mg Take 1 tablet (5 mg) by mouth once daily. 6/25/24  Yes Historical Provider, MD   furosemide (Lasix) 20 mg tablet Take 2 tablets (40 mg) by mouth once daily. 7/31/24  Yes Historical Provider, MD   latanoprost (Xalatan) 0.005 % ophthalmic solution Administer 1 drop into affected eye(s) daily at bedtime. 1/10/24  Yes Historical Provider, MD   Lokelma 5 gram packet Take 5 g by mouth once daily.   Yes Historical Provider, MD   losartan (Cozaar) 25 mg tablet Take 1 tablet (25 mg) by mouth twice a day. 3/25/24  Yes Historical Provider, MD   metoprolol tartrate (Lopressor) 25 mg tablet Take 0.5 tablets (12.5 mg) by mouth 2 times a day. 12/19/23  Yes Historical Provider, MD   ondansetron ODT (Zofran-ODT) 4 mg disintegrating tablet Dissolve 1 tablet (4 mg) in the mouth every 8 hours if needed for nausea. 11/27/24  Yes Jeannine Tejeda PA-C   oxyCODONE (Roxicodone) 5 mg immediate release tablet Take 1 tablet (5 mg) by mouth every 6 hours if needed for moderate pain (4 - 6). 11/27/24  Yes Jeannine Tejeda PA-C   pregabalin (Lyrica) 75 mg capsule Take 1 capsule (75 mg) by mouth 3 times a day.   Yes Historical Provider, MD   SITagliptin phosphate (Januvia) 25 mg tablet Take 1 tablet (25 mg) by mouth once daily. 6/3/24  Yes  Historical Provider, MD       Medications  Scheduled medications  [Held by provider] amLODIPine, 5 mg, oral, Daily  apixaban, 2.5 mg, oral, BID  [Held by provider] dapagliflozin propanediol, 5 mg, oral, Daily  [Held by provider] furosemide, 40 mg, oral, Daily  insulin lispro, 0-10 Units, subcutaneous, Before meals & nightly  latanoprost, 1 drop, Both Eyes, Nightly  [Held by provider] losartan, 25 mg, oral, BID  [Held by provider] metoprolol tartrate, 12.5 mg, oral, BID  metroNIDAZOLE, 500 mg, intravenous, q8h  [Held by provider] pregabalin, 75 mg, oral, Daily  [Held by provider] SITagliptin phosphate, 25 mg, oral, Daily  [Held by provider] sodium zirconium cyclosilicate, 5 g, oral, Daily  vancomycin, 500 mg, oral, q6h      Continuous medications  sodium chloride 0.9%, 100 mL/hr      PRN medications  acetaminophen, 650 mg, q4h PRN   Or  acetaminophen, 650 mg, q4h PRN   Or  acetaminophen, 650 mg, q4h PRN  [Held by provider] cyclobenzaprine, 10 mg, TID PRN  [Held by provider] oxyCODONE, 5 mg, q6h PRN  oxygen, , Continuous PRN - O2/gases        Assessment/Plan   Assessment & Plan  Sepsis with acute renal failure and septic shock (Multi)  -Admit to medical stepdown bed Q4 vital signs monitor on telemetry  -Infectious disease consult  -Received Zosyn in the emergency room at this point we will hold on additional Zosyn.  No notable pneumonia, UTI, or post op infection   -Patient with very foul-smelling stool likely C. Difficile.  Stool sent for pathogen and C. difficile pending  -ID consult recommended high-dose oral vancomycin and IV Flagyl.  Acute kidney injury superimposed on CKD (CMS-HCC)  -Patient with CKD last creatinine noted to be 2.5.  -Hold any nephrotoxic agents  -Unfortunately patient did receive IVP contrast for CT angio of the head, will likely have a further decline in kidney function  -Received 1 L of IV fluid in the emergency room I have ordered normal saline at 100 cc/h for the next 24 hours.  Patient  with history of heart failure with preserved EF.   -Hold all nephrotoxic agents as possible  -Nephrology consult  -Mckeon placed in the emergency room will keep Mckeon at this time for strict I's and O's.  Need to discontinue as soon as possible to prevent  CAUTI  Metabolic encephalopathy  -Likely in the setting of C. difficile with sepsis  -CT head and CT angio head negative for any acute intracranial process  -At this time do not feel that an MRI or further neurologic workup is indicated given patient's current clinical picture  Colitis  -Likely C. Difficile  -High dose oral vancomycin and Flagyl initiated by infectious disease  -Stool for C. difficile and pathogen's pending  -Hold on any further antibiotics  Metabolic acidosis  -In the setting of sepsis with acute kidney injury  Acute hypoxic respiratory failure (Multi)  -Patient with acute hypoxia in the emergency room requiring oxygen no  prior oxygen use  -CT of the chest is negative for pneumonia received IV contrast for CT head and with acute kidney injury will not order any further CT with contrast  -Can consider VQ scan if necessary we will hold at this time patient has been compliant with her Eliquis postoperatively  Total knee replacement status, left  -Recent total knee replacement left at [facility]  -On Eliquis 2.5 mg twice daily postoperatively, continue on admit  -May have weightbearing as tolerated when awake and alert  -X-ray of the left knee no effusion or abscess noted.  Surgical site no signs of infection  -PT OT consult placed  Elevated LFTs  -in the setting of sepsis.   -hold any hepatotoxic medications as appropriate  -Anticipate LFTs will return to normal with treatment of septic shock and IV hydration  -Repeat CMP a.m.           VTE prophylaxis:   DVT prophylaxis: SCDs and Resumed home anticoagulation      See additional orders for further plan of care.   Further evaluation and management per attending and consulting physicians.      Code  Status  Full code        I spent a total of 60 minutes on the date of the service in the professional and overall care of this patient.which included preparing to see the patient, face-to-face patient care, completing clinical documentation, and obtaining and/or reviewing separately obtained history.     Sue Schmidt, ANA [facility]  Pager [phone]

## 2024-12-04 NOTE — ED NOTES
Per report from nursing home. [facility], wound care nurse went in to complete patient dressing and patient was unresponsive and unable to move limbs. Patient last known normal was this morning at 9am when patient received morning medications. Patient is alert and oriented x3-4 at baseline per facility. Is on eliquis and has tremors due to history of parkinsons. Being sent to ED for eval.      Melina Hutchinson RN  12/04/24 [id number]

## 2024-12-05 ENCOUNTER — APPOINTMENT (OUTPATIENT)
Dept: RADIOLOGY | Facility: HOSPITAL | Age: 84
DRG: 871 | End: 2024-12-05
Payer: MEDICARE

## 2024-12-05 ENCOUNTER — APPOINTMENT (OUTPATIENT)
Dept: CARDIOLOGY | Facility: HOSPITAL | Age: 84
DRG: 871 | End: 2024-12-05
Payer: MEDICARE

## 2024-12-05 LAB
ALBUMIN SERPL BCP-MCNC: 2.7 G/DL (ref 3.4–5)
ALP SERPL-CCNC: 66 U/L (ref 33–136)
ALT SERPL W P-5'-P-CCNC: 163 U/L (ref 7–45)
ANION GAP BLDA CALCULATED.4IONS-SCNC: 15 MMO/L (ref 10–25)
ANION GAP SERPL CALC-SCNC: 19 MMOL/L (ref 10–20)
AST SERPL W P-5'-P-CCNC: 245 U/L (ref 9–39)
ATRIAL RATE: 108 BPM
ATRIAL RATE: 110 BPM
ATRIAL RATE: 95 BPM
BASE EXCESS BLDA CALC-SCNC: -10.3 MMOL/L (ref -2–3)
BILIRUB SERPL-MCNC: 0.7 MG/DL (ref 0–1.2)
BODY TEMPERATURE: ABNORMAL
BUN SERPL-MCNC: 87 MG/DL (ref 6–23)
C COLI+JEJ+UPSA DNA STL QL NAA+PROBE: NOT DETECTED
C DIF TOX TCDA+TCDB STL QL NAA+PROBE: DETECTED
C DIFF TOX A+B STL QL IA: POSITIVE
CA-I BLD-SCNC: 0.85 MMOL/L (ref 1.1–1.33)
CA-I BLDA-SCNC: 1.01 MMOL/L (ref 1.1–1.33)
CALCIUM SERPL-MCNC: 6.5 MG/DL (ref 8.6–10.3)
CHLORIDE BLDA-SCNC: 109 MMOL/L (ref 98–107)
CHLORIDE SERPL-SCNC: 107 MMOL/L (ref 98–107)
CO2 SERPL-SCNC: 19 MMOL/L (ref 21–32)
CREAT SERPL-MCNC: 5.5 MG/DL (ref 0.5–1.05)
CREAT UR-MCNC: 116.7 MG/DL (ref 20–320)
CRITICAL CALL TIME: 1433
CRITICAL CALLED BY: ABNORMAL
CRITICAL CALLED TO: ABNORMAL
CRITICAL READ BACK: ABNORMAL
EC STX1 GENE STL QL NAA+PROBE: NOT DETECTED
EC STX2 GENE STL QL NAA+PROBE: NOT DETECTED
EGFRCR SERPLBLD CKD-EPI 2021: 7 ML/MIN/1.73M*2
ERYTHROCYTE [DISTWIDTH] IN BLOOD BY AUTOMATED COUNT: 14.4 % (ref 11.5–14.5)
GLUCOSE BLD MANUAL STRIP-MCNC: 137 MG/DL (ref 74–99)
GLUCOSE BLD MANUAL STRIP-MCNC: 146 MG/DL (ref 74–99)
GLUCOSE BLD MANUAL STRIP-MCNC: 153 MG/DL (ref 74–99)
GLUCOSE BLD MANUAL STRIP-MCNC: 184 MG/DL (ref 74–99)
GLUCOSE BLDA-MCNC: 146 MG/DL (ref 74–99)
GLUCOSE SERPL-MCNC: 153 MG/DL (ref 74–99)
HCO3 BLDA-SCNC: 17.2 MMOL/L (ref 22–26)
HCT VFR BLD AUTO: 33.7 % (ref 36–46)
HCT VFR BLD EST: 31 % (ref 36–46)
HGB BLD-MCNC: 10.1 G/DL (ref 12–16)
HGB BLDA-MCNC: 10.2 G/DL (ref 12–16)
HOLD SPECIMEN: NORMAL
INHALED O2 CONCENTRATION: 28 %
INR PPP: 1.5 (ref 0.9–1.1)
LACTATE BLDA-SCNC: 1.3 MMOL/L (ref 0.4–2)
LACTATE SERPL-SCNC: 1.4 MMOL/L (ref 0.4–2)
MCH RBC QN AUTO: 29.2 PG (ref 26–34)
MCHC RBC AUTO-ENTMCNC: 30 G/DL (ref 32–36)
MCV RBC AUTO: 97 FL (ref 80–100)
NOROVIRUS GI + GII RNA STL NAA+PROBE: NOT DETECTED
NRBC BLD-RTO: 0 /100 WBCS (ref 0–0)
OXYHGB MFR BLDA: 91.9 % (ref 94–98)
P AXIS: 38 DEGREES
P AXIS: 65 DEGREES
P AXIS: 80 DEGREES
P OFFSET: 205 MS
P OFFSET: 208 MS
P OFFSET: 211 MS
P ONSET: 126 MS
P ONSET: 132 MS
P ONSET: 148 MS
PCO2 BLDA: 44 MM HG (ref 38–42)
PH BLDA: 7.2 PH (ref 7.38–7.42)
PLATELET # BLD AUTO: 148 X10*3/UL (ref 150–450)
PO2 BLDA: 69 MM HG (ref 85–95)
POTASSIUM BLDA-SCNC: 4.5 MMOL/L (ref 3.5–5.3)
POTASSIUM SERPL-SCNC: 4.2 MMOL/L (ref 3.5–5.3)
PR INTERVAL: 142 MS
PR INTERVAL: 178 MS
PR INTERVAL: 186 MS
PROT SERPL-MCNC: 5.4 G/DL (ref 6.4–8.2)
PROT UR-ACNC: 702 MG/DL (ref 5–24)
PROT/CREAT UR: 6.02 MG/MG CREAT (ref 0–0.17)
PROTHROMBIN TIME: 16.9 SECONDS (ref 9.8–12.8)
Q ONSET: 219 MS
Q ONSET: 219 MS
Q ONSET: 221 MS
QRS COUNT: 16 BEATS
QRS COUNT: 18 BEATS
QRS COUNT: 18 BEATS
QRS DURATION: 78 MS
QRS DURATION: 80 MS
QRS DURATION: 80 MS
QT INTERVAL: 278 MS
QT INTERVAL: 334 MS
QT INTERVAL: 342 MS
QTC CALCULATION(BAZETT): 372 MS
QTC CALCULATION(BAZETT): 419 MS
QTC CALCULATION(BAZETT): 462 MS
QTC FREDERICIA: 338 MS
QTC FREDERICIA: 389 MS
QTC FREDERICIA: 418 MS
R AXIS: -9 DEGREES
R AXIS: 53 DEGREES
R AXIS: 63 DEGREES
RBC # BLD AUTO: 3.46 X10*6/UL (ref 4–5.2)
RV RNA STL NAA+PROBE: NOT DETECTED
SALMONELLA DNA STL QL NAA+PROBE: NOT DETECTED
SAO2 % BLDA: 95 % (ref 94–100)
SHIGELLA DNA SPEC QL NAA+PROBE: NOT DETECTED
SODIUM BLDA-SCNC: 137 MMOL/L (ref 136–145)
SODIUM SERPL-SCNC: 141 MMOL/L (ref 136–145)
T AXIS: -6 DEGREES
T AXIS: 131 DEGREES
T AXIS: 91 DEGREES
T OFFSET: 358 MS
T OFFSET: 386 MS
T OFFSET: 392 MS
V CHOLERAE DNA STL QL NAA+PROBE: NOT DETECTED
VENTRICULAR RATE: 108 BPM
VENTRICULAR RATE: 110 BPM
VENTRICULAR RATE: 95 BPM
WBC # BLD AUTO: 12.8 X10*3/UL (ref 4.4–11.3)
Y ENTEROCOL DNA STL QL NAA+PROBE: NOT DETECTED

## 2024-12-05 PROCEDURE — 83605 ASSAY OF LACTIC ACID: CPT

## 2024-12-05 PROCEDURE — 2500000005 HC RX 250 GENERAL PHARMACY W/O HCPCS

## 2024-12-05 PROCEDURE — 82330 ASSAY OF CALCIUM: CPT

## 2024-12-05 PROCEDURE — 2500000002 HC RX 250 W HCPCS SELF ADMINISTERED DRUGS (ALT 637 FOR MEDICARE OP, ALT 636 FOR OP/ED)

## 2024-12-05 PROCEDURE — 82306 VITAMIN D 25 HYDROXY: CPT | Mod: STJLAB

## 2024-12-05 PROCEDURE — 85610 PROTHROMBIN TIME: CPT | Performed by: NURSE PRACTITIONER

## 2024-12-05 PROCEDURE — 86706 HEP B SURFACE ANTIBODY: CPT | Mod: STJLAB | Performed by: INTERNAL MEDICINE

## 2024-12-05 PROCEDURE — 87340 HEPATITIS B SURFACE AG IA: CPT | Mod: STJLAB | Performed by: INTERNAL MEDICINE

## 2024-12-05 PROCEDURE — 2500000004 HC RX 250 GENERAL PHARMACY W/ HCPCS (ALT 636 FOR OP/ED): Performed by: NURSE PRACTITIONER

## 2024-12-05 PROCEDURE — 80053 COMPREHEN METABOLIC PANEL: CPT | Performed by: NURSE PRACTITIONER

## 2024-12-05 PROCEDURE — 92610 EVALUATE SWALLOWING FUNCTION: CPT | Mod: GN | Performed by: SPEECH-LANGUAGE PATHOLOGIST

## 2024-12-05 PROCEDURE — 74018 RADEX ABDOMEN 1 VIEW: CPT

## 2024-12-05 PROCEDURE — 83970 ASSAY OF PARATHORMONE: CPT | Mod: STJLAB

## 2024-12-05 PROCEDURE — 2500000004 HC RX 250 GENERAL PHARMACY W/ HCPCS (ALT 636 FOR OP/ED)

## 2024-12-05 PROCEDURE — 99222 1ST HOSP IP/OBS MODERATE 55: CPT | Performed by: NURSE PRACTITIONER

## 2024-12-05 PROCEDURE — 2500000005 HC RX 250 GENERAL PHARMACY W/O HCPCS: Performed by: NURSE PRACTITIONER

## 2024-12-05 PROCEDURE — 2500000004 HC RX 250 GENERAL PHARMACY W/ HCPCS (ALT 636 FOR OP/ED): Mod: JZ | Performed by: INTERNAL MEDICINE

## 2024-12-05 PROCEDURE — 93005 ELECTROCARDIOGRAM TRACING: CPT

## 2024-12-05 PROCEDURE — 36415 COLL VENOUS BLD VENIPUNCTURE: CPT

## 2024-12-05 PROCEDURE — 2060000001 HC INTERMEDIATE ICU ROOM DAILY

## 2024-12-05 PROCEDURE — 99222 1ST HOSP IP/OBS MODERATE 55: CPT | Performed by: INTERNAL MEDICINE

## 2024-12-05 PROCEDURE — 84132 ASSAY OF SERUM POTASSIUM: CPT

## 2024-12-05 PROCEDURE — 82947 ASSAY GLUCOSE BLOOD QUANT: CPT

## 2024-12-05 PROCEDURE — 85027 COMPLETE CBC AUTOMATED: CPT | Performed by: NURSE PRACTITIONER

## 2024-12-05 PROCEDURE — 2500000004 HC RX 250 GENERAL PHARMACY W/ HCPCS (ALT 636 FOR OP/ED): Mod: JZ | Performed by: NURSE PRACTITIONER

## 2024-12-05 PROCEDURE — 36415 COLL VENOUS BLD VENIPUNCTURE: CPT | Performed by: NURSE PRACTITIONER

## 2024-12-05 PROCEDURE — 74018 RADEX ABDOMEN 1 VIEW: CPT | Performed by: STUDENT IN AN ORGANIZED HEALTH CARE EDUCATION/TRAINING PROGRAM

## 2024-12-05 PROCEDURE — 82570 ASSAY OF URINE CREATININE: CPT

## 2024-12-05 RX ORDER — CALCIUM GLUCONATE 20 MG/ML
2 INJECTION, SOLUTION INTRAVENOUS ONCE
Status: COMPLETED | OUTPATIENT
Start: 2024-12-05 | End: 2024-12-05

## 2024-12-05 RX ORDER — DEXTROSE 50 % IN WATER (D50W) INTRAVENOUS SYRINGE
12.5
Status: ACTIVE | OUTPATIENT
Start: 2024-12-05

## 2024-12-05 RX ORDER — INSULIN LISPRO 100 [IU]/ML
0-10 INJECTION, SOLUTION INTRAVENOUS; SUBCUTANEOUS EVERY 4 HOURS
Status: DISPENSED | OUTPATIENT
Start: 2024-12-05

## 2024-12-05 RX ORDER — DEXTROSE 50 % IN WATER (D50W) INTRAVENOUS SYRINGE
25
Status: ACTIVE | OUTPATIENT
Start: 2024-12-05

## 2024-12-05 RX ORDER — VANCOMYCIN HCL 50 MG/ML
125 SOLUTION, RECONSTITUTED, ORAL ORAL EVERY 6 HOURS
Status: DISCONTINUED | OUTPATIENT
Start: 2024-12-05 | End: 2024-12-05

## 2024-12-05 RX ORDER — SODIUM CHLORIDE 9 MG/ML
100 INJECTION, SOLUTION INTRAVENOUS CONTINUOUS
Status: ACTIVE | OUTPATIENT
Start: 2024-12-06 | End: 2024-12-06

## 2024-12-05 RX ORDER — SODIUM CHLORIDE 9 MG/ML
100 INJECTION, SOLUTION INTRAVENOUS CONTINUOUS
Status: DISCONTINUED | OUTPATIENT
Start: 2024-12-06 | End: 2024-12-05

## 2024-12-05 RX ORDER — VANCOMYCIN HCL 50 MG/ML
125 SOLUTION, RECONSTITUTED, ORAL ORAL EVERY 6 HOURS
Status: DISCONTINUED | OUTPATIENT
Start: 2024-12-05 | End: 2024-12-06

## 2024-12-05 ASSESSMENT — PAIN - FUNCTIONAL ASSESSMENT
PAIN_FUNCTIONAL_ASSESSMENT: 0-10
PAIN_FUNCTIONAL_ASSESSMENT: PAINAD (PAIN ASSESSMENT IN ADVANCED DEMENTIA SCALE)
PAIN_FUNCTIONAL_ASSESSMENT: PAINAD (PAIN ASSESSMENT IN ADVANCED DEMENTIA SCALE)
PAIN_FUNCTIONAL_ASSESSMENT: 0-10
PAIN_FUNCTIONAL_ASSESSMENT: 0-10

## 2024-12-05 ASSESSMENT — PAIN SCALES - GENERAL
PAINLEVEL_OUTOF10: 0 - NO PAIN

## 2024-12-05 ASSESSMENT — COGNITIVE AND FUNCTIONAL STATUS - GENERAL
DRESSING REGULAR UPPER BODY CLOTHING: TOTAL
STANDING UP FROM CHAIR USING ARMS: TOTAL
PERSONAL GROOMING: TOTAL
MOVING FROM LYING ON BACK TO SITTING ON SIDE OF FLAT BED WITH BEDRAILS: TOTAL
DAILY ACTIVITIY SCORE: 6
MOVING TO AND FROM BED TO CHAIR: TOTAL
WALKING IN HOSPITAL ROOM: TOTAL
TOILETING: TOTAL
EATING MEALS: TOTAL
HELP NEEDED FOR BATHING: TOTAL
MOBILITY SCORE: 6
DRESSING REGULAR LOWER BODY CLOTHING: TOTAL
TURNING FROM BACK TO SIDE WHILE IN FLAT BAD: TOTAL
CLIMB 3 TO 5 STEPS WITH RAILING: TOTAL

## 2024-12-05 ASSESSMENT — PAIN SCALES - PAIN ASSESSMENT IN ADVANCED DEMENTIA (PAINAD)
BODYLANGUAGE: RELAXED
TOTALSCORE: DISTRACTED OR REASSURED BY VOICE/TOUCH
TOTALSCORE: 0
NEGVOCALIZATION: OCCASIONAL MOAN/GROAN, LOW SPEECH, NEGATIVE/DISAPPROVING QUALITY
FACIALEXPRESSION: SMILING OR INEXPRESSIVE
FACIALEXPRESSION: SMILING OR INEXPRESSIVE
BODYLANGUAGE: RELAXED
BREATHING: NORMAL
TOTALSCORE: 1
BREATHING: NORMAL
BREATHING: SAD, FRIGHTENED, FROWN
CONSOLABILITY: NO NEED TO CONSOLE
BODYLANGUAGE: TENSE, DISTRESSED PACING, FIDGETING
TOTALSCORE: 4
CONSOLABILITY: NO NEED TO CONSOLE
FACIALEXPRESSION: OCCASIONAL MOAN/GROAN, LOW SPEECH, NEGATIVE/DISAPPROVING QUALITY
TOTALSCORE: 0
FACIALEXPRESSION: SMILING OR INEXPRESSIVE
BODYLANGUAGE: RELAXED
CONSOLABILITY: NO NEED TO CONSOLE

## 2024-12-05 NOTE — CONSULTS
Consults    Reason For Consult  AIDAN on CKD stage 4    History Of Present Illness  FILOMENA Snell is a 84 y.o. female with PMH of paroxysmal atrial fibrillation on Eliquis, HFp EF, obesity, CKD stage IV, DMII, glaucoma, history of C. difficile, hypertension and recent left total knee replacement presenting to [facility] with altered mental status on 12/4/2024. She came from [facility], where she was getting physical therapy after L knee replacement on 11/26/2024. Per chart review, her  reports she was having mental status changes since Sunday 12/1 and has gotten worse. She had a decreased appetite since Saturday 11/30 and has had decrease oral intake since then. Her wound care nurse found her unresponsive and unable to move her limbs on morning of 12/4 and was sent to the ED. On initial presentation to the ED, she was febrile, tachycardic, and tachypneic. CMP was remarkable for bicarb of 20, BUN/Creat 79/4.51 with GFR 9, AST 98, ALT 72, T.bili 0.9. Lactate initially 2.9 but improved to 2.6 following IV fluids. CBC was remarkable for leukocytosis 19.8, no anemia noted. CXR showed no effusion or concern for infection. CT head w/o contrast and CT angio of head and neck w/ contrast was done and showed no acute intracranial process. CT chest A/P showed no acute process in chest, colon and rectosigmoid with inflammation consistent with colitis, and atrophic kidneys. A huynh was placed in the ED due to pain in suprapubic region and urinary retention. She received 2 L of fluids and was given Zosyn initially by ED per sepsis protocol. Due to large amounts of large foul smelling brown stool, a C diff panel was done and was positive. Zosyn was stopped, and oral vancomycin and IV flagyl were started.    At bedside this morning, patient was laying in bed with no family present. Her blood pressure has been hypotensive (98/55) and is afebrile. She is oliguric today with urine output of 165 mL. She was disoriented, AOx1. She  was unable to answer my questions other than saying “yes” when asked if she was in pain.      Past Medical History  She has a past medical history of Afib (Multi), Arthritis, Balance problem, C. difficile colitis, Cataract, CKD (chronic kidney disease), stage III (Multi), COVID-19, Diabetic neuropathy (Multi), Endometrial cancer (Multi), Falls, Glaucoma, Heart failure with preserved ejection fraction, Hypertension, Hypokalemia, Obesity, Type 2 diabetes mellitus, and Venous ulcer (Multi).    Surgical History  She has a past surgical history that includes Cataract extraction; Joint replacement; Cholecystectomy; Hysterectomy; Other surgical history; Other surgical history (Bilateral); Appendectomy; Tonsillectomy; Colonoscopy; Upper gastrointestinal endoscopy; and Total knee arthroplasty (Left).     Social History  She reports that she has quit smoking. Her smoking use included cigarettes. She has never used smokeless tobacco. She reports that she does not currently use alcohol. She reports that she does not use drugs.    Family History  Family History   Problem Relation Name Age of Onset    Heart failure Mother      Other (MI) Mother      Stroke Father      Hyperlipidemia Sister      Hypertension Sister          Allergies  Penicillins, Cephalexin, and Sulfamethoxazole-trimethoprim    Review of Systems  Unable to be obtained due to altered mental status     Physical Exam  General: Appears ill. Appears hypovolemic.  Cardiovascular: Regular rate and rhythm.  No murmurs.  Pulmonary: Satting well on room air. Clear to auscultation bilaterally.  No rales, rhonchi, or wheeze.   Abdomen: Soft, tender to palpation, and nondistended. Unable to evaluate CVA tenderness due to altered mental status.  Musculoskeletal: No lower extremity edema. Reported L calf tenderness to palpation. L knee incision present with steri strips, no erythema, swelling, or pus noted.  Neurological: Alert, oriented x1 (baseline AOx3-4). Only able to  "answer \"Yes\" to some questions.  Skin: Warm and dry.       Last Recorded Vitals  /54   Pulse 94   Temp 37.7 °C (99.9 °F) (Temporal)   Resp 20   Wt 90.6 kg (199 lb 11.8 oz)   SpO2 94%     I&O    Intake/Output Summary (Last 24 hours) at 12/5/2024 1231  Last data filed at 12/5/2024 1200  Gross per 24 hour   Intake 2506.66 ml   Output 625 ml   Net 1881.66 ml     Relevant Results     Results for orders placed or performed during the hospital encounter of 12/04/24 (from the past 24 hours)   Lactate   Result Value Ref Range    Lactate 2.6 (H) 0.4 - 2.0 mmol/L   Ammonia   Result Value Ref Range    Ammonia 20 16 - 53 umol/L   C. difficile, PCR    Specimen: Stool   Result Value Ref Range    C. difficile, PCR Detected (A) Not Detected   Clostridium Difficile EIA    Specimen: Stool   Result Value Ref Range    C. difficile (Toxin A/B) Positive (A) Negative, Indeterminate   Lactate   Result Value Ref Range    Lactate 2.6 (H) 0.4 - 2.0 mmol/L   SST TOP   Result Value Ref Range    Extra Tube Hold for add-ons.    Influenza A, and B PCR   Result Value Ref Range    Flu A Result Not Detected Not Detected    Flu B Result Not Detected Not Detected   Sars-CoV-2 PCR   Result Value Ref Range    Coronavirus 2019, PCR Not Detected Not Detected   POCT GLUCOSE   Result Value Ref Range    POCT Glucose 211 (H) 74 - 99 mg/dL   POCT GLUCOSE   Result Value Ref Range    POCT Glucose 169 (H) 74 - 99 mg/dL   Lactate   Result Value Ref Range    Lactate 1.4 0.4 - 2.0 mmol/L   CBC   Result Value Ref Range    WBC 12.8 (H) 4.4 - 11.3 x10*3/uL    nRBC 0.0 0.0 - 0.0 /100 WBCs    RBC 3.46 (L) 4.00 - 5.20 x10*6/uL    Hemoglobin 10.1 (L) 12.0 - 16.0 g/dL    Hematocrit 33.7 (L) 36.0 - 46.0 %    MCV 97 80 - 100 fL    MCH 29.2 26.0 - 34.0 pg    MCHC 30.0 (L) 32.0 - 36.0 g/dL    RDW 14.4 11.5 - 14.5 %    Platelets 148 (L) 150 - 450 x10*3/uL   Comprehensive metabolic panel   Result Value Ref Range    Glucose 153 (H) 74 - 99 mg/dL    Sodium 141 136 - 145 " mmol/L    Potassium 4.2 3.5 - 5.3 mmol/L    Chloride 107 98 - 107 mmol/L    Bicarbonate 19 (L) 21 - 32 mmol/L    Anion Gap 19 10 - 20 mmol/L    Urea Nitrogen 87 (H) 6 - 23 mg/dL    Creatinine 5.50 (H) 0.50 - 1.05 mg/dL    eGFR 7 (L) >60 mL/min/1.73m*2    Calcium 6.5 (L) 8.6 - 10.3 mg/dL    Albumin 2.7 (L) 3.4 - 5.0 g/dL    Alkaline Phosphatase 66 33 - 136 U/L    Total Protein 5.4 (L) 6.4 - 8.2 g/dL     (H) 9 - 39 U/L    Bilirubin, Total 0.7 0.0 - 1.2 mg/dL     (H) 7 - 45 U/L   POCT GLUCOSE   Result Value Ref Range    POCT Glucose 153 (H) 74 - 99 mg/dL   POCT GLUCOSE   Result Value Ref Range    POCT Glucose 146 (H) 74 - 99 mg/dL   Electrocardiogram, 12-lead PRN ACS symptoms   Result Value Ref Range    Ventricular Rate 95 BPM    Atrial Rate 95 BPM    ID Interval 142 ms    QRS Duration 80 ms    QT Interval 334 ms    QTC Calculation(Bazett) 419 ms    P Axis 38 degrees    R Axis -9 degrees    T Axis -6 degrees    QRS Count 16 beats    Q Onset 219 ms    P Onset 148 ms    P Offset 211 ms    T Offset 386 ms    QTC Fredericia 389 ms      BLOOD GAS VENOUS FULL PANEL 12/4/24   Result Value Ref Range    POCT pH, Venous 7.27 (L) 7.33 - 7.43 pH    POCT pCO2, Venous 50 41 - 51 mm Hg    POCT pO2, Venous 22 (L) 35 - 45 mm Hg    POCT SO2, Venous 23 (L) 45 - 75 %    POCT Oxy Hemoglobin, Venous 22.4 (L) 45.0 - 75.0 %    POCT Hematocrit Calculated, Venous 39.0 36.0 - 46.0 %    POCT Sodium, Venous 135 (L) 136 - 145 mmol/L    POCT Potassium, Venous 4.8 3.5 - 5.3 mmol/L    POCT Chloride, Venous 101 98 - 107 mmol/L    POCT Ionized Calicum, Venous 1.03 (L) 1.10 - 1.33 mmol/L    POCT Glucose, Venous 239 (H) 74 - 99 mg/dL    POCT Lactate, Venous 3.5 (H) 0.4 - 2.0 mmol/L    POCT Base Excess, Venous -4.3 (L) -2.0 - 3.0 mmol/L    POCT HCO3 Calculated, Venous 23.0 22.0 - 26.0 mmol/L    POCT Hemoglobin, Venous 13.0 12.0 - 16.0 g/dL    POCT Anion Gap, Venous 16.0 10.0 - 25.0 mmol/L    Patient Temperature      FiO2 21 %      Urinalysis  with Reflex Culture and Microscopic 12/4/24   Result Value Ref Range    Color, Urine Light-Yellow Light-Yellow, Yellow, Dark-Yellow    Appearance, Urine Clear Clear    Specific Gravity, Urine 1.009 1.005 - 1.035    pH, Urine 5.0 5.0, 5.5, 6.0, 6.5, 7.0, 7.5, 8.0    Protein, Urine 20 (TRACE) NEGATIVE, 10 (TRACE), 20 (TRACE) mg/dL    Glucose, Urine 500 (3+) (A) Normal mg/dL    Blood, Urine 0.1 (1+) (A) NEGATIVE    Ketones, Urine NEGATIVE NEGATIVE mg/dL    Bilirubin, Urine NEGATIVE NEGATIVE    Urobilinogen, Urine Normal Normal mg/dL    Nitrite, Urine NEGATIVE NEGATIVE    Leukocyte Esterase, Urine NEGATIVE NEGATIVE   Extra Urine Gray Tube   Result Value Ref Range    Extra Tube Hold for add-ons.    Urinalysis Microscopic   Result Value Ref Range    WBC, Urine 1-5 1-5, NONE /HPF    RBC, Urine 1-2 NONE, 1-2, 3-5 /HPF    Mucus, Urine FEW Reference range not established. /LPF         CT head w/o contrast 12/4/24  No acute intracranial bleed or focal mass effect.  Mild volume loss.  Mild chronic white matter ischemic disease in the deep periventricular regions.    CT angio head and neck w/ and w/o contrast 12/4/24  CTA neck:  No evidence for significant stenosis of the cervical vessels.  Calcified plaque at the carotid bifurcation without stenosis by NASCET criteria      CTA head:  No evidence for significant stenosis or large branch vessel cutoffs  of the intracranial vessels.    CT Chest Abdomen Pelvis w/o contrast 12/4/24  Diffuse thickening of the colon and mild adjacent inflammation.  Similar thickening of the rectosigmoid colon. Findings consistent with colitis. No bowel obstruction. No free air. No free fluid.  Cardiac enlargement. Coronary artery calcifications.  Renal atrophic changes. No acute renal process.  Status post cholecystectomy.      Assessment/Plan   84 y.o. female with PMH of paroxysmal atrial fibrillation on Eliquis, HFp EF, obesity, CKD stage IV, DMII, glaucoma, history of C. difficile, hypertension and  recent left total knee replacement presenting to [facility] with altered mental status, currently on oral vancomycin and IV flagyl for acute C. Difficile infection and AIDAN on CKD stage IV.    #Oliguric prerenal AIDAN vs. Ischemic ATN likely 2/2 dehydration and C. Diff diarrhea plus contrast induced nephrotoxicity  # History of CKD stage IV 2/2 hypertensive nephrosclerosis   - Creatinine has increased from 4.51 to 5.5 today.  - CT A/P showed renal atrophic changes  - Received 2 L of IV fluids and currently has Mckeon catheter placed  Plan:  - Order spot urine/creatinine ratio  - No emergent hemodialysis, but will discuss with family if needed due to worsening creatinine     #HAGMA 2/2 multifactorial including lactic acidosis, sepsis from C. Diff, AIDAN  - corrected anion gap 18.3  - ABG demonstrated pH 7.2, pCO2 44, HCO3 17.2 which indicated primary metabolic acidosis with concomitant respiratory acidosis.  Plan  - Start 1 L bicarb drip over 10 hours and then continue normal saline  - Monitor bicarb level    #Hypocalcemia  #Hypoalbuminemia  - Calcium corrected to 7.5  Plan:  - Order PTH, Vitamin D3 level, and Ionized Ca2    BERNADETTE RHOADES OMS-4

## 2024-12-05 NOTE — ASSESSMENT & PLAN NOTE
-Recent total knee replacement left at [facility]  -On Eliquis 2.5 mg twice daily postoperatively, continue on admit  -May have weightbearing as tolerated when awake and alert  -X-ray of the left knee no effusion or abscess noted.  Surgical site no signs of infection  -PT OT consult placed

## 2024-12-05 NOTE — CONSULTS
"Reason for consult  Colitis    HPI  Savita Snell \"FILOMENA\" is a 84 y.o. female with PMH of A-fib on Eliquis, obesity, CKD, T2DM, C. difficile, HTN, L TKR 11/26/2024 presenting 12/4 with AMS.  Patient was at SNF for PT following knee replacement.  She is A&O x 3 at baseline.  Today at time of consult, she is lethargic and unable to verbalize questions in regard to orientation.  Patient has been having significant amounts of liquid stool output.  Fecal management system in place.  C. difficile PCR and toxin positive.  Patient had been started on oral vancomycin.  ID is following.  Patient denies CP, SOB, abdominal pain though notes tenderness to palpation.    PMH  She has a past medical history of Afib (Multi), Arthritis, Balance problem, C. difficile colitis, Cataract, CKD (chronic kidney disease), stage III (Multi), COVID-19, Diabetic neuropathy (Multi), Endometrial cancer (Multi), Falls, Glaucoma, Heart failure with preserved ejection fraction, Hypertension, Hypokalemia, Obesity, Type 2 diabetes mellitus, and Venous ulcer (Multi).    PSH  She has a past surgical history that includes Cataract extraction; Joint replacement; Cholecystectomy; Hysterectomy; Other surgical history; Other surgical history (Bilateral); Appendectomy; Tonsillectomy; Colonoscopy; Upper gastrointestinal endoscopy; and Total knee arthroplasty (Left).    Family  Family History   Problem Relation Name Age of Onset    Heart failure Mother      Other (MI) Mother      Stroke Father      Hyperlipidemia Sister      Hypertension Sister         Social  She reports that she has quit smoking. Her smoking use included cigarettes. She has never used smokeless tobacco. She reports that she does not currently use alcohol. She reports that she does not use drugs.      Review of Systems  ROS negative unless stated otherwise in HPI     Objective  /54   Pulse 94   Temp 37.7 °C (99.9 °F) (Temporal)   Resp 20   Ht 1.676 m (5' 6\")   Wt 90.6 kg (199 " lb 11.8 oz)   SpO2 94%   BMI 32.24 kg/m²     Physical Exam  Constitutional: Lethargic, minimally interactive, in NAD  Eyes: PERRL, sclera clear, no conjunctival injection  Skin: Warm and dry, no rash or ecchymosis  ENMT: Mucous membranes moist, no lesions noted  Resp: CTAB, even and unlabored  CV: RRR, normal S1, S2, no m,r,g  GI: +BS, soft, round, mild diffuse abdominal TTP, no rebound tenderness or guarding, no palpable masses or organomegaly,FMS with liquid brown stool  Extremities: Extremities warm, no edema, contusions, wounds or cyanosis  Neuro: Alert and oriented x0-1  Psych: Flat affect    Medications  Scheduled medications  [Held by provider] amLODIPine, 5 mg, oral, Daily  [Held by provider] apixaban, 2.5 mg, oral, BID  [Held by provider] dapagliflozin propanediol, 5 mg, oral, Daily  [Held by provider] furosemide, 40 mg, oral, Daily  heparin, 5,000 Units, subcutaneous, q8h  insulin lispro, 0-10 Units, subcutaneous, q4h  latanoprost, 1 drop, Both Eyes, Nightly  [Held by provider] losartan, 25 mg, oral, BID  [Held by provider] metoprolol tartrate, 12.5 mg, oral, BID  [Held by provider] pregabalin, 75 mg, oral, Daily  [Held by provider] SITagliptin phosphate, 25 mg, oral, Daily  [Held by provider] sodium zirconium cyclosilicate, 5 g, oral, Daily  vancomycin, 125 mg, oral, q6h      Continuous medications  sodium chloride 0.9%, 100 mL/hr, Last Rate: 100 mL/hr (12/05/24 0704)      PRN medications  PRN medications: acetaminophen **OR** acetaminophen **OR** acetaminophen, [Held by provider] cyclobenzaprine, dextrose, dextrose, glucagon, glucagon, [Held by provider] oxyCODONE, oxygen     Labs  Lab Results   Component Value Date    WBC 12.8 (H) 12/05/2024    HGB 10.1 (L) 12/05/2024    HCT 33.7 (L) 12/05/2024    MCV 97 12/05/2024     (L) 12/05/2024     Lab Results   Component Value Date    GLUCOSE 153 (H) 12/05/2024    CALCIUM 6.5 (L) 12/05/2024     12/05/2024    K 4.2 12/05/2024    CO2 19 (L)  "12/05/2024     12/05/2024    BUN 87 (H) 12/05/2024    CREATININE 5.50 (H) 12/05/2024     Lab Results   Component Value Date     (H) 12/05/2024     (H) 12/05/2024    ALKPHOS 66 12/05/2024    BILITOT 0.7 12/05/2024     No results found for: \"IRON\", \"TIBC\", \"FERRITIN\"  Lab Results   Component Value Date    INR 2.1 (H) 12/04/2024    INR 1.1 11/19/2024    INR 1.3 (H) 10/08/2024    PROTIME 23.8 (H) 12/04/2024    PROTIME 12.2 11/19/2024    PROTIME 14.5 (H) 10/08/2024       Radiology  CT chest A/P without IV contrast 12/4/2024 noting:  mpression:     Diffuse thickening of the colon and mild adjacent inflammation.  Similar thickening of the rectosigmoid colon. Findings consistent with  colitis. No bowel obstruction. No free air. No free fluid.  Cardiac enlargement. Coronary artery calcifications.  Renal atrophic changes. No acute renal process.  Status post cholecystectomy.  Signed by Blaise Natarajan MD     Assessment:  Savita Snell \"FILOMENA\" is a 84 y.o. female with PMH of A-fib on Eliquis, obesity, CKD, T2DM, C. difficile, HTN, L TKR 11/26/2024 presenting 12/4 with AMS.  Patient having significant amounts of liquid stool output.  C. difficile PCR and toxin positive.  Patient remains minimally interactive.  Oral Vanco initiated.  ID following.    # severe c diff- recurrent- last 12/5/23   # leukocytosis- downtrending  # encephalopathy  # AIDAN  # a fib on Eliquis  # s/p L TKR 11/26/24    Plan:  - continue supportive care  - diet plan per primary team  - continue oral vancomycin per protocol  - continue to follow up ID recs  - pt can follow up in GI clinic    Thank you for allowing us to participate in care. Please call with any further questions or concerns.      Plan has been discussed with Dr. Foy. GI will sign off.     ANA MARIA Jacobs/CNP    "

## 2024-12-05 NOTE — CARE PLAN
Patient remained lethargic throughout shift. ABGs ordered and MD informed (please see labs). IV medications given per provider order.  SLP recommended patient remain NPO due to inability to follow direction/remain alert.   NG tube ordered and RN placed per order.   Imaging order released by RN to confirm placement.     Patient safety maintained throughout shift.      The clinical goals for the shift include remain awake and alert as well as improvement in mental status through out shift      Problem: Skin  Goal: Decreased wound size/increased tissue granulation at next dressing change  Outcome: Progressing  Goal: Participates in plan/prevention/treatment measures  Outcome: Progressing  Goal: Prevent/manage excess moisture  Outcome: Progressing  Goal: Prevent/minimize sheer/friction injuries  Outcome: Progressing  Goal: Promote/optimize nutrition  Outcome: Progressing  Goal: Promote skin healing  Outcome: Progressing     Problem: Diabetes  Goal: Achieve decreasing blood glucose levels by end of shift  Outcome: Progressing  Goal: Increase stability of blood glucose readings by end of shift  Outcome: Progressing  Goal: Decrease in ketones present in urine by end of shift  Outcome: Progressing  Goal: Maintain electrolyte levels within acceptable range throughout shift  Outcome: Progressing  Goal: Maintain glucose levels >70mg/dl to <250mg/dl throughout shift  Outcome: Progressing  Goal: No changes in neurological exam by end of shift  Outcome: Progressing  Goal: Learn about and adhere to nutrition recommendations by end of shift  Outcome: Progressing  Goal: Increase self care and/or family involovement by end of shift  Outcome: Progressing  Goal: Receive DSME education by end of shift  Outcome: Progressing

## 2024-12-05 NOTE — NURSING NOTE
Pt received to 2104-A via cart from ED.  Alert and oriented x 2.  Incontinent of large amount liquid stool, sent for c-diff testing.  Oriented to room and call light and unit.  Family at bedside.

## 2024-12-05 NOTE — NURSING NOTE
Alert and oriented x 1, does not open eyes, responds to voice/painful stimuli.  Fms placed for frequent liquid stooling,

## 2024-12-05 NOTE — CONSULTS
Consults    Reason For Consult  AIDAN    History Of Present Illness  FILOMENA Snell is a 84 y.o. female presenting with CHF, CKD stage 3/4, h/o Cdiff presents with altered mental status    CT abdo - colitis  +Cdiff    AIDAN with creatinine increased from 2.3-2.4 to 4.5 and then 5.5    CT with IV contrast 12/4    Hypotension    Renal consulted for further eval     Past Medical History  She has a past medical history of Afib (Multi), Arthritis, Balance problem, C. difficile colitis, Cataract, CKD (chronic kidney disease), stage III (Multi), COVID-19, Diabetic neuropathy (Multi), Endometrial cancer (Multi), Falls, Glaucoma, Heart failure with preserved ejection fraction, Hypertension, Hypokalemia, Obesity, Type 2 diabetes mellitus, and Venous ulcer (Multi).    Surgical History  She has a past surgical history that includes Cataract extraction; Joint replacement; Cholecystectomy; Hysterectomy; Other surgical history; Other surgical history (Bilateral); Appendectomy; Tonsillectomy; Colonoscopy; Upper gastrointestinal endoscopy; and Total knee arthroplasty (Left).     Medication Documentation Review Audit       Reviewed by Sanya Mederos (Technician) on 12/04/24 at 1436      Medication Order Taking? Sig Documenting Provider Last Dose Status   amLODIPine (Norvasc) 5 mg tablet [id number] Yes Take 1 tablet (5 mg) by mouth once daily. Anatoly Ngo MD 12/4/2024 Morning Active   apixaban (Eliquis) 2.5 mg tablet [id number] Yes Take 1 tablet (2.5 mg) by mouth 2 times a day. Anatoly Ngo MD 12/4/2024 Morning Active   cholecalciferol (Vitamin D3) 50 mcg (2,000 unit) capsule [id number] Yes Take 1 capsule (50 mcg) by mouth early in the morning.. Anatoly Ngo MD 12/4/2024 Morning Active   cyanocobalamin (Vitamin B-12) 1,000 mcg tablet [id number] Yes Take 1 tablet (1,000 mcg) by mouth once daily. Anatoly Ngo MD 12/4/2024 Morning Active   cyclobenzaprine (Flexeril) 10 mg tablet [id number] Yes Take 1 tablet (10 mg)  by mouth 3 times a day as needed for muscle spasms. Jeannine Tejeda PA-C Past Week Active   dapagliflozin propanediol (Farxiga) 5 mg [id number] Yes Take 1 tablet (5 mg) by mouth once daily. Anatoly Provider, MD 12/4/2024 Morning Active   furosemide (Lasix) 20 mg tablet [id number] Yes Take 2 tablets (40 mg) by mouth once daily. Anatoly Provider, MD 12/4/2024 Morning Active   latanoprost (Xalatan) 0.005 % ophthalmic solution [id number] Yes Administer 1 drop into affected eye(s) daily at bedtime. Anatoly Ngo MD 12/3/2024 Bedtime Active   Lokelma 5 gram packet [id number] Yes Take 5 g by mouth once daily. Anatoly Provider, MD 12/4/2024 Morning Active   losartan (Cozaar) 25 mg tablet [id number] Yes Take 1 tablet (25 mg) by mouth twice a day. Anatoly Provider, MD 12/4/2024 Morning Active   metoprolol tartrate (Lopressor) 25 mg tablet [id number] Yes Take 0.5 tablets (12.5 mg) by mouth 2 times a day. Anatoly Ngo MD 12/4/2024 Morning Active   ondansetron ODT (Zofran-ODT) 4 mg disintegrating tablet [id number] Yes Dissolve 1 tablet (4 mg) in the mouth every 8 hours if needed for nausea. Jeannine Tejeda PA-C Past Week Active   oxyCODONE (Roxicodone) 5 mg immediate release tablet [id number] Yes Take 1 tablet (5 mg) by mouth every 6 hours if needed for moderate pain (4 - 6). Jeannine Tejeda PA-C Past Week Active   pregabalin (Lyrica) 75 mg capsule [id number] Yes Take 1 capsule (75 mg) by mouth 3 times a day. Anatoly Provider, MD 12/4/2024 Morning Active   SITagliptin phosphate (Januvia) 25 mg tablet [id number] Yes Take 1 tablet (25 mg) by mouth once daily. Historical Provider, MD 12/4/2024 Morning Active                    Social History  She reports that she has quit smoking. Her smoking use included cigarettes. She has never used smokeless tobacco. She reports that she does not currently use alcohol. She reports that she does not use drugs.    Family History  Family History   Problem Relation Name Age  of Onset    Heart failure Mother      Other (MI) Mother      Stroke Father      Hyperlipidemia Sister      Hypertension Sister          Allergies  Penicillins, Cephalexin, and Sulfamethoxazole-trimethoprim    Review of Systems  As in HPI     Physical Exam  Neck: supple  Lung: no wheeze  Cv: [id number]  exT: noc/c  Abdo: diffuse discomfort       Last Recorded Vitals  /54   Pulse 94   Temp 37.7 °C (99.9 °F) (Temporal)   Resp 20   Wt 90.6 kg (199 lb 11.8 oz)   SpO2 94%     Relevant Results  Results for orders placed or performed during the hospital encounter of 12/04/24 (from the past 24 hours)   C. difficile, PCR    Specimen: Stool   Result Value Ref Range    C. difficile, PCR Detected (A) Not Detected   Stool Pathogen Panel, PCR    Specimen: Stool   Result Value Ref Range    Campylobacter Group Not Detected Not Detected    Salmonella species Not Detected Not Detected    Shigella species Not Detected Not Detected    Vibrio Group Not Detected Not Detected    Yersinia Enterocolitica Not Detected Not Detected    Shiga Toxin 1 Not Detected Not Detected    Shiga Toxin 2 Not Detected Not Detected    Norovirus GI/GII Not Detected Not Detected    Rotavirus A Not Detected Not Detected   Clostridium Difficile EIA    Specimen: Stool   Result Value Ref Range    C. difficile (Toxin A/B) Positive (A) Negative, Indeterminate   Lactate   Result Value Ref Range    Lactate 2.6 (H) 0.4 - 2.0 mmol/L   SST TOP   Result Value Ref Range    Extra Tube Hold for add-ons.    Influenza A, and B PCR   Result Value Ref Range    Flu A Result Not Detected Not Detected    Flu B Result Not Detected Not Detected   Sars-CoV-2 PCR   Result Value Ref Range    Coronavirus 2019, PCR Not Detected Not Detected   POCT GLUCOSE   Result Value Ref Range    POCT Glucose 211 (H) 74 - 99 mg/dL   POCT GLUCOSE   Result Value Ref Range    POCT Glucose 169 (H) 74 - 99 mg/dL   Lactate   Result Value Ref Range    Lactate 1.4 0.4 - 2.0 mmol/L   CBC   Result Value  Ref Range    WBC 12.8 (H) 4.4 - 11.3 x10*3/uL    nRBC 0.0 0.0 - 0.0 /100 WBCs    RBC 3.46 (L) 4.00 - 5.20 x10*6/uL    Hemoglobin 10.1 (L) 12.0 - 16.0 g/dL    Hematocrit 33.7 (L) 36.0 - 46.0 %    MCV 97 80 - 100 fL    MCH 29.2 26.0 - 34.0 pg    MCHC 30.0 (L) 32.0 - 36.0 g/dL    RDW 14.4 11.5 - 14.5 %    Platelets 148 (L) 150 - 450 x10*3/uL   Comprehensive metabolic panel   Result Value Ref Range    Glucose 153 (H) 74 - 99 mg/dL    Sodium 141 136 - 145 mmol/L    Potassium 4.2 3.5 - 5.3 mmol/L    Chloride 107 98 - 107 mmol/L    Bicarbonate 19 (L) 21 - 32 mmol/L    Anion Gap 19 10 - 20 mmol/L    Urea Nitrogen 87 (H) 6 - 23 mg/dL    Creatinine 5.50 (H) 0.50 - 1.05 mg/dL    eGFR 7 (L) >60 mL/min/1.73m*2    Calcium 6.5 (L) 8.6 - 10.3 mg/dL    Albumin 2.7 (L) 3.4 - 5.0 g/dL    Alkaline Phosphatase 66 33 - 136 U/L    Total Protein 5.4 (L) 6.4 - 8.2 g/dL     (H) 9 - 39 U/L    Bilirubin, Total 0.7 0.0 - 1.2 mg/dL     (H) 7 - 45 U/L   POCT GLUCOSE   Result Value Ref Range    POCT Glucose 153 (H) 74 - 99 mg/dL   POCT GLUCOSE   Result Value Ref Range    POCT Glucose 146 (H) 74 - 99 mg/dL   Electrocardiogram, 12-lead PRN ACS symptoms   Result Value Ref Range    Ventricular Rate 95 BPM    Atrial Rate 95 BPM    IN Interval 142 ms    QRS Duration 80 ms    QT Interval 334 ms    QTC Calculation(Bazett) 419 ms    P Axis 38 degrees    R Axis -9 degrees    T Axis -6 degrees    QRS Count 16 beats    Q Onset 219 ms    P Onset 148 ms    P Offset 211 ms    T Offset 386 ms    QTC Fredericia 389 ms   Calcium, ionized   Result Value Ref Range    POCT Calcium, Ionized 0.85 (L) 1.1 - 1.33 mmol/L   Blood Gas Arterial Full Panel   Result Value Ref Range    POCT pH, Arterial 7.20 (LL) 7.38 - 7.42 pH    POCT pCO2, Arterial 44 (H) 38 - 42 mm Hg    POCT pO2, Arterial 69 (L) 85 - 95 mm Hg    POCT SO2, Arterial 95 94 - 100 %    POCT Oxy Hemoglobin, Arterial 91.9 (L) 94.0 - 98.0 %    POCT Hematocrit Calculated, Arterial 31.0 (L) 36.0 - 46.0 %     POCT Sodium, Arterial 137 136 - 145 mmol/L    POCT Potassium, Arterial 4.5 3.5 - 5.3 mmol/L    POCT Chloride, Arterial 109 (H) 98 - 107 mmol/L    POCT Ionized Calcium, Arterial 1.01 (L) 1.10 - 1.33 mmol/L    POCT Glucose, Arterial 146 (H) 74 - 99 mg/dL    POCT Lactate, Arterial 1.3 0.4 - 2.0 mmol/L    POCT Base Excess, Arterial -10.3 (L) -2.0 - 3.0 mmol/L    POCT HCO3 Calculated, Arterial 17.2 (L) 22.0 - 26.0 mmol/L    POCT Hemoglobin, Arterial 10.2 (L) 12.0 - 16.0 g/dL    POCT Anion Gap, Arterial 15 10 - 25 mmo/L    Patient Temperature      FiO2 28 %    Critical Called By FAM     Critical Called To HOUSE MD     Critical Call Time 1433     Critical Read Back Y           Assessment/Plan     Assessment  AIDAN oliguric multifactorial includes volume depletion/prerenal state + ischemic/septic ATN + contrast nephrotoxicity  Cdiff colitis  Acidosis  Hypotension  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

## 2024-12-05 NOTE — PROGRESS NOTES
12/05/24 1126   Discharge Planning   Type of Residence   (Transported from [facility]. Has been there since 11/26/24 Post TKR.)   Intensity of Service   Intensity of Service 0-30 min     Patient unable to answer questions.  Will open eyes occasionally when spoken to.  Called robert Welsh as the emergency contact and left a message to return my call.

## 2024-12-05 NOTE — PROGRESS NOTES
"Physical Therapy                 Therapy Communication Note    Patient Name: Savita Snell \"FILOMENA\"  MRN: [id number]  Department: [facility] 2  Room: 2104/2104-A  Today's Date: 12/5/2024     Discipline: Physical Therapy    Missed Visit Reason: Missed Visit Reason:  (Pt confused with high volume stool output.)      "

## 2024-12-05 NOTE — PROGRESS NOTES
"Speech-Language Pathology    SLP Adult Inpatient Speech-Language Pathology Clinical Swallow Evaluation    Patient Name: Savita Snell \"FILOMENA\"  MRN: [id number]  Today's Date: 12/5/2024   Time Calculation  Start Time: 0948  Stop Time: 1002  Time Calculation (min): 14 min         Current Problem:   1. Sepsis with encephalopathy without septic shock, due to unspecified organism (Multi)              Recommendations:  Risk for Aspiration: Yes  Additional Recommendations: Dysphagia treatment  Solid Diet Recommendations : NPO  Liquid Diet Recommendations: NPO  Compensatory Swallowing Strategies: Other (Comment) (strict oral care)  Medication Administration Recommendations: Non Oral, Other (Comment) (if unable to provide via alternate means, may try crushed in puree if patient is sufficiently alert to swallow)  Follow up treatments: Patient/family education, Other (Comment) (ongoing assessment for diet advancement)      Assessment:  Assessment Results:     Patient presents with oral dysphagia suspect impacted by level of alertness upon completion of clinical swallow evaluation at bedside this date. Examination of oral mechanism was limited, as patient did not follow most commands. [place] Swallow Protocol (3oz water challenge) was deferred due to patient limitations in following commands. Patient was given PO trials of ice chips x2 and puree x1. Patient noted to manipulate ice chips, but demonstrated oral loss for 1/2 trials. Patient with oral holding of puree, requiring assistance for clearing oral cavity. Straw was presented to patient, but even when opening her eyes she did not attempt to round lips around straw or draw up liquid. Trials were discontinued due to level of alertness.    Per the results of this assessment, recommend patient to continue NPO status pending improved alertness. ST to continue to follow during inpatient stay to determine diet advancement vs need for further assessment (I.e., MBSS).    Prognosis: " Fair  Treatment Provided: No  Medical Staff Made Aware: Yes  Barriers: Cognition, Comorbidities      Plan:  Inpatient/Swing Bed or Outpatient: Inpatient  Treatment/Interventions: Bolus trials, Patient/family education  SLP Plan: Skilled SLP  SLP Frequency: 3x per week  Duration: 2 weeks  SLP Discharge Recommendations: Other (Comment) (pending progress during hospital course)  Next Treatment Priority: Ongoing assessment for diet advancement  Discussed POC: Patient, Nursing  Patient/Caregiver Agreeable: Yes    Goals (established 12/5/24):  Patient will complete ongoing assessment at bedside to determine PO diet advancement vs need for further assessment (I.e., MBSS).    Subjective   Patient opened eyes to name, but did not maintain alertness throughout session.    General Visit Information:  Patient Class: Inpatient  Living Environment: Nursing home (skilled/long-term)  Caregiver Feedback: RN reports patient has been lethargic this date, only briefly opening eyes in response to stimulation.  Ordering Physician: EYAL Santana  Reason for Referral: Swallow evaluation - suspected oral or pharyngeal dysphagia, rule out aspiration  Past Medical History Relevant to Rehab: A-fib on Eliquis (held since surgery), HTN, HLD, DM2, CHF, CKD  Prior Level of Function: Decreased function  Patient Seen During This Visit: Yes  Total Number of Visits : 1  Prior to Session Communication: Bedside nurse  Date of Onset: 12/04/24  Date of Order: 12/04/24  [employer] Diet: Regular solids and thin liquids  Current Diet : NPO pending results of clinical swallow evaluation  Dysphagia Diagnosis: Other (Comment) (oral dysphagia, suspect impacted by level of alertness)      Objective       Baseline Assessment:  Respiratory Status: Oxygen via nasal cannula, Other (Comment) (2L O2)  History of Intubation: No  Behavior/Cognition: Lethargic, Requires cueing, Other (comment) (intermittently followed commands)  Patient Positioning: Upright in  "Bed  Baseline Vocal Quality: Normal  Volitional Cough: Other (Comment) (did not elicit upon command)  Volitional Swallow: Other (Comment) (did not elicit upon command)    Relevant Imaging:    CT Chest 12/4/24: \"FINDINGS:  Partially visualized chest showing cardiac enlargement.  No  pericardial effusion.  Coronary artery calcifications.  Lung bases are  clear.\"    CTA Head/Neck 12/4/24: \"IMPRESSION:  CTA neck:  No evidence for significant stenosis of the cervical vessels.  Calcified plaque at the carotid bifurcation without stenosis by  NASCET criteria  CTA head:  No evidence for significant stenosis or large branch vessel cutoffs  of the intracranial vessels.\"    Pain:  Pain Assessment  Pain Assessment: PAINAD (Pain Assessment in Advanced Dementia Scale)  PAINAD (Pain Assessment in Advanced Dementia)  Breathing: Normal  Negative Vocalization: Occasional moan/groan, low speech, negative/disapproving quality  Facial Expression: Sad, frightened, frown  Body Language: Tense, distressed pacing, fidgeting  Consolability: Distracted or reassured by voice/touch  PAINAD Score: 4    Oral/Motor Assessment:  Oral Hygiene: Adequate  Dentition: Adequate/Natural  Oral Motor: Unable to Complete  Intelligibility: Intelligible      Consistencies Trialed:  Consistencies Trialed: Yes  Consistencies Trialed: Ice Chips, Thin (IDDSI Level 0) - Straw, Pureed/extremely thick (IDDSI Level 4)      Clinical Observations:  Patient Positioning: Upright in Bed  Management of Oral Secretions: Other (Comment) (some oral loss of secretions on right side during oral care)  Was The 3 oz Swallow Protocol Completed: No, Other (Comment) (deferred due to patient difficulty following commands)  Other Signs/Symptoms of Difficulty with Feeding: Oral Holding, Oral Residue  Anterior Spillage: Pureed/Extremely Thick (IDDSI Level 4), Other (Comment) (ice chips)  Prolonged Oral Manipulation: Pureed/Extremity Thick (IDDSI Level 4), Other (Comment) (ice " chips)  Oral Residue: Pureed/Extremely Thick (IDDSI Level 4), Other (Comment)  Clinical Observation Comment: Participation impacted by level of alertness      Inpatient:    Education:  Learner patient   Barriers to Learning acuteness of illness barrier   Method demonstration; verbal   Education - Topic ST provided patient education regarding role of ST, purpose of assessment, clinical impressions, goals of treatment, and plan of care. Patient verbalized questionable full comprehension, consistent with cognitive status. Education will be reinforced. ST further coordinated with RN regarding recommendations and precautions per this assessment, with RN verbalizing understanding.     Outcome    Needs review/reinforcement

## 2024-12-05 NOTE — PROGRESS NOTES
" INPATIENT PROGRESS NOTES    PATIENT NAME: Savita Snell    MRN: [id number]  SERVICE DATE:  12/5/2024   SERVICE TIME:  12:33 PM    SIGNATURE: Arben Cardoso MD      SUBJECTIVE  Afebrile  No events overnight       OBJECTIVE  PHYSICAL EXAM:   Patient Vitals for the past 24 hrs:   BP Temp Temp src Pulse Resp SpO2 Height Weight   12/05/24 1143 105/54 -- -- 94 -- 94 % -- --   12/05/24 1116 98/55 37.7 °C (99.9 °F) Temporal 94 20 95 % -- --   12/05/24 1046 94/53 -- -- 96 -- 94 % -- --   12/05/24 1016 103/52 -- -- 96 -- 95 % -- --   12/05/24 0946 114/54 -- -- 96 -- (!) 88 % -- --   12/05/24 0916 103/54 -- -- 96 -- 92 % -- --   12/05/24 0846 103/53 -- -- 98 -- 97 % -- --   12/05/24 0816 99/55 -- -- 92 -- 98 % -- --   12/05/24 0746 110/52 -- -- 96 -- 97 % -- --   12/05/24 0716 103/51 37.6 °C (99.7 °F) Temporal 94 18 97 % -- --   12/05/24 0600 -- -- -- -- -- -- -- 90.6 kg (199 lb 11.8 oz)   12/05/24 0415 101/50 37.2 °C (99 °F) Temporal 98 20 96 % -- --   12/05/24 0015 119/59 37 °C (98.6 °F) Temporal 100 20 95 % -- --   12/04/24 2015 142/59 37.5 °C (99.5 °F) Temporal 96 18 100 % -- --   12/04/24 1845 -- 37.4 °C (99.3 °F) Temporal -- 18 97 % -- --   12/04/24 1514 124/77 36.5 °C (97.7 °F) Temporal 90 20 94 % 1.676 m (5' 6\") 87.9 kg (193 lb 12.6 oz)   12/04/24 1445 120/60 -- -- 87 19 97 % -- --   12/04/24 1415 110/55 -- -- 87 18 97 % -- --   12/04/24 1400 106/55 -- -- 89 18 97 % -- --   12/04/24 1345 (!) 101/49 37.6 °C (99.7 °F) Temporal 87 17 97 % -- --   12/04/24 1315 122/57 -- -- 94 20 98 % -- --   12/04/24 1300 119/58 -- -- 94 19 99 % -- --   12/04/24 1245 133/64 -- -- 98 20 99 % -- --   12/04/24 1244 -- -- -- -- -- 98 % -- --   12/04/24 1236 145/66 (!) 38.1 °C (100.6 °F) Temporal (!) 105 18 (!) 86 % -- --         Gen: NAD  Neck: symmetric, no mass  Cardiovascular: RRR  Respiratory: No distress   GI: Abd soft      Labs:  Lab Results   Component Value Date    WBC 12.8 (H) 12/05/2024    HGB 10.1 (L) 12/05/2024    HCT 33.7 " "(L) 2024    MCV 97 2024     (L) 2024     Lab Results   Component Value Date    GLUCOSE 153 (H) 2024    CALCIUM 6.5 (L) 2024     2024    K 4.2 2024    CO2 19 (L) 2024     2024    BUN 87 (H) 2024    CREATININE 5.50 (H) 2024   ESR: --No results found for: \"SEDRATE\"No results found for: \"CRP\"  Lab Results   Component Value Date     (H) 2024     (H) 2024    ALKPHOS 66 2024    BILITOT 0.7 2024         DATA:   Diagnostic tests reviewed for today's visit:    Labs this admission reviewed  Imagings this admission reviewed  Cultures: Reviewed           ASSESSMENT :   -Recurrent C. difficile colitis  -Leukocytosis improving   -AIDAN   -Acute encephalopathy  -Status post left TKA on   -History of hypertension, hyperlipidemia, A-fib, diabetes, CHF, CKD       PLAN:  -Stop IV Flagyl   -Vancomycin pulse taper course as the followin mg 4 times daily for 1 week followed by 125 mg 3 times daily for 1 week followed by 125 mg twice daily for 1 week followed by 125 mg daily for 1 week followed by 125 mg every other day for 1 week.     Arben Cardoso MD.   Infectious Disease Attending        This note was partially created using voice recognition software and is inherently subject to errors including those of syntax and \"sound-alike\" substitutions which may escape proofreading. In such instances, original meaning may be extrapolated by contextual derivation       "

## 2024-12-06 ENCOUNTER — APPOINTMENT (OUTPATIENT)
Dept: RADIOLOGY | Facility: HOSPITAL | Age: 84
DRG: 871 | End: 2024-12-06
Payer: MEDICARE

## 2024-12-06 ENCOUNTER — APPOINTMENT (OUTPATIENT)
Dept: DIALYSIS | Facility: HOSPITAL | Age: 84
End: 2024-12-06
Payer: MEDICARE

## 2024-12-06 PROBLEM — E44.0 MODERATE PROTEIN-CALORIE MALNUTRITION (MULTI): Status: ACTIVE | Noted: 2024-12-06

## 2024-12-06 LAB
25(OH)D3 SERPL-MCNC: 39 NG/ML (ref 30–100)
ALBUMIN SERPL BCP-MCNC: 2.6 G/DL (ref 3.4–5)
ALP SERPL-CCNC: 69 U/L (ref 33–136)
ALT SERPL W P-5'-P-CCNC: 242 U/L (ref 7–45)
ANION GAP SERPL CALC-SCNC: 18 MMOL/L (ref 10–20)
AST SERPL W P-5'-P-CCNC: 267 U/L (ref 9–39)
BILIRUB SERPL-MCNC: 0.7 MG/DL (ref 0–1.2)
BUN SERPL-MCNC: 102 MG/DL (ref 6–23)
CALCIUM SERPL-MCNC: 6.6 MG/DL (ref 8.6–10.3)
CHLORIDE SERPL-SCNC: 106 MMOL/L (ref 98–107)
CO2 SERPL-SCNC: 23 MMOL/L (ref 21–32)
CREAT SERPL-MCNC: 6.65 MG/DL (ref 0.5–1.05)
EGFRCR SERPLBLD CKD-EPI 2021: 6 ML/MIN/1.73M*2
ERYTHROCYTE [DISTWIDTH] IN BLOOD BY AUTOMATED COUNT: 14.6 % (ref 11.5–14.5)
GLUCOSE BLD MANUAL STRIP-MCNC: 105 MG/DL (ref 74–99)
GLUCOSE BLD MANUAL STRIP-MCNC: 135 MG/DL (ref 74–99)
GLUCOSE BLD MANUAL STRIP-MCNC: 143 MG/DL (ref 74–99)
GLUCOSE BLD MANUAL STRIP-MCNC: 158 MG/DL (ref 74–99)
GLUCOSE BLD MANUAL STRIP-MCNC: 160 MG/DL (ref 74–99)
GLUCOSE BLD MANUAL STRIP-MCNC: 163 MG/DL (ref 74–99)
GLUCOSE SERPL-MCNC: 143 MG/DL (ref 74–99)
HCT VFR BLD AUTO: 33.7 % (ref 36–46)
HGB BLD-MCNC: 10 G/DL (ref 12–16)
MAGNESIUM SERPL-MCNC: 1.87 MG/DL (ref 1.6–2.4)
MCH RBC QN AUTO: 29.2 PG (ref 26–34)
MCHC RBC AUTO-ENTMCNC: 29.7 G/DL (ref 32–36)
MCV RBC AUTO: 99 FL (ref 80–100)
NRBC BLD-RTO: 0 /100 WBCS (ref 0–0)
PHOSPHATE SERPL-MCNC: 7.4 MG/DL (ref 2.5–4.9)
PLATELET # BLD AUTO: 121 X10*3/UL (ref 150–450)
POTASSIUM SERPL-SCNC: 3.9 MMOL/L (ref 3.5–5.3)
PROT SERPL-MCNC: 5.3 G/DL (ref 6.4–8.2)
PTH-INTACT SERPL-MCNC: 150.7 PG/ML (ref 18.5–88)
RBC # BLD AUTO: 3.42 X10*6/UL (ref 4–5.2)
SODIUM SERPL-SCNC: 143 MMOL/L (ref 136–145)
WBC # BLD AUTO: 9.2 X10*3/UL (ref 4.4–11.3)

## 2024-12-06 PROCEDURE — 2500000005 HC RX 250 GENERAL PHARMACY W/O HCPCS

## 2024-12-06 PROCEDURE — 8010000001 HC DIALYSIS - HEMODIALYSIS PER DAY

## 2024-12-06 PROCEDURE — 2500000005 HC RX 250 GENERAL PHARMACY W/O HCPCS: Performed by: INTERNAL MEDICINE

## 2024-12-06 PROCEDURE — 77001 FLUOROGUIDE FOR VEIN DEVICE: CPT | Mod: RT | Performed by: STUDENT IN AN ORGANIZED HEALTH CARE EDUCATION/TRAINING PROGRAM

## 2024-12-06 PROCEDURE — 2720000007 HC OR 272 NO HCPCS: Performed by: STUDENT IN AN ORGANIZED HEALTH CARE EDUCATION/TRAINING PROGRAM

## 2024-12-06 PROCEDURE — 92526 ORAL FUNCTION THERAPY: CPT | Mod: GN | Performed by: SPEECH-LANGUAGE PATHOLOGIST

## 2024-12-06 PROCEDURE — 2500000004 HC RX 250 GENERAL PHARMACY W/ HCPCS (ALT 636 FOR OP/ED)

## 2024-12-06 PROCEDURE — 02HV33Z INSERTION OF INFUSION DEVICE INTO SUPERIOR VENA CAVA, PERCUTANEOUS APPROACH: ICD-10-PCS | Performed by: STUDENT IN AN ORGANIZED HEALTH CARE EDUCATION/TRAINING PROGRAM

## 2024-12-06 PROCEDURE — 5A1D70Z PERFORMANCE OF URINARY FILTRATION, INTERMITTENT, LESS THAN 6 HOURS PER DAY: ICD-10-PCS | Performed by: INTERNAL MEDICINE

## 2024-12-06 PROCEDURE — 99222 1ST HOSP IP/OBS MODERATE 55: CPT | Performed by: PSYCHIATRY & NEUROLOGY

## 2024-12-06 PROCEDURE — 36558 INSERT TUNNELED CV CATH: CPT | Mod: RT | Performed by: STUDENT IN AN ORGANIZED HEALTH CARE EDUCATION/TRAINING PROGRAM

## 2024-12-06 PROCEDURE — 80053 COMPREHEN METABOLIC PANEL: CPT | Performed by: NURSE PRACTITIONER

## 2024-12-06 PROCEDURE — 2500000005 HC RX 250 GENERAL PHARMACY W/O HCPCS: Performed by: NURSE PRACTITIONER

## 2024-12-06 PROCEDURE — C1750 CATH, HEMODIALYSIS,LONG-TERM: HCPCS | Performed by: STUDENT IN AN ORGANIZED HEALTH CARE EDUCATION/TRAINING PROGRAM

## 2024-12-06 PROCEDURE — 82947 ASSAY GLUCOSE BLOOD QUANT: CPT

## 2024-12-06 PROCEDURE — 74018 RADEX ABDOMEN 1 VIEW: CPT

## 2024-12-06 PROCEDURE — 36010 PLACE CATHETER IN VEIN: CPT | Mod: RT | Performed by: STUDENT IN AN ORGANIZED HEALTH CARE EDUCATION/TRAINING PROGRAM

## 2024-12-06 PROCEDURE — 2500000004 HC RX 250 GENERAL PHARMACY W/ HCPCS (ALT 636 FOR OP/ED): Performed by: NURSE PRACTITIONER

## 2024-12-06 PROCEDURE — 70450 CT HEAD/BRAIN W/O DYE: CPT | Performed by: RADIOLOGY

## 2024-12-06 PROCEDURE — 70450 CT HEAD/BRAIN W/O DYE: CPT

## 2024-12-06 PROCEDURE — 85027 COMPLETE CBC AUTOMATED: CPT | Performed by: NURSE PRACTITIONER

## 2024-12-06 PROCEDURE — 36415 COLL VENOUS BLD VENIPUNCTURE: CPT | Performed by: NURSE PRACTITIONER

## 2024-12-06 PROCEDURE — 0JH63XZ INSERTION OF TUNNELED VASCULAR ACCESS DEVICE INTO CHEST SUBCUTANEOUS TISSUE AND FASCIA, PERCUTANEOUS APPROACH: ICD-10-PCS | Performed by: STUDENT IN AN ORGANIZED HEALTH CARE EDUCATION/TRAINING PROGRAM

## 2024-12-06 PROCEDURE — 2500000001 HC RX 250 WO HCPCS SELF ADMINISTERED DRUGS (ALT 637 FOR MEDICARE OP): Performed by: NURSE PRACTITIONER

## 2024-12-06 PROCEDURE — 83735 ASSAY OF MAGNESIUM: CPT

## 2024-12-06 PROCEDURE — 2500000004 HC RX 250 GENERAL PHARMACY W/ HCPCS (ALT 636 FOR OP/ED): Performed by: STUDENT IN AN ORGANIZED HEALTH CARE EDUCATION/TRAINING PROGRAM

## 2024-12-06 PROCEDURE — 84100 ASSAY OF PHOSPHORUS: CPT

## 2024-12-06 PROCEDURE — 2780000003 HC OR 278 NO HCPCS: Performed by: STUDENT IN AN ORGANIZED HEALTH CARE EDUCATION/TRAINING PROGRAM

## 2024-12-06 PROCEDURE — 99291 CRITICAL CARE FIRST HOUR: CPT | Performed by: INTERNAL MEDICINE

## 2024-12-06 PROCEDURE — 2060000001 HC INTERMEDIATE ICU ROOM DAILY

## 2024-12-06 PROCEDURE — 2500000002 HC RX 250 W HCPCS SELF ADMINISTERED DRUGS (ALT 637 FOR MEDICARE OP, ALT 636 FOR OP/ED)

## 2024-12-06 PROCEDURE — 76937 US GUIDE VASCULAR ACCESS: CPT | Mod: RT | Performed by: STUDENT IN AN ORGANIZED HEALTH CARE EDUCATION/TRAINING PROGRAM

## 2024-12-06 RX ORDER — VANCOMYCIN HCL 50 MG/ML
125 SOLUTION, RECONSTITUTED, ORAL ORAL EVERY OTHER DAY
Status: DISCONTINUED | OUTPATIENT
Start: 2025-01-03 | End: 2024-12-06

## 2024-12-06 RX ORDER — VANCOMYCIN HCL 50 MG/ML
125 SOLUTION, RECONSTITUTED, ORAL ORAL EVERY 6 HOURS
Status: DISPENSED | OUTPATIENT
Start: 2024-12-06 | End: 2024-12-12

## 2024-12-06 RX ORDER — VANCOMYCIN HCL 50 MG/ML
125 SOLUTION, RECONSTITUTED, ORAL ORAL DAILY
Status: DISCONTINUED | OUTPATIENT
Start: 2024-12-27 | End: 2024-12-06

## 2024-12-06 RX ORDER — LIDOCAINE HYDROCHLORIDE AND EPINEPHRINE 10; 10 UG/ML; MG/ML
INJECTION, SOLUTION INFILTRATION; PERINEURAL
Status: COMPLETED | OUTPATIENT
Start: 2024-12-06 | End: 2024-12-06

## 2024-12-06 RX ORDER — VANCOMYCIN HCL 50 MG/ML
125 SOLUTION, RECONSTITUTED, ORAL ORAL 3 TIMES DAILY
Status: ACTIVE | OUTPATIENT
Start: 2024-12-12 | End: 2024-12-19

## 2024-12-06 RX ORDER — VANCOMYCIN HCL 50 MG/ML
125 SOLUTION, RECONSTITUTED, ORAL ORAL 2 TIMES DAILY
Status: ACTIVE | OUTPATIENT
Start: 2024-12-19 | End: 2024-12-26

## 2024-12-06 RX ORDER — VANCOMYCIN HCL 50 MG/ML
125 SOLUTION, RECONSTITUTED, ORAL ORAL DAILY
Status: ACTIVE | OUTPATIENT
Start: 2024-12-27 | End: 2025-01-03

## 2024-12-06 RX ORDER — VANCOMYCIN HCL 50 MG/ML
125 SOLUTION, RECONSTITUTED, ORAL ORAL 3 TIMES DAILY
Status: DISCONTINUED | OUTPATIENT
Start: 2024-12-12 | End: 2024-12-06

## 2024-12-06 RX ORDER — VANCOMYCIN HCL 50 MG/ML
125 SOLUTION, RECONSTITUTED, ORAL ORAL 2 TIMES DAILY
Status: DISCONTINUED | OUTPATIENT
Start: 2024-12-19 | End: 2024-12-06

## 2024-12-06 RX ORDER — VANCOMYCIN HCL 50 MG/ML
125 SOLUTION, RECONSTITUTED, ORAL ORAL EVERY 6 HOURS
Status: DISCONTINUED | OUTPATIENT
Start: 2024-12-06 | End: 2024-12-06

## 2024-12-06 RX ORDER — SODIUM CHLORIDE 9 MG/ML
INJECTION, SOLUTION INTRAMUSCULAR; INTRAVENOUS; SUBCUTANEOUS
Status: COMPLETED
Start: 2024-12-06 | End: 2024-12-06

## 2024-12-06 RX ORDER — METOCLOPRAMIDE HYDROCHLORIDE 5 MG/ML
10 INJECTION INTRAMUSCULAR; INTRAVENOUS ONCE
Status: COMPLETED | OUTPATIENT
Start: 2024-12-06 | End: 2024-12-06

## 2024-12-06 RX ORDER — PANTOPRAZOLE SODIUM 40 MG/10ML
40 INJECTION, POWDER, LYOPHILIZED, FOR SOLUTION INTRAVENOUS DAILY
Status: DISPENSED | OUTPATIENT
Start: 2024-12-06

## 2024-12-06 RX ORDER — VANCOMYCIN HCL 50 MG/ML
125 SOLUTION, RECONSTITUTED, ORAL ORAL EVERY OTHER DAY
Status: ACTIVE | OUTPATIENT
Start: 2025-01-03 | End: 2025-01-11

## 2024-12-06 ASSESSMENT — PAIN - FUNCTIONAL ASSESSMENT
PAIN_FUNCTIONAL_ASSESSMENT: NO/DENIES PAIN
PAIN_FUNCTIONAL_ASSESSMENT: 0-10
PAIN_FUNCTIONAL_ASSESSMENT: PAINAD (PAIN ASSESSMENT IN ADVANCED DEMENTIA SCALE)
PAIN_FUNCTIONAL_ASSESSMENT: 0-10

## 2024-12-06 ASSESSMENT — COGNITIVE AND FUNCTIONAL STATUS - GENERAL
CLIMB 3 TO 5 STEPS WITH RAILING: TOTAL
HELP NEEDED FOR BATHING: TOTAL
DAILY ACTIVITIY SCORE: 6
DRESSING REGULAR LOWER BODY CLOTHING: TOTAL
MOVING TO AND FROM BED TO CHAIR: TOTAL
MOVING FROM LYING ON BACK TO SITTING ON SIDE OF FLAT BED WITH BEDRAILS: TOTAL
WALKING IN HOSPITAL ROOM: TOTAL
STANDING UP FROM CHAIR USING ARMS: TOTAL
PERSONAL GROOMING: TOTAL
TOILETING: TOTAL
TURNING FROM BACK TO SIDE WHILE IN FLAT BAD: TOTAL
MOBILITY SCORE: 6
DRESSING REGULAR UPPER BODY CLOTHING: TOTAL
EATING MEALS: TOTAL

## 2024-12-06 ASSESSMENT — PAIN SCALES - GENERAL
PAINLEVEL_OUTOF10: 0 - NO PAIN

## 2024-12-06 ASSESSMENT — PAIN SCALES - PAIN ASSESSMENT IN ADVANCED DEMENTIA (PAINAD)
NEGVOCALIZATION: OCCASIONAL MOAN/GROAN, LOW SPEECH, NEGATIVE/DISAPPROVING QUALITY
BODYLANGUAGE: RELAXED
FACIALEXPRESSION: SMILING OR INEXPRESSIVE
TOTALSCORE: 1
BREATHING: NORMAL
CONSOLABILITY: NO NEED TO CONSOLE

## 2024-12-06 NOTE — CONSULTS
"Consults    History Of Present Illness  Savita Snell \"FILOMENA\" is a 84 y.o. female presenting with altered mental status.  Patient said recently had total left knee replacement.  Per ED note, she was being seen by her wound care nurse when she was observed to be confused.  On arrival to the ED, CT head that was obtained was positive for age-related degeneration without acute abnormal finding.  Lab results are significant for elevated creatinine at 6.65, BUN of 102, and transaminitis.  Patient tested positive for C. difficile.    Per, nursing staff, brain attack was activated from patient this morning due to concern for worsening neurological symptoms.  Per nurse, patient was noted to have unequal pupils left greater than right, and right not reactive to light.  NIH was noted to be 9.  Patient was only oriented to self.  Repeat CT head was obtained, showing no changes from previous CT scan.    On exam, patient was determined to have metabolic encephalopathy.  We were treated for C. difficile, and worsening kidney function.    Scheduled medications  [Held by provider] amLODIPine, 5 mg, oral, Daily  [Held by provider] apixaban, 2.5 mg, oral, BID  [Held by provider] dapagliflozin propanediol, 5 mg, oral, Daily  [Held by provider] furosemide, 40 mg, oral, Daily  heparin, 5,000 Units, subcutaneous, q8h  insulin lispro, 0-10 Units, subcutaneous, q4h  latanoprost, 1 drop, Both Eyes, Nightly  [Held by provider] losartan, 25 mg, oral, BID  [Held by provider] metoprolol tartrate, 12.5 mg, oral, BID  pantoprazole, 40 mg, intravenous, Daily  [Held by provider] pregabalin, 75 mg, oral, Daily  [Held by provider] SITagliptin phosphate, 25 mg, oral, Daily  [Held by provider] sodium zirconium cyclosilicate, 5 g, oral, Daily  vancomycin, 125 mg, nasogastric tube, q6h   Followed by  [START ON 12/12/2024] vancomycin, 125 mg, nasogastric tube, TID   Followed by  [START ON 12/19/2024] vancomycin, 125 mg, nasogastric tube, BID   " Followed by  [START ON 12/27/2024] vancomycin, 125 mg, nasogastric tube, Daily   Followed by  [START ON 1/3/2025] vancomycin, 125 mg, nasogastric tube, Every other day      Continuous medications  sodium chloride 0.9%, 100 mL/hr, Last Rate: 100 mL/hr (12/06/24 0359)      PRN medications  PRN medications: [Held by provider] acetaminophen **OR** [Held by provider] acetaminophen **OR** [Held by provider] acetaminophen, [Held by provider] cyclobenzaprine, dextrose, dextrose, glucagon, glucagon, [Held by provider] oxyCODONE, oxygen       Past Medical History  Past Medical History:   Diagnosis Date    Afib (Multi)     Arthritis     Balance problem     C. difficile colitis     Cataract     CKD (chronic kidney disease), stage III (Multi)     COVID-19     VACCINATED    Diabetic neuropathy (Multi)     Endometrial cancer (Multi)     Falls     HAS FALLEN WITHIN LAST 6 MONTHS    Glaucoma     Heart failure with preserved ejection fraction     Hypertension     Hypokalemia     Obesity     Type 2 diabetes mellitus     Venous ulcer (Multi)      Surgical History  Past Surgical History:   Procedure Laterality Date    APPENDECTOMY      CATARACT EXTRACTION      CHOLECYSTECTOMY      COLONOSCOPY      HYSTERECTOMY      JOINT REPLACEMENT      RIGHT KNEE    OTHER SURGICAL HISTORY      TAILBONE REMOVED DUE TO FRACTURE    OTHER SURGICAL HISTORY Bilateral     DOUBLE MUSCLE EYE SURGERY    TONSILLECTOMY      TOTAL KNEE ARTHROPLASTY Left     UPPER GASTROINTESTINAL ENDOSCOPY       Social History  Social History     Tobacco Use    Smoking status: Former     Types: Cigarettes    Smokeless tobacco: Never   Vaping Use    Vaping status: Never Used   Substance Use Topics    Alcohol use: Not Currently    Drug use: Never     Allergies  Penicillins, Cephalexin, and Sulfamethoxazole-trimethoprim  Medications Prior to Admission   Medication Sig Dispense Refill Last Dose/Taking    amLODIPine (Norvasc) 5 mg tablet Take 1 tablet (5 mg) by mouth once daily.    12/4/2024 Morning    apixaban (Eliquis) 2.5 mg tablet Take 1 tablet (2.5 mg) by mouth 2 times a day.   12/4/2024 Morning    cholecalciferol (Vitamin D3) 50 mcg (2,000 unit) capsule Take 1 capsule (50 mcg) by mouth early in the morning..   12/4/2024 Morning    cyanocobalamin (Vitamin B-12) 1,000 mcg tablet Take 1 tablet (1,000 mcg) by mouth once daily.   12/4/2024 Morning    cyclobenzaprine (Flexeril) 10 mg tablet Take 1 tablet (10 mg) by mouth 3 times a day as needed for muscle spasms. 21 tablet 0 Past Week    dapagliflozin propanediol (Farxiga) 5 mg Take 1 tablet (5 mg) by mouth once daily.   12/4/2024 Morning    furosemide (Lasix) 20 mg tablet Take 2 tablets (40 mg) by mouth once daily.   12/4/2024 Morning    latanoprost (Xalatan) 0.005 % ophthalmic solution Administer 1 drop into affected eye(s) daily at bedtime.   12/3/2024 Bedtime    Lokelma 5 gram packet Take 5 g by mouth once daily.   12/4/2024 Morning    losartan (Cozaar) 25 mg tablet Take 1 tablet (25 mg) by mouth twice a day.   12/4/2024 Morning    metoprolol tartrate (Lopressor) 25 mg tablet Take 0.5 tablets (12.5 mg) by mouth 2 times a day.   12/4/2024 Morning    ondansetron ODT (Zofran-ODT) 4 mg disintegrating tablet Dissolve 1 tablet (4 mg) in the mouth every 8 hours if needed for nausea. 20 tablet 0 Past Week    oxyCODONE (Roxicodone) 5 mg immediate release tablet Take 1 tablet (5 mg) by mouth every 6 hours if needed for moderate pain (4 - 6). 28 tablet 0 Past Week    pregabalin (Lyrica) 75 mg capsule Take 1 capsule (75 mg) by mouth 3 times a day.   12/4/2024 Morning    SITagliptin phosphate (Januvia) 25 mg tablet Take 1 tablet (25 mg) by mouth once daily.   12/4/2024 Morning       Review of Systems  Neurological Exam  Physical Exam    - Patient was A&Ox0.   - Patient was drowsy but arousable with moderate sternal rub.  - Patient has bilateral meiosis, sluggishly responds to light,  - Patient was not able to follow commands  - Does not withdraw from  "noxious stimuli in BUE and BLE.  - Neurologic exam was limited due patient's mentation    Last Recorded Vitals  Blood pressure 140/63, pulse 96, temperature 36.8 °C (98.2 °F), temperature source Temporal, resp. rate (!) 29, height 1.676 m (5' 6\"), weight 91.3 kg (201 lb 4.5 oz), SpO2 98%.    Relevant Results  Results for orders placed or performed during the hospital encounter of 12/04/24 (from the past 24 hours)   PTH, Intact   Result Value Ref Range    Parathyroid Hormone, Intact 150.7 (H) 18.5 - 88.0 pg/mL   Calcium, ionized   Result Value Ref Range    POCT Calcium, Ionized 0.85 (L) 1.1 - 1.33 mmol/L   Vitamin D 25-Hydroxy,Total (for eval of Vitamin D levels)   Result Value Ref Range    Vitamin D, 25-Hydroxy, Total 39 30 - 100 ng/mL   Blood Gas Arterial Full Panel   Result Value Ref Range    POCT pH, Arterial 7.20 (LL) 7.38 - 7.42 pH    POCT pCO2, Arterial 44 (H) 38 - 42 mm Hg    POCT pO2, Arterial 69 (L) 85 - 95 mm Hg    POCT SO2, Arterial 95 94 - 100 %    POCT Oxy Hemoglobin, Arterial 91.9 (L) 94.0 - 98.0 %    POCT Hematocrit Calculated, Arterial 31.0 (L) 36.0 - 46.0 %    POCT Sodium, Arterial 137 136 - 145 mmol/L    POCT Potassium, Arterial 4.5 3.5 - 5.3 mmol/L    POCT Chloride, Arterial 109 (H) 98 - 107 mmol/L    POCT Ionized Calcium, Arterial 1.01 (L) 1.10 - 1.33 mmol/L    POCT Glucose, Arterial 146 (H) 74 - 99 mg/dL    POCT Lactate, Arterial 1.3 0.4 - 2.0 mmol/L    POCT Base Excess, Arterial -10.3 (L) -2.0 - 3.0 mmol/L    POCT HCO3 Calculated, Arterial 17.2 (L) 22.0 - 26.0 mmol/L    POCT Hemoglobin, Arterial 10.2 (L) 12.0 - 16.0 g/dL    POCT Anion Gap, Arterial 15 10 - 25 mmo/L    Patient Temperature      FiO2 28 %    Critical Called By FAM     Critical Called To HOUSE MD     Critical Call Time 1433     Critical Read Back Y    POCT GLUCOSE   Result Value Ref Range    POCT Glucose 137 (H) 74 - 99 mg/dL   Protein, Urine Random   Result Value Ref Range    Total Protein, Urine Random 702 (H) 5 - 24 mg/dL    " Creatinine, Urine Random 116.7 20.0 - 320.0 mg/dL    T. Protein/Creatinine Ratio 6.02 (H) 0.00 - 0.17 mg/mg Creat   SST TOP   Result Value Ref Range    Extra Tube Hold for add-ons.    Protime-INR   Result Value Ref Range    Protime 16.9 (H) 9.8 - 12.8 seconds    INR 1.5 (H) 0.9 - 1.1   POCT GLUCOSE   Result Value Ref Range    POCT Glucose 184 (H) 74 - 99 mg/dL   POCT GLUCOSE   Result Value Ref Range    POCT Glucose 160 (H) 74 - 99 mg/dL   POCT GLUCOSE   Result Value Ref Range    POCT Glucose 163 (H) 74 - 99 mg/dL   CBC   Result Value Ref Range    WBC 9.2 4.4 - 11.3 x10*3/uL    nRBC 0.0 0.0 - 0.0 /100 WBCs    RBC 3.42 (L) 4.00 - 5.20 x10*6/uL    Hemoglobin 10.0 (L) 12.0 - 16.0 g/dL    Hematocrit 33.7 (L) 36.0 - 46.0 %    MCV 99 80 - 100 fL    MCH 29.2 26.0 - 34.0 pg    MCHC 29.7 (L) 32.0 - 36.0 g/dL    RDW 14.6 (H) 11.5 - 14.5 %    Platelets 121 (L) 150 - 450 x10*3/uL   Comprehensive metabolic panel   Result Value Ref Range    Glucose 143 (H) 74 - 99 mg/dL    Sodium 143 136 - 145 mmol/L    Potassium 3.9 3.5 - 5.3 mmol/L    Chloride 106 98 - 107 mmol/L    Bicarbonate 23 21 - 32 mmol/L    Anion Gap 18 10 - 20 mmol/L    Urea Nitrogen 102 (HH) 6 - 23 mg/dL    Creatinine 6.65 (H) 0.50 - 1.05 mg/dL    eGFR 6 (L) >60 mL/min/1.73m*2    Calcium 6.6 (L) 8.6 - 10.3 mg/dL    Albumin 2.6 (L) 3.4 - 5.0 g/dL    Alkaline Phosphatase 69 33 - 136 U/L    Total Protein 5.3 (L) 6.4 - 8.2 g/dL     (H) 9 - 39 U/L    Bilirubin, Total 0.7 0.0 - 1.2 mg/dL     (H) 7 - 45 U/L   Phosphorus   Result Value Ref Range    Phosphorus 7.4 (H) 2.5 - 4.9 mg/dL   Magnesium   Result Value Ref Range    Magnesium 1.87 1.60 - 2.40 mg/dL   POCT GLUCOSE   Result Value Ref Range    POCT Glucose 135 (H) 74 - 99 mg/dL   POCT GLUCOSE   Result Value Ref Range    POCT Glucose 143 (H) 74 - 99 mg/dL   POCT GLUCOSE   Result Value Ref Range    POCT Glucose 158 (H) 74 - 99 mg/dL                                  I have personally reviewed the following  imaging results CT brain attack head wo IV contrast    Result Date: 12/6/2024  Interpreted By:  Manny Schultz, STUDY: CT BRAIN ATTACK HEAD WO IV CONTRAST;  12/6/2024 9:45 am   INDICATION: Signs/Symptoms:change in pupil size.   COMPARISON: 12/04/2024   ACCESSION NUMBER(S): [id number]   ORDERING CLINICIAN: RUSSELL CARRENO   TECHNIQUE: Sequential trans axial images were obtained  .   FINDINGS: INTRACRANIAL:   There is mild prominence of the cortical sulci indicating atrophy.   There is mild ventriculomegaly, again consistent with atrophy.   Mild decreased attenuation of the periventricular and long tracks of the white matter most consistent with gliosis from arterial disease. There is no evidence of definite subacute infarction, intracranial hemorrhage or mass.     EXTRACRANIAL: Visualized paranasal sinuses and mastoids are clear. The calvarium is intact. Moderate atherosclerotic calcification at the carotid siphon.       Mild age related degenerative change as described without acute findings or significant change from the prior exam.   MACRO: Manny Schultz discussed the significance and urgency of this critical finding by secure chat with  RUSSELL CARRENO on 12/6/2024 at 9:59 am. (**-RCF-**) Findings:  See findings.   Signed by: Manny Schultz 12/6/2024 10:01 AM Dictation workstation:   [id number]    XR abdomen 1 view    Result Date: 12/5/2024  Interpreted By:  Brody Lobo, STUDY: XR ABDOMEN 1 VIEW;  12/5/2024 6:00 pm   INDICATION: Signs/Symptoms:NG tube placement.     COMPARISON: CT 12/04/2024   ACCESSION NUMBER(S): [id number]   ORDERING CLINICIAN: VANCE PURI   FINDINGS: Nonspecific nonobstructive bowel gas pattern. Limited evaluation of pneumoperitoneum on supine imaging, however no gross evidence of free air is noted.   NG tube tip and side port superimposed over the stomach.   Age-related degenerative osseous changes.       1.  NG tube tip and side port superimposed over the stomach.   MACRO: None   Signed by:  Brody Lobo 12/5/2024 6:29 PM Dictation workstation:   [id number]    ECG 12 Lead    Result Date: 12/5/2024  Sinus tachycardia Nonspecific ST abnormality Borderline ECG When compared with ECG of 04-DEC-2024 11:29, (unconfirmed) ST no longer elevated in Inferior leads Nonspecific T wave abnormality has replaced inverted T waves in Inferior leads QT has shortened Confirmed by Gen Segura ([id number]) on 12/5/2024 1:41:34 PM    ECG 12 lead    Result Date: 12/5/2024  Probable Sinus tachycardia BASELINE ARTIFACT Defective ECG When compared with ECG of 08-OCT-2024 11:19, Significant changes have occurred Confirmed by Gen Segura ([id number]) on 12/5/2024 1:41:10 PM    Electrocardiogram, 12-lead PRN ACS symptoms    Result Date: 12/5/2024  Normal sinus rhythm Nonspecific T wave abnormality Abnormal ECG When compared with ECG of 04-DEC-2024 11:37, (unconfirmed) Questionable change in QRS axis T wave inversion no longer evident in Anterior leads    XR knee left 1-2 views    Result Date: 12/4/2024  STUDY: Knee Radiographs; 12/4/2024  13:27 PM INDICATION: Recent knee replacement. COMPARISON: XR Left Knee  11/26/2024. ACCESSION NUMBER(S): [id number] ORDERING CLINICIAN: RENETTA RADFORD TECHNIQUE:  Two view(s) of the left knee. FINDINGS: Left knee arthroplasty without evidence for hardware failure. No fracture. No dislocation. A true lateral was not performed. No significant joint effusion within this limitation. Osteopenia.    No acute finding. Signed by Sandor Mccracken MD    CT chest abdomen pelvis wo IV contrast    Result Date: 12/4/2024  STUDY: CT Chest, Abdomen, and Pelvis without IV Contrast; 12/4/2024 12:37 PM. INDICATION: Abdominal distension, bulging below the umbilicus, altered mental status and stool from urethra. COMPARISON: None Available. ACCESSION NUMBER(S): [id number] ORDERING CLINICIAN: RENETTA RADFORD TECHNIQUE: CT of the chest, abdomen, and pelvis was performed.  Contiguous axial images were obtained at 3  mm slice thickness through the chest, abdomen, and pelvis.  Coronal and sagittal reconstructions at 3 mm slice thickness were performed.  No intravenous contrast was administered.  FINDINGS: Partially visualized chest showing cardiac enlargement.  No pericardial effusion.  Coronary artery calcifications.  Lung bases are clear. Abdomen: No acute abnormality of the stomach is identified. The liver of normal size and contour. No intrahepatic ductal dilatation is identified.  Gallbladder absent.  No acute abnormality of the pancreas.  Spleen of normal appearance.  Adrenal glands of normal appearance. Renal atrophic changes.  No acute renal process. No retroperitoneal adenopathy.  Atherosclerosis of the arterial vasculature.  No findings of abdominal aortic aneurysm. Diffuse thickening of the colon and mild adjacent inflammation. Similar thickening of the rectosigmoid colon.  No bowel obstruction. No free air.  No free fluid. The appendix is not definitively identified.  No acute abnormality within its expected location. Pelvis: No pelvic free fluid.  No inflammatory change or findings of free air. No findings of pelvic adenopathy.  Mckeon catheter within collapsed urinary bladder. Skeleton: No acute bony process is identified.    Diffuse thickening of the colon and mild adjacent inflammation. Similar thickening of the rectosigmoid colon. Findings consistent with colitis. No bowel obstruction. No free air. No free fluid. Cardiac enlargement. Coronary artery calcifications. Renal atrophic changes. No acute renal process. Status post cholecystectomy. Signed by Blaise Natarajan MD    CT brain attack angio head and neck W and WO IV contrast    Result Date: 12/4/2024  Interpreted By:  Starla Arias, STUDY: CT BRAIN ATTACK ANGIO HEAD AND NECK W AND WO IV CONTRAST;  12/4/2024 11:28 am   INDICATION: Signs/Symptoms:not moving arms/legs, not following commands.     COMPARISON: None.   ACCESSION NUMBER(S): [id number]   ORDERING  CLINICIAN: RACHELE MIGUEL   TECHNIQUE: 70 ML of Omnipaque 350 was administered intravenously and axial images of the head and neck were acquired.  Coronal, sagittal, and 3-D reconstructions were provided for review.   FINDINGS: The examination is degraded by patient motion artifact.   CTA HEAD FINDINGS:   Anterior circulation: The bilateral intracranial internal carotid arteries, bilateral carotid terminals, bilateral proximal anterior and middle cerebral arteries are patent. Atherosclerotic calcification along the cavernous segments of the carotid arteries bilaterally.   Posterior circulation: Bilateral intracranial vertebral arteries, vertebrobasilar junction, basilar artery and proximal posterior cerebral arteries are patent.   CTA NECK FINDINGS:   Right carotid vessels: The common carotid artery is patent. Calcified plaque at the carotid bifurcation without stenosis by NASCET criteria. The internal carotid artery in the neck is patent without significant stenosis. There is a retropharyngeal course of the carotid artery.   Left carotid vessels: The common carotid artery is patent. Calcified plaque at the carotid bifurcation without significant stenosis by NASCET criteria. The internal carotid artery in the neck is patent without significant stenosis.   Vertebral vessels:  The visualized segments of the cervical vertebral arteries are patent.         CTA neck:   No evidence for significant stenosis of the cervical vessels.   Calcified plaque at the carotid bifurcation without stenosis by NASCET criteria   CTA head:   No evidence for significant stenosis or large branch vessel cutoffs of the intracranial vessels.   MACRO: None   Signed by: Starla Arias 12/4/2024 11:35 AM Dictation workstation:   [id number]    CT brain attack head wo IV contrast    Result Date: 12/4/2024  Interpreted By:  Alvaro Dowell, STUDY: CT BRAIN ATTACK HEAD WO IV CONTRAST;  12/4/2024 11:20 am   INDICATION: Signs/Symptoms:Stroke Evaluation.    COMPARISON: None.   ACCESSION NUMBER(S): [id number]   ORDERING CLINICIAN: RACHELE MIGUEL   TECHNIQUE: Routine axial images were obtained from the skull base through the vertex.  Sagittal and coronal reconstruction images were generated. Brain, subdural, and bone windows were reviewed. N/A   N/A   FINDINGS: INTRACRANIAL: Mild prominence of ventricles and sulci. There is mild patchy hypodensity throughout the deep periventricular white matter. No acute intracranial bleed, midline shift, or focal mass effect. No destructive bone lesion. No depressed skull fracture. Skullbase arterial calcifications in the carotid siphons and vertebral arteries.   EXTRACRANIAL: Visualized paranasal sinuses were clear. Visualized mastoid air cells were clear.       No acute intracranial bleed or focal mass effect.   Mild volume loss.   Mild chronic white matter ischemic disease in the deep periventricular regions.   MACRO: Alvaro Dowell discussed the significance and urgency of this critical finding by epic secure chat with  RACHELE MIGUEL on 12/4/2024 at 11:24 am.  (**-RCF-**) Findings:  See findings.   Signed by: Alvaro Dowell 12/4/2024 11:24 AM Dictation workstation:   [id number]    XR knee left 1-2 views    Result Date: 11/26/2024  Interpreted By:  Aditya Adams, STUDY: XR KNEE LEFT 1-2 VIEWS   INDICATION: Signs/Symptoms:Post-op knee.   COMPARISON: July 3   ACCESSION NUMBER(S): [id number]   ORDERING CLINICIAN: JIA BANERJEE   FINDINGS: Interval postoperative changes of left knee arthroplasty without fracture or malalignment.       Satisfactory appearance status post left knee arthroplasty.   Signed by: Aditya Adams 11/26/2024 5:08 PM Dictation workstation:   [id number]  .      Assessment/Plan   Assessment & Plan  Acute kidney injury superimposed on CKD (CMS-HCC)    Metabolic encephalopathy    Colitis    Metabolic acidosis    Acute hypoxic respiratory failure (Multi)    Total knee replacement status, left    Sepsis with acute renal  "failure and septic shock (Multi)    Elevated LFTs      Plan:    - Observation  - Continue seizure precaution  - Treat C. Difficile  - Address kidney function  - May need MRI brain tomorrow if symptoms does not improve.       The case was discussed with the attending neurologist, Dr. Wylie.      Anita Copeland MD  Emergency Medicine, PGY-2.      I saw and evaluated the patient.  I obtained the key portions of the history and examination.  I reviewed the residents/APNs note, discussed the patient and supervised treatment plan formulation.    Briefly, this is an 84 year old female presenting for evaluation of altered mental status.  She presented after being seen by her wound care nurse with altered mental status.  She was found to have C-Diff, AIDAN, and LFT abnormalities.  A brain attack was called this morning due to decreased responsivness.    Objective:  /60 (BP Location: Right arm)   Pulse 94   Temp 37.3 °C (99.1 °F) (Temporal)   Resp 24   Ht 1.676 m (5' 6\")   Wt 91.3 kg (201 lb 4.5 oz)   SpO2 97%   BMI 32.49 kg/m²     Gen: NAD  Neuro:  --HIF: Awakens to verbal/painful stimulation; nods and says yes; was not able to answer questions; was not able to follow commands  --CN:  PERRLA, + VFF to threat; no visible facial asymmetry  --Motor/Sensory: Moves UE spontaneously; no movement to painful stim in lower extremities; frequent myoclonic jerks  --Reflex: 2+ symmetric, toes down  --Cerebellum: unable to follow commands  --Gait: Deferred     CT Head (I personally reviewed the images/tracings with the following interpretation)  No acute changes    Assessment:   Encephalopathy  Myoclonus    Likely secondary to uremia    Plan:  - agree with dialysis  - if no improvement, consider MRI Brain    Ponce Wylie MD  [facility]  Department of Neurology      "

## 2024-12-06 NOTE — CONSULTS
Consults      Cardiology Consult Note      Date:   12/5/2024  Patient name:  Savita Snell  Date of admission:  12/4/2024 11:08 AM  MRN:   [id number]  YOB: 1940  Time of Consult:  10:36 PM    Consulting Cardiologist: Dr. Sunni Osullivan        Primary Cardiologist:   [facility]     Referring Provider: Dr. America Vanegas      Admission Diagnosis:     No Principal Problem: There is no principal problem currently on the Problem List. Please update the Problem List and refresh.    History of Present Illness:      Savita Snell is a 84 y.o.  female patient who is being at the request of Dr. Vanegas for inpatient consultation of CHF. She was admitted on 12/4/2024.  Previous [facility] and EHR records have been reviewed in detail.      Asked to see patient for paroxysmal atrial fibrillation. Patient admitted for confusion and sepsis/c.diff following left TKR at [facility]. Patient is lethargic and cannot follow commands at the time of my examination earlier this evening. Nurse was informing admitting team. Has been confused and brought to ER for altered mental status.     Patient unable to give any history and no family at the bedside earlier. Has hx of paroxysmal a fib, HFpEF, HTN, CKD, GERD. Had nuclear stress test earlier this year that was normal / low risk with normal LV function.   Allergies:     Allergies   Allergen Reactions    Penicillins Other     States severe FH PCN allergy-has never had PCN herself  FAMILY HISTORY, PATIENT NEVER HAD    Cephalexin Diarrhea     Caused C-diff    Sulfamethoxazole-Trimethoprim Other     REDNESS       Past Medical History:     Past Medical History:   Diagnosis Date    Afib (Multi)     Arthritis     Balance problem     C. difficile colitis     Cataract     CKD (chronic kidney disease), stage III (Multi)     COVID-19     VACCINATED    Diabetic neuropathy (Multi)     Endometrial cancer (Multi)     Falls     HAS FALLEN WITHIN LAST 6 MONTHS    Glaucoma     Heart failure  with preserved ejection fraction     Hypertension     Hypokalemia     Obesity     Type 2 diabetes mellitus     Venous ulcer (Multi)        Past Surgical History:     Past Surgical History:   Procedure Laterality Date    APPENDECTOMY      CATARACT EXTRACTION      CHOLECYSTECTOMY      COLONOSCOPY      HYSTERECTOMY      JOINT REPLACEMENT      RIGHT KNEE    OTHER SURGICAL HISTORY      TAILBONE REMOVED DUE TO FRACTURE    OTHER SURGICAL HISTORY Bilateral     DOUBLE MUSCLE EYE SURGERY    TONSILLECTOMY      TOTAL KNEE ARTHROPLASTY Left     UPPER GASTROINTESTINAL ENDOSCOPY         Family History:     Family History   Problem Relation Name Age of Onset    Heart failure Mother      Other (MI) Mother      Stroke Father      Hyperlipidemia Sister      Hypertension Sister         Social History:     Social History     Socioeconomic History    Marital status:    Tobacco Use    Smoking status: Former     Types: Cigarettes    Smokeless tobacco: Never   Vaping Use    Vaping status: Never Used   Substance and Sexual Activity    Alcohol use: Not Currently    Drug use: Never    Sexual activity: Defer     Comment: Hysterectomy     Social Drivers of Health     Financial Resource Strain: Patient Unable To Answer (12/4/2024)    Overall Financial Resource Strain (CARDIA)     Difficulty of Paying Living Expenses: Patient unable to answer   Food Insecurity: Patient Unable To Answer (12/4/2024)    Hunger Vital Sign     Worried About Running Out of Food in the Last Year: Patient unable to answer     Ran Out of Food in the Last Year: Patient unable to answer   Transportation Needs: Patient Unable To Answer (12/4/2024)    PRAPARE - Transportation     Lack of Transportation (Medical): Patient unable to answer     Lack of Transportation (Non-Medical): Patient unable to answer   Recent Concern: Transportation Needs - Unmet Transportation Needs (10/27/2024)    Received from [facility]    PRAPARE - Transportation     Lack of Transportation  (Medical): No     Lack of Transportation (Non-Medical): Yes   Physical Activity: Inactive (10/27/2024)    Received from [facility]    Exercise Vital Sign     Days of Exercise per Week: 0 days     Minutes of Exercise per Session: 10 min   Stress: No Stress Concern Present (10/27/2024)    Received from [facility]    [employer] [place] [employer] - Occupational Stress Questionnaire     Feeling of Stress : Only a little   Social Connections: Unknown (10/27/2024)    Received from [facility]    Social Connection and Isolation Panel [NHANES]     Frequency of Communication with Friends and Family: Patient declined     Frequency of Social Gatherings with Friends and Family: Patient declined     Attends Sabianist Services: Never     Active Member of Clubs or Organizations: No     Attends Club or Organization Meetings: Never     Marital Status:    Intimate Partner Violence: Patient Unable To Answer (12/4/2024)    Humiliation, Afraid, Rape, and Kick questionnaire     Fear of Current or Ex-Partner: Patient unable to answer     Emotionally Abused: Patient unable to answer     Physically Abused: Patient unable to answer     Sexually Abused: Patient unable to answer   Housing Stability: Patient Unable To Answer (12/4/2024)    Housing Stability Vital Sign     Unable to Pay for Housing in the Last Year: Patient unable to answer     Number of Times Moved in the Last Year: 0     Homeless in the Last Year: Patient unable to answer       Home Medications:     Prior to Admission medications    Medication Sig Start Date End Date Taking? Authorizing Provider   amLODIPine (Norvasc) 5 mg tablet Take 1 tablet (5 mg) by mouth once daily. 1/17/24  Yes Historical Provider, MD   apixaban (Eliquis) 2.5 mg tablet Take 1 tablet (2.5 mg) by mouth 2 times a day. 12/19/23  Yes Historical Provider, MD   cholecalciferol (Vitamin D3) 50 mcg (2,000 unit) capsule Take 1 capsule (50 mcg) by mouth early in the morning..    Yes Historical Provider, MD   cyanocobalamin (Vitamin B-12) 1,000 mcg tablet Take 1 tablet (1,000 mcg) by mouth once daily.   Yes Historical Provider, MD   cyclobenzaprine (Flexeril) 10 mg tablet Take 1 tablet (10 mg) by mouth 3 times a day as needed for muscle spasms. 11/27/24  Yes Jeannine Tejeda PA-C   dapagliflozin propanediol (Farxiga) 5 mg Take 1 tablet (5 mg) by mouth once daily. 6/25/24  Yes Historical Provider, MD   furosemide (Lasix) 20 mg tablet Take 2 tablets (40 mg) by mouth once daily. 7/31/24  Yes Historical Provider, MD   latanoprost (Xalatan) 0.005 % ophthalmic solution Administer 1 drop into affected eye(s) daily at bedtime. 1/10/24  Yes Historical Provider, MD   Lokelma 5 gram packet Take 5 g by mouth once daily.   Yes Historical Provider, MD   losartan (Cozaar) 25 mg tablet Take 1 tablet (25 mg) by mouth twice a day. 3/25/24  Yes Historical Provider, MD   metoprolol tartrate (Lopressor) 25 mg tablet Take 0.5 tablets (12.5 mg) by mouth 2 times a day. 12/19/23  Yes Historical Provider, MD   ondansetron ODT (Zofran-ODT) 4 mg disintegrating tablet Dissolve 1 tablet (4 mg) in the mouth every 8 hours if needed for nausea. 11/27/24  Yes Jeannine Tejeda PA-C   oxyCODONE (Roxicodone) 5 mg immediate release tablet Take 1 tablet (5 mg) by mouth every 6 hours if needed for moderate pain (4 - 6). 11/27/24  Yes Jeannine Tejeda PA-C   pregabalin (Lyrica) 75 mg capsule Take 1 capsule (75 mg) by mouth 3 times a day.   Yes Historical Provider, MD   SITagliptin phosphate (Januvia) 25 mg tablet Take 1 tablet (25 mg) by mouth once daily. 6/3/24  Yes Historical Provider, MD       Current Medications:     Current Outpatient Medications   Medication Instructions    amLODIPine (NORVASC) 5 mg, oral, Daily RT    apixaban (ELIQUIS) 2.5 mg, oral, 2 times daily    cholecalciferol (VITAMIN D3) 2,000 Units, oral, Daily    cyanocobalamin (VITAMIN B-12) 1,000 mcg, oral, Daily RT    cyclobenzaprine (FLEXERIL) 10 mg, oral, 3  times daily PRN    dapagliflozin propanediol (FARXIGA) 5 mg, oral, Daily    furosemide (LASIX) 40 mg, oral, Daily RT    latanoprost (Xalatan) 0.005 % ophthalmic solution 1 drop, ophthalmic (eye), Daily at bedtime    Lokelma 5 g, oral, Daily    losartan (COZAAR) 25 mg, oral, 2 times daily    metoprolol tartrate (LOPRESSOR) 12.5 mg, oral, 2 times daily    ondansetron ODT (ZOFRAN-ODT) 4 mg, oral, Every 8 hours PRN    oxyCODONE (ROXICODONE) 5 mg, oral, Every 6 hours PRN    pregabalin (LYRICA) 75 mg, oral, 3 times daily    SITagliptin phosphate (JANUVIA) 25 mg, oral, Daily RT        IV Medications:     sodium bicarbonate 1 mEq/mL (8.4 %) 150 mEq in dextrose 5% 1,150 mL infusion, 100 mL/hr, Last Rate: 100 mL/hr (12/05/24 1545)  [START ON 12/6/2024] sodium chloride 0.9%, 100 mL/hr        Review of Systems:      Review of Systems   Unable to perform ROS: Mental status change       Vital Signs:     Vitals:    12/05/24 1143 12/05/24 1545 12/05/24 1616 12/05/24 1908   BP: 105/54 115/56 119/56 121/55   BP Location:   Right arm Right arm   Patient Position:   Lying Lying   Pulse: 94 92 92 96   Resp:   16 18   Temp:   37.6 °C (99.7 °F) 36.7 °C (98.1 °F)   TempSrc:   Temporal Temporal   SpO2: 94% 95% 98% 95%   Weight:       Height:           Intake/Output Summary (Last 24 hours) at 12/5/2024 2236  Last data filed at 12/5/2024 1700  Gross per 24 hour   Intake 1171.66 ml   Output 365 ml   Net 806.66 ml     Vitals:    12/05/24 0600   Weight: 90.6 kg (199 lb 11.8 oz)       Physical Examination:     GENERAL APPEARANCE: lethargic woman, difficult to arouse  HEENT: dry mucous membranes  NECK: no JVD, no bruit. Thyroid not palpable. Carotid upstrokes normal.  CHEST: Symmetric and non-tender.  LUNGS: Nonlabored shallow respirations. Diminished aeration, scattered rhonchi, no rales  HEART: PMI not displaced. RRR with normal S1 and S2, no S3. Soft GREGORIO RUSB. No edema. No carotid or abdominal bruits.   ABDOMEN: Soft, nontender, no palpable  "hepatosplenomegaly, no mases, no bruits. Abdominal aorta not noted to be enlarged.  MUSCULOSKELETAL; recent L TKR.  EXTREMITIES: Warm with good color, no clubbing or cyanois. There is no edema noted.  PERIPHERAL VASCULAR: Pulses present and equally palpable;  NEURO/PSHCY: lethargic  INTEGUMENT: Skin warm and dry, right lower leg chronic skin changes.       Lab:     CBC:   Lab Results   Component Value Date    WBC 12.8 (H) 12/05/2024    RBC 3.46 (L) 12/05/2024    HGB 10.1 (L) 12/05/2024    HCT 33.7 (L) 12/05/2024     (L) 12/05/2024        CMP:    Lab Results   Component Value Date     12/05/2024    K 4.2 12/05/2024     12/05/2024    CO2 19 (L) 12/05/2024    BUN 87 (H) 12/05/2024    CREATININE 5.50 (H) 12/05/2024    GLUCOSE 153 (H) 12/05/2024    CALCIUM 6.5 (L) 12/05/2024       Magnesium:    No results found for: \"MG\"    Lipid Profile:    No results found for: \"CHLPL\", \"TRIG\", \"HDL\", \"LDLCALC\", \"LDLDIRECT\"    TSH:    No results found for: \"TSH\"    BNP:   No results found for: \"BNP\"     PT/INR:    Lab Results   Component Value Date    PROTIME 16.9 (H) 12/05/2024    INR 1.5 (H) 12/05/2024       HgBA1c:    Lab Results   Component Value Date    HGBA1C 6.1 (H) 10/17/2024       BMP:  Lab Results   Component Value Date     12/05/2024     12/04/2024    K 4.2 12/05/2024    K 5.0 12/04/2024     12/05/2024     12/04/2024    CO2 19 (L) 12/05/2024    CO2 20 (L) 12/04/2024    BUN 87 (H) 12/05/2024    BUN 79 (H) 12/04/2024    CREATININE 5.50 (H) 12/05/2024    CREATININE 4.51 (H) 12/04/2024       CBC:  Lab Results   Component Value Date    WBC 12.8 (H) 12/05/2024    WBC 19.8 (H) 12/04/2024    RBC 3.46 (L) 12/05/2024    RBC 4.14 12/04/2024    HGB 10.1 (L) 12/05/2024    HGB 12.2 12/04/2024    HCT 33.7 (L) 12/05/2024    HCT 39.5 12/04/2024    MCV 97 12/05/2024    MCV 95 12/04/2024    MCH 29.2 12/05/2024    MCH 29.5 12/04/2024    MCHC 30.0 (L) 12/05/2024    MCHC 30.9 (L) 12/04/2024    RDW 14.4 " 12/05/2024    RDW 14.1 12/04/2024     (L) 12/05/2024     12/04/2024       Cardiac Enzymes:    Lab Results   Component Value Date    TROPHS 26 (H) 12/04/2024       Hepatic Function Panel:    Lab Results   Component Value Date    ALKPHOS 66 12/05/2024     (H) 12/05/2024     (H) 12/05/2024    PROT 5.4 (L) 12/05/2024    BILITOT 0.7 12/05/2024         Diagnostic Studies:     XR abdomen 1 view    Result Date: 12/5/2024  Interpreted By:  Brody Lobo, STUDY: XR ABDOMEN 1 VIEW;  12/5/2024 6:00 pm   INDICATION: Signs/Symptoms:NG tube placement.     COMPARISON: CT 12/04/2024   ACCESSION NUMBER(S): [id number]   ORDERING CLINICIAN: VANCE PURI   FINDINGS: Nonspecific nonobstructive bowel gas pattern. Limited evaluation of pneumoperitoneum on supine imaging, however no gross evidence of free air is noted.   NG tube tip and side port superimposed over the stomach.   Age-related degenerative osseous changes.       1.  NG tube tip and side port superimposed over the stomach.   MACRO: None   Signed by: Brody Lobo 12/5/2024 6:29 PM Dictation workstation:   [id number]    ECG 12 Lead    Result Date: 12/5/2024  Sinus tachycardia Nonspecific ST abnormality Borderline ECG When compared with ECG of 04-DEC-2024 11:29, (unconfirmed) ST no longer elevated in Inferior leads Nonspecific T wave abnormality has replaced inverted T waves in Inferior leads QT has shortened Confirmed by Gen Segura ([id number]) on 12/5/2024 1:41:34 PM    ECG 12 lead    Result Date: 12/5/2024  Probable Sinus tachycardia BASELINE ARTIFACT Defective ECG When compared with ECG of 08-OCT-2024 11:19, Significant changes have occurred Confirmed by Gen Segura ([id number]) on 12/5/2024 1:41:10 PM    Electrocardiogram, 12-lead PRN ACS symptoms    Result Date: 12/5/2024  Normal sinus rhythm Nonspecific T wave abnormality Abnormal ECG When compared with ECG of 04-DEC-2024 11:37, (unconfirmed) Questionable change in QRS axis T wave  inversion no longer evident in Anterior leads    XR knee left 1-2 views    Result Date: 12/4/2024  STUDY: Knee Radiographs; 12/4/2024  13:27 PM INDICATION: Recent knee replacement. COMPARISON: XR Left Knee  11/26/2024. ACCESSION NUMBER(S): [id number] ORDERING CLINICIAN: RENETTA RADFORD TECHNIQUE:  Two view(s) of the left knee. FINDINGS: Left knee arthroplasty without evidence for hardware failure. No fracture. No dislocation. A true lateral was not performed. No significant joint effusion within this limitation. Osteopenia.    No acute finding. Signed by Sandor Mccracken MD    CT chest abdomen pelvis wo IV contrast    Result Date: 12/4/2024  STUDY: CT Chest, Abdomen, and Pelvis without IV Contrast; 12/4/2024 12:37 PM. INDICATION: Abdominal distension, bulging below the umbilicus, altered mental status and stool from urethra. COMPARISON: None Available. ACCESSION NUMBER(S): [id number] ORDERING CLINICIAN: RENETTA RADFORD TECHNIQUE: CT of the chest, abdomen, and pelvis was performed.  Contiguous axial images were obtained at 3 mm slice thickness through the chest, abdomen, and pelvis.  Coronal and sagittal reconstructions at 3 mm slice thickness were performed.  No intravenous contrast was administered.  FINDINGS: Partially visualized chest showing cardiac enlargement.  No pericardial effusion.  Coronary artery calcifications.  Lung bases are clear. Abdomen: No acute abnormality of the stomach is identified. The liver of normal size and contour. No intrahepatic ductal dilatation is identified.  Gallbladder absent.  No acute abnormality of the pancreas.  Spleen of normal appearance.  Adrenal glands of normal appearance. Renal atrophic changes.  No acute renal process. No retroperitoneal adenopathy.  Atherosclerosis of the arterial vasculature.  No findings of abdominal aortic aneurysm. Diffuse thickening of the colon and mild adjacent inflammation. Similar thickening of the rectosigmoid colon.  No bowel obstruction. No  free air.  No free fluid. The appendix is not definitively identified.  No acute abnormality within its expected location. Pelvis: No pelvic free fluid.  No inflammatory change or findings of free air. No findings of pelvic adenopathy.  Mckeon catheter within collapsed urinary bladder. Skeleton: No acute bony process is identified.    Diffuse thickening of the colon and mild adjacent inflammation. Similar thickening of the rectosigmoid colon. Findings consistent with colitis. No bowel obstruction. No free air. No free fluid. Cardiac enlargement. Coronary artery calcifications. Renal atrophic changes. No acute renal process. Status post cholecystectomy. Signed by Blaise Natarajan MD    CT brain attack angio head and neck W and WO IV contrast    Result Date: 12/4/2024  Interpreted By:  Starla Arias, STUDY: CT BRAIN ATTACK ANGIO HEAD AND NECK W AND WO IV CONTRAST;  12/4/2024 11:28 am   INDICATION: Signs/Symptoms:not moving arms/legs, not following commands.     COMPARISON: None.   ACCESSION NUMBER(S): [id number]   ORDERING CLINICIAN: RACHELE MIGUEL   TECHNIQUE: 70 ML of Omnipaque 350 was administered intravenously and axial images of the head and neck were acquired.  Coronal, sagittal, and 3-D reconstructions were provided for review.   FINDINGS: The examination is degraded by patient motion artifact.   CTA HEAD FINDINGS:   Anterior circulation: The bilateral intracranial internal carotid arteries, bilateral carotid terminals, bilateral proximal anterior and middle cerebral arteries are patent. Atherosclerotic calcification along the cavernous segments of the carotid arteries bilaterally.   Posterior circulation: Bilateral intracranial vertebral arteries, vertebrobasilar junction, basilar artery and proximal posterior cerebral arteries are patent.   CTA NECK FINDINGS:   Right carotid vessels: The common carotid artery is patent. Calcified plaque at the carotid bifurcation without stenosis by NASCET criteria. The  internal carotid artery in the neck is patent without significant stenosis. There is a retropharyngeal course of the carotid artery.   Left carotid vessels: The common carotid artery is patent. Calcified plaque at the carotid bifurcation without significant stenosis by NASCET criteria. The internal carotid artery in the neck is patent without significant stenosis.   Vertebral vessels:  The visualized segments of the cervical vertebral arteries are patent.         CTA neck:   No evidence for significant stenosis of the cervical vessels.   Calcified plaque at the carotid bifurcation without stenosis by NASCET criteria   CTA head:   No evidence for significant stenosis or large branch vessel cutoffs of the intracranial vessels.   MACRO: None   Signed by: Starla Arias 12/4/2024 11:35 AM Dictation workstation:   [id number]    CT brain attack head wo IV contrast    Result Date: 12/4/2024  Interpreted By:  Alvaro Dowell, STUDY: CT BRAIN ATTACK HEAD WO IV CONTRAST;  12/4/2024 11:20 am   INDICATION: Signs/Symptoms:Stroke Evaluation.   COMPARISON: None.   ACCESSION NUMBER(S): [id number]   ORDERING CLINICIAN: RACHELE MIGUEL   TECHNIQUE: Routine axial images were obtained from the skull base through the vertex.  Sagittal and coronal reconstruction images were generated. Brain, subdural, and bone windows were reviewed. N/A   N/A   FINDINGS: INTRACRANIAL: Mild prominence of ventricles and sulci. There is mild patchy hypodensity throughout the deep periventricular white matter. No acute intracranial bleed, midline shift, or focal mass effect. No destructive bone lesion. No depressed skull fracture. Skullbase arterial calcifications in the carotid siphons and vertebral arteries.   EXTRACRANIAL: Visualized paranasal sinuses were clear. Visualized mastoid air cells were clear.       No acute intracranial bleed or focal mass effect.   Mild volume loss.   Mild chronic white matter ischemic disease in the deep periventricular regions.    MACRO: Alvaro Dowell discussed the significance and urgency of this critical finding by epic secure chat with  RACHELE MIGUEL on 12/4/2024 at 11:24 am.  (**-RCF-**) Findings:  See findings.   Signed by: Alvaro Dowell 12/4/2024 11:24 AM Dictation workstation:   [id number]      Radiology:     XR abdomen 1 view   Final Result   1.  NG tube tip and side port superimposed over the stomach.        MACRO:   None        Signed by: Brody Lobo 12/5/2024 6:29 PM   Dictation workstation:   [id number]      XR knee left 1-2 views   Final Result   No acute finding.   Signed by Sandor Mccracken MD      CT chest abdomen pelvis wo IV contrast   Final Result   Diffuse thickening of the colon and mild adjacent inflammation.   Similar thickening of the rectosigmoid colon. Findings consistent with   colitis. No bowel obstruction. No free air. No free fluid.   Cardiac enlargement. Coronary artery calcifications.   Renal atrophic changes. No acute renal process.   Status post cholecystectomy.   Signed by Blaise Natarajan MD      CT brain attack angio head and neck W and WO IV contrast   Final Result   CTA neck:        No evidence for significant stenosis of the cervical vessels.        Calcified plaque at the carotid bifurcation without stenosis by   NASCET criteria        CTA head:        No evidence for significant stenosis or large branch vessel cutoffs   of the intracranial vessels.        MACRO:   None        Signed by: Starla Arias 12/4/2024 11:35 AM   Dictation workstation:   [id number]      CT brain attack head wo IV contrast   Final Result   No acute intracranial bleed or focal mass effect.        Mild volume loss.        Mild chronic white matter ischemic disease in the deep   periventricular regions.        MACRO:   Alvaro Dowell discussed the significance and urgency of this critical   finding by epic secure chat with  RACHELE MIGUEL on 12/4/2024 at 11:24   am.  (**-RCF-**) Findings:  See findings.        Signed by: Alvaro Dowell  12/4/2024 11:24 AM   Dictation workstation:   [id number]          Assessment:     Problem List Items Addressed This Visit    None  Visit Diagnoses       Sepsis with encephalopathy without septic shock, due to unspecified organism (Multi)    -  Primary            Patient Active Problem List   Diagnosis    Unilateral primary osteoarthritis, left knee    Pain in left knee    S/P TKR (total knee replacement), left    Acute kidney injury superimposed on CKD (CMS-HCC)    Metabolic encephalopathy    Colitis    Metabolic acidosis    Acute hypoxic respiratory failure (Multi)    Total knee replacement status, left    Sepsis with acute renal failure and septic shock (Multi)    Elevated LFTs       Plan:     Paroxysmal atrial fibrillation. History of same. No additional work-up needed.   Acute on chronic renal failure - dehydration  Chronic HFpEF - no signs of any decompensation  Coronary calcifications - noted previously. No evidence of acute myocardial infarction. Low risk nuclear stress test. No further ischemic evaluation needed at this time.   Altered mental status / Sepsis / C difficile colitis - per primary team.     Patient can go to medical tele. Would continue with rate control of a fib. Once stable and no plans for invasive testing oral anticoagulation can be restarted. Patient can follow-up with her [facility] cardiologist post-DC. Call if any further questions.     Thank you for the opportunity to participate in the care of your patient.  Please do not hesitate to call if you have any questions.    Electronically signed by Sunni Osullivan MD, on 12/5/2024 at 10:36 PM

## 2024-12-06 NOTE — CARE PLAN
Problem: Skin  Goal: Prevent/manage excess moisture  Outcome: Progressing  Flowsheets (Taken 12/5/2024 2235 by Lilibeth Cobb RN)  Prevent/manage excess moisture:   Cleanse incontinence/protect with barrier cream   Monitor for/manage infection if present   Moisturize dry skin  Goal: Prevent/minimize sheer/friction injuries  Outcome: Progressing     Problem: Pain - Adult  Goal: Verbalizes/displays adequate comfort level or baseline comfort level  Outcome: Progressing   The patient's goals for the shift include      The clinical goals for the shift include Patient's orientation will improve by end of shift.    Over the shift, the patient did not make progress toward the following goals. Barriers to progression include Patient is not back to baseline orientation. Recommendations to address these barriers include frequent reorientation.

## 2024-12-06 NOTE — POST-PROCEDURE NOTE
Interventional Radiology Brief Postprocedure Note    Attending: Calixto Rodrigues    Diagnosis: CKD    Description of procedure: Right chest tunneled hemodialysis catheter placement (23cm cuff to tip)     Anesthesia:  Local    Complications: None    Estimated Blood Loss: minimal    Medications  As of 12/06/24 1525      iohexol (OMNIPaque) 350 mg iodine/mL solution 70 mL (mL) Total volume:  70 mL      Date/Time Rate/Dose/Volume Action       12/04/24  1128 70 mL Given               sodium chloride 0.9 % bolus 1,000 mL (mL/hr) Total volume:  Not documented* Dosing weight:  90.7   *Total volume has not been documented. View each administration to see the amount administered.     Date/Time Rate/Dose/Volume Action       12/04/24  1145 1,000 mL - 500 mL/hr (over 120 min) New Bag      1347  (over 120 min) Stopped               sodium chloride 0.9 % bolus 1,000 mL (mL/hr) Total volume:  1,016.67 mL* Dosing weight:  87.9   *From user-documented volume     Date/Time Rate/Dose/Volume Action       12/04/24  1820 1,000 mL - 500 mL/hr (over 120 min) New Bag      2022 1,016.67 mL Stopped               piperacillin-tazobactam (Zosyn) 4.5 g in dextrose (iso)  mL (g) Total dose:  4.5 g Dosing weight:  90.7      Date/Time Rate/Dose/Volume Action       12/04/24  1200 4.5 g (over 30 min) New Bag      1235  (over 30 min) Stopped               acetaminophen (Tylenol) suppository 650 mg (mg) Total dose:  650 mg Dosing weight:  90.7      Date/Time Rate/Dose/Volume Action       12/04/24  1148 650 mg Given               acetaminophen (Tylenol) suppository 650 mg  - Omnicell Override Pull (mg) Total dose:  650 mg      Date/Time Rate/Dose/Volume Action       12/04/24  1148 650 mg Given               acetaminophen (Tylenol) suppository 650 mg (mg) Total dose:  Cannot be calculated* Dosing weight:  90.7   *Administration dose not documented     Date/Time Rate/Dose/Volume Action       12/06/24  0032 *Not included in total See Alternative       0628 *Not included in total Held by provider               oxygen (O2) therapy (L/min) Total volume:  Not documented* Dosing weight:  90.7   *Total volume has not been documented. View each administration to see the amount administered.     Date/Time Rate/Dose/Volume Action       12/04/24  1244 3 L/min Start      1348 2 L/min Rate Change Medical Gas      1845 2 L/min Rate Verify Medical Gas     12/06/24  0715 2 L/min Start               vancomycin (Firvanq) solution 500 mg (mg) Total dose:  0 mg* Dosing weight:  90.7   *Administration not included in total     Date/Time Rate/Dose/Volume Action       12/04/24  1717 *500 mg Missed      2222 *500 mg Missed     12/05/24  0636 *500 mg Missed      1247 *500 mg Missed               vancomycin (Firvanq) solution 125 mg (mg) Total dose:  375 mg* Dosing weight:  90.6   *Administration not included in total     Date/Time Rate/Dose/Volume Action       12/05/24  1746 *125 mg Missed      2333 125 mg Given     12/06/24  0529 125 mg Given      1128 125 mg Given               metroNIDAZOLE (Flagyl) 500 mg in sodium chloride (iso)  mL (mg) Total dose:  500 mg* Dosing weight:  90.7   *From user-documented volume     Date/Time Rate/Dose/Volume Action       12/04/24  1706 500 mg (over 60 min) New Bag      1835  (over 60 min) Stopped     12/05/24  0028 500 mg (over 60 min) - 100 mL New Bag      0130  (over 60 min) Stopped      0959 500 mg (over 60 min) New Bag      1117  (over 60 min) Stopped               acetaminophen (Tylenol) tablet 650 mg (mg) Total dose:  Cannot be calculated* Dosing weight:  90.7   *Administration dose not documented     Date/Time Rate/Dose/Volume Action       12/06/24  0032 *Not included in total See Alternative      0628 *Not included in total Held by provider               acetaminophen (Tylenol) oral liquid 650 mg (mg) Total dose:  650 mg Dosing weight:  90.7      Date/Time Rate/Dose/Volume Action       12/06/24  0032 650 mg Given      0628 *Not included  in total Held by provider               sodium chloride 0.9% infusion (mL/hr) Total volume:  1,389.99 mL* Dosing weight:  90.7   *From user-documented volume     Date/Time Rate/Dose/Volume Action       12/04/24  1706 100 mL/hr New Bag      1900 100 mL/hr - 190 mL Rate Verify      2017 100 mL/hr - 128.33 mL Rate Verify      2022 100 mL/hr New Bag      2249 100 mL/hr - 253.33 mL Rate Verify     12/05/24  0700 818.33 mL       0704 100 mL/hr New Bag      1900  Stopped               sodium chloride 0.9% infusion (mL/hr) Total volume:  1,073.33 mL* Dosing weight:  90.6   *From user-documented volume     Date/Time Rate/Dose/Volume Action       12/06/24  0359 100 mL/hr New Bag      1443 1,073.33 mL                amLODIPine (Norvasc) tablet 5 mg (mg) Total dose:  Cannot be calculated* Dosing weight:  90.7   *Administration dose not documented     Date/Time Rate/Dose/Volume Action       12/04/24  1553 *Not included in total Held by provider     12/05/24  0700 *5 mg Missed     12/06/24  0700 *5 mg Missed               apixaban (Eliquis) tablet 2.5 mg (mg) Total dose:  Cannot be calculated* Dosing weight:  90.7   *Administration dose not documented     Date/Time Rate/Dose/Volume Action       12/04/24  2222 *2.5 mg Missed      2225 *Not included in total Held by provider     12/05/24  0900 *2.5 mg Missed      2100 *2.5 mg Missed     12/06/24  0900 *Not included in total Automatically Held               cyclobenzaprine (Flexeril) tablet 10 mg (mg) Total dose:  Cannot be calculated* Dosing weight:  90.7   *Administration dose not documented     Date/Time Rate/Dose/Volume Action       12/04/24  1553 *Not included in total Held by provider               dapagliflozin propanediol (Farxiga) tablet 5 mg (mg) Total dose:  Cannot be calculated* Dosing weight:  90.7   *Administration dose not documented     Date/Time Rate/Dose/Volume Action       12/04/24  1553 *Not included in total Held by provider     12/05/24  0900 *5 mg Missed      12/06/24 0900 *Not included in total Automatically Held               furosemide (Lasix) tablet 40 mg (mg) Total dose:  Cannot be calculated* Dosing weight:  90.7   *Administration dose not documented     Date/Time Rate/Dose/Volume Action       12/04/24 1553 *Not included in total Held by provider     12/05/24 0700 *40 mg Missed     12/06/24 0700 *40 mg Missed               latanoprost (Xalatan) 0.005 % ophthalmic solution 1 drop (drop) Total dose:  2 drop Dosing weight:  90.7      Date/Time Rate/Dose/Volume Action       12/04/24 2255 1 drop Given     12/05/24 2116 1 drop Given               sodium zirconium cyclosilicate (Lokelma) packet 5 g (g) Total dose:  Cannot be calculated* Dosing weight:  90.7   *Administration dose not documented     Date/Time Rate/Dose/Volume Action       12/04/24 1553 *Not included in total Held by provider     12/05/24 0900 *5 g Missed     12/06/24 0900 *Not included in total Automatically Held               losartan (Cozaar) tablet 25 mg (mg) Total dose:  Cannot be calculated* Dosing weight:  90.7   *Administration dose not documented     Date/Time Rate/Dose/Volume Action       12/04/24 1553 *Not included in total Held by provider      2100 *25 mg Missed     12/05/24 0900 *25 mg Missed      2100 *25 mg Missed     12/06/24 0900 *Not included in total Automatically Held               metoprolol tartrate (Lopressor) tablet 12.5 mg (mg) Total dose:  Cannot be calculated* Dosing weight:  90.7   *Administration dose not documented     Date/Time Rate/Dose/Volume Action       12/04/24 1553 *Not included in total Held by provider      2100 *12.5 mg Missed     12/05/24 0900 *12.5 mg Missed      2100 *12.5 mg Missed     12/06/24 0900 *Not included in total Automatically Held               oxyCODONE (Roxicodone) immediate release tablet 5 mg (mg) Total dose:  Cannot be calculated* Dosing weight:  90.7   *Administration dose not documented     Date/Time Rate/Dose/Volume Action        12/04/24  1553 *Not included in total Held by provider               pregabalin (Lyrica) capsule 75 mg (mg) Total dose:  Cannot be calculated* Dosing weight:  90.7   *Administration dose not documented     Date/Time Rate/Dose/Volume Action       12/04/24  1553 *Not included in total Held by provider      1615 *75 mg Missed     12/05/24  0900 *75 mg Missed     12/06/24  0900 *Not included in total Automatically Held               SITagliptin phosphate (Januvia) tablet 25 mg (mg) Total dose:  Cannot be calculated* Dosing weight:  90.7   *Administration dose not documented     Date/Time Rate/Dose/Volume Action       12/04/24  1553 *Not included in total Held by provider     12/05/24  0700 *25 mg Missed     12/06/24  0700 *25 mg Missed               insulin lispro injection 0-10 Units (Units) Total dose:  4 Units Dosing weight:  90.7      Date/Time Rate/Dose/Volume Action       12/04/24  1744 4 Units Given      2100 *0 Units Missed               insulin lispro injection 0-10 Units (Units) Total dose:  6 Units Dosing weight:  87.9      Date/Time Rate/Dose/Volume Action       12/05/24  0701 *Not included in total Missed      0758 *Not included in total Missed      1157 *Not included in total Missed      1614 *Not included in total Missed      2115 2 Units Given     12/06/24  0032 2 Units Given      0414 2 Units Given      0826 *Not included in total Missed      1123 *Not included in total Missed               heparin (porcine) injection 5,000 Units (Units) Total dose:  25,000 Units Dosing weight:  87.9      Date/Time Rate/Dose/Volume Action       12/05/24  0635 5,000 Units Given      1545 5,000 Units Given      2115 5,000 Units Given     12/06/24  0528 5,000 Units Given      1433 5,000 Units Given               sodium bicarbonate 1 mEq/mL (8.4 %) 150 mEq in dextrose 5% 1,150 mL infusion (mL/hr) Total volume:  Not documented* Dosing weight:  90.6   *Total volume has not been documented. View each administration to see the  amount administered.     Date/Time Rate/Dose/Volume Action       12/05/24  1545 100 mL/hr New Bag     12/06/24  0345  Stopped               calcium gluconate 2 g in sodium chloride (iso)  mL (mL/hr) Total volume:  Not documented* Dosing weight:  90.6   *Total volume has not been documented. View each administration to see the amount administered.     Date/Time Rate/Dose/Volume Action       12/05/24  1545 2 g - 100 mL/hr (over 60 min) New Bag      1648  (over 60 min) Stopped               pantoprazole (ProtoNix) injection 40 mg (mg) Total dose:  40 mg Dosing weight:  91.3      Date/Time Rate/Dose/Volume Action       12/06/24  0633 40 mg Given               sodium chloride (PF) 0.9% solution  - Omnicell Override Pull (mL) Total volume:  10 mL      Date/Time Rate/Dose/Volume Action       12/06/24  0638 10 mL Given               lidocaine-epinephrine (Xylocaine W/EPI) 1 %-1:100,000 injection (mL) Total volume:  15 mL      Date/Time Rate/Dose/Volume Action       12/06/24  1500 5 mL Given      1505 8 mL Given      1509 2 mL Given                   No specimens collected      See detailed result report with images in PACS.    The patient tolerated the procedure well without incident or complication and is in stable condition.

## 2024-12-06 NOTE — PROGRESS NOTES
"Speech-Language Pathology    SLP Adult Inpatient  Speech-Language Pathology Treatment     Patient Name: Savita Snell \"FILOMENA\"  MRN: [id number]  Today's Date: 12/6/2024  Time Calculation  Start Time: 1031  Stop Time: 1046  Time Calculation (min): 15 min         Current Problem:   1. Sepsis with encephalopathy without septic shock, due to unspecified organism (Multi)              SLP Assessment:  SLP TX Intervention Outcome: No Progress Made (due to lethargy)  SLP Assessment Results: Other (Comment) (Swallowing deficits)  Patient participated in skilled speech therapy services targeting dysphagia management. Patient continues to present with oral dysphagia suspect impacted by level of alertness upon completion of attempted PO trials this date. Patient was able to open her mouth when asked and tolerated oral care well. Patient was asked to swish water in her mouth, however patient swallowed the water instead. An oral swab was used to clear the remaining residue in the mouth. Examination of oral mechanism was limited due to patient's lethargy leading to inability to follow most directions., However, patient was clearly attempting to complete the tasks asked of her. Patient was given one ice chip. Mastication of the ice chip was prolonged as patient was waxing and waning in and out of sleep. Patient eventually swallowed the ice chip and continuation of PO trials was deferred this date due to patient's inability to stay awake.     Per the results of this session today, recommend patient continues NPO diet with STRICT oral care. ST to continue to follow during inpatient stay to determine diet advancement vs need for further assessment (I.e. MBSS).    Prognosis: Fair  Treatment Tolerance: Patient limited by fatigue  Medical Staff Made Aware: Yes  Strengths: Motivation  Barriers: Comorbidities  Education Provided: Yes       Plan:  Inpatient/Swing Bed or Outpatient: Inpatient  Treatment/Interventions: Dysphagia " treatment  SLP TX Plan: Continue Plan of Care  SLP Plan: Skilled SLP  SLP Frequency: 3x per week  Duration: 2 weeks  SLP Discharge Recommendations: Continue skilled SLP services at the next level of care  Next Treatment Priority: PO trials  Discussed POC: Patient, Nursing  Patient/Caregiver Agreeable: Yes      Subjective   Patient was lethargic throughout the session and was unable to respond to most questions, however stated that she was not in any pain.    Most Recent Visit:  SLP Received On: 12/06/24    General Visit Information:   Reason for Referral: Swallowing Evaluation, Suspected Oral or Pharyngeal Dysphagia, and Rule out aspiration  Referred By: EYAL Santana  Past Medical History Relevant to Rehab: A-fib on Eliquis (held since surgery), HTN, HLD, DM2, CHF, CKD  Patient Seen During This Visit: Yes  Arrival: Independent  Caregiver Feedback: RN reports patient continues to be lethargic this date, however may be more awake tomorrow after dialysis.  Total Number of Visits : 2  Prior to Session Communication: Bedside nurse      Pain Assessment:  Pain Assessment  Pain Assessment: 0-10  0-10 (Numeric) Pain Score: 0 - No pain      Objective     Goals (established 12/5/24):  Patient will complete ongoing assessment at bedside to determine PO diet advancement vs need for further assessment (I.e., MBSS).  Progress: Patient was able to complete one PO trial of an ice chip this date.    Therapeutic Swallow:  Therapeutic Swallow Intervention : PO Trials  Solid Diet Recommendations: NPO  Liquid Diet Recommendations: NPO  Swallow Comments: STRICT oral care      Inpatient:      Education:  Learner patient; RN   Barriers to Learning acuteness of illness barrier   Method verbal   Education - Topic ST provided patient education regarding role of ST, goals of treatment, and plan of care. RN verbalized comprehension. Education will be reinforced to patient. ST further coordinated with RN regarding recommendations and  precautions, with RN verbalizing understanding.     Outcome    needs instruction; needs review/reinforcement; unable to meet; goals/outcomes

## 2024-12-06 NOTE — PROGRESS NOTES
"Speech-Language Pathology    SLP Adult Inpatient Speech-Language Pathology Clinical Swallow Evaluation    Patient Name: Savita Snell \"FILOMENA\"  MRN: [id number]  Today's Date: 12/6/2024   Time Calculation  Start Time: 1031  Stop Time: 1046  Time Calculation (min): 15 min         Current Problem:   1. Sepsis with encephalopathy without septic shock, due to unspecified organism (Multi)              Recommendations:  Liquid Diet Recommendations: NPO      Assessment:  Prognosis: Fair  Treatment Tolerance: Patient limited by fatigue  Medical Staff Made Aware: Yes  Strengths: Motivation  Barriers: Comorbidities      Plan:  Inpatient/Swing Bed or Outpatient: Inpatient  SLP Frequency: 3x per week  Duration: 2 weeks  SLP Discharge Recommendations: Continue skilled SLP services at the next level of care  Next Treatment Priority: PO trials  Discussed POC: Patient, Nursing  Patient/Caregiver Agreeable: Yes      Subjective   Patient was lethargic throughout the session. She was able to follow one command however her eyes were closed through      General Visit Information:  Patient Class: Inpatient  Living Environment: Nursing home (skilled/long-term)  Arrival: Independent  Caregiver Feedback: RN reports patient continues to be lethargic this date, however may be more awake tomorrow after dialysis.  Ordering Physician: EYAL Santana  Reason for Referral: Swallowing Evaluation, Suspected Oral or Pharyngeal Dysphagia, and Rule out aspiration  Referred By: EYAL Santana  Past Medical History Relevant to Rehab: A-fib on Eliquis (held since surgery), HTN, HLD, DM2, CHF, CKD  Prior Level of Function: Decreased function  Patient Seen During This Visit: Yes  Total Number of Visits : 2  Prior to Session Communication: Bedside nurse  Date of Onset: 12/04/24  Date of Order: 12/04/24  [employer] Diet: Regular solids and thin liquids  Current Diet : NPO pending results of clinical swallow evaluation  Dysphagia Diagnosis: Other " (Comment) (oral dysphagia, suspect impacted by level of alertness)      Objective     Pain:  Pain Assessment  Pain Assessment: 0-10  0-10 (Numeric) Pain Score: 0 - No pain    Oral/Motor Assessment:  Oral Hygiene: Adequate  Dentition: Adequate/Natural  Oral Motor: Unable to Complete  Intelligibility: Intelligible    Inpatient:    Education:  Learner patient; RN   Barriers to Learning  acuteness of illness barrier   Method verbal   Education - Topic ST provided patient education regarding role of ST, goals of treatment, and plan of care. Patient did not verbalize comprehension due to lethargy, however RN verbalized comprehension. Education will be reinforced. ST further coordinated with RN regarding recommendations and precautions per this assessment, with RN verbalizing understanding.     Outcome     needs instruction; needs review/reinforcement; unable to meet goals/outcomes

## 2024-12-06 NOTE — PRE-PROCEDURE NOTE
Interventional Radiology Preprocedure Note    Indication for procedure: The encounter diagnosis was Sepsis with encephalopathy without septic shock, due to unspecified organism (Multi).    Relevant review of systems: NA    Relevant Labs:   Lab Results   Component Value Date    CREATININE 6.65 (H) 12/06/2024    EGFR 6 (L) 12/06/2024    INR 1.5 (H) 12/05/2024    PROTIME 16.9 (H) 12/05/2024       Planned Sedation/Anesthesia: Minimal    Airway assessment: normal    Directed physical examination:    GENERAL: no acute distress  HEENT: AT/NC  NECK: supple  CV: RRR  PULM: non-labored breathing  ABDOMEN: soft    Benefits, risks and alternatives of procedure and planned sedation have been discussed with the patient and/or their representative. All questions answered and they agree to proceed.

## 2024-12-06 NOTE — PROGRESS NOTES
"Occupational Therapy                 Therapy Communication Note    Patient Name: Savita Snell \"FILOMENA\"  MRN: [id number]  Department: [facility]  Room: 2104/2104-A  Today's Date: 12/6/2024     Discipline: Occupational Therapy    Missed Visit Reason: Missed Visit Reason:  (hold-brain attack called)    Comment: Received orders for OT eval 12/5. Completed chart review, and attempted OT eval. Pt with \"brain attack\" called this am. Will hold OT eval at this time and complete as appropriate.   "

## 2024-12-06 NOTE — PROGRESS NOTES
PHARMACY STROKE RESPONSE      Patient Name: Savita Snell  MRN: [id number]  Location: 2104/2104-A    Patient Weight (kg):   Wt Readings from Last 1 Encounters:   12/06/24 91.3 kg (201 lb 4.5 oz)        An acute Brain Attack has been activated, pharmacy participated in multidisciplinary team bedside response for Savita Snell.  Contraindications for fibrinolytic therapy have been reviewed by pharmacy and any issues relating to medication therapy have been discussed directly with the provider(s) caring for this patient.     Pharmacy aided in the procurement, bedside response for fibrinolytic therapy. Patient did not receive fibrinolysis.    Orders Placed This Encounter      acetaminophen (Tylenol) suppository 650 mg      acetaminophen (Tylenol) suppository 650 mg  - Omnicell Override Pull      OR Linked Order Group          acetaminophen (Tylenol) tablet 650 mg          acetaminophen (Tylenol) oral liquid 650 mg          acetaminophen (Tylenol) suppository 650 mg      amLODIPine (Norvasc) tablet 5 mg      apixaban (Eliquis) tablet 2.5 mg      calcium gluconate 2 g in sodium chloride (iso)  mL      cyclobenzaprine (Flexeril) tablet 10 mg      dapagliflozin propanediol (Farxiga) tablet 5 mg      dextrose 50 % injection 12.5 g      dextrose 50 % injection 25 g      furosemide (Lasix) tablet 40 mg      glucagon (Glucagen) injection 1 mg      glucagon (Glucagen) injection 1 mg      heparin (porcine) injection 5,000 Units      insulin lispro injection 0-10 Units      iohexol (OMNIPaque) 350 mg iodine/mL solution 70 mL      latanoprost (Xalatan) 0.005 % ophthalmic solution 1 drop      losartan (Cozaar) tablet 25 mg      metoprolol tartrate (Lopressor) tablet 12.5 mg      oxyCODONE (Roxicodone) immediate release tablet 5 mg      oxygen (O2) therapy      pantoprazole (ProtoNix) injection 40 mg      piperacillin-tazobactam (Zosyn) 4.5 g in dextrose (iso)  mL      pregabalin (Lyrica) capsule 75 mg       SITagliptin phosphate (Januvia) tablet 25 mg      sodium bicarbonate 1 mEq/mL (8.4 %) 150 mEq in dextrose 5% 1,150 mL infusion      sodium chloride (PF) 0.9% solution  - Omnicell Override Pull      sodium chloride 0.9 % bolus 1,000 mL      sodium chloride 0.9 % bolus 1,000 mL      sodium chloride 0.9% infusion      sodium chloride 0.9% infusion      sodium zirconium cyclosilicate (Lokelma) packet 5 g      vancomycin (Firvanq) solution 125 mg      Thank you for allowing me to take part in the care of this patient.     EULA BRICENO  12/6/2024  9:24 AM         References:    [facility] Stroke Tools   [facility] [place] IV Thrombolysis Checklist

## 2024-12-06 NOTE — PRE-PROCEDURE NOTE
"Report from Sending RN:    Report From: Sharmaine Dai RN   Recent Surgery of Procedure: Yes  Baseline Level of Consciousness (LOC): A&Ox1-2, lethargic   Oxygen Use: Yes  Type: NC  Diabetic: Yes  Last BP Med Given Day of Dialysis: see MAR   Last Pain Med Given: see MAR   Lab Tests to be Obtained with Dialysis: Yes  Blood Transfusion to be Given During Dialysis: No  Available IV Access: Yes  Medications to be Administered During Dialysis: No  Continuous IV Infusion Running: No  Restraints on Currently or in the Last 24 Hours: No  Hand-Off Communication: new permacath placed, \"ok to use\" present, consent signed   Full Code.   Dialysis Catheter Dressing: clean dry and intact   Last Dressing Change: 12/6/2024  "

## 2024-12-06 NOTE — CARE PLAN
Pt had unchanged neuro checks during the shift. Pupils remained equal and reactive, pt remained pedro orientation. Pt lethargic and not interactive, answers yes and no questions but does not interact much further than that. Provider aware of mental status, continue to monitor per provider order. VS stable. Remained NSR, maintained 2L NC throughout the night with no SOB. No complaints of pain during the night. Insulin coverage x3 with 2 units each time. Pt febrile (37.7) via axillary temp, provider notified, tylenol suspension given via NG tube x1 per provider order. Temp improved after tylenol administered. Na bicarb infusion completed, NS maintenance fluid began infusing after Na bicarb per order. Only about 75mL urine out from huynh this shift, provider notified, no new orders at this time. Compliant with medications. Safety maintained.      Problem: Skin  Goal: Participates in plan/prevention/treatment measures  12/6/2024 0541 by Lilibeth Cobb RN  Outcome: Progressing     Problem: Skin  Goal: Prevent/manage excess moisture  12/6/2024 0541 by Lilibeth Cbob RN  Outcome: Progressing     Problem: Skin  Goal: Prevent/minimize sheer/friction injuries  12/6/2024 0541 by Lilibeth Cobb RN  Outcome: Progressing     Problem: Skin  Goal: Promote/optimize nutrition  12/6/2024 0541 by Lilibeth Cobb RN  Outcome: Progressing     Problem: Skin  Goal: Promote skin healing  12/6/2024 0541 by Lilibeth Cobb RN  Outcome: Progressing     Problem: Diabetes  Goal: Maintain electrolyte levels within acceptable range throughout shift  Outcome: Progressing     Problem: Diabetes  Goal: Increase stability of blood glucose readings by end of shift  Outcome: Progressing     Problem: Pain - Adult  Goal: Verbalizes/displays adequate comfort level or baseline comfort level  12/6/2024 0541 by Lilibeth Cobb RN  Outcome: Progressing     Problem: Safety - Adult  Goal: Free from fall injury  12/6/2024 0541 by Lilibeth Cobb  RN  Outcome: Met     Problem: Discharge Planning  Goal: Discharge to home or other facility with appropriate resources  12/6/2024 0541 by Lilibeth Cobb RN  Outcome: Progressing     Problem: Chronic Conditions and Co-morbidities  Goal: Patient's chronic conditions and co-morbidity symptoms are monitored and maintained or improved  12/6/2024 0541 by Lilibeth Cobb RN  Outcome: Progressing     Problem: Fall/Injury  Goal: Not fall by end of shift  Outcome: Met     Problem: Fall/Injury  Goal: Be free from injury by end of the shift  Outcome: Met    The clinical goals for the shift include Pt will have unchanged neuro checks during the shift.

## 2024-12-06 NOTE — NURSING NOTE
1645;  Inserted corpac via left nares using cortrak w/o difficulty.  Pt tolerated procedure w/o difficulty.  KUB ordered for placement.

## 2024-12-06 NOTE — PROGRESS NOTES
Savita Snell     [id number]  America Vanegas MD  This is a patient with a past medical history as detailed below admitted to the  ED from nursing home [facility], wound care nurse went in to complete patient dressing and patient was unresponsive and unable to move limbs. Patient last known normal was this morning at 9am when patient received morning medications. Patient is alert and oriented x3-4 at baseline per facility. Is on eliquis and has tremors due to history of parkinsons. Being sent to ED for eval. patient is sleepy arousable open eyes and answer some questions by yes or no she moves extremities however she does not follow commands  There is a history of diarrhea    Assessment and plan   1-sepsis Tmax 39 admitted to the hospital obtain culture consult With infectious disease   CT showed colitis started on vancomycin and Flagyl per infectious disease monitor blood pressure closely   2-acute renal failure could be related to ATN IV fluids has been given Mckeon catheter has been placed monitor urine output strict I's and O's and weight avoid any nephrotoxic agent consulted nephrology monitor electrolytes  3-colitis per CT likely C. Difficile stool toxin for C. Difficile antibiotics per infectious disease recommendation  4-acute respiratory failure with hypoxia CT chest rule out pulmonary embolism negative monitor pulse ox O2 support to keep pulse ox above 92%   5-elevated LFTs had a CT abdomen trend CMP if persist consider ultrasound right upper quadrant  6-change of mental status likely metabolic encephalopathy  CT brain CTA monitor neurostatus aspiration precaution .  7-status post left total knee replacement   8 -hyperglycemia last hemoglobin A1c October of this year was 5.9 will repeat  Discussed with the ED /consultants.      Review of other systems negative other than HPI  Past medical history    Epic& consultants notes reviewed  Most recent images Reviewed  Last EKG/Rhythm reviewed       Vital signs has been reviewed  Sleepy open eyes to stimuli some answer question by yes or no does not follow commands   SKIN:  No rashes .   ENT mucosa,  Normal/nose normal   NECK: no jugulovenous distention  NO lymphadenopathy   LUNGS: Diminished air entry bilateral  CARDIAC:  S1 and S2  ABDOMEN: Abdomen soft, non-tender.    EXTREMITIES: Extremities normal.   NEURO: As above does not follow commands   PULSES: + pedal / radial   No edema  No goiter   No carotid bruits.                   Past Medical History  She has a past medical history of Afib (Multi), Arthritis, Balance problem, C. difficile colitis, Cataract, CKD (chronic kidney disease), stage III (Multi), COVID-19, Diabetic neuropathy (Multi), Endometrial cancer (Multi), Falls, Glaucoma, Heart failure with preserved ejection fraction, Hypertension, Hypokalemia, Obesity, Type 2 diabetes mellitus, and Venous ulcer (Multi).    Surgical History  She has a past surgical history that includes Cataract extraction; Joint replacement; Cholecystectomy; Hysterectomy; Other surgical history; Other surgical history (Bilateral); Appendectomy; Tonsillectomy; Colonoscopy; Upper gastrointestinal endoscopy; and Total knee arthroplasty (Left).     Social History  She reports that she has quit smoking. Her smoking use included cigarettes. She has never used smokeless tobacco. She reports that she does not currently use alcohol. She reports that she does not use drugs.    Family History  Family History   Problem Relation Name Age of Onset    Heart failure Mother      Other (MI) Mother      Stroke Father      Hyperlipidemia Sister      Hypertension Sister          Allergies  Penicillins, Cephalexin, and Sulfamethoxazole-trimethoprim       Last Recorded Vitals  /55 (BP Location: Right arm, Patient Position: Lying)   Pulse 96   Temp 36.7 °C (98.1 °F) (Temporal)   Resp 18   Wt 90.6 kg (199 lb 11.8 oz)   SpO2 95%      Case has been discussed with consultants /critical  care team.  Pt seen and examined  cct 32 minutes I have  gone over labs  most recent EKG/Rhythm /images/X ray,case .D/W RN and consultants,the patient has a high probability of sudden and clinical significant deterioration which requires the highest level of care & intervene urgently.              America Vanegas MD

## 2024-12-06 NOTE — CARE PLAN
The patient's goals for the shift include      The clinical goals for the shift include Patient's orientation will improve by end of shift.      Problem: Skin  Goal: Prevent/manage excess moisture  Outcome: Progressing  Flowsheets (Taken 12/5/2024 2235 by Lilibeth Cobb RN)  Prevent/manage excess moisture:   Cleanse incontinence/protect with barrier cream   Monitor for/manage infection if present   Moisturize dry skin  Goal: Prevent/minimize sheer/friction injuries  12/6/2024 1844 by Sharmaine Dai RN  Flowsheets (Taken 12/5/2024 2235 by Lilibeth Cobb RN)  Prevent/minimize sheer/friction injuries:   Use pull sheet   Turn/reposition every 2 hours/use positioning/transfer devices   Complete micro-shifts as needed if patient unable. Adjust patient position to relieve pressure points, not a full turn  12/6/2024 1841 by Sharmaine Dai RN  Outcome: Progressing

## 2024-12-06 NOTE — PROGRESS NOTES
"Physical Therapy                 Therapy Communication Note    Patient Name: Savita Snell \"FILOMENA\"  MRN: [id number]  Department: [facility]  Room: 2104/2104-A  Today's Date: 12/6/2024     Discipline: Physical Therapy    Missed Time: Attempt    Comment: Pt chart reviewed. Brain attack called this am. Per nursing, hold off on PT eval for today. Will attempt again as appropriate and able.  "

## 2024-12-06 NOTE — H&P
Savita Snell   11/4/2024  [id number]  America Vanegas MD  This is a patient with a past medical history as detailed below admitted to the  ED from nursing home [facility], wound care nurse went in to complete patient dressing and patient was unresponsive and unable to move limbs. Patient last known normal was this morning at 9am when patient received morning medications. Patient is alert and oriented x3-4 at baseline per facility. Is on eliquis and has tremors due to history of parkinsons. Being sent to ED for eval. patient is sleepy arousable open eyes and answer some questions by yes or no she moves extremities however she does not follow commands  There is a history of diarrhea    Assessment and plan   1-sepsis Tmax 39 admitted to the hospital obtain culture consult With infectious disease   CT showed colitis started on vancomycin and Flagyl per infectious disease monitor blood pressure closely   2-acute renal failure could be related to ATN IV fluids has been given Mckeon catheter has been placed monitor urine output strict I's and O's and weight avoid any nephrotoxic agent consulted nephrology monitor electrolytes  3-colitis per CT likely C. Difficile stool toxin for C. Difficile antibiotics per infectious disease recommendation  4-acute respiratory failure with hypoxia CT chest rule out pulmonary embolism negative monitor pulse ox O2 support to keep pulse ox above 92%   5-elevated LFTs had a CT abdomen trend CMP if persist consider ultrasound right upper quadrant  6-change of mental status likely metabolic encephalopathy  CT brain CTA monitor neurostatus aspiration precaution .  7-status post left total knee replacement   Discussed with the ED /consultants.      Review of other systems negative other than HPI  Past medical history    Epic& consultants notes reviewed  Most recent images Reviewed  Last EKG/Rhythm reviewed      Vital signs has been reviewed  Sleepy open eyes to stimuli some  answer question by yes or no does not follow commands   SKIN:  No rashes .   ENT mucosa,  Normal/nose normal   NECK: no jugulovenous distention  NO lymphadenopathy   LUNGS: Diminished air entry bilateral  CARDIAC:  S1 and S2  ABDOMEN: Abdomen soft, non-tender.    EXTREMITIES: Extremities normal.   NEURO: As above does not follow commands   PULSES: + pedal / radial   No goiter   No carotid bruits.       Past Medical History  She has a past medical history of Afib (Multi), Arthritis, Balance problem, C. difficile colitis, Cataract, CKD (chronic kidney disease), stage III (Multi), COVID-19, Diabetic neuropathy (Multi), Endometrial cancer (Multi), Falls, Glaucoma, Heart failure with preserved ejection fraction, Hypertension, Hypokalemia, Obesity, Type 2 diabetes mellitus, and Venous ulcer (Multi).    Surgical History  She has a past surgical history that includes Cataract extraction; Joint replacement; Cholecystectomy; Hysterectomy; Other surgical history; Other surgical history (Bilateral); Appendectomy; Tonsillectomy; Colonoscopy; Upper gastrointestinal endoscopy; and Total knee arthroplasty (Left).     Social History  She reports that she has quit smoking. Her smoking use included cigarettes. She has never used smokeless tobacco. She reports that she does not currently use alcohol. She reports that she does not use drugs.    Family History  Family History   Problem Relation Name Age of Onset    Heart failure Mother      Other (MI) Mother      Stroke Father      Hyperlipidemia Sister      Hypertension Sister                     America Vanegas MD

## 2024-12-06 NOTE — PROGRESS NOTES
"Nutrition Initial Assessment:   Nutrition Assessment    Reason for Assessment: Provider consult order    Nutrition Note:  Savita Snell \"FILOMENA\" is a 84 y.o. female presenting 12/4 from SNF with altered mental status. Pt found to have watery diarrhea and evidence of colitis on CT abdomen and pelvis; + severe C. difficile infection; ID involved in pt care.  PT too with   oliguric prerenal AIDAN vs. Ischemic ATN likely 2/2 dehydration and C. Diff diarrhea plus contrast induced nephrotoxicity on CKD IV with plan for permacath access and to start HD 12/6. NGT in place for medications--plan to exchange out for a corpak today or 12/7. Pt has remained too lethargic for ST intervention.     Past Medical History  [facility] stay 11/26-11/27/2024 S/p left TKR 11/26/2024   has a past medical history of Afib (Multi), Arthritis, Balance problem, C. difficile colitis, Cataract, CKD (chronic kidney disease), stage III (Multi), COVID-19, Diabetic neuropathy (Multi), Endometrial cancer (Multi), Falls, Glaucoma, Heart failure with preserved ejection fraction, Hypertension, Hypokalemia, Obesity, Type 2 diabetes mellitus, and Venous ulcer (Multi).  Surgical History   has a past surgical history that includes Cataract extraction; Joint replacement; Cholecystectomy; Hysterectomy; Other surgical history; Other surgical history (Bilateral); Appendectomy; Tonsillectomy; Colonoscopy; Upper gastrointestinal endoscopy; and Total knee arthroplasty (Left).       Nutrition History:  Energy Intake: Poor < 50 %, Fair 50-75 %  Food and Nutrient History: Pt from [facility]--there only ~7 days. Met with pt at bedside--pt opened eyes to her name and smiled. When asked pt how she was eating at the SNF, pt wrinkled her nose; when asked about food allergies, pt nodded no.  Pt fell asleep again during questioning. Diet at  as regular/no concentrated sweets.  Vitamin/Herbal Supplement Use: home meds include januvia, lokelma, lasix, D3, " "B12, farxiga  Food Allergies/Intolerances:  None  GI Symptoms: Diarrhea  Oral Problems:  too lethargic for ST intervention; pt on regular foods/liquids PTA.        Anthropometrics:  Height: 167.6 cm (5' 6\")   Weight: 91.3 kg (201 lb 4.5 oz)   BMI (Calculated): 32.5   Admit weight measured 87.9kg; 12/6 with rehydration 91.3kg   IBW 59.1kg       Weight History:   10/2024: 90.1kg  8/2024: 89kg  7/2024: 90.4kg  4/2024: 92.7kg  Weight Change %:   0-10 (Numeric) Pain Score: 0 - No pain  PAINAD Score:  [1]      Nutrition Focused Physical Exam Findings:    Subcutaneous Fat Loss:   Orbital Fat Pads: Mild-Moderate (slight dark circles and slight hollowing)  Buccal Fat Pads: Mild-Moderate (flat cheeks, minimal bounce)  Triceps: Defer  Ribs: Defer  Muscle Wasting:  Temporalis: Mild-Moderate (slight depression)  Pectoralis (Clavicular Region): Mild-Moderate (some protrusion of clavicle)  Deltoid/Trapezius: Well nourished (rounded appearance at arm, shoulder, neck)  Interosseous: Well nourished (muscle bulges)  Trapezius/Infraspinatus/Supraspinatus (Scapular Region): Defer  Quadriceps: Defer  Gastrocnemius: Defer  Edema:  Edema Location: nonpitting BLE  Physical Findings:  Skin: Positive (pale; pt with left TKR incision and generalized bruising.)    Nutrition Significant Labs:  BMP Trend:   Results from last 7 days   Lab Units 12/06/24  0523 12/05/24  0552 12/04/24  1157   GLUCOSE mg/dL 143* 153* 239*   CALCIUM mg/dL 6.6* 6.5* 7.4*   SODIUM mmol/L 143 141 138   POTASSIUM mmol/L 3.9 4.2 5.0   CO2 mmol/L 23 19* 20*   CHLORIDE mmol/L 106 107 101   BUN mg/dL 102* 87* 79*   CREATININE mg/dL 6.65* 5.50* 4.51*    , A1C:  Lab Results   Component Value Date    HGBA1C 6.1 (H) 10/17/2024   , BG POCT trend:   Results from last 7 days   Lab Units 12/06/24  1157 12/06/24  0917 12/06/24  0736 12/06/24  0358 12/06/24  0002   POCT GLUCOSE mg/dL 158* 143* 135* 163* 160*    , Liver Function Trend:   Results from last 7 days   Lab Units " "12/06/24  0523 12/05/24  0552 12/04/24  1157   ALK PHOS U/L 69 66 107   AST U/L 267* 245* 98*   ALT U/L 242* 163* 72*   BILIRUBIN TOTAL mg/dL 0.7 0.7 0.9    , Vit D:   Lab Results   Component Value Date    VITD25 39 12/05/2024    , Vit B12: No results found for: \"[id number]\" , Folate: No results found for: \"FOLATE\"     Nutrition Specific Medications:  Scheduled medications  [Held by provider] amLODIPine, 5 mg, oral, Daily  [Held by provider] apixaban, 2.5 mg, oral, BID  [Held by provider] dapagliflozin propanediol, 5 mg, oral, Daily  [Held by provider] furosemide, 40 mg, oral, Daily  heparin, 5,000 Units, subcutaneous, q8h  insulin lispro, 0-10 Units, subcutaneous, q4h  latanoprost, 1 drop, Both Eyes, Nightly  [Held by provider] losartan, 25 mg, oral, BID  [Held by provider] metoprolol tartrate, 12.5 mg, oral, BID  pantoprazole, 40 mg, intravenous, Daily  [Held by provider] pregabalin, 75 mg, oral, Daily  [Held by provider] SITagliptin phosphate, 25 mg, oral, Daily  [Held by provider] sodium zirconium cyclosilicate, 5 g, oral, Daily  vancomycin, 125 mg, nasogastric tube, q6h   Followed by  [START ON 12/12/2024] vancomycin, 125 mg, nasogastric tube, TID   Followed by  [START ON 12/19/2024] vancomycin, 125 mg, nasogastric tube, BID   Followed by  [START ON 12/27/2024] vancomycin, 125 mg, nasogastric tube, Daily   Followed by  [START ON 1/3/2025] vancomycin, 125 mg, nasogastric tube, Every other day      Continuous medications     PRN medications  PRN medications: [Held by provider] acetaminophen **OR** [Held by provider] acetaminophen **OR** [Held by provider] acetaminophen, [Held by provider] cyclobenzaprine, dextrose, dextrose, glucagon, glucagon, [Held by provider] oxyCODONE, oxygen     I/O:   Last BM Date: 12/06/24; Stool Appearance: Watery, Liquid (12/06/24 1227)    Dietary Orders (From admission, onward)       Start     Ordered    12/05/24 0020  NPO Diet; Effective now  Diet effective now         12/05/24 0020 "    12/04/24 1635  May Participate in Room Service With Assistance  ( ROOM SERVICE MAY PARTICIPATE WITH ASSISTANCE)  Once        Question:  .  Answer:  Yes    12/04/24 1634                     Estimated Needs:   Total Energy Estimated Needs (kCal):  (1800-2100kcal (20-23kcal/kg of 91.3kg))     Total Protein Estimated Needs (g):  (83-118g pro (1.4-2g pro/kg IBW of 59.1kg))     Total Fluid Estimated Needs (mL):  (1mL/kcal/d or as per physician)           Nutrition Diagnosis   Malnutrition Diagnosis  Patient has Malnutrition Diagnosis: Yes  Diagnosis Status: New  Malnutrition Diagnosis: Moderate malnutrition related to acute disease or injury  As Evidenced by: infection, current NPO status due to altered mental status compliated by <75% of energy intakes compared to estimated nutrient needs >7 days with mild muscle and fat wasting.            Nutrition Interventions/Recommendations         Nutrition Prescription:  Individualized Nutrition Prescription Provided for : enteral nutrition        Nutrition Interventions:   Interventions: Enteral intake  Enteral Intake: Insert enteral feeding tube  Goal: Once dobbhoff replaced, suggest tube feed with Nepro with goal of 40mL/hr to provide 1920kcal, 78g pro, and 698mL formula free water or >75% kcal needs and 1.4g pro/kg IBW.   Adjust water flush pending fluid needs suggesting start with 100mL 4x/day or as per Nephrology.    Collaboration and Referral of Nutrition Care: Collaboration by nutrition professional with other providers  Goal: ks; ENrecs in    Nutrition Education:   12/6:Pt only nodded head then fell back asleep.       Nutrition Monitoring and Evaluation   Food/Nutrient Related History Monitoring  Monitoring and Evaluation Plan: Energy intake  Energy Intake: Estimated energy intake  Criteria: EN to meet >75% of estimated nutrient needs    Body Composition/Growth/Weight History  Monitoring and Evaluation Plan: Weight  Weight: Measured weight  Criteria: daily  weight    Biochemical Data, Medical Tests and Procedures  Monitoring and Evaluation Plan: Electrolyte/renal panel  Electrolyte and Renal Panel: Magnesium, Phosphorus, Potassium, Sodium  Criteria: lytes WNR              Time Spent (min): 60 minutes

## 2024-12-06 NOTE — CONSULTS
"   Department of Medicine  Division of Pulmonary, Critical Care, and Sleep Medicine    Reason For Consult  I was asked by Dr. Vanegas to evaluate Ms. Savita Snell \"FILOMENA\" for hypoxia.    History Of Present Illness  Savita Snell \"FILOMENA\" is a 84 y.o. female with past medical history as noted below who is admitted on 12/4/2024 with altered mental status, sepsis / c.diff colitis and AIDAN. Pulmonary is consulted for hypoxia.      Patient unable to give any history and no family at the bedside earlier. History was mainly obtained from medical records and nursing staff.     Past Medical History:  Past Medical History:   Diagnosis Date    Afib (Multi)     Arthritis     Balance problem     C. difficile colitis     Cataract     CKD (chronic kidney disease), stage III (Multi)     COVID-19     VACCINATED    Diabetic neuropathy (Multi)     Endometrial cancer (Multi)     Falls     HAS FALLEN WITHIN LAST 6 MONTHS    Glaucoma     Heart failure with preserved ejection fraction     Hypertension     Hypokalemia     Obesity     Type 2 diabetes mellitus     Venous ulcer (Multi)        Surgical History:  Past Surgical History:   Procedure Laterality Date    APPENDECTOMY      CATARACT EXTRACTION      CHOLECYSTECTOMY      COLONOSCOPY      HYSTERECTOMY      JOINT REPLACEMENT      RIGHT KNEE    OTHER SURGICAL HISTORY      TAILBONE REMOVED DUE TO FRACTURE    OTHER SURGICAL HISTORY Bilateral     DOUBLE MUSCLE EYE SURGERY    TONSILLECTOMY      TOTAL KNEE ARTHROPLASTY Left     UPPER GASTROINTESTINAL ENDOSCOPY         Social History:   Social History     Tobacco Use    Smoking status: Former     Types: Cigarettes    Smokeless tobacco: Never   Vaping Use    Vaping status: Never Used   Substance Use Topics    Alcohol use: Not Currently    Drug use: Never       Family History:  Family History   Problem Relation Name Age of Onset    Heart failure Mother      Other (MI) Mother      Stroke Father      Hyperlipidemia Sister      Hypertension " "Sister            Vital Signs  Visit Vitals  /54 (BP Location: Right arm, Patient Position: Lying)   Pulse 86   Temp 37 °C (98.6 °F) (Axillary)   Resp 16   Ht 1.676 m (5' 6\")   Wt 91.3 kg (201 lb 4.5 oz)   SpO2 97%   BMI 32.49 kg/m²   OB Status Hysterectomy   Smoking Status Former   BSA 2.06 m²        Physical Exam  Vitals and nursing note reviewed.   Constitutional:       General: No in acute distress. Lethargic  HENT:      Head: Normocephalic and atraumatic.   Eyes:      Conjunctiva/sclera: Conjunctivae normal.   Cardiovascular:      Rate and Rhythm: Normal rate  Pulmonary:      Effort: Pulmonary effort is normal. No respiratory distress.         Oxygen Therapy  SpO2: 97 %  Medical Gas Therapy: Supplemental oxygen  Medical Gas Delivery Method: Nasal cannula (2L)         Medications   Scheduled medications  [Held by provider] amLODIPine, 5 mg, oral, Daily  [Held by provider] apixaban, 2.5 mg, oral, BID  [Held by provider] dapagliflozin propanediol, 5 mg, oral, Daily  [Held by provider] furosemide, 40 mg, oral, Daily  heparin, 5,000 Units, subcutaneous, q8h  insulin lispro, 0-10 Units, subcutaneous, q4h  latanoprost, 1 drop, Both Eyes, Nightly  [Held by provider] losartan, 25 mg, oral, BID  [Held by provider] metoprolol tartrate, 12.5 mg, oral, BID  [Held by provider] pregabalin, 75 mg, oral, Daily  [Held by provider] SITagliptin phosphate, 25 mg, oral, Daily  [Held by provider] sodium zirconium cyclosilicate, 5 g, oral, Daily  vancomycin, 125 mg, nasogastric tube, q6h      Continuous medications  sodium chloride 0.9%, 100 mL/hr, Last Rate: 100 mL/hr (12/06/24 8016)      PRN medications  PRN medications: acetaminophen **OR** acetaminophen **OR** acetaminophen, [Held by provider] cyclobenzaprine, dextrose, dextrose, glucagon, glucagon, [Held by provider] oxyCODONE, oxygen     Allergies  Penicillins, Cephalexin, and Sulfamethoxazole-trimethoprim      Lab Results     Lab Results   Component Value Date    WBC 9.2 " 12/06/2024    HGB 10.0 (L) 12/06/2024    HCT 33.7 (L) 12/06/2024    MCV 99 12/06/2024     (L) 12/06/2024      Lab Results   Component Value Date    GLUCOSE 153 (H) 12/05/2024    CALCIUM 6.5 (L) 12/05/2024     12/05/2024    K 4.2 12/05/2024    CO2 19 (L) 12/05/2024     12/05/2024    BUN 87 (H) 12/05/2024    CREATININE 5.50 (H) 12/05/2024      Lab Results   Component Value Date     (H) 12/05/2024     (H) 12/05/2024    ALKPHOS 66 12/05/2024    BILITOT 0.7 12/05/2024        Chest Radiograph   CT chest abdomen pelvis wo IV contrast 12/04/2024    Narrative  STUDY:  CT Chest, Abdomen, and Pelvis without IV Contrast; 12/4/2024 12:37 PM.  INDICATION:  Abdominal distension, bulging below the umbilicus, altered mental  status and stool from urethra.  COMPARISON:  None Available.  ACCESSION NUMBER(S):  [id number]  ORDERING CLINICIAN:  RENETTA RADFORD  TECHNIQUE:  CT of the chest, abdomen, and pelvis was performed.  Contiguous axial  images were obtained at 3 mm slice thickness through the chest,  abdomen, and pelvis.  Coronal and sagittal reconstructions at 3 mm  slice thickness were performed.  No intravenous contrast was  administered.  FINDINGS:  Partially visualized chest showing cardiac enlargement.  No  pericardial effusion.  Coronary artery calcifications.  Lung bases are  clear.  Abdomen:  No acute abnormality of the stomach is identified.  The liver of normal size and contour. No intrahepatic ductal  dilatation is identified.  Gallbladder absent.  No acute abnormality of the pancreas.  Spleen of normal appearance.  Adrenal glands of normal appearance.  Renal atrophic changes.  No acute renal process.  No retroperitoneal adenopathy.  Atherosclerosis of the arterial  vasculature.  No findings of abdominal aortic aneurysm.  Diffuse thickening of the colon and mild adjacent inflammation.  Similar thickening of the rectosigmoid colon.  No bowel obstruction.  No free air.  No free  fluid.  The appendix is not definitively identified.  No acute abnormality  within its expected location.  Pelvis:  No pelvic free fluid.  No inflammatory change or findings of free air.  No findings of pelvic adenopathy.  Mckeon catheter within collapsed  urinary bladder.  Skeleton:  No acute bony process is identified.    Impression  Diffuse thickening of the colon and mild adjacent inflammation.  Similar thickening of the rectosigmoid colon. Findings consistent with  colitis. No bowel obstruction. No free air. No free fluid.  Cardiac enlargement. Coronary artery calcifications.  Renal atrophic changes. No acute renal process.  Status post cholecystectomy.  Signed by Blaise Natarajan MD      No results found for this or any previous visit from the past 10 days.         Pulmonary Function Tests         Assessment and Plan / Recommendations   Assessment/Plan   Acute Hypoxemia - on 2 LPM o2  C. Diff Colitis   AIDAN  Metabolic acidosis   Acute encephalopathy  Afib    CT scan of chest is reviewed. Segmental atelectasis in lower lobes. No acute air-space disease otherwise.   Acute hypoxemia is most likely related to atelectasis + mild hypoventilation in the setting of lethargy.   Defer management of sepsis / C.diff and AIDAN to ID and Nephrology teams.   No further work up from the pulmonary standpoint at this time.       Martha Lowe MD    Please excuse any misspellings or unintended errors related to the Dragon speech recognition software used to dictate this note.

## 2024-12-06 NOTE — SIGNIFICANT EVENT
Patient had rapid response called for brain attack.  Patient was seen and evaluated.  She is minimally responsive but does answer appropriately.  Nurse had noted that potentially her pupils were unequal in size.  Upon my evaluation they appear to be quite similar in size.  She did not receive any narcotics recently or breathing treatment.  She does have some left upper extremity drift but her overall mentation has not improved compared to admission and she is here and being treated for encephalopathy with C. difficile colitis and approaching renal failure and going to be initiating dialysis.  Will get CT brain stat for brain attack.  She is not a candidate for TNK.  Neurology service was already consulted per primary team and they are updated on the rapid response.    Total critical care time: Approximately 32 min  Due to a high probability of clinically significant, life threatening deterioration, the patient required my highest level of preparedness to intervene emergently and I personally spent this critical care time directly and personally managing the patient. This critical care time included obtaining a history; examining the patient; pulse oximetry; ordering and review of studies; arranging urgent treatment with development of a management plan; evaluation of patient's response to treatment; frequent reassessment; and, discussions with other providers.  This critical care time was performed to assess and manage the high probability of imminent, life-threatening deterioration that could result in multi-organ failure. It was exclusive of separately billable procedures and treating other patients and teaching time.

## 2024-12-06 NOTE — CONSULTS
"Subjective   Patient was resting in bed during examination. She is disoriented and did not have family present. She was able to answer yes and no questions. She reports she is not in pain today. Her blood pressure has been stable and is afebrile. She remains oliguric today, producing 240 mL of urine.       Objective     Last Recorded Vitals:  Blood pressure 138/63, pulse 95, temperature 37.3 °C (99.1 °F), temperature source Temporal, resp. rate 16, height 1.676 m (5' 6\"), weight 91.3 kg (201 lb 4.5 oz), SpO2 99%.    I&O  I/O last 3 completed shifts:  In: 2506.7 (27.5 mL/kg) [I.V.:1390 (15.2 mL/kg); IV Piggyback:1116.7]  Out: 440 (4.8 mL/kg) [Urine:240 (0.1 mL/kg/hr); Stool:200]  Weight: 91.3 kg   I/O this shift:  In: 1073.3 [I.V.:1073.3]  Out: 200 [Urine:75; Stool:125]    Physical Exam  Constitutional:       General: She is not in acute distress.     Appearance: She is ill-appearing.   HENT:      Nose:      Comments: NG tube present     Mouth/Throat:      Mouth: Mucous membranes are dry.   Cardiovascular:      Rate and Rhythm: Normal rate and regular rhythm.      Heart sounds: Murmur heard.      Comments: Systolic murmur  Pulmonary:      Effort: Pulmonary effort is normal. No respiratory distress.      Breath sounds: Normal breath sounds. No wheezing or rhonchi.   Abdominal:      General: Abdomen is flat. Bowel sounds are normal. There is no distension.      Palpations: Abdomen is soft.      Tenderness: There is no abdominal tenderness.   Skin:     General: Skin is warm and dry.   Neurological:      Mental Status: She is disoriented.      Comments: Only able to answer yes or no questions         Relevant results  Labs  Results for orders placed or performed during the hospital encounter of 12/04/24 (from the past 24 hours)   Protein, Urine Random   Result Value Ref Range    Total Protein, Urine Random 702 (H) 5 - 24 mg/dL    Creatinine, Urine Random 116.7 20.0 - 320.0 mg/dL    T. Protein/Creatinine Ratio 6.02 (H) " 0.00 - 0.17 mg/mg Creat   SST TOP   Result Value Ref Range    Extra Tube Hold for add-ons.    Protime-INR   Result Value Ref Range    Protime 16.9 (H) 9.8 - 12.8 seconds    INR 1.5 (H) 0.9 - 1.1   POCT GLUCOSE   Result Value Ref Range    POCT Glucose 184 (H) 74 - 99 mg/dL   POCT GLUCOSE   Result Value Ref Range    POCT Glucose 160 (H) 74 - 99 mg/dL   POCT GLUCOSE   Result Value Ref Range    POCT Glucose 163 (H) 74 - 99 mg/dL   CBC   Result Value Ref Range    WBC 9.2 4.4 - 11.3 x10*3/uL    nRBC 0.0 0.0 - 0.0 /100 WBCs    RBC 3.42 (L) 4.00 - 5.20 x10*6/uL    Hemoglobin 10.0 (L) 12.0 - 16.0 g/dL    Hematocrit 33.7 (L) 36.0 - 46.0 %    MCV 99 80 - 100 fL    MCH 29.2 26.0 - 34.0 pg    MCHC 29.7 (L) 32.0 - 36.0 g/dL    RDW 14.6 (H) 11.5 - 14.5 %    Platelets 121 (L) 150 - 450 x10*3/uL   Comprehensive metabolic panel   Result Value Ref Range    Glucose 143 (H) 74 - 99 mg/dL    Sodium 143 136 - 145 mmol/L    Potassium 3.9 3.5 - 5.3 mmol/L    Chloride 106 98 - 107 mmol/L    Bicarbonate 23 21 - 32 mmol/L    Anion Gap 18 10 - 20 mmol/L    Urea Nitrogen 102 (HH) 6 - 23 mg/dL    Creatinine 6.65 (H) 0.50 - 1.05 mg/dL    eGFR 6 (L) >60 mL/min/1.73m*2    Calcium 6.6 (L) 8.6 - 10.3 mg/dL    Albumin 2.6 (L) 3.4 - 5.0 g/dL    Alkaline Phosphatase 69 33 - 136 U/L    Total Protein 5.3 (L) 6.4 - 8.2 g/dL     (H) 9 - 39 U/L    Bilirubin, Total 0.7 0.0 - 1.2 mg/dL     (H) 7 - 45 U/L   Phosphorus   Result Value Ref Range    Phosphorus 7.4 (H) 2.5 - 4.9 mg/dL   Magnesium   Result Value Ref Range    Magnesium 1.87 1.60 - 2.40 mg/dL   POCT GLUCOSE   Result Value Ref Range    POCT Glucose 135 (H) 74 - 99 mg/dL   POCT GLUCOSE   Result Value Ref Range    POCT Glucose 143 (H) 74 - 99 mg/dL   POCT GLUCOSE   Result Value Ref Range    POCT Glucose 158 (H) 74 - 99 mg/dL       Assessment/Plan   This is a 84 y.o. female with past medical history of paroxysmal atrial fibrillation on Eliquis, HFp EF, obesity, CKD stage IV, DMII, glaucoma,  history of C. difficile, hypertension and recent left total knee replacement presenting to [facility] with altered mental status, currently on oral vancomycin for acute C. Difficile infection and AIDAN on CKD stage IV.     #Oliguric prerenal AIDAN vs. Ischemic ATN likely 2/2 dehydration and C. Diff diarrhea plus contrast induced nephrotoxicity  # History of CKD stage IV 2/2 hypertensive nephrosclerosis  - Creatinine increased from 5.5 to 6.65. BUN increased from 87 to 102  - Mckeon catheter still in place  Plan  - With concern for increasing BUN and Cr and lack of urine output, we will initiate dialysis today and tomorrow  - Consent was obtained from her son, Blaise, over the phone    #Hypocalcemia 2/2 hyperphosphatemia and CKD  #Hypoalbuminemia  - Calcium corrected to 7.7  - PTH elevated appropriately  Plan  - Dialysis likely to improve hyperphosphatemia.   - Continue to monitor calcium level post dialysis    #HAGMA 2/2 multifactorial including lactic acidosis, sepsis from C. Diff, AIDAN - resolved      Patient was seen and case discussed with the attending.  NAHUM MOODY-4    Date: December 6, 2024

## 2024-12-06 NOTE — PROGRESS NOTES
History: Savita Snell is here today for her first post-op visit.  She is two weeks out from a total knee arthroplasty.  She is having normal post-operative pain.     Physical Exam: The wound is healing nicely. There is no redness, irritation, or drainage.  The patient is able to straight leg raise. Range of motion is from [] to [] degrees. Swelling and ecchymosis are normal for this stage of healing.  She is neurovascularly intact throughout the extremity.     Radiographs: AP and lateral views of the left knee show excellent alignment of the total knee arthroplasty with no acute abnormality.     Assessment: Stable left total knee arthroplasty, 2 weeks out.     Plan:   She is going to continue with physical therapy.  Her order was updated today. The goal for next visit is range of motion between 110-120 degrees.  She will continue to ice several times throughout the day.  She will continue to wean down off narcotics.   She will follow up in 4 weeks for a follow up with 2 view knee x-rays.   All questions were answered with the patient.     Scribe Attestation  By signing my name below, Mary MCGRAW Scribe   attest that this documentation has been prepared under the direction and in the presence of Hi Clay MD.

## 2024-12-07 LAB
ALBUMIN SERPL BCP-MCNC: 2.5 G/DL (ref 3.4–5)
ALP SERPL-CCNC: 85 U/L (ref 33–136)
ALT SERPL W P-5'-P-CCNC: 195 U/L (ref 7–45)
ANION GAP SERPL CALC-SCNC: 16 MMOL/L (ref 10–20)
AST SERPL W P-5'-P-CCNC: 176 U/L (ref 9–39)
BILIRUB SERPL-MCNC: 0.7 MG/DL (ref 0–1.2)
BUN SERPL-MCNC: 49 MG/DL (ref 6–23)
CALCIUM SERPL-MCNC: 6.8 MG/DL (ref 8.6–10.3)
CHLORIDE SERPL-SCNC: 102 MMOL/L (ref 98–107)
CO2 SERPL-SCNC: 26 MMOL/L (ref 21–32)
CREAT SERPL-MCNC: 4.18 MG/DL (ref 0.5–1.05)
EGFRCR SERPLBLD CKD-EPI 2021: 10 ML/MIN/1.73M*2
ERYTHROCYTE [DISTWIDTH] IN BLOOD BY AUTOMATED COUNT: 14.6 % (ref 11.5–14.5)
GIANT PLATELETS BLD QL SMEAR: NORMAL
GLUCOSE BLD MANUAL STRIP-MCNC: 111 MG/DL (ref 74–99)
GLUCOSE BLD MANUAL STRIP-MCNC: 111 MG/DL (ref 74–99)
GLUCOSE BLD MANUAL STRIP-MCNC: 116 MG/DL (ref 74–99)
GLUCOSE BLD MANUAL STRIP-MCNC: 117 MG/DL (ref 74–99)
GLUCOSE BLD MANUAL STRIP-MCNC: 120 MG/DL (ref 74–99)
GLUCOSE BLD MANUAL STRIP-MCNC: 149 MG/DL (ref 74–99)
GLUCOSE SERPL-MCNC: 113 MG/DL (ref 74–99)
HBV SURFACE AB SER-ACNC: <3.1 MIU/ML
HBV SURFACE AG SERPL QL IA: NONREACTIVE
HCT VFR BLD AUTO: 30.4 % (ref 36–46)
HGB BLD-MCNC: 9 G/DL (ref 12–16)
MAGNESIUM SERPL-MCNC: 1.84 MG/DL (ref 1.6–2.4)
MCH RBC QN AUTO: 28.9 PG (ref 26–34)
MCHC RBC AUTO-ENTMCNC: 29.6 G/DL (ref 32–36)
MCV RBC AUTO: 98 FL (ref 80–100)
NRBC BLD-RTO: 0 /100 WBCS (ref 0–0)
PHOSPHATE SERPL-MCNC: 4.3 MG/DL (ref 2.5–4.9)
PLATELET # BLD AUTO: 94 X10*3/UL (ref 150–450)
POLYCHROMASIA BLD QL SMEAR: NORMAL
POTASSIUM SERPL-SCNC: 4 MMOL/L (ref 3.5–5.3)
PROT SERPL-MCNC: 5 G/DL (ref 6.4–8.2)
RBC # BLD AUTO: 3.11 X10*6/UL (ref 4–5.2)
RBC MORPH BLD: NORMAL
SODIUM SERPL-SCNC: 140 MMOL/L (ref 136–145)
STOMATOCYTES BLD QL SMEAR: NORMAL
WBC # BLD AUTO: 9.5 X10*3/UL (ref 4.4–11.3)

## 2024-12-07 PROCEDURE — 85027 COMPLETE CBC AUTOMATED: CPT | Performed by: NURSE PRACTITIONER

## 2024-12-07 PROCEDURE — 82947 ASSAY GLUCOSE BLOOD QUANT: CPT

## 2024-12-07 PROCEDURE — 2500000005 HC RX 250 GENERAL PHARMACY W/O HCPCS: Performed by: NURSE PRACTITIONER

## 2024-12-07 PROCEDURE — 2500000004 HC RX 250 GENERAL PHARMACY W/ HCPCS (ALT 636 FOR OP/ED)

## 2024-12-07 PROCEDURE — 84075 ASSAY ALKALINE PHOSPHATASE: CPT | Performed by: NURSE PRACTITIONER

## 2024-12-07 PROCEDURE — 84100 ASSAY OF PHOSPHORUS: CPT

## 2024-12-07 PROCEDURE — 99232 SBSQ HOSP IP/OBS MODERATE 35: CPT | Performed by: PSYCHIATRY & NEUROLOGY

## 2024-12-07 PROCEDURE — 83735 ASSAY OF MAGNESIUM: CPT

## 2024-12-07 PROCEDURE — 2060000001 HC INTERMEDIATE ICU ROOM DAILY

## 2024-12-07 PROCEDURE — 36415 COLL VENOUS BLD VENIPUNCTURE: CPT | Performed by: NURSE PRACTITIONER

## 2024-12-07 RX ORDER — LIDOCAINE 560 MG/1
1 PATCH PERCUTANEOUS; TOPICAL; TRANSDERMAL DAILY
Status: DISPENSED | OUTPATIENT
Start: 2024-12-07

## 2024-12-07 ASSESSMENT — COGNITIVE AND FUNCTIONAL STATUS - GENERAL
EATING MEALS: TOTAL
STANDING UP FROM CHAIR USING ARMS: TOTAL
DAILY ACTIVITIY SCORE: 6
PERSONAL GROOMING: TOTAL
DRESSING REGULAR LOWER BODY CLOTHING: TOTAL
DRESSING REGULAR UPPER BODY CLOTHING: TOTAL
MOBILITY SCORE: 6
MOVING FROM LYING ON BACK TO SITTING ON SIDE OF FLAT BED WITH BEDRAILS: TOTAL
WALKING IN HOSPITAL ROOM: TOTAL
HELP NEEDED FOR BATHING: TOTAL
TURNING FROM BACK TO SIDE WHILE IN FLAT BAD: TOTAL
CLIMB 3 TO 5 STEPS WITH RAILING: TOTAL
TOILETING: TOTAL
MOVING TO AND FROM BED TO CHAIR: TOTAL

## 2024-12-07 ASSESSMENT — PAIN SCALES - PAIN ASSESSMENT IN ADVANCED DEMENTIA (PAINAD)
FACIALEXPRESSION: FACIAL GRIMACING
NEGVOCALIZATION: OCCASIONAL MOAN/GROAN, LOW SPEECH, NEGATIVE/DISAPPROVING QUALITY
TOTALSCORE: 4
CONSOLABILITY: DISTRACTED OR REASSURED BY VOICE/TOUCH
BODYLANGUAGE: RELAXED
BREATHING: NORMAL

## 2024-12-07 ASSESSMENT — PAIN SCALES - GENERAL
PAINLEVEL_OUTOF10: 0 - NO PAIN

## 2024-12-07 ASSESSMENT — PAIN - FUNCTIONAL ASSESSMENT
PAIN_FUNCTIONAL_ASSESSMENT: 0-10
PAIN_FUNCTIONAL_ASSESSMENT: PAINAD (PAIN ASSESSMENT IN ADVANCED DEMENTIA SCALE)

## 2024-12-07 NOTE — CARE PLAN
Problem: Skin  Goal: Prevent/minimize sheer/friction injuries  Outcome: Met  Flowsheets (Taken 12/7/2024 0433)  Prevent/minimize sheer/friction injuries:   Use pull sheet   Turn/reposition every 2 hours/use positioning/transfer devices   HOB 30 degrees or less     Problem: Diabetes  Goal: Maintain glucose levels >70mg/dl to <250mg/dl throughout shift  Outcome: Met     Problem: Pain - Adult  Goal: Verbalizes/displays adequate comfort level or baseline comfort level  Outcome: Met      Tube feeding rates increased to 30ml/hr this shift. Pt alert and oriented x2, disoriented to time and situation. Pt slow to respond to questions. Corepak bridled by ICU RN this shift. Safety maintained. Alarms on.

## 2024-12-07 NOTE — PROGRESS NOTES
"Savita Snell \"FILOMENA\" is a 84 y.o. female on day 3 of admission presenting with No Principal Problem: There is no principal problem currently on the Problem List. Please update the Problem List and refresh..      Subjective   Dialysed yesterday  Mental status modestly improved       Objective     Last Recorded Vitals  Blood pressure 113/53, pulse 90, temperature 36.8 °C (98.2 °F), temperature source Temporal, resp. rate 17, height 1.676 m (5' 6\"), weight 93 kg (205 lb 0.4 oz), SpO2 97%.    Physical Exam  Neurological Exam    Gen: NAD  Neuro:  --HIF: A&O X 1, repetition and naming intact  --CN:  PERRLA, EOMI, VFF, no visible facial asymmetry, facial sensation intact, no tongue or palatal deviation, SCM intact  --Motor: Moves all 4 extremities equally; no focal deficits  --Sensory: Intact to light touch  --Reflex: 2+ symmetric, toes down  --Cerebellum: Difficult to follow commands  --Gait: Deferred     Relevant Results        NIH Stroke Scale  1A. Level of Consciousness: Requires Repeated Stimulation to Arouse or Responds to Pain  1B. Ask Month and Age: 1 Question Right  1C. Blink Eyes & Squeeze Hands: Performs Both Tasks  2. Best Gaze: Normal (not able to perform task)  3. Visual: No Visual Loss (unable to perform task)  4. Facial Palsy: Minor Paralysis  5A. Motor - Left Arm: No Drift  5B. Motor - Right Arm: No Drift  6A. Motor - Left Leg: No Drift (patient had recent knee surgery)  6B. Motor - Right Leg: Some Effort Against Gravity  7. Limb Ataxia: Present in Two Limbs  8. Sensory Loss: Normal (patient did not answer question)  9. Best Language: No Aphasia  10. Dysarthria: Mild-to-Moderate Dysarthria  11. Extinction and Inattention: No Abnormality  NIH Stroke Scale: 9           Shana Coma Scale  Best Eye Response: To verbal stimuli  Best Verbal Response: Confused  Best Motor Response: Localizes pain  Shana Coma Scale Score: 12                              Assessment/Plan   This patient currently has cardiac " telemetry ordered; if you would like to modify or discontinue the telemetry order, click here to go to the orders activity to modify/discontinue the order.  Assessment & Plan  Acute kidney injury superimposed on CKD (CMS-HCC)    Metabolic encephalopathy    Colitis    Metabolic acidosis    Acute hypoxic respiratory failure (Multi)    Total knee replacement status, left    Sepsis with acute renal failure and septic shock (Multi)    Elevated LFTs    Moderate protein-calorie malnutrition (Multi)     Encephalopathy  - likely due to uremia  - modest improvement after HD  - will continue to follow; exam is nonfocal     2.  Myoclonus, secondary to uremia  - non observed today  - continue to monitor          Ponce Wylie MD

## 2024-12-07 NOTE — POST-PROCEDURE NOTE
Report to Receiving RN:    Report To: Lilibeth ARCHIBALD RN   Time Report Called: 2100  Hand-Off Communication: tolerated HD. No fluid removal   Complications During Treatment: No  Ultrafiltration Treatment: No  Medications Administered During Dialysis: No  Blood Products Administered During Dialysis: No  Labs Sent During Dialysis: Yes Hep B surface Ag/Ab  Heparin Drip Rate Changes: N/A  Dialysis Catheter Dressing: clean dry and intact   Last Dressing Change: 12/6/2024    Electronic Signatures:  AE (Signed KATIE Fernandez )       Last Updated: 9:06 PM by DELVIN REMY

## 2024-12-07 NOTE — NURSING NOTE
During my shift with patient patient orientation status improved from A&Ox1 to A&Ox2, more alert, interactive, follow commands with less delay.  Patient VSS and afebrile.  Mckeon catheter and FMS maintained.  Patient had significant less urine this shift, notified Hali NP.  Patient has corpak to left nare, tube feed advanced to 20ml/hr at this time per order.  Cdiff precautions maintained, patient receiving oral vanco per order.  Safety maintained, call light in reach, alarms set, POC continues.

## 2024-12-07 NOTE — CARE PLAN
Pt had neuro improvements toward the end of the shift. Pt more interactive, answers some questions, remains disoriented. Answers to name, answers pain assessment, mostly one word/ yes or no answers from pt. Pupils remained equal and reactive. VS stable. Remained NSR, maintained 2L NC. FMS changed at beginning of shift and remained intact/in place, no leakage. Scant output from both huynh and FMS during the shift. Dialysis completed around 2100, no complications. Corepak advanced per provider order by 10cm (by ICU nurse), advanced from 80 to 90cm. Repeat KUB done, provider confirmed okay to use for meds and tube feeds. Tube feed started, tolerating well. No insulin coverage needed, stable BG q4h. No complaints of pain during the night. Compliant with medications. Safety maintained.      Problem: Skin  Goal: Prevent/manage excess moisture  12/6/2024 2343 by Lilibeth Cobb RN  Flowsheets (Taken 12/6/2024 2343)  Prevent/manage excess moisture:   Cleanse incontinence/protect with barrier cream   Monitor for/manage infection if present   Moisturize dry skin     Problem: Skin  Goal: Prevent/minimize sheer/friction injuries  12/7/2024 0654 by Lilibeth Cobb RN  Outcome: Progressing     Problem: Skin  Goal: Promote/optimize nutrition  12/7/2024 0654 by Lilibeth Cobb RN  Outcome: Progressing     Problem: Skin  Goal: Promote skin healing  12/7/2024 0654 by Lilibeth Cobb RN  Outcome: Progressing     Problem: Diabetes  Goal: No changes in neurological exam by end of shift  Outcome: Progressing     Problem: Diabetes  Goal: Increase self care and/or family involovement by end of shift  Outcome: Progressing     Problem: Pain - Adult  Goal: Verbalizes/displays adequate comfort level or baseline comfort level  12/7/2024 0654 by Lilibeth Cobb RN  Outcome: Progressing     Problem: Discharge Planning  Goal: Discharge to home or other facility with appropriate resources  12/7/2024 0654 by Lilibeth Cobb RN  Outcome:  Progressing     Problem: Chronic Conditions and Co-morbidities  Goal: Patient's chronic conditions and co-morbidity symptoms are monitored and maintained or improved  12/7/2024 0654 by Lilibeth Cobb RN  Outcome: Progressing     Problem: Fall/Injury  Goal: Use assistive devices by end of the shift  Outcome: Progressing     Problem: Fall/Injury  Goal: Verbalize understanding of risk factor reduction measures to prevent injury from fall in the home  Outcome: Progressing     The clinical goals for the shift include Pt will have unchanged neuro checks during the shift.

## 2024-12-07 NOTE — PROGRESS NOTES
"Savita Snell \"FILOMENA\" is a 84 y.o. female on day 3 of admission presenting with No Principal Problem: There is no principal problem currently on the Problem List. Please update the Problem List and refresh..      Subjective   Interval events reviewed, tolerated HD     Medication Documentation Review Audit       Reviewed by Sanya Mederos (Technician) on 12/04/24 at 1436      Medication Order Taking? Sig Documenting Provider Last Dose Status   amLODIPine (Norvasc) 5 mg tablet [id number] Yes Take 1 tablet (5 mg) by mouth once daily. Anatoly Ngo MD 12/4/2024 Morning Active   apixaban (Eliquis) 2.5 mg tablet [id number] Yes Take 1 tablet (2.5 mg) by mouth 2 times a day. Anatoly Ngo MD 12/4/2024 Morning Active   cholecalciferol (Vitamin D3) 50 mcg (2,000 unit) capsule [id number] Yes Take 1 capsule (50 mcg) by mouth early in the morning.. Anatoly Ngo MD 12/4/2024 Morning Active   cyanocobalamin (Vitamin B-12) 1,000 mcg tablet [id number] Yes Take 1 tablet (1,000 mcg) by mouth once daily. Anatoly Ngo MD 12/4/2024 Morning Active   cyclobenzaprine (Flexeril) 10 mg tablet [id number] Yes Take 1 tablet (10 mg) by mouth 3 times a day as needed for muscle spasms. Jeannine Tejeda PA-C Past Week Active   dapagliflozin propanediol (Farxiga) 5 mg [id number] Yes Take 1 tablet (5 mg) by mouth once daily. Anatoly Ngo MD 12/4/2024 Morning Active   furosemide (Lasix) 20 mg tablet [id number] Yes Take 2 tablets (40 mg) by mouth once daily. Anatoly Ngo MD 12/4/2024 Morning Active   latanoprost (Xalatan) 0.005 % ophthalmic solution [id number] Yes Administer 1 drop into affected eye(s) daily at bedtime. Anatoly Ngo MD 12/3/2024 Bedtime Active   Lokelma 5 gram packet [id number] Yes Take 5 g by mouth once daily. Anatoly Ngo MD 12/4/2024 Morning Active   losartan (Cozaar) 25 mg tablet [id number] Yes Take 1 tablet (25 mg) by mouth twice a day. Anatoly Ngo MD 12/4/2024 Morning " Active   metoprolol tartrate (Lopressor) 25 mg tablet [id number] Yes Take 0.5 tablets (12.5 mg) by mouth 2 times a day. Historical Provider, MD 12/4/2024 Morning Active   ondansetron ODT (Zofran-ODT) 4 mg disintegrating tablet [id number] Yes Dissolve 1 tablet (4 mg) in the mouth every 8 hours if needed for nausea. Jeannine Tejeda PA-C Past Week Active   oxyCODONE (Roxicodone) 5 mg immediate release tablet [id number] Yes Take 1 tablet (5 mg) by mouth every 6 hours if needed for moderate pain (4 - 6). Jeannine Tejeda PA-C Past Week Active   pregabalin (Lyrica) 75 mg capsule [id number] Yes Take 1 capsule (75 mg) by mouth 3 times a day. Historical Provider, MD 12/4/2024 Morning Active   SITagliptin phosphate (Januvia) 25 mg tablet [id number] Yes Take 1 tablet (25 mg) by mouth once daily. Historical Provider, MD 12/4/2024 Morning Active                    Objective     Last Recorded Vitals  /53 (BP Location: Right arm, Patient Position: Lying)   Pulse 90   Temp 36.8 °C (98.2 °F) (Temporal)   Resp 17   Wt 93 kg (205 lb 0.4 oz)   SpO2 97%   Intake/Output last 3 Shifts:    Intake/Output Summary (Last 24 hours) at 12/7/2024 0940  Last data filed at 12/7/2024 0350  Gross per 24 hour   Intake 1673.33 ml   Output 675 ml   Net 998.33 ml       Admission Weight  Weight: 90.7 kg (200 lb) (12/04/24 1135)    Daily Weight  12/07/24 : 93 kg (205 lb 0.4 oz)    Image Results  XR abdomen 1 view  Narrative: Interpreted By:  Wilberto Feliciano,   STUDY:  XR ABDOMEN 1 VIEW;  12/6/2024 10:31 pm      INDICATION:  Signs/Symptoms:corpak placement.      COMPARISON:  12/6/2024      ACCESSION NUMBER(S):  [id number]      ORDERING CLINICIAN:  ROHINI CASTRO      FINDINGS:  Corpak tube loops in the right abdomen and tip terminates at level of  the left lateral mid abdomen. There is nonspecific bowel gas pattern.  Limited also free abdominal air on supine radiograph. Mild basilar  atelectasis.      Impression: Corpak tube tip terminates at the level  of the lateral left mid  abdomen.      MACRO:  None      Signed by: Wilberto Feliciano 12/7/2024 1:27 AM  Dictation workstation:   [id number]      Physical Exam    Neck: supple  Lung: no wheeze  Cv: rrs1s2  exT: no c/c    Relevant Results      Results for orders placed or performed during the hospital encounter of 12/04/24 (from the past 24 hours)   POCT GLUCOSE   Result Value Ref Range    POCT Glucose 158 (H) 74 - 99 mg/dL   POCT GLUCOSE   Result Value Ref Range    POCT Glucose 105 (H) 74 - 99 mg/dL   POCT GLUCOSE   Result Value Ref Range    POCT Glucose 111 (H) 74 - 99 mg/dL   POCT GLUCOSE   Result Value Ref Range    POCT Glucose 116 (H) 74 - 99 mg/dL   CBC   Result Value Ref Range    WBC 9.5 4.4 - 11.3 x10*3/uL    nRBC 0.0 0.0 - 0.0 /100 WBCs    RBC 3.11 (L) 4.00 - 5.20 x10*6/uL    Hemoglobin 9.0 (L) 12.0 - 16.0 g/dL    Hematocrit 30.4 (L) 36.0 - 46.0 %    MCV 98 80 - 100 fL    MCH 28.9 26.0 - 34.0 pg    MCHC 29.6 (L) 32.0 - 36.0 g/dL    RDW 14.6 (H) 11.5 - 14.5 %    Platelets 94 (L) 150 - 450 x10*3/uL   Comprehensive metabolic panel   Result Value Ref Range    Glucose 113 (H) 74 - 99 mg/dL    Sodium 140 136 - 145 mmol/L    Potassium 4.0 3.5 - 5.3 mmol/L    Chloride 102 98 - 107 mmol/L    Bicarbonate 26 21 - 32 mmol/L    Anion Gap 16 10 - 20 mmol/L    Urea Nitrogen 49 (H) 6 - 23 mg/dL    Creatinine 4.18 (H) 0.50 - 1.05 mg/dL    eGFR 10 (L) >60 mL/min/1.73m*2    Calcium 6.8 (L) 8.6 - 10.3 mg/dL    Albumin 2.5 (L) 3.4 - 5.0 g/dL    Alkaline Phosphatase 85 33 - 136 U/L    Total Protein 5.0 (L) 6.4 - 8.2 g/dL     (H) 9 - 39 U/L    Bilirubin, Total 0.7 0.0 - 1.2 mg/dL     (H) 7 - 45 U/L   Magnesium   Result Value Ref Range    Magnesium 1.84 1.60 - 2.40 mg/dL   Phosphorus   Result Value Ref Range    Phosphorus 4.3 2.5 - 4.9 mg/dL   Morphology   Result Value Ref Range    RBC Morphology See Below     Polychromasia Mild     Stomatocytes Few     Giant Platelets Few    POCT GLUCOSE   Result Value Ref Range    POCT  Glucose 120 (H) 74 - 99 mg/dL                Assessment/Plan   This patient currently has cardiac telemetry ordered; if you would like to modify or discontinue the telemetry order, click here to go to the orders activity to modify/discontinue the order.    Assessment  AIDAN oliguric (septic/ischemic ATN + contrast nephrotoxicity)  Cdiff colitis  H/o HTN  H/o CHF  Anemia  Transamintis    Plan  Tolerated HD yesterday with adequate solute clearance  No need for HD today  Next session on Monday   Monitor for renal recovery  Serial labs, strict I/Os       Prachi Urbina MD

## 2024-12-07 NOTE — PROGRESS NOTES
Savita Snell   12/6/24  [id number]  America Vanegas MD  This is a patient with a past medical history as detailed below admitted to the  ED from nursing home [facility], wound care nurse went in to complete patient dressing and patient was unresponsive and unable to move limbs. Patient last known normal was this morning at 9am when patient received morning medications. Patient is alert and oriented x3-4 at baseline per facility. Is on eliquis and has tremors due to history of parkinsons. Being sent to ED for eval.    She received 2 L of fluids and was given Zosyn initially by ED per sepsis protocol. Due to large amounts of large foul smelling brown stool, a C diff panel was done and was positive. Zosyn was stopped, and oral vancomycin and IV flagyl were started.     At bedside this morning, patient was laying in bed with no family present. Her blood pressure has been hypotensive (98/55) and is afebrile. She is oliguric today with urine output of 165 mL. She was disoriented, AOx1. She was unable to answer my questions other than saying “yes” when asked if she was in pain.  Assessment and plan   1-sepsis Tmax 39 admitted to the hospital obtain culture consult With infectious disease   CT showed colitis   -Start high-dose p.o. vancomycin 500 mg every 6 hours  -Start IV Flagyl 500 mg every 8 hours  -Follow-up C. difficile PCR  -Follow-up blood culture   2-acute renal failure could be related to ATN IV fluids has been given Mckeon catheter has been placed monitor urine output strict I's and O's and weight avoid any nephrotoxic agent consulted nephrology    - CT A/P showed renal atrophic changes  - Received 2 L of IV fluids and currently has Mckeon catheter placed   - Order spot urine/creatinine ratio   cmp  3-colitis per CT likely C. Difficile stool toxin for C. Difficile antibiotics per infectious disease recommendation  4-acute respiratory failure with hypoxia CT chest rule out pulmonary embolism  negative monitor pulse ox O2 support to keep pulse ox above 92%   5-elevated LFTs had a CT abdomen trend CMP if persist consider ultrasound right upper quadrant  6-change of mental status likely metabolic encephalopathy  CT brain CTA monitor neurostatus aspiration precaution .  7-status post left total knee replacement   8 -hyperglycemia last hemoglobin A1c    Discussed with the  consultants.      Review of other systems negative other than HPI  Past medical history    Epic& consultants notes reviewed  Most recent images Reviewed  Last EKG/Rhythm reviewed      Vital signs has been reviewed  Sleepy open eyes to stimuli some answer question by yes or no does not follow commands   SKIN:  No rashes .   ENT mucosa,  Normal/nose normal   NECK: no jugulovenous distention  NO lymphadenopathy   LUNGS: Diminished air entry bilateral  CARDIAC:  S1 and S2  ABDOMEN: Abdomen soft, non-tender.       PULSES: + pedal / radial                       Past Medical History  She has a past medical history of Afib (Multi), Arthritis, Balance problem, C. difficile colitis, Cataract, CKD (chronic kidney disease), stage III (Multi), COVID-19, Diabetic neuropathy (Multi), Endometrial cancer (Multi), Falls, Glaucoma, Heart failure with preserved ejection fraction, Hypertension, Hypokalemia, Obesity, Type 2 diabetes mellitus, and Venous ulcer (Multi).    Surgical History  She has a past surgical history that includes Cataract extraction; Joint replacement; Cholecystectomy; Hysterectomy; Other surgical history; Other surgical history (Bilateral); Appendectomy; Tonsillectomy; Colonoscopy; Upper gastrointestinal endoscopy; and Total knee arthroplasty (Left).     Social History  She reports that she has quit smoking. Her smoking use included cigarettes. She has never used smokeless tobacco. She reports that she does not currently use alcohol. She reports that she does not use drugs.    Family History  Family History   Problem Relation Name Age of  Onset    Heart failure Mother      Other (MI) Mother      Stroke Father      Hyperlipidemia Sister      Hypertension Sister        Case has been discussed with consultants /critical care team.  Pt seen and examined  cct 32 minutes I have  gone over labs  most recent EKG/Rhythm /images/X ray,case .D/W RN and consultants,the patient has a high probability of sudden and clinical significant deterioration which requires the highest level of care & intervene urgently.    Allergies  Penicillins, Cephalexin, and Sulfamethoxazole-trimethoprim       Last Recorded Vitals  /59 (BP Location: Right arm, Patient Position: Lying)   Pulse 92   Temp 37.3 °C (99.1 °F) (Temporal)   Resp 14   Wt 91.3 kg (201 lb 4.5 oz)   SpO2 98%     Relevant Results              America Vanegas MD

## 2024-12-08 VITALS
SYSTOLIC BLOOD PRESSURE: 126 MMHG | OXYGEN SATURATION: 93 % | DIASTOLIC BLOOD PRESSURE: 58 MMHG | RESPIRATION RATE: 19 BRPM | TEMPERATURE: 98.6 F | HEIGHT: 66 IN | BODY MASS INDEX: 32.56 KG/M2 | HEART RATE: 94 BPM | WEIGHT: 202.6 LBS

## 2024-12-08 LAB
ALBUMIN SERPL BCP-MCNC: 2.5 G/DL (ref 3.4–5)
ALP SERPL-CCNC: 112 U/L (ref 33–136)
ALT SERPL W P-5'-P-CCNC: 141 U/L (ref 7–45)
ANION GAP SERPL CALC-SCNC: 15 MMOL/L (ref 10–20)
AST SERPL W P-5'-P-CCNC: 108 U/L (ref 9–39)
BACTERIA BLD CULT: NORMAL
BACTERIA BLD CULT: NORMAL
BILIRUB SERPL-MCNC: 0.6 MG/DL (ref 0–1.2)
BUN SERPL-MCNC: 66 MG/DL (ref 6–23)
CALCIUM SERPL-MCNC: 7 MG/DL (ref 8.6–10.3)
CHLORIDE SERPL-SCNC: 103 MMOL/L (ref 98–107)
CO2 SERPL-SCNC: 26 MMOL/L (ref 21–32)
CREAT SERPL-MCNC: 5.47 MG/DL (ref 0.5–1.05)
EGFRCR SERPLBLD CKD-EPI 2021: 7 ML/MIN/1.73M*2
ERYTHROCYTE [DISTWIDTH] IN BLOOD BY AUTOMATED COUNT: 14.3 % (ref 11.5–14.5)
GLUCOSE BLD MANUAL STRIP-MCNC: 144 MG/DL (ref 74–99)
GLUCOSE BLD MANUAL STRIP-MCNC: 164 MG/DL (ref 74–99)
GLUCOSE BLD MANUAL STRIP-MCNC: 173 MG/DL (ref 74–99)
GLUCOSE BLD MANUAL STRIP-MCNC: 183 MG/DL (ref 74–99)
GLUCOSE BLD MANUAL STRIP-MCNC: 191 MG/DL (ref 74–99)
GLUCOSE BLD MANUAL STRIP-MCNC: 193 MG/DL (ref 74–99)
GLUCOSE SERPL-MCNC: 195 MG/DL (ref 74–99)
HCT VFR BLD AUTO: 29.9 % (ref 36–46)
HGB BLD-MCNC: 9 G/DL (ref 12–16)
MAGNESIUM SERPL-MCNC: 1.94 MG/DL (ref 1.6–2.4)
MCH RBC QN AUTO: 29.2 PG (ref 26–34)
MCHC RBC AUTO-ENTMCNC: 30.1 G/DL (ref 32–36)
MCV RBC AUTO: 97 FL (ref 80–100)
NRBC BLD-RTO: 0 /100 WBCS (ref 0–0)
PHOSPHATE SERPL-MCNC: 3.8 MG/DL (ref 2.5–4.9)
PLATELET # BLD AUTO: 86 X10*3/UL (ref 150–450)
POTASSIUM SERPL-SCNC: 3.7 MMOL/L (ref 3.5–5.3)
PROT SERPL-MCNC: 5.1 G/DL (ref 6.4–8.2)
RBC # BLD AUTO: 3.08 X10*6/UL (ref 4–5.2)
SODIUM SERPL-SCNC: 140 MMOL/L (ref 136–145)
WBC # BLD AUTO: 10.2 X10*3/UL (ref 4.4–11.3)

## 2024-12-08 PROCEDURE — 80053 COMPREHEN METABOLIC PANEL: CPT | Performed by: NURSE PRACTITIONER

## 2024-12-08 PROCEDURE — 2500000004 HC RX 250 GENERAL PHARMACY W/ HCPCS (ALT 636 FOR OP/ED)

## 2024-12-08 PROCEDURE — 36415 COLL VENOUS BLD VENIPUNCTURE: CPT | Performed by: NURSE PRACTITIONER

## 2024-12-08 PROCEDURE — 85027 COMPLETE CBC AUTOMATED: CPT | Performed by: NURSE PRACTITIONER

## 2024-12-08 PROCEDURE — 2500000002 HC RX 250 W HCPCS SELF ADMINISTERED DRUGS (ALT 637 FOR MEDICARE OP, ALT 636 FOR OP/ED)

## 2024-12-08 PROCEDURE — 83735 ASSAY OF MAGNESIUM: CPT

## 2024-12-08 PROCEDURE — 2060000001 HC INTERMEDIATE ICU ROOM DAILY

## 2024-12-08 PROCEDURE — 82947 ASSAY GLUCOSE BLOOD QUANT: CPT

## 2024-12-08 PROCEDURE — 2500000004 HC RX 250 GENERAL PHARMACY W/ HCPCS (ALT 636 FOR OP/ED): Performed by: INTERNAL MEDICINE

## 2024-12-08 PROCEDURE — 99232 SBSQ HOSP IP/OBS MODERATE 35: CPT | Performed by: PSYCHIATRY & NEUROLOGY

## 2024-12-08 PROCEDURE — 84100 ASSAY OF PHOSPHORUS: CPT

## 2024-12-08 PROCEDURE — 2500000005 HC RX 250 GENERAL PHARMACY W/O HCPCS: Performed by: NURSE PRACTITIONER

## 2024-12-08 RX ORDER — FUROSEMIDE 10 MG/ML
100 INJECTION INTRAMUSCULAR; INTRAVENOUS ONCE
Status: COMPLETED | OUTPATIENT
Start: 2024-12-08 | End: 2024-12-08

## 2024-12-08 ASSESSMENT — COGNITIVE AND FUNCTIONAL STATUS - GENERAL
DAILY ACTIVITIY SCORE: 6
DRESSING REGULAR LOWER BODY CLOTHING: TOTAL
MOVING TO AND FROM BED TO CHAIR: TOTAL
STANDING UP FROM CHAIR USING ARMS: TOTAL
MOVING FROM LYING ON BACK TO SITTING ON SIDE OF FLAT BED WITH BEDRAILS: TOTAL
WALKING IN HOSPITAL ROOM: TOTAL
TOILETING: TOTAL
CLIMB 3 TO 5 STEPS WITH RAILING: TOTAL
HELP NEEDED FOR BATHING: TOTAL
MOBILITY SCORE: 6
TURNING FROM BACK TO SIDE WHILE IN FLAT BAD: TOTAL
DRESSING REGULAR UPPER BODY CLOTHING: TOTAL
EATING MEALS: TOTAL
PERSONAL GROOMING: TOTAL

## 2024-12-08 ASSESSMENT — PAIN SCALES - PAIN ASSESSMENT IN ADVANCED DEMENTIA (PAINAD)
TOTALSCORE: 0
BODYLANGUAGE: RELAXED
BREATHING: NORMAL
CONSOLABILITY: NO NEED TO CONSOLE
FACIALEXPRESSION: SMILING OR INEXPRESSIVE
CONSOLABILITY: NO NEED TO CONSOLE
BODYLANGUAGE: RELAXED
BREATHING: NORMAL
TOTALSCORE: 0
BREATHING: NORMAL
FACIALEXPRESSION: SMILING OR INEXPRESSIVE
CONSOLABILITY: NO NEED TO CONSOLE
TOTALSCORE: 0
BODYLANGUAGE: RELAXED
FACIALEXPRESSION: SMILING OR INEXPRESSIVE

## 2024-12-08 ASSESSMENT — PAIN SCALES - GENERAL
PAINLEVEL_OUTOF10: 0 - NO PAIN

## 2024-12-08 ASSESSMENT — PAIN - FUNCTIONAL ASSESSMENT
PAIN_FUNCTIONAL_ASSESSMENT: 0-10

## 2024-12-08 NOTE — NURSING NOTE
Assisting primary RN with med administration of vancomycin per MD order.     Patient safety mantained

## 2024-12-08 NOTE — CARE PLAN
The clinical goals for the shift include patient will remain HDS throughout shift. The patient met this goal     S: Patient admitted 12/4 for AMS, AIDAN  B: History of: afib on eliquis, CHF, CAD, HTN, HLD, CKD, T2DM   A: Patient A&Ox1 - just to person. Remains in NSR and on 3 L NC. Medicated per orders throughout shift. Corpak remains in place with continuous tube feed running at goal rate. Mckeon intact with minimal output.   R: Patient to have dialysis Monday

## 2024-12-08 NOTE — CARE PLAN
The patient's goals for the shift include      The clinical goals for the shift include patient will remain HDS throughout shift    Problem: Skin  Goal: Decreased wound size/increased tissue granulation at next dressing change  Outcome: Progressing  Goal: Participates in plan/prevention/treatment measures  Outcome: Progressing  Goal: Prevent/manage excess moisture  Outcome: Progressing  Goal: Promote/optimize nutrition  Outcome: Progressing  Goal: Promote skin healing  Outcome: Progressing     Problem: Diabetes  Goal: Achieve decreasing blood glucose levels by end of shift  Outcome: Progressing  Goal: Increase stability of blood glucose readings by end of shift  Outcome: Progressing  Goal: Decrease in ketones present in urine by end of shift  Outcome: Progressing  Goal: Maintain electrolyte levels within acceptable range throughout shift  Outcome: Progressing  Goal: No changes in neurological exam by end of shift  Outcome: Progressing  Goal: Learn about and adhere to nutrition recommendations by end of shift  Outcome: Progressing  Goal: Increase self care and/or family involovement by end of shift  Outcome: Progressing  Goal: Receive DSME education by end of shift  Outcome: Progressing     Problem: Discharge Planning  Goal: Discharge to home or other facility with appropriate resources  Outcome: Progressing     Problem: Chronic Conditions and Co-morbidities  Goal: Patient's chronic conditions and co-morbidity symptoms are monitored and maintained or improved  Outcome: Progressing     Problem: Fall/Injury  Goal: Verbalize understanding of risk factor reduction measures to prevent injury from fall in the home  Outcome: Progressing  Goal: Use assistive devices by end of the shift  Outcome: Progressing  Goal: Pace activities to prevent fatigue by end of the shift  Outcome: Progressing   Pt knew her name and where she was.  Neuro checks were good all day.  Tolerated Q2 hour turns.  MRI ordered.  Sheet completed with son.   Not done today.  MRI will likely be done tomorrow.  Pt appeared comfortable today

## 2024-12-08 NOTE — PROGRESS NOTES
"Savita Snell \"FILOMENA\" is a 84 y.o. female on day 4 of admission presenting with No Principal Problem: There is no principal problem currently on the Problem List. Please update the Problem List and refresh..      Subjective   Interval events reviewed, NAD     Medication Documentation Review Audit       Reviewed by Sanya Mederos (Technician) on 12/04/24 at 1436      Medication Order Taking? Sig Documenting Provider Last Dose Status   amLODIPine (Norvasc) 5 mg tablet [id number] Yes Take 1 tablet (5 mg) by mouth once daily. Anatoly Ngo MD 12/4/2024 Morning Active   apixaban (Eliquis) 2.5 mg tablet [id number] Yes Take 1 tablet (2.5 mg) by mouth 2 times a day. Anatoly Ngo MD 12/4/2024 Morning Active   cholecalciferol (Vitamin D3) 50 mcg (2,000 unit) capsule [id number] Yes Take 1 capsule (50 mcg) by mouth early in the morning.. Anatoly Ngo MD 12/4/2024 Morning Active   cyanocobalamin (Vitamin B-12) 1,000 mcg tablet [id number] Yes Take 1 tablet (1,000 mcg) by mouth once daily. Anatoly Ngo MD 12/4/2024 Morning Active   cyclobenzaprine (Flexeril) 10 mg tablet [id number] Yes Take 1 tablet (10 mg) by mouth 3 times a day as needed for muscle spasms. Jeannine Tejeda PA-C Past Week Active   dapagliflozin propanediol (Farxiga) 5 mg [id number] Yes Take 1 tablet (5 mg) by mouth once daily. Anatoly Ngo MD 12/4/2024 Morning Active   furosemide (Lasix) 20 mg tablet [id number] Yes Take 2 tablets (40 mg) by mouth once daily. Anatoly Ngo MD 12/4/2024 Morning Active   latanoprost (Xalatan) 0.005 % ophthalmic solution [id number] Yes Administer 1 drop into affected eye(s) daily at bedtime. Anatoly Ngo MD 12/3/2024 Bedtime Active   Lokelma 5 gram packet [id number] Yes Take 5 g by mouth once daily. Anatoly Ngo MD 12/4/2024 Morning Active   losartan (Cozaar) 25 mg tablet [id number] Yes Take 1 tablet (25 mg) by mouth twice a day. Anatoly Provider, MD 12/4/2024 Morning Active "   metoprolol tartrate (Lopressor) 25 mg tablet [id number] Yes Take 0.5 tablets (12.5 mg) by mouth 2 times a day. Historical Provider, MD 12/4/2024 Morning Active   ondansetron ODT (Zofran-ODT) 4 mg disintegrating tablet [id number] Yes Dissolve 1 tablet (4 mg) in the mouth every 8 hours if needed for nausea. Jeannine Tejeda PA-C Past Week Active   oxyCODONE (Roxicodone) 5 mg immediate release tablet [id number] Yes Take 1 tablet (5 mg) by mouth every 6 hours if needed for moderate pain (4 - 6). Jeannine Tejeda PA-C Past Week Active   pregabalin (Lyrica) 75 mg capsule [id number] Yes Take 1 capsule (75 mg) by mouth 3 times a day. Historical Provider, MD 12/4/2024 Morning Active   SITagliptin phosphate (Januvia) 25 mg tablet [id number] Yes Take 1 tablet (25 mg) by mouth once daily. Historical Provider, MD 12/4/2024 Morning Active                    Objective     Last Recorded Vitals  /62 (BP Location: Right arm, Patient Position: Lying)   Pulse 94   Temp 36.4 °C (97.5 °F) (Temporal)   Resp 16   Wt 91.9 kg (202 lb 9.6 oz)   SpO2 98%   Intake/Output last 3 Shifts:    Intake/Output Summary (Last 24 hours) at 12/8/2024 0934  Last data filed at 12/8/2024 0600  Gross per 24 hour   Intake 890 ml   Output 357.5 ml   Net 532.5 ml       Admission Weight  Weight: 90.7 kg (200 lb) (12/04/24 1135)    Daily Weight  12/08/24 : 91.9 kg (202 lb 9.6 oz)    Image Results  XR abdomen 1 view  Narrative: Interpreted By:  Wilberto Feliciano,   STUDY:  XR ABDOMEN 1 VIEW;  12/6/2024 10:31 pm      INDICATION:  Signs/Symptoms:corpak placement.      COMPARISON:  12/6/2024      ACCESSION NUMBER(S):  [id number]      ORDERING CLINICIAN:  ROHINI CASTRO      FINDINGS:  Corpak tube loops in the right abdomen and tip terminates at level of  the left lateral mid abdomen. There is nonspecific bowel gas pattern.  Limited also free abdominal air on supine radiograph. Mild basilar  atelectasis.      Impression: Corpak tube tip terminates at the level of the  lateral left mid  abdomen.      MACRO:  None      Signed by: Wilberto Feliciano 12/7/2024 1:27 AM  Dictation workstation:   [id number]      Physical Exam    Neck: supple  Lung: no wheeze  Cv: rrs1s2  exT: noc/c    Relevant Results      Results for orders placed or performed during the hospital encounter of 12/04/24 (from the past 24 hours)   POCT GLUCOSE   Result Value Ref Range    POCT Glucose 111 (H) 74 - 99 mg/dL   POCT GLUCOSE   Result Value Ref Range    POCT Glucose 117 (H) 74 - 99 mg/dL   POCT GLUCOSE   Result Value Ref Range    POCT Glucose 149 (H) 74 - 99 mg/dL   POCT GLUCOSE   Result Value Ref Range    POCT Glucose 164 (H) 74 - 99 mg/dL   POCT GLUCOSE   Result Value Ref Range    POCT Glucose 193 (H) 74 - 99 mg/dL   CBC   Result Value Ref Range    WBC 10.2 4.4 - 11.3 x10*3/uL    nRBC 0.0 0.0 - 0.0 /100 WBCs    RBC 3.08 (L) 4.00 - 5.20 x10*6/uL    Hemoglobin 9.0 (L) 12.0 - 16.0 g/dL    Hematocrit 29.9 (L) 36.0 - 46.0 %    MCV 97 80 - 100 fL    MCH 29.2 26.0 - 34.0 pg    MCHC 30.1 (L) 32.0 - 36.0 g/dL    RDW 14.3 11.5 - 14.5 %    Platelets 86 (L) 150 - 450 x10*3/uL   Comprehensive metabolic panel   Result Value Ref Range    Glucose 195 (H) 74 - 99 mg/dL    Sodium 140 136 - 145 mmol/L    Potassium 3.7 3.5 - 5.3 mmol/L    Chloride 103 98 - 107 mmol/L    Bicarbonate 26 21 - 32 mmol/L    Anion Gap 15 10 - 20 mmol/L    Urea Nitrogen 66 (H) 6 - 23 mg/dL    Creatinine 5.47 (H) 0.50 - 1.05 mg/dL    eGFR 7 (L) >60 mL/min/1.73m*2    Calcium 7.0 (L) 8.6 - 10.3 mg/dL    Albumin 2.5 (L) 3.4 - 5.0 g/dL    Alkaline Phosphatase 112 33 - 136 U/L    Total Protein 5.1 (L) 6.4 - 8.2 g/dL     (H) 9 - 39 U/L    Bilirubin, Total 0.6 0.0 - 1.2 mg/dL     (H) 7 - 45 U/L   Magnesium   Result Value Ref Range    Magnesium 1.94 1.60 - 2.40 mg/dL   Phosphorus   Result Value Ref Range    Phosphorus 3.8 2.5 - 4.9 mg/dL   POCT GLUCOSE   Result Value Ref Range    POCT Glucose 191 (H) 74 - 99 mg/dL                Assessment/Plan   This  patient currently has cardiac telemetry ordered; if you would like to modify or discontinue the telemetry order, click here to go to the orders activity to modify/discontinue the order.    Assessment  AIDAN oliguric (septic/ischemic ATN + contrast nephrotoxicity)  Cdiff colitis  H/o HTN  H/o CHF  Anemia  Transamintis     Plan  Lasix challenge today  HD tomorrow  Monitoring for renal recovery  Serial labs, strict I/Os       Prachi Urbina MD

## 2024-12-08 NOTE — PROGRESS NOTES
"Savita Snell \"FILOMENA\" is a 84 y.o. female on day 4 of admission presenting with No Principal Problem: There is no principal problem currently on the Problem List. Please update the Problem List and refresh..      Subjective   No overnight events       Objective     Last Recorded Vitals  Blood pressure 138/88, pulse 90, temperature 36.3 °C (97.3 °F), temperature source Temporal, resp. rate 14, height 1.676 m (5' 6\"), weight 91.9 kg (202 lb 9.6 oz), SpO2 96%.    Physical Exam  Neurological Exam    Gen: NAD  Neuro:  --HIF: A&O X 2 (not oriented to time), repetition and naming intact  --CN:  PERRLA, EOMI, VFF, no visible facial asymmetry, facial sensation intact, no tongue or palatal deviation, SCM intact  --Motor: Moves all 4 extremities equally; no focal deficits  --Sensory: Intact to light touch  --Reflex: 2+ symmetric, toes down  --Cerebellum: Difficult to follow commands  --Gait: Deferred     Relevant Results        NIH Stroke Scale  1A. Level of Consciousness: Alert, Keenly Responsive  1B. Ask Month and Age: 1 Question Right  1C. Blink Eyes & Squeeze Hands: Performs Both Tasks  2. Best Gaze: Normal  3. Visual: No Visual Loss  4. Facial Palsy: Normal Symmetrical Movements  5A. Motor - Left Arm: No Drift  5B. Motor - Right Arm: No Drift  6A. Motor - Left Leg: No Effort Against Gravity  6B. Motor - Right Leg: Drift  7. Limb Ataxia: Present in One Limb  8. Sensory Loss: Normal  9. Best Language: No Aphasia  10. Dysarthria: Normal  11. Extinction and Inattention: No Abnormality  NIH Stroke Scale: 6           Shana Coma Scale  Best Eye Response: Spontaneous  Best Verbal Response: Confused  Best Motor Response: Follows commands  Shana Coma Scale Score: 14                              Assessment/Plan   This patient currently has cardiac telemetry ordered; if you would like to modify or discontinue the telemetry order, click here to go to the orders activity to modify/discontinue the order.  Assessment & " Plan  Acute kidney injury superimposed on CKD (CMS-HCC)    Metabolic encephalopathy    Colitis    Metabolic acidosis    Acute hypoxic respiratory failure (Multi)    Total knee replacement status, left    Sepsis with acute renal failure and septic shock (Multi)    Elevated LFTs    Moderate protein-calorie malnutrition (Multi)     Encephalopathy  - likely due to uremia  - modest improvement still but quite far from baseline  - given that she is not quite back to baseline, will obtain MRI Brain to r/o intracranial process       2.  Myoclonus, secondary to uremia  - resolved  - continue to monitor            Ponce Wylie MD

## 2024-12-08 NOTE — PROGRESS NOTES
Savita Snell   24  [id number]  America Vanegas MD  This is a patient with a past medical history as detailed below admitted to the  ED from nursing home [facility], wound care nurse went in to complete patient dressing and patient was unresponsive and unable to move limbs. Patient last known normal was this morning at 9am when patient received morning medications. Patient is alert and oriented x3-4 at baseline per facility. Is on eliquis and has tremors due to history of parkinsons. Being sent to ED for eval. patient is sleepy arousable open eyes and answer some questions by yes or no she moves extremities however she does not follow commands  There is a history of diarrhea  Patient condition deteriorated in the CCU where she became more lethargic her kidney function continued to deteriorate nephrology service was consulted she was initiated on hemodialysis yesterday Corpak tube has been placed for nutrition neurology service has been consulted repeat CT brain images has been done the patient mental status after session of hemodialysis is improved  Assessment and plan   1-sepsis Tmax 39 admitted to the hospital obtain culture consult With infectious disease   CT showed colitis C. difficile toxin came back positive  started on vancomycin and Flagyl   Now on  -Vancomycin pulse taper course as the followin mg 4 times daily for 1 week followed by 125 mg 3 times daily for 1 week followed by 125 mg twice daily for 1 week followed by 125 mg daily for 1 week followed by 125 mg every other day for 1 week.   2-acute renal failure could be related to ATN IV fluids has been given Mckeon catheter has been placed monitor urine output strict I's and O's and weight avoid any nephrotoxic agent consulted nephrology monitor electrolytes   As above condition deteriorated nephrology service was consulted  Dialysis catheter placed  Initiated on hemodialysis  3- acute respiratory failure with hypoxia CT chest  rule out pulmonary embolism negative monitor pulse ox O2 support to keep pulse ox above 92%   4-elevated LFTs had a CT abdomen trend CMP    6-change of mental status likely metabolic encephalopathy  CT brain CTA monitor neurostatus aspiration precaution .  As above had a rapid response repeat images was done overall slowly improving  7-status post left total knee replacement   8 -hyperglycemia last hemoglobin A1c October of this year was 5.9 will repeat  Discussed with the consultants.      Review of other systems negative other than HPI  Past medical history    Epic& consultants notes reviewed  Most recent images Reviewed  Last EKG/Rhythm reviewed      Vital signs has been reviewed  Sleepy open eyes to stimuli some answer question by yes or no does not follow commands   SKIN:  No rashes .   ENT mucosa,  Normal/nose normal   NECK: no jugulovenous distention  NO lymphadenopathy   LUNGS: Diminished air entry bilateral  CARDIAC:  S1 and S2  ABDOMEN: Abdomen soft, non-tender.    EXTREMITIES: Extremities normal.   NEURO: Open eyes follow simple commands neuro Limited but seem to be nonfocal  PULSES: + pedal / radial   No edema                      Past Medical History  She has a past medical history of Afib (Multi), Arthritis, Balance problem, C. difficile colitis, Cataract, CKD (chronic kidney disease), stage III (Multi), COVID-19, Diabetic neuropathy (Multi), Endometrial cancer (Multi), Falls, Glaucoma, Heart failure with preserved ejection fraction, Hypertension, Hypokalemia, Obesity, Type 2 diabetes mellitus, and Venous ulcer (Multi).    Surgical History  She has a past surgical history that includes Cataract extraction; Joint replacement; Cholecystectomy; Hysterectomy; Other surgical history; Other surgical history (Bilateral); Appendectomy; Tonsillectomy; Colonoscopy; Upper gastrointestinal endoscopy; and Total knee arthroplasty (Left).     Social History  She reports that she has quit smoking. Her smoking use  included cigarettes. She has never used smokeless tobacco. She reports that she does not currently use alcohol. She reports that she does not use drugs.  Case has been discussed with consultants /critical care team.  Pt seen and examined  cct 32 minutes I have  gone over labs  most recent EKG/Rhythm /images/X ray,case .D/W RN and consultants,the patient has a high probability of sudden and clinical significant deterioration which requires the highest level of care & intervene urgently.    Family History  Family History   Problem Relation Name Age of Onset    Heart failure Mother      Other (MI) Mother      Stroke Father      Hyperlipidemia Sister      Hypertension Sister               America Vanegas MD

## 2024-12-09 ENCOUNTER — APPOINTMENT (OUTPATIENT)
Dept: RADIOLOGY | Facility: HOSPITAL | Age: 84
DRG: 871 | End: 2024-12-09
Payer: MEDICARE

## 2024-12-09 ENCOUNTER — APPOINTMENT (OUTPATIENT)
Dept: CARDIOLOGY | Facility: HOSPITAL | Age: 84
DRG: 871 | End: 2024-12-09
Payer: MEDICARE

## 2024-12-09 ENCOUNTER — APPOINTMENT (OUTPATIENT)
Dept: DIALYSIS | Facility: HOSPITAL | Age: 84
End: 2024-12-09
Payer: MEDICARE

## 2024-12-09 ENCOUNTER — APPOINTMENT (OUTPATIENT)
Dept: ORTHOPEDIC SURGERY | Facility: CLINIC | Age: 84
End: 2024-12-09
Payer: MEDICARE

## 2024-12-09 LAB
ALBUMIN SERPL BCP-MCNC: 2.5 G/DL (ref 3.4–5)
ALP SERPL-CCNC: 118 U/L (ref 33–136)
ALT SERPL W P-5'-P-CCNC: 104 U/L (ref 7–45)
ANION GAP SERPL CALC-SCNC: 14 MMOL/L (ref 10–20)
AST SERPL W P-5'-P-CCNC: 78 U/L (ref 9–39)
BILIRUB SERPL-MCNC: 0.5 MG/DL (ref 0–1.2)
BUN SERPL-MCNC: 83 MG/DL (ref 6–23)
CALCIUM SERPL-MCNC: 7.2 MG/DL (ref 8.6–10.3)
CHLORIDE SERPL-SCNC: 103 MMOL/L (ref 98–107)
CO2 SERPL-SCNC: 26 MMOL/L (ref 21–32)
CREAT SERPL-MCNC: 6.44 MG/DL (ref 0.5–1.05)
EGFRCR SERPLBLD CKD-EPI 2021: 6 ML/MIN/1.73M*2
ERYTHROCYTE [DISTWIDTH] IN BLOOD BY AUTOMATED COUNT: 14.1 % (ref 11.5–14.5)
ERYTHROCYTE [DISTWIDTH] IN BLOOD BY AUTOMATED COUNT: 14.1 % (ref 11.5–14.5)
GLUCOSE BLD MANUAL STRIP-MCNC: 111 MG/DL (ref 74–99)
GLUCOSE BLD MANUAL STRIP-MCNC: 127 MG/DL (ref 74–99)
GLUCOSE BLD MANUAL STRIP-MCNC: 137 MG/DL (ref 74–99)
GLUCOSE BLD MANUAL STRIP-MCNC: 156 MG/DL (ref 74–99)
GLUCOSE BLD MANUAL STRIP-MCNC: 170 MG/DL (ref 74–99)
GLUCOSE BLD MANUAL STRIP-MCNC: 175 MG/DL (ref 74–99)
GLUCOSE SERPL-MCNC: 173 MG/DL (ref 74–99)
HCT VFR BLD AUTO: 29.5 % (ref 36–46)
HCT VFR BLD AUTO: 32.4 % (ref 36–46)
HGB BLD-MCNC: 8.7 G/DL (ref 12–16)
HGB BLD-MCNC: 9.7 G/DL (ref 12–16)
MAGNESIUM SERPL-MCNC: 1.99 MG/DL (ref 1.6–2.4)
MCH RBC QN AUTO: 28.7 PG (ref 26–34)
MCH RBC QN AUTO: 29 PG (ref 26–34)
MCHC RBC AUTO-ENTMCNC: 29.5 G/DL (ref 32–36)
MCHC RBC AUTO-ENTMCNC: 29.9 G/DL (ref 32–36)
MCV RBC AUTO: 97 FL (ref 80–100)
MCV RBC AUTO: 97 FL (ref 80–100)
NRBC BLD-RTO: 0 /100 WBCS (ref 0–0)
NRBC BLD-RTO: 0 /100 WBCS (ref 0–0)
PHOSPHATE SERPL-MCNC: 2.7 MG/DL (ref 2.5–4.9)
PLATELET # BLD AUTO: 89 X10*3/UL (ref 150–450)
PLATELET # BLD AUTO: 97 X10*3/UL (ref 150–450)
POTASSIUM SERPL-SCNC: 3.3 MMOL/L (ref 3.5–5.3)
PROT SERPL-MCNC: 5 G/DL (ref 6.4–8.2)
RBC # BLD AUTO: 3.03 X10*6/UL (ref 4–5.2)
RBC # BLD AUTO: 3.35 X10*6/UL (ref 4–5.2)
SODIUM SERPL-SCNC: 140 MMOL/L (ref 136–145)
WBC # BLD AUTO: 10.8 X10*3/UL (ref 4.4–11.3)
WBC # BLD AUTO: 11.4 X10*3/UL (ref 4.4–11.3)

## 2024-12-09 PROCEDURE — 80048 BASIC METABOLIC PNL TOTAL CA: CPT | Mod: CCI

## 2024-12-09 PROCEDURE — 85027 COMPLETE CBC AUTOMATED: CPT

## 2024-12-09 PROCEDURE — 80053 COMPREHEN METABOLIC PANEL: CPT | Performed by: NURSE PRACTITIONER

## 2024-12-09 PROCEDURE — 2500000001 HC RX 250 WO HCPCS SELF ADMINISTERED DRUGS (ALT 637 FOR MEDICARE OP): Performed by: NURSE PRACTITIONER

## 2024-12-09 PROCEDURE — 85027 COMPLETE CBC AUTOMATED: CPT | Performed by: NURSE PRACTITIONER

## 2024-12-09 PROCEDURE — 2500000004 HC RX 250 GENERAL PHARMACY W/ HCPCS (ALT 636 FOR OP/ED)

## 2024-12-09 PROCEDURE — 2500000004 HC RX 250 GENERAL PHARMACY W/ HCPCS (ALT 636 FOR OP/ED): Performed by: INTERNAL MEDICINE

## 2024-12-09 PROCEDURE — 84439 ASSAY OF FREE THYROXINE: CPT

## 2024-12-09 PROCEDURE — 92526 ORAL FUNCTION THERAPY: CPT | Mod: GN | Performed by: STUDENT IN AN ORGANIZED HEALTH CARE EDUCATION/TRAINING PROGRAM

## 2024-12-09 PROCEDURE — 83735 ASSAY OF MAGNESIUM: CPT

## 2024-12-09 PROCEDURE — 36415 COLL VENOUS BLD VENIPUNCTURE: CPT

## 2024-12-09 PROCEDURE — 84100 ASSAY OF PHOSPHORUS: CPT

## 2024-12-09 PROCEDURE — 74018 RADEX ABDOMEN 1 VIEW: CPT

## 2024-12-09 PROCEDURE — 84443 ASSAY THYROID STIM HORMONE: CPT

## 2024-12-09 PROCEDURE — 99232 SBSQ HOSP IP/OBS MODERATE 35: CPT | Performed by: NURSE PRACTITIONER

## 2024-12-09 PROCEDURE — 82947 ASSAY GLUCOSE BLOOD QUANT: CPT

## 2024-12-09 PROCEDURE — 74018 RADEX ABDOMEN 1 VIEW: CPT | Performed by: RADIOLOGY

## 2024-12-09 PROCEDURE — 8010000001 HC DIALYSIS - HEMODIALYSIS PER DAY

## 2024-12-09 PROCEDURE — 2500000005 HC RX 250 GENERAL PHARMACY W/O HCPCS: Performed by: NURSE PRACTITIONER

## 2024-12-09 PROCEDURE — 93005 ELECTROCARDIOGRAM TRACING: CPT

## 2024-12-09 PROCEDURE — 2060000001 HC INTERMEDIATE ICU ROOM DAILY

## 2024-12-09 PROCEDURE — 2500000004 HC RX 250 GENERAL PHARMACY W/ HCPCS (ALT 636 FOR OP/ED): Performed by: NURSE PRACTITIONER

## 2024-12-09 PROCEDURE — 70551 MRI BRAIN STEM W/O DYE: CPT | Performed by: RADIOLOGY

## 2024-12-09 PROCEDURE — 36415 COLL VENOUS BLD VENIPUNCTURE: CPT | Performed by: NURSE PRACTITIONER

## 2024-12-09 PROCEDURE — 70551 MRI BRAIN STEM W/O DYE: CPT

## 2024-12-09 PROCEDURE — 84484 ASSAY OF TROPONIN QUANT: CPT

## 2024-12-09 PROCEDURE — 2500000002 HC RX 250 W HCPCS SELF ADMINISTERED DRUGS (ALT 637 FOR MEDICARE OP, ALT 636 FOR OP/ED)

## 2024-12-09 RX ORDER — METOPROLOL TARTRATE 1 MG/ML
5 INJECTION, SOLUTION INTRAVENOUS
Status: DISCONTINUED | OUTPATIENT
Start: 2024-12-09 | End: 2024-12-16 | Stop reason: HOSPADM

## 2024-12-09 RX ORDER — VANCOMYCIN HCL 50 MG/ML
125 SOLUTION, RECONSTITUTED, ORAL ORAL DAILY
Status: DISCONTINUED | OUTPATIENT
Start: 2024-12-27 | End: 2024-12-11

## 2024-12-09 RX ORDER — POTASSIUM CHLORIDE 1.5 G/1.58G
40 POWDER, FOR SOLUTION ORAL ONCE
Status: COMPLETED | OUTPATIENT
Start: 2024-12-09 | End: 2024-12-09

## 2024-12-09 RX ORDER — METOPROLOL TARTRATE 1 MG/ML
5 INJECTION, SOLUTION INTRAVENOUS ONCE
Status: COMPLETED | OUTPATIENT
Start: 2024-12-09 | End: 2024-12-09

## 2024-12-09 RX ORDER — VANCOMYCIN HCL 50 MG/ML
125 SOLUTION, RECONSTITUTED, ORAL ORAL 2 TIMES DAILY
Status: DISCONTINUED | OUTPATIENT
Start: 2024-12-19 | End: 2024-12-11

## 2024-12-09 RX ORDER — VANCOMYCIN HCL 50 MG/ML
125 SOLUTION, RECONSTITUTED, ORAL ORAL EVERY 6 HOURS
Status: DISCONTINUED | OUTPATIENT
Start: 2024-12-10 | End: 2024-12-11

## 2024-12-09 RX ORDER — VANCOMYCIN HCL 50 MG/ML
125 SOLUTION, RECONSTITUTED, ORAL ORAL EVERY OTHER DAY
Status: DISCONTINUED | OUTPATIENT
Start: 2025-01-03 | End: 2024-12-11

## 2024-12-09 RX ORDER — VANCOMYCIN HCL 50 MG/ML
125 SOLUTION, RECONSTITUTED, ORAL ORAL 3 TIMES DAILY
Status: DISCONTINUED | OUTPATIENT
Start: 2024-12-12 | End: 2024-12-11

## 2024-12-09 ASSESSMENT — COGNITIVE AND FUNCTIONAL STATUS - GENERAL
DAILY ACTIVITIY SCORE: 9
DRESSING REGULAR UPPER BODY CLOTHING: A LOT
EATING MEALS: A LOT
TOILETING: TOTAL
PERSONAL GROOMING: TOTAL
TURNING FROM BACK TO SIDE WHILE IN FLAT BAD: A LOT
MOVING TO AND FROM BED TO CHAIR: TOTAL
WALKING IN HOSPITAL ROOM: TOTAL
STANDING UP FROM CHAIR USING ARMS: TOTAL
DRESSING REGULAR LOWER BODY CLOTHING: A LOT
HELP NEEDED FOR BATHING: TOTAL
CLIMB 3 TO 5 STEPS WITH RAILING: TOTAL
MOVING FROM LYING ON BACK TO SITTING ON SIDE OF FLAT BED WITH BEDRAILS: A LOT
MOBILITY SCORE: 8

## 2024-12-09 ASSESSMENT — PAIN - FUNCTIONAL ASSESSMENT
PAIN_FUNCTIONAL_ASSESSMENT: 0-10

## 2024-12-09 ASSESSMENT — PAIN SCALES - GENERAL
PAINLEVEL_OUTOF10: 0 - NO PAIN

## 2024-12-09 NOTE — PROGRESS NOTES
"Speech-Language Pathology    SLP Adult Inpatient  Speech-Language Pathology Treatment     Patient Name: Savita Snell \"FILOMENA\"  MRN: [id number]  Today's Date: 2024  Time Calculation  Start Time: 1216  Stop Time: 1225  Time Calculation (min): 9 min         Current Problem:   1. Sepsis with encephalopathy without septic shock, due to unspecified organism (Multi)          Assessment:  Pt was seen for dysphagia management this date. Pt was seen while having dialysis after cleared by RN. The patient was awake and alert to person and place. She was observed with thin liquids via spoon and straw, puree and solid consistencies with no overt s/s of aspiration. The patient appears favorable to resume and oral diet as medically indicated. This information has been communicated with the NP and dietician to incorporate an oral diet in conjunction with tube feeds. ST will continue to follow.    SLP Assessment  SLP TX Intervention Outcome: Making Progress Towards Goals  SLP Assessment Results: Other (Comment) (Swallowing deficits)  Prognosis: Good  Treatment Tolerance: Patient tolerated treatment well  Medical Staff Made Aware: Yes  Strengths: Motivation  Barriers: Comorbidities  Education Provided: Yes    Recommendations:  Solid Diet Recommendations: Regular (IDDSI Level 7)   Liquid Diet Recommendations: Thin (IDDSI Level 0)   Sit upright 90 degrees for all PO  Remain upright 20-30 minutes after meals  Feed / Eat at a slow rate  Small Bite/Sip  Medications one at a time  Diligent and regular oral Care   Medicine Administration: Per medical team. If oral medications are appropriate, recommend whole with puree carrier    Interventions:  Therapeutic Swallow Intervention : Compensatory Strategies, PO Trials, Caregiver Education      Baseline Assessment:  Oxygen: 3L via NC    Subjective:  Patient was alert to person,  and place. She was able very cooperative for the session.     Plan: Skilled speech therapy for dysphagia " treatment is warranted in order to provide training and instruction regarding the use of compensatory swallow strategies, oropharyngeal strengthening exercises, and pt/caregiver education in order to reduce risk of aspiration, dehydration and malnutrition.  Plan  Inpatient/Swing Bed or Outpatient: Inpatient  Treatment/Interventions: Dysphagia treatment  SLP TX Plan: Continue Plan of Care  SLP Plan: Skilled SLP  SLP Frequency: 2x per week  Duration: 2 weeks  SLP Discharge Recommendations: Continue skilled SLP services at the next level of care  Next Treatment Priority: Diet noreen v MBSS  Discussed POC: Patient, Caregiver/family  Discussed Risks/Benefits: Yes  Patient/Caregiver Agreeable: Yes     Goals (established 12/5/24):  Patient will complete ongoing assessment at bedside to determine PO diet advancement vs need for further assessment (I.e., MBSS).  Progress: Pt tolerated trials of thin liquid via spoon and straw, puree and regular solids absent of overt s/s of aspiration.     2. Patient/Caregiver will demonstrate knowledge of taught compensatory strategies and dietary consistency recommendations to optimize functional swallow function without overt clinical signs and symptoms of aspiration or dysphagia. NEW GOAL, est 12/9/24    Pain:  Pain Assessment  Pain Assessment: 0-10  0-10 (Numeric) Pain Score: 0 - No pain     Education:  Learner:  Patient  Barriers to Learning: Acuteness of illness  Method: Verbal  Education - Topic: ST provided patient education regarding role of ST, purpose of assessment, clinical impressions, goals of treatment, and plan of care. Patient verbalized full comprehension, consistent with cognitive status. Education will be reinforced. ST further coordinated with RN regarding recommendations and precautions per this assessment, with RN verbalizing understanding.  Outcome:  Verbalized understanding and agreement, Needs review/reinforcement

## 2024-12-09 NOTE — PROGRESS NOTES
"Nutrition Follow Up Assessment:   Nutrition Assessment         Nutrition Note:  Savita Snell \"FILOMENA\" is a 84 y.o. female presenting 12/4 from SNF with altered mental status. Pt found to have watery diarrhea and evidence of colitis on CT abdomen and pelvis; + severe C. difficile infection; ID involved in pt care.  PT too with   oliguric prerenal AIDAN vs. Ischemic ATN likely 2/2 dehydration and C. Diff diarrhea plus contrast induced nephrotoxicity on CKD IV with plan for permacath access and to start HD 12/6. NGT in place for medications--plan to exchange out for a corpak today or 12/7. Pt has remained too lethargic for ST intervention.     12/9/2024 Follow up: Chart reviewed and events noted. Right chest tunneled line placed 12/6 and HD started 12/6 with another treatment 12/9. Dobbhoff placed as well and EN initiated 12/6 and EN at ordered goal 40mL/hr since 12/7.   Pt more alert 12/9 and passed ST eval for regular/thin liquids; pt will need assistance feeding however. FMS remains in place with daily output 100-200mL noted.     Past Medical History  [facility] stay 11/26-11/27/2024 S/p left TKR 11/26/2024   has a past medical history of Afib (Multi), Arthritis, Balance problem, C. difficile colitis, Cataract, CKD (chronic kidney disease), stage III (Multi), COVID-19, Diabetic neuropathy (Multi), Endometrial cancer (Multi), Falls, Glaucoma, Heart failure with preserved ejection fraction, Hypertension, Hypokalemia, Obesity, Type 2 diabetes mellitus, and Venous ulcer (Multi).  Surgical History   has a past surgical history that includes Cataract extraction; Joint replacement; Cholecystectomy; Hysterectomy; Other surgical history; Other surgical history (Bilateral); Appendectomy; Tonsillectomy; Colonoscopy; Upper gastrointestinal endoscopy; and Total knee arthroplasty (Left).       Nutrition History:  Energy Intake: Fair 50-75 %, Poor < 50 %  Food and Nutrient History: 12/6: [facility] from [facility]--there only ~7 days. Met with pt at bedside--pt opened eyes to her name and smiled. When asked pt how she was eating at the [facility], pt wrinkled her nose; when asked about food allergies, pt nodded no.  Pt fell asleep again during questioning. Diet at [facility] as regular/no concentrated sweets.  Vitamin/Herbal Supplement Use: home meds include januvia, lokelma, lasix, D3, B12, farxiga  Food Allergies/Intolerances:  None  GI Symptoms: Diarrhea  Oral Problems:  passed ST 12/9 for regular/thin liquids.        Anthropometrics:  Height: 167.6 cm (5' 6\")   Weight: 90.8 kg (200 lb 2.8 oz)   BMI (Calculated): 32.32   Admit weight measured 87.9kg; 12/6 with rehydration 91.3kg   IBW 59.1kg       Weight History:   10/2024: 90.1kg  8/2024: 89kg  7/2024: 90.4kg  4/2024: 92.7kg  Weight Change %:   0-10 (Numeric) Pain Score: 0 - No pain  PAINAD Score:  [0]      Nutrition Focused Physical Exam Findings:    Subcutaneous Fat Loss:   Orbital Fat Pads: Mild-Moderate (slight dark circles and slight hollowing)  Buccal Fat Pads: Mild-Moderate (flat cheeks, minimal bounce)  Triceps: Defer  Ribs: Defer  Muscle Wasting:  Temporalis: Mild-Moderate (slight depression)  Pectoralis (Clavicular Region): Mild-Moderate (some protrusion of clavicle)  Deltoid/Trapezius: Well nourished (rounded appearance at arm, shoulder, neck)  Interosseous: Well nourished (muscle bulges)  Trapezius/Infraspinatus/Supraspinatus (Scapular Region): Defer  Quadriceps: Defer  Gastrocnemius: Defer  Edema:  Edema Location: nonpitting to BLE  Physical Findings:  Skin: Positive (pale; pt with left TKR incision and generalized bruising.)    Nutrition Significant Labs:  BMP Trend:   Results from last 7 days   Lab Units 12/09/24  0707 12/08/24  0504 12/07/24  0502 12/06/24  0523   GLUCOSE mg/dL 173* 195* 113* 143*   CALCIUM mg/dL 7.2* 7.0* 6.8* 6.6*   SODIUM mmol/L 140 140 140 143   POTASSIUM mmol/L 3.3* 3.7 4.0 3.9   CO2 mmol/L 26 26 26 23   CHLORIDE mmol/L 103 103 102 106   BUN " "mg/dL 83* 66* 49* 102*   CREATININE mg/dL 6.44* 5.47* 4.18* 6.65*    , A1C:  Lab Results   Component Value Date    HGBA1C 6.1 (H) 10/17/2024   , BG POCT trend:   Results from last 7 days   Lab Units 12/09/24  1152 12/09/24  0808 12/09/24  0403 12/09/24  0028 12/08/24  1944   POCT GLUCOSE mg/dL 127* 170* 175* 156* 173*    , Liver Function Trend:   Results from last 7 days   Lab Units 12/09/24  0707 12/08/24  0504 12/07/24  0502 12/06/24  0523   ALK PHOS U/L 118 112 85 69   AST U/L 78* 108* 176* 267*   ALT U/L 104* 141* 195* 242*   BILIRUBIN TOTAL mg/dL 0.5 0.6 0.7 0.7    , Vit D:   Lab Results   Component Value Date    VITD25 39 12/05/2024    , Vit B12: No results found for: \"AMJDQQDI78\" , Folate: No results found for: \"FOLATE\"     Nutrition Specific Medications:  Scheduled medications  [Held by provider] amLODIPine, 5 mg, oral, Daily  [Held by provider] apixaban, 2.5 mg, oral, BID  [Held by provider] dapagliflozin propanediol, 5 mg, oral, Daily  [Held by provider] furosemide, 40 mg, oral, Daily  heparin, 5,000 Units, subcutaneous, q8h  insulin lispro, 0-10 Units, subcutaneous, q4h  latanoprost, 1 drop, Both Eyes, Nightly  lidocaine, 1 patch, transdermal, Daily  [Held by provider] losartan, 25 mg, oral, BID  [Held by provider] metoprolol tartrate, 12.5 mg, oral, BID  pantoprazole, 40 mg, intravenous, Daily  [Held by provider] pregabalin, 75 mg, oral, Daily  [Held by provider] SITagliptin phosphate, 25 mg, oral, Daily  [Held by provider] sodium zirconium cyclosilicate, 5 g, oral, Daily  vancomycin, 125 mg, nasoduodenal tube, q6h   Followed by  [START ON 12/12/2024] vancomycin, 125 mg, nasoduodenal tube, TID   Followed by  [START ON 12/19/2024] vancomycin, 125 mg, nasogastric tube, BID   Followed by  [START ON 12/27/2024] vancomycin, 125 mg, nasogastric tube, Daily   Followed by  [START ON 1/3/2025] vancomycin, 125 mg, nasogastric tube, Every other day      Continuous medications     PRN medications  PRN medications: " [Held by provider] acetaminophen **OR** [Held by provider] acetaminophen **OR** [Held by provider] acetaminophen, [Held by provider] cyclobenzaprine, dextrose, dextrose, glucagon, glucagon, [Held by provider] oxyCODONE, oxygen     I/O:   Last BM Date: 12/08/24; Stool Appearance: Liquid (12/09/24 0600)    Dietary Orders (From admission, onward)       Start     Ordered    12/09/24 1338  Enteral feeding WITH diet order Diet type: Consistent Carb, Renal; Carb diet selection: CCD 60 gm/meal; Potassium restriction: Potassium Restricted 2 gm (50mEq); Sodium restriction: 2 - 3 grams Sodium; Tube feeding formula: Nepro Carbsteady; start...  Continuous        Comments: Please assist with feeding.   Question Answer Comment   Diet type Consistent Carb    Diet type Renal    Carb diet selection: CCD 60 gm/meal    Potassium restriction: Potassium Restricted 2 gm (50mEq)    Sodium restriction: 2 - 3 grams Sodium    Tube feeding formula: Nepro Carbsteady    Feeding route: NG (nasogastric tube)    Tube feeding cyclic (start / stop time): start 2000hrs, stop 0800hrs    Tube feeding cyclic rate (mL/hr): 45    Tube feeding flush (mL): 100    Flush type: Water    Water type: Tap water    Flush frequency: Every 6 hours        12/09/24 1339    12/04/24 1635  May Participate in Room Service With Assistance  ( ROOM SERVICE MAY PARTICIPATE WITH ASSISTANCE)  Once        Question:  .  Answer:  Yes    12/04/24 1634                     Estimated Needs:   Total Energy Estimated Needs (kCal):  (1800-2100kcal (20-23kcal/kg of 91.3kg))     Total Protein Estimated Needs (g):  (83-118g pro (1.4-2g pro/kg IBW of 59.1kg))     Total Fluid Estimated Needs (mL):  (1mL/kcal/d or as per physician)           Nutrition Diagnosis   Malnutrition Diagnosis  Patient has Malnutrition Diagnosis: Yes  Diagnosis Status: Ongoing  Malnutrition Diagnosis: Moderate malnutrition related to acute disease or injury  As Evidenced by: infection, current NPO status due to  altered mental status compliated by <75% of energy intakes compared to estimated nutrient needs >7 days with mild muscle and fat wasting. (12/9: Passed ST 12/9 for regular foods/thin liquids)  Additional Assessment Information: 12/9: Spoke with RN, NP, and . BLANCA pending for dobbhoff placement verification; to currently keep tube in and feed pt EN at night x12 hours in an attempt to encourage oral intakes during the day.            Nutrition Interventions/Recommendations         Nutrition Prescription:  Individualized Nutrition Prescription Provided for : EN + FOOD        Nutrition Interventions:   Interventions: Meals and snacks, Enteral intake  Meals and Snacks: Carbohydrate-modified diet, Mineral-modified diet  Goal: consistent 60g CHO/renal diet  Enteral Intake: Modify schedule of enteral nutrition  Goal: Suggest Nepro 45mL/hr x 12 hours (8P-8A) with 100mL water 4x/day to provide 972kcal, 44g pro, and 792mL water (formula + flush) for 50% nutrient needs in an attempt to encourage oral intakes during the day.    Collaboration and Referral of Nutrition Care: Collaboration by nutrition professional with other providers  Goal: ks, RN Leeanna, ST [facility], BALDEMAR Haywood    Nutrition Education:   12/9: Pt c/o thirst; on HD and ST pending at the time.        Nutrition Monitoring and Evaluation   Food/Nutrient Related History Monitoring  Monitoring and Evaluation Plan: Energy intake  Energy Intake: Estimated energy intake  Criteria: PO >75% of estimated nutrient needs; EN meeting ~50%. Goal to transition off tube feeds as long as oral intakes >75% of estimated nutrient needs.    Body Composition/Growth/Weight History  Monitoring and Evaluation Plan: Weight  Weight: Measured weight  Criteria: daily weight    Biochemical Data, Medical Tests and Procedures  Monitoring and Evaluation Plan: Electrolyte/renal panel  Electrolyte and Renal Panel: Magnesium, Phosphorus, Potassium, Sodium  Criteria: lytes WNR              Time  Spent (min): 45 minutes

## 2024-12-09 NOTE — PROGRESS NOTES
"Speech-Language Pathology                 Therapy Communication Note    Patient Name: Savita Snell \"FILOMENA\"  MRN: [id number]  Department: [facility] MRI  Room: 2104/2104-A  Today's Date: 12/9/2024     Discipline: Speech Language Pathology    Missed Visit Reason:  Unavailable    Missed Time: Attempt    Comment:  Attempted dysphagia treatment this date. Transport arrived to take the patient for an MRI. Will attempt again as schedule permits.           "

## 2024-12-09 NOTE — PROGRESS NOTES
"Savita Snell \"FILOMENA\" is a 84 y.o. female on day 5 of admission presenting with No Principal Problem: There is no principal problem currently on the Problem List. Please update the Problem List and refresh..      Subjective   Pt alert this morning.    No concerns voiced.    Objective     Last Recorded Vitals  Blood pressure 143/62, pulse 85, temperature 36.7 °C (98.1 °F), temperature source Temporal, resp. rate 15, height 1.676 m (5' 6\"), weight 90.8 kg (200 lb 2.8 oz), SpO2 98%.    Physical Exam  Eyes:      Extraocular Movements: Extraocular movements intact.      Pupils: Pupils are equal, round, and reactive to light.   Neurological:      Motor: Motor strength is normal.  Psychiatric:         Speech: Speech normal.       Neurological Exam  Mental Status  Awake, alert and oriented to person, place and time. Recent and remote memory are intact. Speech is normal. Language is fluent with no aphasia. Attention and concentration are normal.  Repetition and naming intact..    Cranial Nerves  CN III, IV, VI: Extraocular movements intact bilaterally. Pupils equal round and reactive to light bilaterally.  CN V: Facial sensation is normal.  CN VII: Full and symmetric facial movement.  CN IX, X: Palate elevates symmetrically  CN XII: Tongue midline without atrophy or fasciculations.  Blinks to bilateral visual threat.    Motor   Strength is 5/5 throughout all four extremities.    Sensory  Light touch is normal in upper and lower extremities.     Coordination  Right: Finger-to-nose normal.Left: Finger-to-nose normal.    Relevant Results  Scheduled medications  [Held by provider] amLODIPine, 5 mg, oral, Daily  [Held by provider] apixaban, 2.5 mg, oral, BID  [Held by provider] dapagliflozin propanediol, 5 mg, oral, Daily  [Held by provider] furosemide, 40 mg, oral, Daily  heparin, 5,000 Units, subcutaneous, q8h  insulin lispro, 0-10 Units, subcutaneous, q4h  latanoprost, 1 drop, Both Eyes, Nightly  lidocaine, 1 patch, " transdermal, Daily  [Held by provider] losartan, 25 mg, oral, BID  [Held by provider] metoprolol tartrate, 12.5 mg, oral, BID  pantoprazole, 40 mg, intravenous, Daily  [Held by provider] pregabalin, 75 mg, oral, Daily  [Held by provider] SITagliptin phosphate, 25 mg, oral, Daily  [Held by provider] sodium zirconium cyclosilicate, 5 g, oral, Daily  vancomycin, 125 mg, nasoduodenal tube, q6h   Followed by  [START ON 12/12/2024] vancomycin, 125 mg, nasoduodenal tube, TID   Followed by  [START ON 12/19/2024] vancomycin, 125 mg, nasogastric tube, BID   Followed by  [START ON 12/27/2024] vancomycin, 125 mg, nasogastric tube, Daily   Followed by  [START ON 1/3/2025] vancomycin, 125 mg, nasogastric tube, Every other day      Continuous medications     PRN medications  PRN medications: [Held by provider] acetaminophen **OR** [Held by provider] acetaminophen **OR** [Held by provider] acetaminophen, [Held by provider] cyclobenzaprine, dextrose, dextrose, glucagon, glucagon, [Held by provider] oxyCODONE, oxygen  Results for orders placed or performed during the hospital encounter of 12/04/24 (from the past 24 hours)   POCT GLUCOSE   Result Value Ref Range    POCT Glucose 183 (H) 74 - 99 mg/dL   POCT GLUCOSE   Result Value Ref Range    POCT Glucose 144 (H) 74 - 99 mg/dL   POCT GLUCOSE   Result Value Ref Range    POCT Glucose 173 (H) 74 - 99 mg/dL   POCT GLUCOSE   Result Value Ref Range    POCT Glucose 156 (H) 74 - 99 mg/dL   POCT GLUCOSE   Result Value Ref Range    POCT Glucose 175 (H) 74 - 99 mg/dL   Comprehensive metabolic panel   Result Value Ref Range    Glucose 173 (H) 74 - 99 mg/dL    Sodium 140 136 - 145 mmol/L    Potassium 3.3 (L) 3.5 - 5.3 mmol/L    Chloride 103 98 - 107 mmol/L    Bicarbonate 26 21 - 32 mmol/L    Anion Gap 14 10 - 20 mmol/L    Urea Nitrogen 83 (H) 6 - 23 mg/dL    Creatinine 6.44 (H) 0.50 - 1.05 mg/dL    eGFR 6 (L) >60 mL/min/1.73m*2    Calcium 7.2 (L) 8.6 - 10.3 mg/dL    Albumin 2.5 (L) 3.4 - 5.0 g/dL     Alkaline Phosphatase 118 33 - 136 U/L    Total Protein 5.0 (L) 6.4 - 8.2 g/dL    AST 78 (H) 9 - 39 U/L    Bilirubin, Total 0.5 0.0 - 1.2 mg/dL     (H) 7 - 45 U/L   Magnesium   Result Value Ref Range    Magnesium 1.99 1.60 - 2.40 mg/dL   Phosphorus   Result Value Ref Range    Phosphorus 2.7 2.5 - 4.9 mg/dL   CBC   Result Value Ref Range    WBC 10.8 4.4 - 11.3 x10*3/uL    nRBC 0.0 0.0 - 0.0 /100 WBCs    RBC 3.03 (L) 4.00 - 5.20 x10*6/uL    Hemoglobin 8.7 (L) 12.0 - 16.0 g/dL    Hematocrit 29.5 (L) 36.0 - 46.0 %    MCV 97 80 - 100 fL    MCH 28.7 26.0 - 34.0 pg    MCHC 29.5 (L) 32.0 - 36.0 g/dL    RDW 14.1 11.5 - 14.5 %    Platelets 89 (L) 150 - 450 x10*3/uL   POCT GLUCOSE   Result Value Ref Range    POCT Glucose 170 (H) 74 - 99 mg/dL     XR abdomen 1 view    Addendum Date: 12/9/2024    Interpreted By:  Beka Lobato, ADDENDUM: Feeding tube tip probably in the distal (4th) portion of the duodenum.   Signed by: Beka Lobato 12/9/2024 10:59 AM   -------- ORIGINAL REPORT -------- Dictation workstation:   [id number]    Result Date: 12/9/2024  Interpreted By:  Wilberto Feliciano, STUDY: XR ABDOMEN 1 VIEW;  12/6/2024 10:31 pm   INDICATION: Signs/Symptoms:corpak placement.   COMPARISON: 12/6/2024   ACCESSION NUMBER(S): [id number]   ORDERING CLINICIAN: ROHINI CASTRO   FINDINGS: Corpak tube loops in the right abdomen and tip terminates at level of the left lateral mid abdomen. There is nonspecific bowel gas pattern. Limited also free abdominal air on supine radiograph. Mild basilar atelectasis.       Corpak tube tip terminates at the level of the lateral left mid abdomen.   MACRO: None   Signed by: Wilberto Feliciano 12/7/2024 1:27 AM Dictation workstation:   [id number]    MR brain wo IV contrast    Result Date: 12/9/2024  Interpreted By:  Gen Gibson, STUDY: MR BRAIN WO IV CONTRAST;  12/9/2024 9:49 am   INDICATION: Signs/Symptoms: Encephalopathy.     COMPARISON: CT head dated 12/06/2024.   ACCESSION NUMBER(S): [id number]    ORDERING CLINICIAN: DESHAUN GRULLON   TECHNIQUE: Standard multiplanar multisequence MR imaging was performed through the brain without intravenous contrast. Axial T2, FLAIR, DWI, gradient echo T2 and sagittal and coronal T1 weighted images of brain were acquired.   FINDINGS: Parenchyma: There is no diffusion restriction abnormality to suggest acute infarct.  No evidence of recent hemorrhage. There is no mass effect or midline shift. Mild-to-moderate burden of small scattered T2/FLAIR white matter hyperintensities throughout the bilateral cerebral hemispheres, typical of chronic small vessel ischemic change. Tiny remote bilateral basal ganglia lacunar infarcts.   CSF Spaces: The ventricles, sulci and basal cisterns are within normal limits for age with moderate, relatively generalized brain atrophy. Basilar cisterns are patent.   Extra-axial spaces: No extra-axial fluid collection.   Paranasal Sinuses: Visualized paranasal sinuses are clear.   Mastoids: Fluid within the right mastoid. Clear on the left.   Orbits: Bilateral native lens extractions.   Calvarium: No suspicious osseous marrow signal.       No acute infarct, recent hemorrhage, or intracranial mass effect.   Mild-to-moderate chronic small vessel ischemic change with remote bilateral basal ganglia lacunar infarcts.   Moderate generalized brain atrophy.   Right mastoid effusion.   MACRO: None   Signed by: Gen Gibson 12/9/2024 10:35 AM Dictation workstation:   [id number]    IR CVC tunneled    Result Date: 12/9/2024  Interpreted By:  Alvaro Rodrigues, STUDY: IR CVC TUNNELED;  12/6/2024 3:43 pm   INDICATION: Signs/Symptoms:Permcath placement to initiate dialysis.   COMPARISON: None.   ACCESSION NUMBER(S): [id number]   ORDERING CLINICIAN: ANA MARION   TECHNIQUE: INTERVENTIONALIST(S): Calixto Rodrigues MD   CONSENT: The patient/patient's POA/next of kin was informed of the nature of the proposed procedure. The purposes, alternatives, risks, and  benefits were explained and discussed. All questions were answered and consent was obtained.   RADIATION EXPOSURE: Fluoroscopy time: 0.1 min Dose: 6.7 mGy Dose Area Product (DAP): 1733 mGy*cm^2   SEDATION: None   MEDICATION: None.   TIME OUT: A time out was performed immediately prior to procedure start with the interventional team, correctly identifying the patient name, date of birth, MRN, procedure, anatomy (including marking of site and side), patient position, procedure consent form, relevant laboratory and imaging test results, antibiotic administration, safety precautions, and procedure-specific equipment needs.   COMPLICATIONS: No immediate adverse events identified.   FINDINGS: In the recumbent position, the patient was positioned on the angiography table. The right supraclavicular and infraclavicular cutaneous tissues were prepared and draped in usual sterile manner.   The supraclavicular access site was screened with gray-scale ultrasound with subsequent subcutaneous instillation of Lidocaine 1% local anesthesia. Ultrasound images demonstrate a patent right internal jugular vein. Under direct ultrasound guidance and Seldinger/micropuncture technique, the right internal jugular vein was accessed. An ultrasound digital spot image was acquired and stored on the  PACS.   After confirmation of location, a 018 [place]-Mandril guidewire was inserted to secure location. The guidewire was advanced into the inferior vena cava utilizing intermittent fluoroscopy. The micro-access needle was removed over the guidewire. Utilizing a 5-on-4 coaxial dilator sheath system, upsize to a 035 guidewire was performed. Subsequent access tract dilation was performed to an eventual -[place] peel-away sheath dilator system.   After Lidocaine 1% local anesthesia, a subcutaneous tunnel tract was created from the  right infraclavicular chest to the venous access site. After continuity of the tunnel tract and venous access site was  obtained, a  23 cm hemodialysis catheter was then placed with tract continuity and its central catheter tip(s) to reside at the cavoatrial junction. A fluoroscopic spot image of the chest was acquired in the AP projection to confirm optimal location.   The catheter ports were aspirated and flushed without resistance with normal saline. The catheter ports were then charged with high-concentration heparin. The venous access site was closed and sterilely dressed. The external portions of the catheter were secured with a purse-string suture and sterile dressings.   The patient tolerated the procedure without complication.       1. Uncomplicated placement of a tunneled right internal jugular infraclavicular 23 cm hemodialysis catheter. The catheter is ready for immediate use.   Performed and dictated at [facility].   MACRO: None.   Signed by: Alvaro Rodrigues 12/9/2024 8:36 AM Dictation workstation:   [id number]    XR abdomen 1 view    Result Date: 12/6/2024  Interpreted By:  Raul Rust, STUDY: XR ABDOMEN 1 VIEW;  12/6/2024 6:00 pm   INDICATION: Signs/Symptoms:Corpak placement.   COMPARISON: Abdominal x-rays 12/05/2024   ACCESSION NUMBER(S): [id number]   ORDERING CLINICIAN: VANCE PURI   FINDINGS: A Corpak feeding tube is noted terminating in the right mid abdomen likely coiled within the distal gastric antrum/proximal duodenum. Multiple overlying leads are present. Cholecystectomy clips noted in the right mid abdomen.   Distal pelvis is excluded in the field of view thereby limiting evaluation. Nonobstructive bowel gas pattern. Limited evaluation of pneumoperitoneum on supine imaging. No pneumatosis or portal venous gas.   Bibasilar atelectasis.   Multilevel degenerative changes of the spine.       1.  Corpak feeding tube projects over the right mid abdomen likely within the gastric antrum/proximal duodenum.   MACRO: None   Signed by: Raul Rust 12/6/2024 6:39 PM  Dictation workstation:   [id number]    CT brain attack head wo IV contrast    Result Date: 12/6/2024  Interpreted By:  Manny Schultz, STUDY: CT BRAIN ATTACK HEAD WO IV CONTRAST;  12/6/2024 9:45 am   INDICATION: Signs/Symptoms:change in pupil size.   COMPARISON: 12/04/2024   ACCESSION NUMBER(S): [id number]   ORDERING CLINICIAN: RUSSELL CARRENO   TECHNIQUE: Sequential trans axial images were obtained  .   FINDINGS: INTRACRANIAL:   There is mild prominence of the cortical sulci indicating atrophy.   There is mild ventriculomegaly, again consistent with atrophy.   Mild decreased attenuation of the periventricular and long tracks of the white matter most consistent with gliosis from arterial disease. There is no evidence of definite subacute infarction, intracranial hemorrhage or mass.     EXTRACRANIAL: Visualized paranasal sinuses and mastoids are clear. The calvarium is intact. Moderate atherosclerotic calcification at the carotid siphon.       Mild age related degenerative change as described without acute findings or significant change from the prior exam.   MACRO: Manny Schultz discussed the significance and urgency of this critical finding by secure chat with  RUSSELL CARRENO on 12/6/2024 at 9:59 am. (**-RCF-**) Findings:  See findings.   Signed by: Manny Schultz 12/6/2024 10:01 AM Dictation workstation:   [id number]    XR abdomen 1 view    Result Date: 12/5/2024  Interpreted By:  Brody Lobo, STUDY: XR ABDOMEN 1 VIEW;  12/5/2024 6:00 pm   INDICATION: Signs/Symptoms:NG tube placement.     COMPARISON: CT 12/04/2024   ACCESSION NUMBER(S): [id number]   ORDERING CLINICIAN: VANCE PURI   FINDINGS: Nonspecific nonobstructive bowel gas pattern. Limited evaluation of pneumoperitoneum on supine imaging, however no gross evidence of free air is noted.   NG tube tip and side port superimposed over the stomach.   Age-related degenerative osseous changes.       1.  NG tube tip and side port superimposed over the stomach.    MACRO: None   Signed by: Brody Lobo 12/5/2024 6:29 PM Dictation workstation:   [id number]    ECG 12 Lead    Result Date: 12/5/2024  Sinus tachycardia Nonspecific ST abnormality Borderline ECG When compared with ECG of 04-DEC-2024 11:29, (unconfirmed) ST no longer elevated in Inferior leads Nonspecific T wave abnormality has replaced inverted T waves in Inferior leads QT has shortened Confirmed by Gen Segura ([id number]) on 12/5/2024 1:41:34 PM    ECG 12 lead    Result Date: 12/5/2024  Probable Sinus tachycardia BASELINE ARTIFACT Defective ECG When compared with ECG of 08-OCT-2024 11:19, Significant changes have occurred Confirmed by Gen Segura ([id number]) on 12/5/2024 1:41:10 PM    Electrocardiogram, 12-lead PRN ACS symptoms    Result Date: 12/5/2024  Normal sinus rhythm Nonspecific T wave abnormality Abnormal ECG When compared with ECG of 04-DEC-2024 11:37, (unconfirmed) Questionable change in QRS axis T wave inversion no longer evident in Anterior leads    XR knee left 1-2 views    Result Date: 12/4/2024  STUDY: Knee Radiographs; 12/4/2024  13:27 PM INDICATION: Recent knee replacement. COMPARISON: XR Left Knee  11/26/2024. ACCESSION NUMBER(S): [id number] ORDERING CLINICIAN: RENETTA RADFORD TECHNIQUE:  Two view(s) of the left knee. FINDINGS: Left knee arthroplasty without evidence for hardware failure. No fracture. No dislocation. A true lateral was not performed. No significant joint effusion within this limitation. Osteopenia.    No acute finding. Signed by Sandor Mccracken MD    CT chest abdomen pelvis wo IV contrast    Result Date: 12/4/2024  STUDY: CT Chest, Abdomen, and Pelvis without IV Contrast; 12/4/2024 12:37 PM. INDICATION: Abdominal distension, bulging below the umbilicus, altered mental status and stool from urethra. COMPARISON: None Available. ACCESSION NUMBER(S): [id number] ORDERING CLINICIAN: RENETTA RADFORD TECHNIQUE: CT of the chest, abdomen, and pelvis was performed.  Contiguous axial  images were obtained at 3 mm slice thickness through the chest, abdomen, and pelvis.  Coronal and sagittal reconstructions at 3 mm slice thickness were performed.  No intravenous contrast was administered.  FINDINGS: Partially visualized chest showing cardiac enlargement.  No pericardial effusion.  Coronary artery calcifications.  Lung bases are clear. Abdomen: No acute abnormality of the stomach is identified. The liver of normal size and contour. No intrahepatic ductal dilatation is identified.  Gallbladder absent.  No acute abnormality of the pancreas.  Spleen of normal appearance.  Adrenal glands of normal appearance. Renal atrophic changes.  No acute renal process. No retroperitoneal adenopathy.  Atherosclerosis of the arterial vasculature.  No findings of abdominal aortic aneurysm. Diffuse thickening of the colon and mild adjacent inflammation. Similar thickening of the rectosigmoid colon.  No bowel obstruction. No free air.  No free fluid. The appendix is not definitively identified.  No acute abnormality within its expected location. Pelvis: No pelvic free fluid.  No inflammatory change or findings of free air. No findings of pelvic adenopathy.  Mckeon catheter within collapsed urinary bladder. Skeleton: No acute bony process is identified.    Diffuse thickening of the colon and mild adjacent inflammation. Similar thickening of the rectosigmoid colon. Findings consistent with colitis. No bowel obstruction. No free air. No free fluid. Cardiac enlargement. Coronary artery calcifications. Renal atrophic changes. No acute renal process. Status post cholecystectomy. Signed by Blaise Natarajan MD    CT brain attack angio head and neck W and WO IV contrast    Result Date: 12/4/2024  Interpreted By:  Starla Arias, STUDY: CT BRAIN ATTACK ANGIO HEAD AND NECK W AND WO IV CONTRAST;  12/4/2024 11:28 am   INDICATION: Signs/Symptoms:not moving arms/legs, not following commands.     COMPARISON: None.   ACCESSION NUMBER(S):  [id number]   ORDERING CLINICIAN: RACHELE MIGUEL   TECHNIQUE: 70 ML of Omnipaque 350 was administered intravenously and axial images of the head and neck were acquired.  Coronal, sagittal, and 3-D reconstructions were provided for review.   FINDINGS: The examination is degraded by patient motion artifact.   CTA HEAD FINDINGS:   Anterior circulation: The bilateral intracranial internal carotid arteries, bilateral carotid terminals, bilateral proximal anterior and middle cerebral arteries are patent. Atherosclerotic calcification along the cavernous segments of the carotid arteries bilaterally.   Posterior circulation: Bilateral intracranial vertebral arteries, vertebrobasilar junction, basilar artery and proximal posterior cerebral arteries are patent.   CTA NECK FINDINGS:   Right carotid vessels: The common carotid artery is patent. Calcified plaque at the carotid bifurcation without stenosis by NASCET criteria. The internal carotid artery in the neck is patent without significant stenosis. There is a retropharyngeal course of the carotid artery.   Left carotid vessels: The common carotid artery is patent. Calcified plaque at the carotid bifurcation without significant stenosis by NASCET criteria. The internal carotid artery in the neck is patent without significant stenosis.   Vertebral vessels:  The visualized segments of the cervical vertebral arteries are patent.         CTA neck:   No evidence for significant stenosis of the cervical vessels.   Calcified plaque at the carotid bifurcation without stenosis by NASCET criteria   CTA head:   No evidence for significant stenosis or large branch vessel cutoffs of the intracranial vessels.   MACRO: None   Signed by: Starla Arias 12/4/2024 11:35 AM Dictation workstation:   [id number]    CT brain attack head wo IV contrast    Result Date: 12/4/2024  Interpreted By:  Alvaro Dowell, STUDY: CT BRAIN ATTACK HEAD WO IV CONTRAST;  12/4/2024 11:20 am   INDICATION:  Signs/Symptoms:Stroke Evaluation.   COMPARISON: None.   ACCESSION NUMBER(S): [id number]   ORDERING CLINICIAN: RACHELE MIGUEL   TECHNIQUE: Routine axial images were obtained from the skull base through the vertex.  Sagittal and coronal reconstruction images were generated. Brain, subdural, and bone windows were reviewed. N/A   N/A   FINDINGS: INTRACRANIAL: Mild prominence of ventricles and sulci. There is mild patchy hypodensity throughout the deep periventricular white matter. No acute intracranial bleed, midline shift, or focal mass effect. No destructive bone lesion. No depressed skull fracture. Skullbase arterial calcifications in the carotid siphons and vertebral arteries.   EXTRACRANIAL: Visualized paranasal sinuses were clear. Visualized mastoid air cells were clear.       No acute intracranial bleed or focal mass effect.   Mild volume loss.   Mild chronic white matter ischemic disease in the deep periventricular regions.   MACRO: Alvaro Dowell discussed the significance and urgency of this critical finding by epic secure chat with  RACHELE MIGUEL on 12/4/2024 at 11:24 am.  (**-RCF-**) Findings:  See findings.   Signed by: Alvaro Dowell 12/4/2024 11:24 AM Dictation workstation:   [id number]    XR knee left 1-2 views    Result Date: 11/26/2024  Interpreted By:  Aditya Adams, STUDY: XR KNEE LEFT 1-2 VIEWS   INDICATION: Signs/Symptoms:Post-op knee.   COMPARISON: July 3   ACCESSION NUMBER(S): [id number]   ORDERING CLINICIAN: JIA BANERJEE   FINDINGS: Interval postoperative changes of left knee arthroplasty without fracture or malalignment.       Satisfactory appearance status post left knee arthroplasty.   Signed by: Aditya Adams 11/26/2024 5:08 PM Dictation workstation:   [id number]     Assessment/Plan   This patient currently has cardiac telemetry ordered; if you would like to modify or discontinue the telemetry order, click here to go to the orders activity to modify/discontinue the order.  Assessment &  Plan  Acute kidney injury superimposed on CKD (CMS-HCC)    Metabolic encephalopathy    Colitis    Metabolic acidosis    Acute hypoxic respiratory failure (Multi)    Total knee replacement status, left    Sepsis with acute renal failure and septic shock (Multi)    Elevated LFTs    Moderate protein-calorie malnutrition (Multi)    Suspect encephalopathy 2/2 uremia.  - Mental status is improving  - MRI brain done and unremarkable.  - Continue supportive care.    Myoclonus 2/2 uremia- currently resolved.    Case/plan discussed with Dr. Thomas.    ANA MARIA Nicole-CNP

## 2024-12-09 NOTE — PROGRESS NOTES
"Subjective   No acute overnight events.  Patient remained afebrile and hemodynamically stable.  At bedside this morning, her mentation has improved.  She is alert and oriented x 3.  She has no complaints.  She is currently receiving dialysis at bedside and RN stated that her BP has been pretty good.  However, during the session, there is an episode in which her heart rate went up to 100s.    Objective     Last Recorded Vitals:  Blood pressure 143/62, pulse 85, temperature 36.7 °C (98.1 °F), temperature source Temporal, resp. rate 15, height 1.676 m (5' 6\"), weight 90.8 kg (200 lb 2.8 oz), SpO2 93%.    I&O  I/O last 3 completed shifts:  In: 1780 (19.6 mL/kg) [NG/GT:1780]  Out: 907.5 (10 mL/kg) [Urine:387.5 (0.1 mL/kg/hr); Emesis/NG output:220; Stool:300]  Weight: 90.8 kg   I/O this shift:  In: 200 [I.V.:200]  Out: -     Physical Exam  Vitals and nursing note reviewed.   Constitutional:       General: She is not in acute distress.     Appearance: She is ill-appearing (chronically-ill).   HENT:      Nose:      Comments: NG tube in place.  Eyes:      General: No scleral icterus.     Extraocular Movements: Extraocular movements intact.      Conjunctiva/sclera: Conjunctivae normal.   Cardiovascular:      Rate and Rhythm: Normal rate and regular rhythm.      Pulses: Normal pulses.      Heart sounds: Normal heart sounds. No murmur heard.  Pulmonary:      Effort: No respiratory distress.      Breath sounds: No wheezing, rhonchi or rales.      Comments: Diminished breath sounds bilaterally.  Abdominal:      General: Bowel sounds are decreased. There is no distension.      Palpations: Abdomen is soft.      Tenderness: There is abdominal tenderness in the right lower quadrant and left lower quadrant. There is no guarding or rebound.   Musculoskeletal:         General: No tenderness.      Cervical back: Neck supple.      Right lower leg: No edema.      Left lower leg: No edema.   Skin:     General: Skin is warm and dry. "   Neurological:      Mental Status: She is alert and oriented to person, place, and time.   Psychiatric:         Mood and Affect: Mood normal.         Behavior: Behavior normal.         Relevant results  Labs  Results for orders placed or performed during the hospital encounter of 12/04/24 (from the past 24 hours)   POCT GLUCOSE   Result Value Ref Range    POCT Glucose 144 (H) 74 - 99 mg/dL   POCT GLUCOSE   Result Value Ref Range    POCT Glucose 173 (H) 74 - 99 mg/dL   POCT GLUCOSE   Result Value Ref Range    POCT Glucose 156 (H) 74 - 99 mg/dL   POCT GLUCOSE   Result Value Ref Range    POCT Glucose 175 (H) 74 - 99 mg/dL   Comprehensive metabolic panel   Result Value Ref Range    Glucose 173 (H) 74 - 99 mg/dL    Sodium 140 136 - 145 mmol/L    Potassium 3.3 (L) 3.5 - 5.3 mmol/L    Chloride 103 98 - 107 mmol/L    Bicarbonate 26 21 - 32 mmol/L    Anion Gap 14 10 - 20 mmol/L    Urea Nitrogen 83 (H) 6 - 23 mg/dL    Creatinine 6.44 (H) 0.50 - 1.05 mg/dL    eGFR 6 (L) >60 mL/min/1.73m*2    Calcium 7.2 (L) 8.6 - 10.3 mg/dL    Albumin 2.5 (L) 3.4 - 5.0 g/dL    Alkaline Phosphatase 118 33 - 136 U/L    Total Protein 5.0 (L) 6.4 - 8.2 g/dL    AST 78 (H) 9 - 39 U/L    Bilirubin, Total 0.5 0.0 - 1.2 mg/dL     (H) 7 - 45 U/L   Magnesium   Result Value Ref Range    Magnesium 1.99 1.60 - 2.40 mg/dL   Phosphorus   Result Value Ref Range    Phosphorus 2.7 2.5 - 4.9 mg/dL   CBC   Result Value Ref Range    WBC 10.8 4.4 - 11.3 x10*3/uL    nRBC 0.0 0.0 - 0.0 /100 WBCs    RBC 3.03 (L) 4.00 - 5.20 x10*6/uL    Hemoglobin 8.7 (L) 12.0 - 16.0 g/dL    Hematocrit 29.5 (L) 36.0 - 46.0 %    MCV 97 80 - 100 fL    MCH 28.7 26.0 - 34.0 pg    MCHC 29.5 (L) 32.0 - 36.0 g/dL    RDW 14.1 11.5 - 14.5 %    Platelets 89 (L) 150 - 450 x10*3/uL   POCT GLUCOSE   Result Value Ref Range    POCT Glucose 170 (H) 74 - 99 mg/dL   POCT GLUCOSE   Result Value Ref Range    POCT Glucose 127 (H) 74 - 99 mg/dL     Assessment/Plan   This is a 84 y.o. female with past  medical history of paroxysmal atrial fibrillation on Eliquis, HFp EF, obesity, CKD stage IV, DMII, glaucoma, history of C. difficile, hypertension and recent left total knee replacement presenting to [facility] with altered mental status, currently on oral vancomycin for acute C. Difficile infection and AIDAN on CKD stage IV.     #Oliguric prerenal AIDAN/Ischemic ATN likely 2/2 dehydration and C. Diff diarrhea + contrast induced nephrotoxicity  # History of CKD stage IV 2/2 hypertensive nephrosclerosis  -Creatinine uptrended to 6.44.  Plan:  -Continue with maintenance hemodialysis MWF  -Monitor for renal recovery     #Hypocalcemia 2/2 hyperphosphatemia and CKD   #Hypoalbuminemia  -Improving post dialysis sessions. Corrected calcium is 8.4 today.  -PTH elevated appropriately  Plan:  -Continue with HD and monitoring Ca2+ and Phos levels    Patient was seen and case discussed with the attending.  Caterina Anderson,   Internal Medicine PGY-1  Date: December 9, 2024

## 2024-12-09 NOTE — NURSING NOTE
Speech at bedside 0901.    Speech eval paused for MRI brain. Patient left unit with transport at 0915.

## 2024-12-09 NOTE — PROGRESS NOTES
Savita Snell   12/08/24  [id number]  America Vanegas MD  This is a patient with a past medical history as detailed below admitted to the  ED from nursing home [facility], wound care nurse went in to complete patient dressing and patient was unresponsive and unable to move limbs. Patient last known normal was this morning at 9am when patient received morning medications. Patient is alert and oriented x3-4 at baseline per facility. Is on eliquis and has tremors due to history of parkinsons. Being sent to ED for eval. the patient remained to be in the CCU remained to be lethargic however able to open eyes and follow simple commands remained to have Corpak tube evaluated by nephrology initiated on hemodialysis ordered MRI of the brain by neurology service    Assessment and plan   1-sepsis fever admitted to the hospital obtain culture consult With infectious disease   CT showed colitis started on vancomycin tapering dose discontinue Flagyl monitor fever monitor blood pressure closely  2-acute renal failure could be related to ATN IV fluids has been given Mckeon catheter has been placed monitor urine output strict I's and O's and weight avoid any nephrotoxic agent consulted nephrology monitor electrolytes initiated on hemodialysis  3-colitis per CT likely C. Difficile stool toxin for C. Difficile antibiotics per infectious disease recommendation  4-acute respiratory failure with hypoxia CT chest rule out pulmonary embolism negative pulmonary critical care has been consulted continue with O2 support acute change of mental status  5-elevated LFTs had a CT abdomen trend CMP if persist consider ultrasound right upper quadrant  6-change of mental status likely metabolic encephalopathy  CT brain CTA monitor neurostatus aspiration precaution .  MRI of the brain  7-status post left total knee replacement    Discussed with the ED /consultants.      Review of other systems negative other than HPI  Past medical  history    Epic& consultants notes reviewed  Most recent images Reviewed  Last EKG/Rhythm reviewed      Vital signs has been reviewed  Sleepy open eyes to stimuli some answer question by yes or no does not follow commands   SKIN:  No rashes .   ENT mucosa,  Normal/nose normal   NECK: no jugulovenous distention  NO lymphadenopathy   LUNGS: Diminished air entry bilateral  CARDIAC:  S1 and S2  ABDOMEN: Abdomen soft, non-tender.    EXTREMITIES: Extremities normal.   NEURO: As above does not follow commands   PULSES: + pedal / radial                       Past Medical History  She has a past medical history of Afib (Multi), Arthritis, Balance problem, C. difficile colitis, Cataract, CKD (chronic kidney disease), stage III (Multi), COVID-19, Diabetic neuropathy (Multi), Endometrial cancer (Multi), Falls, Glaucoma, Heart failure with preserved ejection fraction, Hypertension, Hypokalemia, Obesity, Type 2 diabetes mellitus, and Venous ulcer (Multi).    Surgical History  She has a past surgical history that includes Cataract extraction; Joint replacement; Cholecystectomy; Hysterectomy; Other surgical history; Other surgical history (Bilateral); Appendectomy; Tonsillectomy; Colonoscopy; Upper gastrointestinal endoscopy; and Total knee arthroplasty (Left).     Social History  She reports that she has quit smoking. Her smoking use included cigarettes. She has never used smokeless tobacco. She reports that she does not currently use alcohol. She reports that she does not use drugs.    Family History  Family History   Problem Relation Name Age of Onset    Heart failure Mother      Other (MI) Mother      Stroke Father      Hyperlipidemia Sister      Hypertension Sister              Case has been discussed with consultants /critical care team.  Pt seen and examined  cct 32 minutes I have  gone over labs  most recent EKG/Rhythm /images/X ray,case .D/W RN and consultants,the patient has a high probability of sudden and clinical  significant deterioration which requires the highest level of care & intervene urgently.      Relevant Results              America Vanegas MD

## 2024-12-09 NOTE — PRE-PROCEDURE NOTE
Report from Sending RN:    Report From: Leeanna Olsen RN   Recent Surgery of Procedure: Yes  Baseline Level of Consciousness (LOC): A&Ox3  Oxygen Use: Yes  Type: NC  Diabetic: Yes  Last BP Med Given Day of Dialysis: see MAR  Last Pain Med Given: see MAR   Lab Tests to be Obtained with Dialysis: No  Blood Transfusion to be Given During Dialysis: No  Available IV Access: Yes  Medications to be Administered During Dialysis: No  Continuous IV Infusion Running: No  Restraints on Currently or in the Last 24 Hours: No  Hand-Off Communication: Ready for bedside dialysis. Full Code.   Dialysis Catheter Dressing: date fell off, please assess and change bedside  Last Dressing Change: POST PLACEMENT

## 2024-12-09 NOTE — PROGRESS NOTES
"Physical Therapy                 Therapy Communication Note    Patient Name: Savita Snell \"FILOMENA\"  MRN: [id number]  Department: [facility] 2  Room: 2104/2104-A  Today's Date: 12/9/2024     Discipline: Physical Therapy        Missed Time: Attempt    Comment: Pt in dialysis at this time. Will attempt again as appropriate and able.  "

## 2024-12-09 NOTE — POST-PROCEDURE NOTE
Report to Receiving RN:    Report To: Leeanna  Time Report Called: 1531  Hand-Off Communication: 219 mL removed, Dr Urbina discussed bedside and decided to not remove fluid today   Complications During Treatment: Yes- tachy with decreased BP   Ultrafiltration Treatment: N/A  Medications Administered During Dialysis: No  Blood Products Administered During Dialysis: No  Labs Sent During Dialysis: No  Heparin Drip Rate Changes: N/A  Dialysis Catheter Dressing: clean dry intact   Last Dressing Change: 12/9/2024    Electronic Signatures:  AE (Signed KATIE Fernandez)     Last Updated: 3:34 PM by DELVIN REMY

## 2024-12-09 NOTE — PROGRESS NOTES
"Occupational Therapy                 Therapy Communication Note    Patient Name: Savita Snell \"FILOMENA\"  MRN: [id number]  Department: [facility] 2  Room: 2104/2104-A  Today's Date: 12/9/2024     Discipline: Occupational Therapy    Missed Visit Reason: Missed Visit Reason: Patient in a medical procedure (HD)    Missed Time: Attempt      "

## 2024-12-09 NOTE — CARE PLAN
The clinical goals for the shift include patient will remain HDS throughout shift. The patient met this goal      S: Patient admitted 12/4 for AMS, AIDAN  B: History of: afib on eliquis, CHF, CAD, HTN, HLD, CKD, T2DM   A: Patient A&Ox3, mentation improving. Remains in NSR and on 3 L NC. Medicated per orders throughout shift. Corpak remains in place with continuous tube feed running at goal rate. Mckeon intact with minimal output.   R: Patient to have dialysis today

## 2024-12-10 ENCOUNTER — APPOINTMENT (OUTPATIENT)
Dept: RADIOLOGY | Facility: HOSPITAL | Age: 84
DRG: 871 | End: 2024-12-10
Payer: MEDICARE

## 2024-12-10 LAB
ALBUMIN SERPL BCP-MCNC: 2.5 G/DL (ref 3.4–5)
ALP SERPL-CCNC: 124 U/L (ref 33–136)
ALT SERPL W P-5'-P-CCNC: 102 U/L (ref 7–45)
ANION GAP SERPL CALC-SCNC: 12 MMOL/L (ref 10–20)
ANION GAP SERPL CALC-SCNC: 14 MMOL/L (ref 10–20)
AST SERPL W P-5'-P-CCNC: 91 U/L (ref 9–39)
ATRIAL RATE: 136 BPM
BILIRUB SERPL-MCNC: 0.6 MG/DL (ref 0–1.2)
BUN SERPL-MCNC: 33 MG/DL (ref 6–23)
BUN SERPL-MCNC: 40 MG/DL (ref 6–23)
CALCIUM SERPL-MCNC: 7.4 MG/DL (ref 8.6–10.3)
CALCIUM SERPL-MCNC: 7.4 MG/DL (ref 8.6–10.3)
CARDIAC TROPONIN I PNL SERPL HS: 41 NG/L (ref 0–13)
CHLORIDE SERPL-SCNC: 100 MMOL/L (ref 98–107)
CHLORIDE SERPL-SCNC: 102 MMOL/L (ref 98–107)
CO2 SERPL-SCNC: 27 MMOL/L (ref 21–32)
CO2 SERPL-SCNC: 28 MMOL/L (ref 21–32)
CREAT SERPL-MCNC: 3.29 MG/DL (ref 0.5–1.05)
CREAT SERPL-MCNC: 3.78 MG/DL (ref 0.5–1.05)
EGFRCR SERPLBLD CKD-EPI 2021: 11 ML/MIN/1.73M*2
EGFRCR SERPLBLD CKD-EPI 2021: 13 ML/MIN/1.73M*2
ERYTHROCYTE [DISTWIDTH] IN BLOOD BY AUTOMATED COUNT: 14.2 % (ref 11.5–14.5)
GLUCOSE BLD MANUAL STRIP-MCNC: 104 MG/DL (ref 74–99)
GLUCOSE BLD MANUAL STRIP-MCNC: 111 MG/DL (ref 74–99)
GLUCOSE BLD MANUAL STRIP-MCNC: 119 MG/DL (ref 74–99)
GLUCOSE BLD MANUAL STRIP-MCNC: 120 MG/DL (ref 74–99)
GLUCOSE BLD MANUAL STRIP-MCNC: 121 MG/DL (ref 74–99)
GLUCOSE SERPL-MCNC: 122 MG/DL (ref 74–99)
GLUCOSE SERPL-MCNC: 145 MG/DL (ref 74–99)
HCT VFR BLD AUTO: 31.4 % (ref 36–46)
HGB BLD-MCNC: 9.6 G/DL (ref 12–16)
LACTATE SERPL-SCNC: 0.9 MMOL/L (ref 0.4–2)
MAGNESIUM SERPL-MCNC: 1.79 MG/DL (ref 1.6–2.4)
MAGNESIUM SERPL-MCNC: 2.49 MG/DL (ref 1.6–2.4)
MCH RBC QN AUTO: 29.7 PG (ref 26–34)
MCHC RBC AUTO-ENTMCNC: 30.6 G/DL (ref 32–36)
MCV RBC AUTO: 97 FL (ref 80–100)
NRBC BLD-RTO: 0 /100 WBCS (ref 0–0)
P OFFSET: 181 MS
P ONSET: 122 MS
PHOSPHATE SERPL-MCNC: 2.9 MG/DL (ref 2.5–4.9)
PLATELET # BLD AUTO: 112 X10*3/UL (ref 150–450)
POTASSIUM SERPL-SCNC: 3.6 MMOL/L (ref 3.5–5.3)
POTASSIUM SERPL-SCNC: 3.7 MMOL/L (ref 3.5–5.3)
PR INTERVAL: 186 MS
PROT SERPL-MCNC: 5 G/DL (ref 6.4–8.2)
Q ONSET: 215 MS
QRS COUNT: 22 BEATS
QRS DURATION: 124 MS
QT INTERVAL: 256 MS
QTC CALCULATION(BAZETT): 385 MS
QTC FREDERICIA: 336 MS
R AXIS: -12 DEGREES
RBC # BLD AUTO: 3.23 X10*6/UL (ref 4–5.2)
SODIUM SERPL-SCNC: 136 MMOL/L (ref 136–145)
SODIUM SERPL-SCNC: 139 MMOL/L (ref 136–145)
T AXIS: 151 DEGREES
T OFFSET: 343 MS
T4 FREE SERPL-MCNC: 0.79 NG/DL (ref 0.61–1.12)
TSH SERPL-ACNC: 4.88 MIU/L (ref 0.44–3.98)
VENTRICULAR RATE: 136 BPM
WBC # BLD AUTO: 11.6 X10*3/UL (ref 4.4–11.3)

## 2024-12-10 PROCEDURE — 83735 ASSAY OF MAGNESIUM: CPT

## 2024-12-10 PROCEDURE — 74018 RADEX ABDOMEN 1 VIEW: CPT | Performed by: RADIOLOGY

## 2024-12-10 PROCEDURE — 74018 RADEX ABDOMEN 1 VIEW: CPT

## 2024-12-10 PROCEDURE — 82947 ASSAY GLUCOSE BLOOD QUANT: CPT

## 2024-12-10 PROCEDURE — 2500000005 HC RX 250 GENERAL PHARMACY W/O HCPCS: Performed by: NURSE PRACTITIONER

## 2024-12-10 PROCEDURE — 2060000001 HC INTERMEDIATE ICU ROOM DAILY

## 2024-12-10 PROCEDURE — 87040 BLOOD CULTURE FOR BACTERIA: CPT | Mod: STJLAB

## 2024-12-10 PROCEDURE — 85027 COMPLETE CBC AUTOMATED: CPT | Performed by: NURSE PRACTITIONER

## 2024-12-10 PROCEDURE — 2500000005 HC RX 250 GENERAL PHARMACY W/O HCPCS

## 2024-12-10 PROCEDURE — 84100 ASSAY OF PHOSPHORUS: CPT

## 2024-12-10 PROCEDURE — 36415 COLL VENOUS BLD VENIPUNCTURE: CPT

## 2024-12-10 PROCEDURE — 80053 COMPREHEN METABOLIC PANEL: CPT | Performed by: NURSE PRACTITIONER

## 2024-12-10 PROCEDURE — 83605 ASSAY OF LACTIC ACID: CPT

## 2024-12-10 PROCEDURE — 2500000004 HC RX 250 GENERAL PHARMACY W/ HCPCS (ALT 636 FOR OP/ED)

## 2024-12-10 RX ORDER — MAGNESIUM SULFATE HEPTAHYDRATE 40 MG/ML
2 INJECTION, SOLUTION INTRAVENOUS ONCE
Status: COMPLETED | OUTPATIENT
Start: 2024-12-10 | End: 2024-12-10

## 2024-12-10 RX ORDER — METOPROLOL TARTRATE 1 MG/ML
5 INJECTION, SOLUTION INTRAVENOUS ONCE
Status: COMPLETED | OUTPATIENT
Start: 2024-12-10 | End: 2024-12-10

## 2024-12-10 ASSESSMENT — COGNITIVE AND FUNCTIONAL STATUS - GENERAL
PERSONAL GROOMING: TOTAL
WALKING IN HOSPITAL ROOM: TOTAL
EATING MEALS: A LOT
MOBILITY SCORE: 10
DRESSING REGULAR LOWER BODY CLOTHING: A LOT
HELP NEEDED FOR BATHING: A LOT
TOILETING: TOTAL
MOVING TO AND FROM BED TO CHAIR: A LOT
TURNING FROM BACK TO SIDE WHILE IN FLAT BAD: A LOT
DAILY ACTIVITIY SCORE: 10
DRESSING REGULAR UPPER BODY CLOTHING: A LOT
MOVING FROM LYING ON BACK TO SITTING ON SIDE OF FLAT BED WITH BEDRAILS: A LOT
STANDING UP FROM CHAIR USING ARMS: A LOT
CLIMB 3 TO 5 STEPS WITH RAILING: TOTAL

## 2024-12-10 ASSESSMENT — PAIN - FUNCTIONAL ASSESSMENT
PAIN_FUNCTIONAL_ASSESSMENT: 0-10
PAIN_FUNCTIONAL_ASSESSMENT: 0-10

## 2024-12-10 ASSESSMENT — PAIN SCALES - GENERAL
PAINLEVEL_OUTOF10: 0 - NO PAIN
PAINLEVEL_OUTOF10: 0 - NO PAIN

## 2024-12-10 NOTE — CONSULTS
"Subjective   No acute overnight events. Patient remained afebrile and hemodynamically stable. At bedside this morning, her mentation is continuing to improve. She feels that her hands are more swollen than normal. She denies any chest pain, SOB, abdominal pain, dysuria. She continues to have diarrhea. She reported that she sees a nephrology at [facility].       Objective     Last Recorded Vitals:  Blood pressure 108/57, pulse (!) 112, temperature 36.4 °C (97.5 °F), resp. rate 10, height 1.676 m (5' 6\"), weight 90.8 kg (200 lb 2.8 oz), SpO2 98%.    I&O  I/O last 3 completed shifts:  In: 1730 (19.1 mL/kg) [I.V.:450 (5 mL/kg); Other:600; NG/GT:680]  Out: 1119 (12.3 mL/kg) [Urine:200 (0.1 mL/kg/hr); Other:819; Stool:100]  Weight: 90.8 kg   No intake/output data recorded.    Physical Exam  Constitutional:       Appearance: She is ill-appearing.   HENT:      Nose:      Comments: NG in place  Cardiovascular:      Rate and Rhythm: Normal rate and regular rhythm.   Pulmonary:      Effort: Pulmonary effort is normal.      Breath sounds: Normal breath sounds.   Abdominal:      General: Bowel sounds are normal.      Palpations: Abdomen is soft.      Tenderness: There is no abdominal tenderness.   Musculoskeletal:         General: No swelling.      Right lower leg: No edema.      Left lower leg: No edema.      Comments: Healing excision on L knee with steri strips. No erythema, pus, swelling   Skin:     General: Skin is warm and dry.   Neurological:      Mental Status: She is alert.   Psychiatric:      Comments: AOx3         Relevant results  Labs  Results for orders placed or performed during the hospital encounter of 12/04/24 (from the past 24 hours)   POCT GLUCOSE   Result Value Ref Range    POCT Glucose 127 (H) 74 - 99 mg/dL   POCT GLUCOSE   Result Value Ref Range    POCT Glucose 111 (H) 74 - 99 mg/dL   POCT GLUCOSE   Result Value Ref Range    POCT Glucose 137 (H) 74 - 99 mg/dL   ECG 12 Lead   Result Value Ref Range    " Ventricular Rate 136 BPM    Atrial Rate 136 BPM    MS Interval 186 ms    QRS Duration 124 ms    QT Interval 256 ms    QTC Calculation(Bazett) 385 ms    R Axis -12 degrees    T Axis 151 degrees    QRS Count 22 beats    Q Onset 215 ms    P Onset 122 ms    P Offset 181 ms    T Offset 343 ms    QTC Fredericia 336 ms   Basic Metabolic Panel   Result Value Ref Range    Glucose 145 (H) 74 - 99 mg/dL    Sodium 136 136 - 145 mmol/L    Potassium 3.6 3.5 - 5.3 mmol/L    Chloride 100 98 - 107 mmol/L    Bicarbonate 28 21 - 32 mmol/L    Anion Gap 12 10 - 20 mmol/L    Urea Nitrogen 33 (H) 6 - 23 mg/dL    Creatinine 3.29 (H) 0.50 - 1.05 mg/dL    eGFR 13 (L) >60 mL/min/1.73m*2    Calcium 7.4 (L) 8.6 - 10.3 mg/dL   CBC   Result Value Ref Range    WBC 11.4 (H) 4.4 - 11.3 x10*3/uL    nRBC 0.0 0.0 - 0.0 /100 WBCs    RBC 3.35 (L) 4.00 - 5.20 x10*6/uL    Hemoglobin 9.7 (L) 12.0 - 16.0 g/dL    Hematocrit 32.4 (L) 36.0 - 46.0 %    MCV 97 80 - 100 fL    MCH 29.0 26.0 - 34.0 pg    MCHC 29.9 (L) 32.0 - 36.0 g/dL    RDW 14.1 11.5 - 14.5 %    Platelets 97 (L) 150 - 450 x10*3/uL   Magnesium   Result Value Ref Range    Magnesium 1.79 1.60 - 2.40 mg/dL   Troponin I, High Sensitivity   Result Value Ref Range    Troponin I, High Sensitivity 41 (H) 0 - 13 ng/L   TSH with reflex to Free T4 if abnormal   Result Value Ref Range    Thyroid Stimulating Hormone 4.88 (H) 0.44 - 3.98 mIU/L   Thyroxine, Free   Result Value Ref Range    Thyroxine, Free 0.79 0.61 - 1.12 ng/dL   Blood Culture    Specimen: Peripheral Venipuncture; Blood culture   Result Value Ref Range    Blood Culture Loaded on Instrument - Culture in progress    Blood Culture    Specimen: Peripheral Venipuncture; Blood culture   Result Value Ref Range    Blood Culture Loaded on Instrument - Culture in progress    Lactate   Result Value Ref Range    Lactate 0.9 0.4 - 2.0 mmol/L   POCT GLUCOSE   Result Value Ref Range    POCT Glucose 120 (H) 74 - 99 mg/dL   CBC   Result Value Ref Range    WBC 11.6  (H) 4.4 - 11.3 x10*3/uL    nRBC 0.0 0.0 - 0.0 /100 WBCs    RBC 3.23 (L) 4.00 - 5.20 x10*6/uL    Hemoglobin 9.6 (L) 12.0 - 16.0 g/dL    Hematocrit 31.4 (L) 36.0 - 46.0 %    MCV 97 80 - 100 fL    MCH 29.7 26.0 - 34.0 pg    MCHC 30.6 (L) 32.0 - 36.0 g/dL    RDW 14.2 11.5 - 14.5 %    Platelets 112 (L) 150 - 450 x10*3/uL   Comprehensive metabolic panel   Result Value Ref Range    Glucose 122 (H) 74 - 99 mg/dL    Sodium 139 136 - 145 mmol/L    Potassium 3.7 3.5 - 5.3 mmol/L    Chloride 102 98 - 107 mmol/L    Bicarbonate 27 21 - 32 mmol/L    Anion Gap 14 10 - 20 mmol/L    Urea Nitrogen 40 (H) 6 - 23 mg/dL    Creatinine 3.78 (H) 0.50 - 1.05 mg/dL    eGFR 11 (L) >60 mL/min/1.73m*2    Calcium 7.4 (L) 8.6 - 10.3 mg/dL    Albumin 2.5 (L) 3.4 - 5.0 g/dL    Alkaline Phosphatase 124 33 - 136 U/L    Total Protein 5.0 (L) 6.4 - 8.2 g/dL    AST 91 (H) 9 - 39 U/L    Bilirubin, Total 0.6 0.0 - 1.2 mg/dL     (H) 7 - 45 U/L   Magnesium   Result Value Ref Range    Magnesium 2.49 (H) 1.60 - 2.40 mg/dL   Phosphorus   Result Value Ref Range    Phosphorus 2.9 2.5 - 4.9 mg/dL   POCT GLUCOSE   Result Value Ref Range    POCT Glucose 119 (H) 74 - 99 mg/dL       Assessment/Plan   This is a 84 y.o. female with past medical history of paroxysmal atrial fibrillation on Eliquis, HFp EF, obesity, CKD stage IV, DMII, glaucoma, history of C. difficile, hypertension and recent left total knee replacement presenting to [facility] with altered mental status, currently on oral vancomycin for acute C. Difficile infection and AIDAN on CKD stage IV     #Oliguric prerenal AIDAN/Ischemic ATN likely 2/2 dehydration and C. Diff diarrhea + contrast induced nephrotoxicity  # History of CKD stage IV 2/2 hypertensive nephrosclerosis  -Creatinine downtrended to 3.78 after dialysis yesterday  - Baseline creatinine 2.3-2.4  Plan:  -Continue with hemodialysis as needed  -Monitor for renal recovery     #Hypocalcemia 2/2 hyperphosphatemia and CKD   #Hypoalbuminemia  -Improving  post dialysis sessions. Corrected calcium is 8.6 today.  -PTH elevated appropriately  Plan:  -Continue with HD and monitoring Ca2+ and Phos levels      Patient was seen and case discussed with the attending.  NAHUM MOODY-4  Date: December 10, 2024

## 2024-12-10 NOTE — PROGRESS NOTES
"Occupational Therapy                 Therapy Communication Note    Patient Name: Savita Snell \"FILOMENA\"  MRN: [id number]  Department: [facility] 2  Room: 2104/2104-A  Today's Date: 12/10/2024     Discipline: Occupational Therapy    Missed Visit Reason:  Chart review completed. Attempted OT evaluation at 1201.  Began to assist patient to edge of bed to sit up; however, patient reported feeling nauseated. Notified RN.  Will complete OT evaluation as appropriate.     Missed Time: Attempt      "

## 2024-12-10 NOTE — PROGRESS NOTES
12/10/24 1121   Discharge Planning   Living Arrangements Other (Comment)   Support Systems Spouse/significant other;Family members   Type of Residence Skilled nursing facility   Expected Discharge Disposition SNF  ([facility])   Does the patient need discharge transport arranged? Yes   RoundTrip coordination needed? Yes   Has discharge transport been arranged? No   Patient Choice   Patient / Family choosing to utilize agency / facility established prior to hospitalization Yes   Intensity of Service   Intensity of Service >30 min     Spoke with patient's son, Blaise, on phone. He said plan will eventually be to return the the [facility]. He said there are still many unknowns, including ortho input since patient was recent tkr and has not been getting up.  Therapy has attempted to see patient several times but she has not been available.   Discussed potential need for outpatient dialysis and that it is currently unknown. He is aware if needed, we will help make arrangements. He was thankful for call. Asked RN to call him as well for medical updates.   Asked DSC to send return skilled referral to [place].

## 2024-12-10 NOTE — PROGRESS NOTES
Savita Snell  2024  [id number]  America Vanegas MD  This is a patient with a past medical history as detailed below admitted to the  ED from nursing home [facility], wound care nurse went in to complete patient dressing and patient was unresponsive and unable to move limbs. Patient last known normal was this morning at 9am when patient received morning medications. Patient is alert and oriented x3-4 at baseline per facility. Is on eliquis and has tremors due to history of parkinsons. Being sent to ED for eval. patient is sleepy arousable open eyes and answer some questions by yes or no she moves extremities however she does not follow commands  There is a history of diarrhea  Patient condition deteriorated in the CCU where she became more lethargic her kidney function continued to deteriorate nephrology service was consulted she was initiated on hemodialysis  Corpak tube has been placed for nutrition neurology service has been consulted repeat CT brain images has been done neurology service  Has been consulted recommended MRI the patient was found to be in A-fib RVR cardiology services been consulted IV beta-blocker  Assessment and plan   1-sepsis   admitted to the hospital obtain culture consult With infectious disease   CT showed colitis C. difficile toxin came back positive  started on vancomycin and Flagyl currently Flagyl has been discontinued based on tapering dose of vancomycin  Vancomycin pulse taper course as the followin mg 4 times daily for 1 week followed by 125 mg 3 times daily for 1 week followed by 125 mg twice daily for 1 week followed by 125 mg daily for 1 week followed by 125 mg every other day for 1 week.   2-acute renal failure could be related to ATN IV fluids has been given Mckeon catheter has been placed monitor urine output strict I's and O's and weight avoid any nephrotoxic agent consulted nephrology    As above condition deteriorated nephrology service was  consulted  Dialysis catheter placed  Initiated on hemodialysis monitor electrolytes   3- acute respiratory failure with hypoxia CT chest rule out pulmonary embolism negative monitor pulse ox O2 support to keep pulse ox above 92%   4-elevated LFTs had a CT abdomen trend CMP    6-change of mental status likely metabolic encephalopathy  CT brain CTA monitor neurostatus aspiration precaution .  As above had a rapid response repeat images was done overall slowly improving neurology service was consulted and recommended  7-status post left total knee replacement   8 -RVR started IV beta-blocker monitor.  Consulted  cardiology .  Discussed with the consultants.         Epic& consultants notes reviewed  Most recent images Reviewed  Last EKG/Rhythm reviewed      Vital signs has been reviewed  Sleepy open eyes to stimuli some answer question by yes or no does not follow commands   SKIN:  No rashes .   ENT mucosa,  Normal/nose normal   NECK: no jugulovenous distention  NO lymphadenopathy   LUNGS: Diminished air entry bilateral  CARDIAC:  S1 and S2 tachy    ABDOMEN: Abdomen soft, non-tender.    EXTREMITIES: Extremities normal.   NEURO: Open eyes follow simple commands neuro  nonfocal  PULSES: + pedal / radial                       Past Medical History  She has a past medical history of Afib (Multi), Arthritis, Balance problem, C. difficile colitis, Cataract, CKD (chronic kidney disease), stage III (Multi), COVID-19, Diabetic neuropathy (Multi), Endometrial cancer (Multi), Falls, Glaucoma, Heart failure with preserved ejection fraction, Hypertension, Hypokalemia, Obesity, Type 2 diabetes mellitus, and Venous ulcer (Multi).    Surgical History  She has a past surgical history that includes Cataract extraction; Joint replacement; Cholecystectomy; Hysterectomy; Other surgical history; Other surgical history (Bilateral); Appendectomy; Tonsillectomy; Colonoscopy; Upper gastrointestinal endoscopy; and Total knee arthroplasty (Left).      Social History  She reports that she has quit smoking. Her smoking use included cigarettes. She has never used smokeless tobacco. She reports that she does not currently use alcohol. She reports that she does not use drugs.  Case has been discussed with consultants /critical care team.  Pt seen and examined  cct 32 minutes I have  gone over labs  most recent EKG/Rhythm /images/X ray,case .D/W RN and consultants,the patient has a high probability of sudden and clinical significant deterioration which requires the highest level of care & intervene urgently.    Family History  Family History   Problem Relation Name Age of Onset    Heart failure Mother      Other (MI) Mother      Stroke Father      Hyperlipidemia Sister      Hypertension Sister               America Vanegas MD

## 2024-12-10 NOTE — PROGRESS NOTES
"Physical Therapy                 Therapy Communication Note    Patient Name: Savita Snell \"FILOMENA\"  MRN: [id number]  Today's Date: 12/10/2024     Discipline: Physical Therapy    Missed Visit Reason: PT order received, chart reviewed. Attempted pt at 1140. Began to assist pt to edge of bed to sit up but pt reported feeling nauseated. RN informed. Will reattempt PT evaluation when medically appropriate.     Missed Time: Attempt      "

## 2024-12-11 ENCOUNTER — APPOINTMENT (OUTPATIENT)
Dept: DIALYSIS | Facility: HOSPITAL | Age: 84
End: 2024-12-11
Payer: MEDICARE

## 2024-12-11 LAB
ALBUMIN SERPL BCP-MCNC: 2.5 G/DL (ref 3.4–5)
ALP SERPL-CCNC: 137 U/L (ref 33–136)
ALT SERPL W P-5'-P-CCNC: 88 U/L (ref 7–45)
ANION GAP SERPL CALC-SCNC: 15 MMOL/L (ref 10–20)
AST SERPL W P-5'-P-CCNC: 63 U/L (ref 9–39)
BILIRUB SERPL-MCNC: 0.5 MG/DL (ref 0–1.2)
BUN SERPL-MCNC: 54 MG/DL (ref 6–23)
CALCIUM SERPL-MCNC: 7.3 MG/DL (ref 8.6–10.3)
CHLORIDE SERPL-SCNC: 100 MMOL/L (ref 98–107)
CO2 SERPL-SCNC: 26 MMOL/L (ref 21–32)
CREAT SERPL-MCNC: 4.94 MG/DL (ref 0.5–1.05)
EGFRCR SERPLBLD CKD-EPI 2021: 8 ML/MIN/1.73M*2
ERYTHROCYTE [DISTWIDTH] IN BLOOD BY AUTOMATED COUNT: 14 % (ref 11.5–14.5)
GLUCOSE BLD MANUAL STRIP-MCNC: 104 MG/DL (ref 74–99)
GLUCOSE BLD MANUAL STRIP-MCNC: 110 MG/DL (ref 74–99)
GLUCOSE BLD MANUAL STRIP-MCNC: 127 MG/DL (ref 74–99)
GLUCOSE BLD MANUAL STRIP-MCNC: 137 MG/DL (ref 74–99)
GLUCOSE BLD MANUAL STRIP-MCNC: 152 MG/DL (ref 74–99)
GLUCOSE BLD MANUAL STRIP-MCNC: 95 MG/DL (ref 74–99)
GLUCOSE SERPL-MCNC: 110 MG/DL (ref 74–99)
HCT VFR BLD AUTO: 30.8 % (ref 36–46)
HGB BLD-MCNC: 9.1 G/DL (ref 12–16)
HOLD SPECIMEN: NORMAL
MAGNESIUM SERPL-MCNC: 2.45 MG/DL (ref 1.6–2.4)
MCH RBC QN AUTO: 28.8 PG (ref 26–34)
MCHC RBC AUTO-ENTMCNC: 29.5 G/DL (ref 32–36)
MCV RBC AUTO: 98 FL (ref 80–100)
NRBC BLD-RTO: 0 /100 WBCS (ref 0–0)
PHOSPHATE SERPL-MCNC: 3.7 MG/DL (ref 2.5–4.9)
PLATELET # BLD AUTO: 152 X10*3/UL (ref 150–450)
POTASSIUM SERPL-SCNC: 3.5 MMOL/L (ref 3.5–5.3)
PROT SERPL-MCNC: 5 G/DL (ref 6.4–8.2)
RBC # BLD AUTO: 3.16 X10*6/UL (ref 4–5.2)
SODIUM SERPL-SCNC: 137 MMOL/L (ref 136–145)
WBC # BLD AUTO: 11.7 X10*3/UL (ref 4.4–11.3)

## 2024-12-11 PROCEDURE — 2500000005 HC RX 250 GENERAL PHARMACY W/O HCPCS: Performed by: NURSE PRACTITIONER

## 2024-12-11 PROCEDURE — 86704 HEP B CORE ANTIBODY TOTAL: CPT | Mod: STJLAB | Performed by: INTERNAL MEDICINE

## 2024-12-11 PROCEDURE — 36415 COLL VENOUS BLD VENIPUNCTURE: CPT | Performed by: NURSE PRACTITIONER

## 2024-12-11 PROCEDURE — 2500000004 HC RX 250 GENERAL PHARMACY W/ HCPCS (ALT 636 FOR OP/ED)

## 2024-12-11 PROCEDURE — 2500000002 HC RX 250 W HCPCS SELF ADMINISTERED DRUGS (ALT 637 FOR MEDICARE OP, ALT 636 FOR OP/ED)

## 2024-12-11 PROCEDURE — 85027 COMPLETE CBC AUTOMATED: CPT | Performed by: NURSE PRACTITIONER

## 2024-12-11 PROCEDURE — 83735 ASSAY OF MAGNESIUM: CPT

## 2024-12-11 PROCEDURE — 86803 HEPATITIS C AB TEST: CPT | Mod: STJLAB | Performed by: INTERNAL MEDICINE

## 2024-12-11 PROCEDURE — 2500000005 HC RX 250 GENERAL PHARMACY W/O HCPCS

## 2024-12-11 PROCEDURE — 82947 ASSAY GLUCOSE BLOOD QUANT: CPT

## 2024-12-11 PROCEDURE — 2500000004 HC RX 250 GENERAL PHARMACY W/ HCPCS (ALT 636 FOR OP/ED): Performed by: INTERNAL MEDICINE

## 2024-12-11 PROCEDURE — 36415 COLL VENOUS BLD VENIPUNCTURE: CPT | Performed by: INTERNAL MEDICINE

## 2024-12-11 PROCEDURE — 80053 COMPREHEN METABOLIC PANEL: CPT | Performed by: NURSE PRACTITIONER

## 2024-12-11 PROCEDURE — 84100 ASSAY OF PHOSPHORUS: CPT

## 2024-12-11 PROCEDURE — 8010000001 HC DIALYSIS - HEMODIALYSIS PER DAY

## 2024-12-11 PROCEDURE — 1200000002 HC GENERAL ROOM WITH TELEMETRY DAILY

## 2024-12-11 RX ORDER — PANTOPRAZOLE SODIUM 40 MG/1
40 TABLET, DELAYED RELEASE ORAL
Status: DISCONTINUED | OUTPATIENT
Start: 2024-12-12 | End: 2024-12-16 | Stop reason: HOSPADM

## 2024-12-11 RX ORDER — VANCOMYCIN HCL 50 MG/ML
125 SOLUTION, RECONSTITUTED, ORAL ORAL DAILY
Status: DISCONTINUED | OUTPATIENT
Start: 2024-12-27 | End: 2024-12-12

## 2024-12-11 RX ORDER — HEPARIN SODIUM 1000 [USP'U]/ML
1900 INJECTION, SOLUTION INTRAVENOUS; SUBCUTANEOUS AS NEEDED
Status: DISCONTINUED | OUTPATIENT
Start: 2024-12-11 | End: 2024-12-16 | Stop reason: HOSPADM

## 2024-12-11 RX ORDER — VANCOMYCIN HCL 50 MG/ML
125 SOLUTION, RECONSTITUTED, ORAL ORAL EVERY OTHER DAY
Status: DISCONTINUED | OUTPATIENT
Start: 2025-01-03 | End: 2024-12-12

## 2024-12-11 RX ORDER — VANCOMYCIN HCL 50 MG/ML
125 SOLUTION, RECONSTITUTED, ORAL ORAL 2 TIMES DAILY
Status: DISCONTINUED | OUTPATIENT
Start: 2024-12-19 | End: 2024-12-12

## 2024-12-11 RX ORDER — VANCOMYCIN HCL 50 MG/ML
125 SOLUTION, RECONSTITUTED, ORAL ORAL EVERY 6 HOURS
Status: DISCONTINUED | OUTPATIENT
Start: 2024-12-11 | End: 2024-12-12

## 2024-12-11 RX ORDER — PANTOPRAZOLE SODIUM 40 MG/10ML
40 INJECTION, POWDER, LYOPHILIZED, FOR SOLUTION INTRAVENOUS
Status: DISCONTINUED | OUTPATIENT
Start: 2024-12-12 | End: 2024-12-16 | Stop reason: HOSPADM

## 2024-12-11 RX ORDER — VANCOMYCIN HCL 50 MG/ML
125 SOLUTION, RECONSTITUTED, ORAL ORAL 3 TIMES DAILY
Status: DISCONTINUED | OUTPATIENT
Start: 2024-12-12 | End: 2024-12-12

## 2024-12-11 ASSESSMENT — COGNITIVE AND FUNCTIONAL STATUS - GENERAL
STANDING UP FROM CHAIR USING ARMS: A LOT
HELP NEEDED FOR BATHING: A LOT
DRESSING REGULAR LOWER BODY CLOTHING: A LOT
DRESSING REGULAR UPPER BODY CLOTHING: A LOT
DAILY ACTIVITIY SCORE: 13
MOBILITY SCORE: 10
MOVING TO AND FROM BED TO CHAIR: A LOT
WALKING IN HOSPITAL ROOM: TOTAL
MOVING FROM LYING ON BACK TO SITTING ON SIDE OF FLAT BED WITH BEDRAILS: A LOT
CLIMB 3 TO 5 STEPS WITH RAILING: TOTAL
TURNING FROM BACK TO SIDE WHILE IN FLAT BAD: A LOT
TOILETING: A LOT
PERSONAL GROOMING: A LOT
EATING MEALS: A LITTLE

## 2024-12-11 ASSESSMENT — PAIN SCALES - GENERAL
PAINLEVEL_OUTOF10: 0 - NO PAIN

## 2024-12-11 ASSESSMENT — PAIN - FUNCTIONAL ASSESSMENT
PAIN_FUNCTIONAL_ASSESSMENT: 0-10

## 2024-12-11 NOTE — PROCEDURES
Report to Receiving RN:    Report To: Krystyna Morales  Time Report Called: [id number]  Hand-Off Communication: pt stable, vss, tx ended 41 minutes early due to machine clotting off, unable to return anything but arterial blood, 250 blood loss, Dr. Ciara marcos  Complications During Treatment: Yes  Ultrafiltration Treatment: No  Medications Administered During Dialysis: No  Blood Products Administered During Dialysis: No  Labs Sent During Dialysis: No  Heparin Drip Rate Changes: N/A  Dialysis Catheter Dressing: Clean Dry Intact  Last Dressing Change: 12/9/24    Electronic Signatures:   (Casimiro Mensah )   Authored:    (Casimiro Mensah )   Authored:     Last Updated: 5:43 PM by CASIMIRO MENSAH

## 2024-12-11 NOTE — CONSULTS
"Subjective   Patient was resting comfortably in bed during examination. She has no complaints this morning. She denies chest pain, shortness of breath, dysuria, abdominal pain, or increased swelling. She is Aox3 this morning and mentation continues to improve. She remained afebrile and hemodynamically stable. Her tachycardia has resolved.       Objective     Last Recorded Vitals:  Blood pressure 139/64, pulse 72, temperature 36.4 °C (97.5 °F), temperature source Temporal, resp. rate 15, height 1.676 m (5' 6\"), weight 86 kg (189 lb 9.5 oz), SpO2 100%.    I&O  I/O last 3 completed shifts:  In: 50 (0.6 mL/kg) [I.V.:50 (0.6 mL/kg)]  Out: 140 (1.6 mL/kg) [Urine:140 (0 mL/kg/hr)]  Weight: 86 kg   I/O this shift:  In: -   Out: 15 [Urine:15]    Physical Exam  Constitutional:       Appearance: She is ill-appearing.   Cardiovascular:      Rate and Rhythm: Normal rate and regular rhythm.   Pulmonary:      Effort: Pulmonary effort is normal.      Breath sounds: Normal breath sounds.   Abdominal:      General: Bowel sounds are normal.      Palpations: Abdomen is soft.      Tenderness: There is no abdominal tenderness.   Musculoskeletal:         General: No swelling.      Right lower leg: No edema.      Left lower leg: No edema.      Comments: Healing excision on L knee with steri strips. No erythema, pus, swelling   Skin:     General: Skin is warm and dry.   Neurological:      Mental Status: She is alert.   Psychiatric:      Comments: AOx3       Relevant results  Labs  Results for orders placed or performed during the hospital encounter of 12/04/24 (from the past 24 hours)   POCT GLUCOSE   Result Value Ref Range    POCT Glucose 111 (H) 74 - 99 mg/dL   POCT GLUCOSE   Result Value Ref Range    POCT Glucose 121 (H) 74 - 99 mg/dL   POCT GLUCOSE   Result Value Ref Range    POCT Glucose 104 (H) 74 - 99 mg/dL   POCT GLUCOSE   Result Value Ref Range    POCT Glucose 127 (H) 74 - 99 mg/dL   POCT GLUCOSE   Result Value Ref Range    POCT " Glucose 110 (H) 74 - 99 mg/dL   CBC   Result Value Ref Range    WBC 11.7 (H) 4.4 - 11.3 x10*3/uL    nRBC 0.0 0.0 - 0.0 /100 WBCs    RBC 3.16 (L) 4.00 - 5.20 x10*6/uL    Hemoglobin 9.1 (L) 12.0 - 16.0 g/dL    Hematocrit 30.8 (L) 36.0 - 46.0 %    MCV 98 80 - 100 fL    MCH 28.8 26.0 - 34.0 pg    MCHC 29.5 (L) 32.0 - 36.0 g/dL    RDW 14.0 11.5 - 14.5 %    Platelets 152 150 - 450 x10*3/uL   Comprehensive metabolic panel   Result Value Ref Range    Glucose 110 (H) 74 - 99 mg/dL    Sodium 137 136 - 145 mmol/L    Potassium 3.5 3.5 - 5.3 mmol/L    Chloride 100 98 - 107 mmol/L    Bicarbonate 26 21 - 32 mmol/L    Anion Gap 15 10 - 20 mmol/L    Urea Nitrogen 54 (H) 6 - 23 mg/dL    Creatinine 4.94 (H) 0.50 - 1.05 mg/dL    eGFR 8 (L) >60 mL/min/1.73m*2    Calcium 7.3 (L) 8.6 - 10.3 mg/dL    Albumin 2.5 (L) 3.4 - 5.0 g/dL    Alkaline Phosphatase 137 (H) 33 - 136 U/L    Total Protein 5.0 (L) 6.4 - 8.2 g/dL    AST 63 (H) 9 - 39 U/L    Bilirubin, Total 0.5 0.0 - 1.2 mg/dL    ALT 88 (H) 7 - 45 U/L   Magnesium   Result Value Ref Range    Magnesium 2.45 (H) 1.60 - 2.40 mg/dL   Phosphorus   Result Value Ref Range    Phosphorus 3.7 2.5 - 4.9 mg/dL   POCT GLUCOSE   Result Value Ref Range    POCT Glucose 104 (H) 74 - 99 mg/dL       Assessment/Plan   This is a 84 y.o. female with past medical history of paroxysmal atrial fibrillation on Eliquis, HFp EF, obesity, CKD stage IV, DMII, glaucoma, history of C. difficile, hypertension and recent left total knee replacement presenting to [facility] with altered mental status, currently on oral vancomycin for acute C. Difficile infection and AIDAN on CKD stage IV      #Oliguric prerenal AIDAN/Ischemic ATN likely 2/2 dehydration and C. Diff diarrhea + contrast induced nephrotoxicity  # History of CKD stage IV 2/2 hypertensive nephrosclerosis  -Creatinine increased to 4.94  - Baseline creatinine 2.3-2.4  - Discussed with  today for outpatient dialysis  Plan:  - Will do maintenance hemodialysis  today due to increasing creatinine  - Monitor for renal recovery     #Hypocalcemia 2/2 hyperphosphatemia and CKD   #Hypoalbuminemia  -Improving post dialysis sessions. Corrected calcium is 8.5 today.  -PTH elevated appropriately  Plan:  - Continue with HD and monitoring Ca2+ and Phos levels      Patient was seen and case discussed with the attending.  NAHUM MOODY-4  Date: December 11, 2024

## 2024-12-11 NOTE — PROGRESS NOTES
Savita Snell   12/10/2024  [id number]  America Vanegas MD  This is a patient with a past medical history as detailed below admitted to the  ED from nursing home [facility], wound care nurse went in to complete patient dressing and patient was unresponsive and unable to move limbs. Patient last known normal was this morning at 9am when patient received morning medications. Patient is alert and oriented x3-4 at baseline per facility. Is on eliquis and has tremors due to history of parkinsons. Being sent to ED for eval.   Patient remained to be in the CCU she was evaluated by speech therapy) placement placed nutrition started she also was found to be in acute on top of chronic kidney disease initiated on hemodialysis of this admission due to oliguric AIDAN she went into A-fib RVR I started the patient on IV beta-blocker and cardiology service was consulted    Assessment and plan   1-sepsis /C. difficile colitis admitted to the CCU monitor blood pressure closely.  Initially started on Flagyl and vancomycin and subsequently put on tapering dose of vancomycin monitor blood pressure closely follow-up on the culture results  2-acute renal failure could be related to ATN IV fluids has been given Mckeon catheter has been placed monitor urine output strict I's and O's and weight avoid any nephrotoxic agent oliguric AIDAN related to ATN Started on hemodialysis monitor electrolytes nephrology services following   3-change of mental status status post CT neurology consult MRI of the brain has been ordered  4-acute respiratory failure with hypoxia CT chest rule out pulmonary embolism negative monitor pulse ox O2 support to keep pulse ox above 92%   5-elevated LFTs had a CT abdomen trend CMP if persist consider ultrasound right upper quadrant  6-A-fib RVR started on IV beta-blocker cardiology service consulted heart rate is   better controlled   7-status post left total knee replacement   8 -hyperglycemia last  hemoglobin A1c October of this year was 5.9 will repeat  Discussed with the  consultants.      Review of other systems negative other than HPI  Past medical history    Epic& consultants notes reviewed  Most recent images Reviewed  Last EKG/Rhythm reviewed      Vital signs has been reviewed  Sleepy open eyes to stimuli some answer question by yes or no does not follow commands   SKIN:  No rashes .   ENT mucosa,  Normal/nose normal   NECK: no jugulovenous distention  NO lymphadenopathy   LUNGS: Diminished air entry bilateral  CARDIAC:  S1 and S2  ABDOMEN: Abdomen soft, non-tender.    EXTREMITIES: Extremities normal.   NEURO: As above does not follow commands   PULSES: + pedal / radial   No edema  No goiter   No carotid bruits.                   Past Medical History  She has a past medical history of Afib (Multi), Arthritis, Balance problem, C. difficile colitis, Cataract, CKD (chronic kidney disease), stage III (Multi), COVID-19, Diabetic neuropathy (Multi), Endometrial cancer (Multi), Falls, Glaucoma, Heart failure with preserved ejection fraction, Hypertension, Hypokalemia, Obesity, Type 2 diabetes mellitus, and Venous ulcer (Multi).    Surgical History  She has a past surgical history that includes Cataract extraction; Joint replacement; Cholecystectomy; Hysterectomy; Other surgical history; Other surgical history (Bilateral); Appendectomy; Tonsillectomy; Colonoscopy; Upper gastrointestinal endoscopy; and Total knee arthroplasty (Left).     Social History  She reports that she has quit smoking. Her smoking use included cigarettes. She has never used smokeless tobacco. She reports that she does not currently use alcohol. She reports that she does not use drugs.    Family History  Family History   Problem Relation Name Age of Onset    Heart failure Mother      Other (MI) Mother      Stroke Father      Hyperlipidemia Sister      Hypertension Sister        Case has been discussed with consultants /critical care  team.  Pt seen and examined  cct 32 minutes I have  gone over labs  most recent EKG/Rhythm /images/X ray,case .D/W RN and consultants,the patient has a high probability of sudden and clinical significant deterioration which requires the highest level of care & intervene urgently.    Allergies  Cephalexin, Penicillins, and Sulfamethoxazole-trimethoprim            America Vanegas MD

## 2024-12-11 NOTE — PROGRESS NOTES
"Occupational Therapy                 Therapy Communication Note    Patient Name: Savita Snell \"FILOMENA\"  MRN: [id number]  Department: [facility]  Room: 2104/2104-A  Today's Date: 12/11/2024     Discipline: Occupational Therapy    Comment: Updated chart review, and attempted OT eval this date. Pt receiving dialysis in room. Will hold OT eval and complete as appropriate.  "

## 2024-12-11 NOTE — CARE PLAN
Pt hds throughout the shift. A&O x3, confused to situation. Tube feed started, but pt later pulled out corpak. NP notified. Plan to leave corpak out for now pending speech evaluation today. On 3L NC. Received antbx as ordered. Mckeon catheter in place with minimal urine output. Safety maintained, will continue to monitor     Problem: Skin  Goal: Decreased wound size/increased tissue granulation at next dressing change  Outcome: Progressing  Goal: Participates in plan/prevention/treatment measures  Outcome: Progressing  Goal: Prevent/manage excess moisture  Outcome: Progressing  Goal: Promote/optimize nutrition  Outcome: Progressing  Goal: Promote skin healing  Outcome: Progressing     Problem: Diabetes  Goal: Achieve decreasing blood glucose levels by end of shift  Outcome: Progressing     Problem: Discharge Planning  Goal: Discharge to home or other facility with appropriate resources  Outcome: Progressing     Problem: Chronic Conditions and Co-morbidities  Goal: Patient's chronic conditions and co-morbidity symptoms are monitored and maintained or improved  Outcome: Progressing     Problem: Fall/Injury  Goal: Verbalize understanding of risk factor reduction measures to prevent injury from fall in the home  Outcome: Progressing  Goal: Use assistive devices by end of the shift  Outcome: Progressing

## 2024-12-11 NOTE — CARE PLAN
The patient's goals for the shift include      The clinical goals for the shift include Patient will increase PO intake to 25% of meals.      Problem: Skin  Goal: Decreased wound size/increased tissue granulation at next dressing change  12/10/2024 1954 by Leeanna Olsen RN  Outcome: Progressing  12/10/2024 1953 by Leeanna Olsen RN  Outcome: Progressing     Problem: Skin  Goal: Participates in plan/prevention/treatment measures  12/10/2024 1954 by Leeanna Olsen RN  Outcome: Progressing  Flowsheets (Taken 12/10/2024 1954)  Participates in plan/prevention/treatment measures:   Discuss with provider PT/OT consult   Increase activity/out of bed for meals  12/10/2024 1953 by Leeanna Olsen RN  Outcome: Progressing     Problem: Skin  Goal: Prevent/manage excess moisture  12/10/2024 1954 by Leeanna Olsen RN  Outcome: Progressing  Flowsheets (Taken 12/10/2024 1954)  Prevent/manage excess moisture:   Cleanse incontinence/protect with barrier cream   Monitor for/manage infection if present  12/10/2024 1953 by Leeanna Olsen RN  Outcome: Progressing     Problem: Skin  Goal: Promote/optimize nutrition  12/10/2024 1954 by Leeanna Olsen RN  Outcome: Progressing  12/10/2024 1953 by Leeanna Olsen RN  Outcome: Progressing     Problem: Skin  Goal: Promote skin healing  12/10/2024 1954 by Leeanna Olsen RN  Outcome: Progressing  12/10/2024 1953 by Leeanna Olsen RN  Outcome: Progressing

## 2024-12-11 NOTE — PROGRESS NOTES
Met with pt,  and son.  Pt comes from [facility], but does not want to return.  Pt will also need on going dialysis.  Provided pt and family a snf list with onsite dialysis and they will review.  Per [facility], she spoke with [place] liaison and they would provide transportation to dialysis if needed.   CT to follow for snf choice.    3:03pm; Attempted to meet with  pt for snf choice, however pt receiving dialysis.  Called pt's son, Blaise.  He states they marked 3 preferences on the list in pt's room ([facility], [facility] is third, he cannot recall the second choice).  Request to [facility] to send new snf referrals to [facility] and [facility].  Will follow for second choice if needed.    3:45pm; [facility] can accept.  Spoke with pt's son, Blaise, and confirmed this is [facility].  He will update pt, since she is in dialysis.  [facility] is requesting dialysis info, request to [facility] to send.

## 2024-12-11 NOTE — PROCEDURES
Report from Sending RN:    Report From: Krystyna Morales  Recent Surgery of Procedure: Yes; left knee replacement Nov  Baseline Level of Consciousness (LOC): A&Ox3  Oxygen Use: Yes  Type: 2L NC  Diabetic: Yes;   Last BP Med Given Day of Dialysis: See EMR  Last Pain Med Given: See EMR  Lab Tests to be Obtained with Dialysis: No  Blood Transfusion to be Given During Dialysis: No  Available IV Access: No  Medications to be Administered During Dialysis: No  Continuous IV Infusion Running: No  Restraints on Currently or in the Last 24 Hours: No  Hand-Off Communication: Pt stable for hd; full code, right chest subclavien  Dialysis Catheter Dressing: Clean Dry Intact  Last Dressing Change: 12/9/24

## 2024-12-11 NOTE — CARE PLAN
The patient's goals for the shift include      The clinical goals for the shift include Patient will increase PO intake to 25% of meals.      Problem: Skin  Goal: Decreased wound size/increased tissue granulation at next dressing change  Outcome: Progressing  Goal: Participates in plan/prevention/treatment measures  Outcome: Progressing  Goal: Prevent/manage excess moisture  Outcome: Progressing  Goal: Promote/optimize nutrition  Outcome: Progressing  Goal: Promote skin healing  Outcome: Progressing     Problem: Diabetes  Goal: Achieve decreasing blood glucose levels by end of shift  Outcome: Progressing  Goal: Increase stability of blood glucose readings by end of shift  Outcome: Progressing  Goal: Decrease in ketones present in urine by end of shift  Outcome: Progressing  Goal: Maintain electrolyte levels within acceptable range throughout shift  Outcome: Progressing  Goal: No changes in neurological exam by end of shift  Outcome: Progressing  Goal: Learn about and adhere to nutrition recommendations by end of shift  Outcome: Progressing  Goal: Increase self care and/or family involovement by end of shift  Outcome: Progressing  Goal: Receive DSME education by end of shift  Outcome: Progressing     Problem: Discharge Planning  Goal: Discharge to home or other facility with appropriate resources  Outcome: Progressing     Problem: Chronic Conditions and Co-morbidities  Goal: Patient's chronic conditions and co-morbidity symptoms are monitored and maintained or improved  Outcome: Progressing     Problem: Fall/Injury  Goal: Verbalize understanding of risk factor reduction measures to prevent injury from fall in the home  Outcome: Progressing  Goal: Use assistive devices by end of the shift  Outcome: Progressing  Goal: Pace activities to prevent fatigue by end of the shift  Outcome: Progressing

## 2024-12-11 NOTE — PROGRESS NOTES
"Physical Therapy                 Therapy Communication Note    Patient Name: Savita Snell \"FILOMENA\"  MRN: [id number]  Department: [facility] 2  Room: 2104/2104-A  Today's Date: 12/11/2024     Discipline: Physical Therapy    Missed Visit Reason: Missed Visit Reason: Patient in a medical procedure (Orders received and chart review completed. Attempted to work with pt and pt receiving dialysis at this time. Will continue to follow up with pt as appropriate.)    Missed Time: Attempt    Comment:  "

## 2024-12-11 NOTE — CARE PLAN
Problem: Skin  Goal: Prevent/manage excess moisture  Outcome: Met  Flowsheets (Taken 12/11/2024 1482)  Prevent/manage excess moisture: Cleanse incontinence/protect with barrier cream     Problem: Diabetes  Goal: No changes in neurological exam by end of shift  Outcome: Met     Problem: Fall/Injury  Goal: Verbalize understanding of risk factor reduction measures to prevent injury from fall in the home  Outcome: Met       Pt remained HDS this shift. Pt remained alert and oriented x3, disoriented to situation. Pt had one bowel movement this shift. Pt tolerated food and PO meds this shift. Pt tolerated HD this shift. Safety maintained. Alarms on.

## 2024-12-12 LAB
ALBUMIN SERPL BCP-MCNC: 2.6 G/DL (ref 3.4–5)
ALP SERPL-CCNC: 126 U/L (ref 33–136)
ALT SERPL W P-5'-P-CCNC: 71 U/L (ref 7–45)
ANION GAP SERPL CALC-SCNC: 13 MMOL/L (ref 10–20)
AST SERPL W P-5'-P-CCNC: 47 U/L (ref 9–39)
BILIRUB SERPL-MCNC: 0.5 MG/DL (ref 0–1.2)
BUN SERPL-MCNC: 37 MG/DL (ref 6–23)
CALCIUM SERPL-MCNC: 7.2 MG/DL (ref 8.6–10.3)
CHLORIDE SERPL-SCNC: 101 MMOL/L (ref 98–107)
CO2 SERPL-SCNC: 28 MMOL/L (ref 21–32)
CREAT SERPL-MCNC: 3.75 MG/DL (ref 0.5–1.05)
EGFRCR SERPLBLD CKD-EPI 2021: 11 ML/MIN/1.73M*2
ERYTHROCYTE [DISTWIDTH] IN BLOOD BY AUTOMATED COUNT: 14 % (ref 11.5–14.5)
GLUCOSE BLD MANUAL STRIP-MCNC: 103 MG/DL (ref 74–99)
GLUCOSE BLD MANUAL STRIP-MCNC: 105 MG/DL (ref 74–99)
GLUCOSE BLD MANUAL STRIP-MCNC: 123 MG/DL (ref 74–99)
GLUCOSE BLD MANUAL STRIP-MCNC: 131 MG/DL (ref 74–99)
GLUCOSE BLD MANUAL STRIP-MCNC: 171 MG/DL (ref 74–99)
GLUCOSE SERPL-MCNC: 107 MG/DL (ref 74–99)
HBV CORE AB SER QL: NONREACTIVE
HCT VFR BLD AUTO: 28.7 % (ref 36–46)
HCV AB SER QL: NONREACTIVE
HGB BLD-MCNC: 8.7 G/DL (ref 12–16)
MAGNESIUM SERPL-MCNC: 2.22 MG/DL (ref 1.6–2.4)
MCH RBC QN AUTO: 29.3 PG (ref 26–34)
MCHC RBC AUTO-ENTMCNC: 30.3 G/DL (ref 32–36)
MCV RBC AUTO: 97 FL (ref 80–100)
NRBC BLD-RTO: 0 /100 WBCS (ref 0–0)
PHOSPHATE SERPL-MCNC: 3 MG/DL (ref 2.5–4.9)
PLATELET # BLD AUTO: 158 X10*3/UL (ref 150–450)
POTASSIUM SERPL-SCNC: 3.8 MMOL/L (ref 3.5–5.3)
PROT SERPL-MCNC: 4.9 G/DL (ref 6.4–8.2)
RBC # BLD AUTO: 2.97 X10*6/UL (ref 4–5.2)
SODIUM SERPL-SCNC: 138 MMOL/L (ref 136–145)
WBC # BLD AUTO: 11.8 X10*3/UL (ref 4.4–11.3)

## 2024-12-12 PROCEDURE — 2500000005 HC RX 250 GENERAL PHARMACY W/O HCPCS: Performed by: NURSE PRACTITIONER

## 2024-12-12 PROCEDURE — 92526 ORAL FUNCTION THERAPY: CPT | Mod: GN | Performed by: PHARMACIST

## 2024-12-12 PROCEDURE — 2500000001 HC RX 250 WO HCPCS SELF ADMINISTERED DRUGS (ALT 637 FOR MEDICARE OP): Performed by: NURSE PRACTITIONER

## 2024-12-12 PROCEDURE — 82947 ASSAY GLUCOSE BLOOD QUANT: CPT

## 2024-12-12 PROCEDURE — 36415 COLL VENOUS BLD VENIPUNCTURE: CPT | Performed by: NURSE PRACTITIONER

## 2024-12-12 PROCEDURE — 2500000002 HC RX 250 W HCPCS SELF ADMINISTERED DRUGS (ALT 637 FOR MEDICARE OP, ALT 636 FOR OP/ED): Performed by: NURSE PRACTITIONER

## 2024-12-12 PROCEDURE — 97161 PT EVAL LOW COMPLEX 20 MIN: CPT | Mod: GP

## 2024-12-12 PROCEDURE — 97535 SELF CARE MNGMENT TRAINING: CPT | Mod: GO | Performed by: OCCUPATIONAL THERAPIST

## 2024-12-12 PROCEDURE — 1200000002 HC GENERAL ROOM WITH TELEMETRY DAILY

## 2024-12-12 PROCEDURE — 97530 THERAPEUTIC ACTIVITIES: CPT | Mod: GP

## 2024-12-12 PROCEDURE — 83735 ASSAY OF MAGNESIUM: CPT

## 2024-12-12 PROCEDURE — 97165 OT EVAL LOW COMPLEX 30 MIN: CPT | Mod: GO | Performed by: OCCUPATIONAL THERAPIST

## 2024-12-12 PROCEDURE — 84100 ASSAY OF PHOSPHORUS: CPT

## 2024-12-12 PROCEDURE — 85027 COMPLETE CBC AUTOMATED: CPT | Performed by: NURSE PRACTITIONER

## 2024-12-12 PROCEDURE — 80053 COMPREHEN METABOLIC PANEL: CPT | Performed by: NURSE PRACTITIONER

## 2024-12-12 RX ORDER — AMLODIPINE BESYLATE 2.5 MG/1
2.5 TABLET ORAL
Status: DISCONTINUED | OUTPATIENT
Start: 2024-12-12 | End: 2024-12-16 | Stop reason: HOSPADM

## 2024-12-12 RX ORDER — VANCOMYCIN HCL 50 MG/ML
125 SOLUTION, RECONSTITUTED, ORAL ORAL 3 TIMES DAILY
Status: DISCONTINUED | OUTPATIENT
Start: 2024-12-12 | End: 2024-12-16 | Stop reason: HOSPADM

## 2024-12-12 RX ORDER — LIDOCAINE 560 MG/1
1 PATCH PERCUTANEOUS; TOPICAL; TRANSDERMAL DAILY
Start: 2024-12-13

## 2024-12-12 RX ORDER — INSULIN LISPRO 100 [IU]/ML
0-10 INJECTION, SOLUTION INTRAVENOUS; SUBCUTANEOUS
Status: DISCONTINUED | OUTPATIENT
Start: 2024-12-12 | End: 2024-12-16 | Stop reason: HOSPADM

## 2024-12-12 RX ORDER — VANCOMYCIN HCL 50 MG/ML
125 SOLUTION, RECONSTITUTED, ORAL ORAL 2 TIMES DAILY
Status: DISCONTINUED | OUTPATIENT
Start: 2024-12-19 | End: 2024-12-16 | Stop reason: HOSPADM

## 2024-12-12 RX ORDER — VANCOMYCIN HCL 50 MG/ML
125 SOLUTION, RECONSTITUTED, ORAL ORAL EVERY 6 HOURS
Status: COMPLETED | OUTPATIENT
Start: 2024-12-12 | End: 2024-12-12

## 2024-12-12 RX ORDER — VANCOMYCIN HCL 50 MG/ML
125 SOLUTION, RECONSTITUTED, ORAL ORAL EVERY OTHER DAY
Status: DISCONTINUED | OUTPATIENT
Start: 2025-01-03 | End: 2024-12-16 | Stop reason: HOSPADM

## 2024-12-12 RX ORDER — VANCOMYCIN HCL 50 MG/ML
SOLUTION, RECONSTITUTED, ORAL ORAL
Qty: 115 ML | Refills: 0 | Status: SHIPPED | OUTPATIENT
Start: 2024-12-12 | End: 2025-01-10

## 2024-12-12 RX ORDER — VANCOMYCIN HCL 50 MG/ML
125 SOLUTION, RECONSTITUTED, ORAL ORAL DAILY
Status: DISCONTINUED | OUTPATIENT
Start: 2024-12-27 | End: 2024-12-16 | Stop reason: HOSPADM

## 2024-12-12 ASSESSMENT — PAIN - FUNCTIONAL ASSESSMENT
PAIN_FUNCTIONAL_ASSESSMENT: 0-10

## 2024-12-12 ASSESSMENT — COGNITIVE AND FUNCTIONAL STATUS - GENERAL
MOBILITY SCORE: 9
DRESSING REGULAR UPPER BODY CLOTHING: A LOT
DAILY ACTIVITIY SCORE: 13
STANDING UP FROM CHAIR USING ARMS: A LOT
EATING MEALS: A LITTLE
DAILY ACTIVITIY SCORE: 14
DRESSING REGULAR LOWER BODY CLOTHING: TOTAL
HELP NEEDED FOR BATHING: A LOT
HELP NEEDED FOR BATHING: A LOT
MOVING FROM LYING ON BACK TO SITTING ON SIDE OF FLAT BED WITH BEDRAILS: A LOT
CLIMB 3 TO 5 STEPS WITH RAILING: TOTAL
MOBILITY SCORE: 8
PERSONAL GROOMING: A LITTLE
DRESSING REGULAR UPPER BODY CLOTHING: A LITTLE
TURNING FROM BACK TO SIDE WHILE IN FLAT BAD: A LOT
DRESSING REGULAR LOWER BODY CLOTHING: A LOT
WALKING IN HOSPITAL ROOM: TOTAL
TOILETING: TOTAL
TOILETING: A LOT
MOVING FROM LYING ON BACK TO SITTING ON SIDE OF FLAT BED WITH BEDRAILS: A LOT
MOVING TO AND FROM BED TO CHAIR: TOTAL
MOVING TO AND FROM BED TO CHAIR: TOTAL
STANDING UP FROM CHAIR USING ARMS: TOTAL
WALKING IN HOSPITAL ROOM: TOTAL
TURNING FROM BACK TO SIDE WHILE IN FLAT BAD: A LOT
EATING MEALS: A LITTLE
DRESSING REGULAR LOWER BODY CLOTHING: A LOT
MOBILITY SCORE: 8
MOVING FROM LYING ON BACK TO SITTING ON SIDE OF FLAT BED WITH BEDRAILS: A LOT
CLIMB 3 TO 5 STEPS WITH RAILING: TOTAL
PERSONAL GROOMING: A LOT
WALKING IN HOSPITAL ROOM: TOTAL
TOILETING: A LOT
CLIMB 3 TO 5 STEPS WITH RAILING: TOTAL
STANDING UP FROM CHAIR USING ARMS: TOTAL
TURNING FROM BACK TO SIDE WHILE IN FLAT BAD: A LOT
HELP NEEDED FOR BATHING: A LOT
DRESSING REGULAR UPPER BODY CLOTHING: A LOT
DAILY ACTIVITIY SCORE: 13
MOVING TO AND FROM BED TO CHAIR: TOTAL
PERSONAL GROOMING: A LOT

## 2024-12-12 ASSESSMENT — PAIN SCALES - GENERAL
PAINLEVEL_OUTOF10: 0 - NO PAIN

## 2024-12-12 ASSESSMENT — ACTIVITIES OF DAILY LIVING (ADL): HOME_MANAGEMENT_TIME_ENTRY: 9

## 2024-12-12 NOTE — PROGRESS NOTES
12/12/24 1247   Discharge Planning   Home or Post Acute Services Post acute facilities (Rehab/SNF/etc)   Type of Post Acute Facility Services Skilled nursing   Expected Discharge Disposition SNF  ([facility])     Additional information requesting for HD placement from [facility]. Request sent to the [facility] to send updates to the facility.

## 2024-12-12 NOTE — PROGRESS NOTES
"Speech-Language Pathology Clinical Swallow Treatment and Discharge    Patient Name: Savita Snell \"FILOMENA\"  MRN: [id number]  : 1940  Today's Date: 24  Start Time: 1541  Stop Time: 1607  Time Calculation (min): 26 min    ASSESSMENT  SLP TX Intervention Outcome: Making Progress Towards Goals (Goals met)  Treatment Tolerance: Patient tolerated treatment well    Impressions: Pt's swallow function appears to have returned to baseline. No further SLP needs indicated at this time. Will DC.    Please re-consult SLP if further concerns are noted.    PLAN  Is MBSS recommended? No; no pharyngeal dysphagia suspected.  Recommended solid consistency: Regular (IDDSI level 7)  Recommended liquid consistency: Thin (IDDSI 0)  Recommended medication administration: Whole, in thin liquid  Safe swallow strategies:  - Upright positioning for all PO intake  - Slow rate of intake  - Chew thoroughly    Discharge recommendation: Home with no further SLP  Inpatient/Swing Bed or Outpatient: Inpatient  SLP TX Plan: Discharge from Speech Therapy  SLP Plan: No skilled SLP  Discussed POC: Patient  Patient/Caregiver Agreeable: Yes    SUBJECTIVE  Prior to Session Communication: Bedside nurse  RN cleared pt to participate in session and reported that pt is more alert today and has been tolerating her diet.  Respiratory status: Room air  Positioning: Upright in bed  Pt was alert, pleasant, and cooperative for session.    Pain Assessment  Pain Assessment: 0-10  0-10 (Numeric) Pain Score: 0 - No pain    Orientation: Oriented to self, Oriented to location, and Oriented to situation  Ability to follow functional commands: Follows complex commands after some simplification/repetition    OBJECTIVE  Therapeutic Swallow  Therapeutic Swallow Intervention : PO Trials  Solid Diet Recommendations: Regular (IDDSI Level 7)  Liquid Diet Recommendations: Thin (IDDSI Level 0)  Swallow Comments:    Pt consumed thin water via single and sequential " cup/straw sips x6-7oz, applesauce x3 bites, and trent cracker x3 bites. Pt fed self given occasional assist from SLP due to UE weakness. Mastication was mildly prolonged but appeared adequate. Oral clearance was adequate given liquid wash after dry cracker bolus. Pt did not successfully complete 3oz liquid challenge due to taking a break (she stated the water was too cold to swallow in a single attempt). No overt s/sx aspiration/penetration were observed with any trials.    Pt reported that she has hx of esophageal dysphagia with foods getting stuck in her throat occasionally; this has been ongoing for many years and pt has gone through workup for same. Pt stated that she has not had any difficulty today and that this seems to be at baseline.    Treatment/Education:  Results and recommendations were relayed to: Patient and Bedside nurse  Education provided: Yes   Learner: Patient   Barriers to learning: Acuteness of illness barrier   Method of teaching: Verbal   Topic: results of assessment and recommended safe swallow strategies   Outcome of teaching: Pt verbalized understanding    Goals (established 12/5/24):  Patient will complete ongoing assessment at bedside to determine PO diet advancement vs need for further assessment (I.e., MBSS).  Progress: MET     2. Patient/Caregiver will demonstrate knowledge of taught compensatory strategies and dietary consistency recommendations to optimize functional swallow function without overt clinical signs and symptoms of aspiration or dysphagia. (est 12/9/24)  Progress: Goal met this date; pt demonstrated understanding of need to take small bites and chew thoroughly

## 2024-12-12 NOTE — PROGRESS NOTES
"Occupational Therapy  Evaluation    Patient Name: Savita Snell \"FILOMENA\"  MRN: [id number]  Today's Date: 12/12/2024  Time Calculation  Start Time: 1030  Stop Time: 1054  Time Calculation (min): 24 min  3027/3027-A    Assessment  IP OT Assessment  OT Assessment: Patient demonstrates decreased independence with self care, decreased independence with functional transfers, decreased balance, decreased strength and decreased endurance.  Patient will benefit from skilled OT to address deficits.  Anticipate patient will benefit from moderate intensity therapy post hospital stay.  Prognosis: Good  Evaluation/Treatment Tolerance: Patient limited by pain  Medical Staff Made Aware: Yes  End of Session Communication: Bedside nurse  End of Session Patient Position: Bed, 3 rail up, Alarm on    Plan:  Treatment Interventions: ADL retraining, Functional transfer training, UE strengthening/ROM, Endurance training, Cognitive reorientation, Patient/family training  OT Frequency: 3 times per week  OT Discharge Recommendations: Moderate intensity level of continued care  Equipment Recommended upon Discharge: Wheeled walker  OT Recommended Transfer Status: Maximum assist, Assist of 2  OT - OK to Discharge: Yes (to next level of care when medically cleared by physician)    Subjective     Current Problem:  1. Sepsis with encephalopathy without septic shock, due to unspecified organism (Multi)        2. Acute pain of left knee  lidocaine 4 % patch      3. C. difficile colitis  vancomycin (Firvanq) 50 mg/mL oral solution      4. Acute renal failure, unspecified acute renal failure type (CMS-HCC)  Line Care      5. Dysphagia, unspecified type  Referral to Speech Therapy          General:  General  Reason for Referral: impaired self care  Referred By: Dr. Vanegas  Past Medical History Relevant to Rehab: A-fib on Eliquis (held since surgery), HTN, HLD, DM2, CHF, CKD, recent knee surgery  Missed Visit Reason: Patient in a medical procedure " (HD)  Co-Treatment: PT  Co-Treatment Reason: 2 person assist  Prior to Session Communication: Bedside nurse  Patient Position Received: Bed, 3 rail up, Alarm on  Preferred Learning Style: verbal  General Comment: Patient agreeable to therapy.    Precautions:  LE Weight Bearing Status: Weight Bearing as Tolerated  Medical Precautions: Fall precautions, contact plus precautions      Pain:  Pain Assessment  Pain Assessment: 0-10  0-10 (Numeric) Pain Score:  (pain in left knee but did not rate)    Objective     Cognition:  Orientation Level: Oriented X4        Home Living:  Home Living Comments: Patient from SNF.  Plans to return.     Prior Function:  Level of [place]: Needs assistance with ADLs, Needs assistance with homemaking, Needs assistance with functional transfers  Receives Help From:  (staff)  Prior Function Comments: Patient using walker for mobility.      ADL:  LE Dressing Assistance: Maximal  Toileting Assistance with Device: Total    Activity Tolerance:  Endurance: Tolerates 10 - 20 min exercise with multiple rests    Bed Mobility/Transfers:   Bed Mobility  Bed Mobility: Yes  Bed Mobility 1  Bed Mobility 1: Supine to sitting, Sitting to supine  Level of Assistance 1: Maximum assistance, +2  Transfers  Transfer: Yes  Transfer 1  Transfer From 1: Sit to  Transfer to 1: Stand  Technique 1: Sit to stand, Stand to sit  Transfer Device 1: Walker, Gait belt  Transfer Level of Assistance 1: Maximum assistance, +2  Trials/Comments 1: completed 2 trials, unable to maintain standing upright        Outcome Measures: [facility] Daily Activity  Putting on and taking off regular lower body clothing: Total  Bathing (including washing, rinsing, drying): A lot  Putting on and taking off regular upper body clothing: A little  Toileting, which includes using toilet, bedpan or urinal: Total  Taking care of personal grooming such as brushing teeth: A little  Eating Meals: None  Daily Activity - Total Score: 14          EDUCATION:  Education  Individual(s) Educated: Patient  Education Provided: Fall precautons, Risk and benefits of OT discussed with patient or other  Plan of Care Discussed and Agreed Upon: yes  Patient Response to Education: Patient/Caregiver Verbalized Understanding of Information    Goals:   Encounter Problems       Encounter Problems (Active)       OT Goals       Patient will complete functional transfers with mod A. (Progressing)       Start:  12/12/24    Expected End:  12/26/24            Patient will complete toileting with mod A. (Progressing)       Start:  12/12/24    Expected End:  12/26/24            Patient will complete LE dressing with mod A. (Progressing)       Start:  12/12/24    Expected End:  12/26/24

## 2024-12-12 NOTE — CONSULTS
"Subjective   Patient resting comfortably in bed and eating breakfast during examination. She remains hemodynamically stable and afebrile. She reports she tolerated dialysis well yesterday. She has no complaints today, but was feeling down after occupational therapy not going well today. She denies any chest pain, shortness of breath, abdominal pain, or dysuria. She continues to have diarrhea, with only one episode this morning. We discussed with nurses to remove her huynh catheter today.        Objective     Last Recorded Vitals:  Blood pressure 148/85, pulse 83, temperature 36.3 °C (97.3 °F), temperature source Temporal, resp. rate 20, height 1.676 m (5' 6\"), weight 86 kg (189 lb 9.5 oz), SpO2 98%.    I&O  I/O last 3 completed shifts:  In: 1020 (11.9 mL/kg) [P.O.:120; I.V.:550 (6.4 mL/kg); Other:350]  Out: 140 (1.6 mL/kg) [Urine:140 (0 mL/kg/hr)]  Weight: 86 kg   No intake/output data recorded.    Physical Exam  Constitutional:       Appearance: No acute distress. Appears well.  Cardiovascular:      Rate and Rhythm: Normal rate and regular rhythm. Systolic murmur  Pulmonary:      Effort: Pulmonary effort is normal.      Breath sounds: Normal breath sounds.   Abdominal:      General: Bowel sounds are normal.      Palpations: Abdomen is soft.      Tenderness: There is no abdominal tenderness.   Musculoskeletal:         General: No swelling.      Right lower leg: No edema.      Left lower leg: No edema.      Comments: Healing excision on L knee with steri strips. No erythema, pus, swelling   Skin:     General: Skin is warm and dry, however lower extremities cold this morning.  Neurological:      Mental Status: She is alert.   Psychiatric:      Comments: AOx3     Relevant results  Labs  Results for orders placed or performed during the hospital encounter of 12/04/24 (from the past 24 hours)   POCT GLUCOSE   Result Value Ref Range    POCT Glucose 137 (H) 74 - 99 mg/dL   POCT GLUCOSE   Result Value Ref Range    POCT " Glucose 95 74 - 99 mg/dL   PST Top   Result Value Ref Range    Extra Tube Hold for add-ons.    Hepatitis C antibody   Result Value Ref Range    Hepatitis C AB Nonreactive Nonreactive   Hepatitis B Core Antibody, Total   Result Value Ref Range    Hepatitis B Core AB- Total Nonreactive Nonreactive   POCT GLUCOSE   Result Value Ref Range    POCT Glucose 152 (H) 74 - 99 mg/dL   POCT GLUCOSE   Result Value Ref Range    POCT Glucose 131 (H) 74 - 99 mg/dL   POCT GLUCOSE   Result Value Ref Range    POCT Glucose 103 (H) 74 - 99 mg/dL   CBC   Result Value Ref Range    WBC 11.8 (H) 4.4 - 11.3 x10*3/uL    nRBC 0.0 0.0 - 0.0 /100 WBCs    RBC 2.97 (L) 4.00 - 5.20 x10*6/uL    Hemoglobin 8.7 (L) 12.0 - 16.0 g/dL    Hematocrit 28.7 (L) 36.0 - 46.0 %    MCV 97 80 - 100 fL    MCH 29.3 26.0 - 34.0 pg    MCHC 30.3 (L) 32.0 - 36.0 g/dL    RDW 14.0 11.5 - 14.5 %    Platelets 158 150 - 450 x10*3/uL   Comprehensive metabolic panel   Result Value Ref Range    Glucose 107 (H) 74 - 99 mg/dL    Sodium 138 136 - 145 mmol/L    Potassium 3.8 3.5 - 5.3 mmol/L    Chloride 101 98 - 107 mmol/L    Bicarbonate 28 21 - 32 mmol/L    Anion Gap 13 10 - 20 mmol/L    Urea Nitrogen 37 (H) 6 - 23 mg/dL    Creatinine 3.75 (H) 0.50 - 1.05 mg/dL    eGFR 11 (L) >60 mL/min/1.73m*2    Calcium 7.2 (L) 8.6 - 10.3 mg/dL    Albumin 2.6 (L) 3.4 - 5.0 g/dL    Alkaline Phosphatase 126 33 - 136 U/L    Total Protein 4.9 (L) 6.4 - 8.2 g/dL    AST 47 (H) 9 - 39 U/L    Bilirubin, Total 0.5 0.0 - 1.2 mg/dL    ALT 71 (H) 7 - 45 U/L   Magnesium   Result Value Ref Range    Magnesium 2.22 1.60 - 2.40 mg/dL   Phosphorus   Result Value Ref Range    Phosphorus 3.0 2.5 - 4.9 mg/dL   POCT GLUCOSE   Result Value Ref Range    POCT Glucose 105 (H) 74 - 99 mg/dL       Assessment/Plan   This is a 84 y.o. female with past medical history of paroxysmal atrial fibrillation on Eliquis, HFp EF, obesity, CKD stage IV, DMII, glaucoma, history of C. difficile, hypertension and recent left total knee  replacement presenting to [facility] with altered mental status, currently on oral vancomycin for acute C. Difficile infection and AIDAN on CKD stage IV      #Oliguric prerenal AIDAN/Ischemic ATN likely 2/2 dehydration and C. Diff diarrhea + contrast induced nephrotoxicity  # History of CKD stage IV 2/2 hypertensive nephrosclerosis  - Creatinine downtrended to 3.75 after dialysis  - Baseline creatinine 2.3-2.4  Plan:  - Remove huynh catheter today  - Resume home lasix and monitor urine output  - Continue HD MWF  - Monitor for renal recovery  - Plan on discharge to [facility] and continue dialysis there. Will inform outpatient nephrologist     #Hypocalcemia 2/2 hyperphosphatemia and CKD   #Hypoalbuminemia  - Improving post dialysis sessions. Corrected calcium is 8.3 today.  - PTH elevated appropriately  - Hyperphosphatemia resolved  Plan:  - Continue with HD and monitoring Ca2+ and Phos levels    Patient was seen and case discussed with the attending.  NAHUM MOODY-4  Date: December 12, 2024

## 2024-12-12 NOTE — NURSING NOTE
Patient arrived from [facility] at this time to room 3027 on [address]. Patient stable upon arrival to floor.

## 2024-12-12 NOTE — PROGRESS NOTES
Savita Snell  24  [id number]  America Vanegas MD  This is a patient with a past medical history as detailed below admitted to the  ED from nursing home [facility], wound care nurse went in to complete patient dressing and patient was unresponsive and unable to move limbs. Patient last known normal was this morning at 9am when patient received morning medications. Patient is alert and oriented x3-4 at baseline per facility. Is on eliquis and has tremors due to history of parkinsons. Being sent to ED for eval.    Patient remained to be in the CCU remained to be on hemodialysis discussed with the nephrology service her urine output started to improve however she still requiring hemodialysis she is more awake speech therapy saw the patient heart rate is well-controlled after adding IV beta-blocker evaluated by nephrology pulmonary critical care and cardiology service had an MRI of the brain which was negative for acute infarct or bleed      Assessment and plan   1-sepsis    /C. difficile colitis remained to be on contact precaution blood pressure seem to be soft remained to be in the CCU initially placed on Flagyl and vancomycin subsequently changed to  Vancomycin pulse taper course as the followin mg 4 times daily for 1 week followed by 125 mg 3 times daily for 1 week followed by 125 mg twice daily for 1 week followed by 125 mg daily for 1 week followed by 125 mg every other day for 1 week.    Monitor for any fever monitor MAP nutrition support  2-acute renal failure could be related to ATN IV fluids has been given Mckeon catheter has been placed monitor urine output strict I's and O's and weight avoid any nephrotoxic agent consulted nephrology   discussed with the nephrology service at the bedside the patient continued to require hemodialysis   monitor electrolytes   3- acute respiratory failure with hypoxia CT chest rule out pulmonary embolism negative monitor pulse ox O2 support to keep  pulse ox above 92% try to wean down oxygen   4-elevated LFTs had a CT abdomen trend CMP    6-change of mental status likely metabolic encephalopathy  CT brain CTA monitor neurostatus aspiration precaution .  Status post neurology consultation MRI of the brain  7-status post left total knee replacement   8 -RVR started IV beta-blocker monitor.  Consulted  cardiology .  Rate is controlled status post cardiology consult  9-debility nutrition support had a [place] tube feed monitor intake   Discussed with the consultants.         Epic& consultants notes reviewed  Most recent images Reviewed  Last EKG/Rhythm reviewed      Vital signs has been reviewed  Sleepy open eyes to stimuli some answer question by yes or no does not follow commands   SKIN:  No rashes .   ENT mucosa,  Normal/nose normal   NECK: no jugulovenous distention  NO lymphadenopathy   LUNGS: Diminished air entry bilateral  CARDIAC:  S1 and S2 tachy    ABDOMEN: Abdomen soft, non-tender.    EXTREMITIES: Extremities normal.   NEURO: Open eyes follow simple commands neuro  nonfocal  PULSES: + pedal / radial                       Past Medical History  She has a past medical history of Afib (Multi), Arthritis, Balance problem, C. difficile colitis, Cataract, CKD (chronic kidney disease), stage III (Multi), COVID-19, Diabetic neuropathy (Multi), Endometrial cancer (Multi), Falls, Glaucoma, Heart failure with preserved ejection fraction, Hypertension, Hypokalemia, Obesity, Type 2 diabetes mellitus, and Venous ulcer (Multi).    Surgical History  She has a past surgical history that includes Cataract extraction; Joint replacement; Cholecystectomy; Hysterectomy; Other surgical history; Other surgical history (Bilateral); Appendectomy; Tonsillectomy; Colonoscopy; Upper gastrointestinal endoscopy; and Total knee arthroplasty (Left).     Social History  She reports that she has quit smoking. Her smoking use included cigarettes. She has never used smokeless tobacco. She  reports that she does not currently use alcohol. She reports that she does not use drugs.  Case has been discussed with consultants /critical care team.  Pt seen and examined  cct 32 minutes I have  gone over labs  most recent EKG/Rhythm /images/X ray,case .D/W RN and consultants,the patient has a high probability of sudden and clinical significant deterioration which requires the highest level of care & intervene urgently.    Family History  Family History   Problem Relation Name Age of Onset    Heart failure Mother      Other (MI) Mother      Stroke Father      Hyperlipidemia Sister      Hypertension Sister               America Vanegas MD

## 2024-12-12 NOTE — NURSING NOTE
0700: Assumed care of pt. At this time    1220: Pt. Mckeon catheter removed at this time. The pt. Tolerated well and there was 10ml saline removed from the balloon. Pt. Had purewick placed. Will follow throughout the duration of this shift. Call light in reach.    1600: ST at the bedside at this time to work with the pt. She reports that the pt. Is feeling much better than she was and she was able to tolerate swallowing and communicating much better. Call light in reach and pt. Remains in the bed. Will follow throughout the duration of this shift.     1845: Pt. Stable throughout this shift and was able to have uneventful day today. The pt. Still very weak and was unable to work with therapy much. The pt. Still pending a DC plan waiting for dialysis chair time. Will follow for the rest of this shift. Call light in reach and bed in lowest position

## 2024-12-12 NOTE — NURSING NOTE
Patient has had an uneventful night since arrival from [facility]. Patient neuro checks have been unchanged. Patient vital signs stable. Patient safety maintained.

## 2024-12-12 NOTE — PROGRESS NOTES
"Physical Therapy    Physical Therapy Evaluation & Treatment    Patient Name: Savita Snell \"FILOMENA\"  MRN: [id number]  Today's Date: 12/12/2024   Time Calculation  Start Time: 1034  Stop Time: 1057  Time Calculation (min): 23 min  3027/3027-A    Assessment/Plan   PT Assessment  PT Assessment Results: Decreased strength, Decreased range of motion, Decreased endurance, Impaired balance, Decreased mobility, Decreased safety awareness, Pain  Rehab Prognosis: Good  Evaluation/Treatment Tolerance: Patient limited by fatigue, Patient limited by pain  Medical Staff Made Aware: Yes  End of Session Communication: Bedside nurse  Assessment Comment: Pt is a 83 y/o female admitted for unresponsive episode at nursing facility. Pt presents with pain, decreased strength, endurance and balance. Pt able to tolerate bed mobility and transfer attempts. She is requiring a significant amount of assistance for mobility at this time. She is functioning below baseline level of function and will benefit from skilled therapy during stay to improve overall functional mobility and safety awareness. Upon discharge pt will benefit from moderate intensity therapy for continued improvement in functional mobility.     End of Session Patient Position: Bed, 3 rail up, Alarm on (call light within reach)  IP OR SWING BED PT PLAN  Inpatient or Swing Bed: Inpatient  PT Plan  Treatment/Interventions: Bed mobility, Transfer training, Gait training, Stair training, Balance training, Neuromuscular re-education, Endurance training, Strengthening, Range of motion, Therapeutic exercise, Therapeutic activity, Home exercise program, Positioning, Postural re-education  PT Plan: Ongoing PT  PT Frequency: 4 times per week  PT Discharge Recommendations: Moderate intensity level of continued care  Equipment Recommended upon Discharge: Wheeled walker  PT Recommended Transfer Status: Assist x2, Assistive device (max A x2)  PT - OK to Discharge: Yes (Once medically " cleared to next level of care)    Current Problem:  Patient Active Problem List   Diagnosis    Unilateral primary osteoarthritis, left knee    Pain in left knee    S/P TKR (total knee replacement), left    Acute kidney injury superimposed on CKD (CMS-HCC)    Metabolic encephalopathy    Colitis    Metabolic acidosis    Acute hypoxic respiratory failure (Multi)    Total knee replacement status, left    Sepsis with acute renal failure and septic shock (Multi)    Elevated LFTs    Moderate protein-calorie malnutrition (Multi)     Subjective     General Visit Information:  General  Reason for Referral: Presents from [facility] after wound care nurse went in to complete patient dressing and patient was unresponsive and unable to move limbs.  Referred By: Dr. Vanegas  Past Medical History Relevant to Rehab: paroxysmal atrial fibrillation on Eliquis, HFp EF, obesity, CKD stage III, DMII , glaucoma, history of C. difficile, hypertension and recent left total knee replacement 11/26 at [facility]  PT Missed Visit: Yes  Missed Visit Reason: Patient in a medical procedure (Orders received and chart review completed. Attempted to work with pt and pt receiving dialysis at this time. Will continue to follow up with pt as appropriate.)  Family/Caregiver Present: No  Co-Treatment: OT  Co-Treatment Reason: To maximize pt safety with functional mobility and discharge planning  Prior to Session Communication: Bedside nurse  Patient Position Received: Bed, 3 rail up, Alarm on  Preferred Learning Style: verbal  General Comment: Pt pleasant and agreeable to work with therapy.    Home Living/PLOF:  Home Living  Home Living Comments: Pt admitted for [facility] where she was receiving rehba after having L TKA on 11/26. Prior to surgery pt lived at home with her spouse, first floor set up with 3 RAJENDRA to enter with HR. WIS, seat and gbs. Independent with ADLs and used a RW for mobility. (-) drives, (-)  falls    Precautions:  Precautions  LE Weight Bearing Status: Weight Bearing as Tolerated (LLE)  Medical Precautions: Fall precautions, Oxygen therapy device and L/min (3L O2 via NC)  Post-Surgical Precautions: Left total knee precautions  Precautions Comment: Contact + precautions (c-diff)    Objective     Pain:  Pain Assessment  Pain Assessment: 0-10  0-10 (Numeric) Pain Score:  (Pt did not numerically rank pain, increased pain with movement)  Pain Location: Knee  Pain Orientation: Left  Pain Interventions: Repositioned    Cognition:  Cognition  Overall Cognitive Status: Within Functional Limits  Orientation Level: Oriented X4    General Assessments:  General Observation  General Observation: Mckeon     Activity Tolerance  Endurance: Tolerates 30 min exercise with multiple rests    Sensation  Light Touch: No apparent deficits  Sensation Comment: Pt denies any n/t.     Postural Control  Trunk Control: Multiple episodes of retro leaning/LOB when seated EOB requiring assist to maintain trunk in midline  Righting Reactions: Impaired  Protective Responses: Impaired    Static Sitting Balance  Static Sitting-Balance Support: Bilateral upper extremity supported, Feet supported  Static Sitting-Level of Assistance: Contact guard, Minimum assistance  Static Sitting-Comment/Number of Minutes: Bouts of min A to assist pt in righting to midline as she demo'd episodes of retro leaning/LOB, sat EOB for ~10 min total  Static Standing Balance  Static Standing-Balance Support: Bilateral upper extremity supported  Static Standing-Level of Assistance: Maximum assistance (x2)  Dynamic Standing Balance  Dynamic Standing-Balance Support: Bilateral upper extremity supported  Dynamic Standing-Level of Assistance: Maximum assistance (x2)    Functional Assessments:     Bed Mobility  Bed Mobility: Yes  Bed Mobility 1  Bed Mobility 1: Supine to sitting, Scooting  Level of Assistance 1: Maximum assistance, +2, Maximum verbal cues, Maximum  tactile cues  Bed Mobility Comments 1: HOB elevated and use of bed HR. Required assist with LEs, trunk and scooting glutes towards EOB with use of draw sheets. Verbal/tactile cues for proper hand placement and body mechanics.  Bed Mobility 2  Bed Mobility  2: Sitting to supine  Level of Assistance 2: Dependent, +2  Bed Mobility 3  Bed Mobility 3: Scooting  Level of Assistance 3: Dependent, +2  Bed Mobility Comments 3: Boost in supine x2 trials with use of draw sheets    Transfers  Transfer: Yes (Pt may benefit from use of faustino stedy for transfers, although pt still has healing incisions on LLE from knee surgery so would need some type of padding in this area.)  Transfer 1  Transfer From 1: Bed to, Stand to  Transfer to 1: Stand, Bed  Technique 1: Sit to stand, Stand to sit  Transfer Device 1: Walker, Gait belt  Transfer Level of Assistance 1: Maximum assistance, Maximum verbal cues, Maximum tactile cues, +2  Trials/Comments 1: x2 attempts, pt unable to acheive full standing position as demonstrated through increased hip/trunk flex. Pt achieved ~25% of stand. VCs for proper hand placement and body mechanics.    Extremity/Trunk Assessments:  RUE   RUE : Within Functional Limits  LUE   LUE: Within Functional Limits  RLE   RLE :  (AROM WFL. Grossly 3+/5 strength)  LLE   LLE :  (Knee flex/ext AROM limited 2/2 recent knee surgery. Grossly 3+/5 strength.)    Treatments:  Pt able to tolerate the following activities during today's treatment session. Pt instructed/educated throughout the session on safety awareness, body mechanics and proper hand placement.     Therapeutic Exercise  Therapeutic Exercise Performed: Yes  Therapeutic Exercise Activity 1: Supine: AP x5 BL, QS x5 BL, GS x5  Therapeutic Exercise Activity 2: Seated EOB: LAQ x5 BL  Therapeutic Exercise Activity 3: Pt educated on various supine/seated exercises to complete throughout the day to continue to improve LE strength/ROM.    Therapeutic Activity  Therapeutic  Activity Performed: Yes  Therapeutic Activity 1: Static sitting EOB for ~10 min with UE/LE support CGA with bouts of min A to assist pt in maintaining trunk in midline as she demo'd episodes of retro leaning/LOB.     Bed Mobility  Bed Mobility: Yes  Bed Mobility 1  Bed Mobility 1: Supine to sitting, Scooting  Level of Assistance 1: Maximum assistance, +2, Maximum verbal cues, Maximum tactile cues  Bed Mobility Comments 1: HOB elevated and use of bed HR. Required assist with LEs, trunk and scooting glutes towards EOB with use of draw sheets. Verbal/tactile cues for proper hand placement and body mechanics.  Bed Mobility 2  Bed Mobility  2: Sitting to supine  Level of Assistance 2: Dependent, +2  Bed Mobility 3  Bed Mobility 3: Scooting  Level of Assistance 3: Dependent, +2  Bed Mobility Comments 3: Boost in supine x2 trials with use of draw sheets     Transfers  Transfer: Yes (Pt may benefit from use of faustino stedy for transfers, although pt still has healing incisions on LLE from knee surgery so would need some type of padding in this area.)  Transfer 1  Transfer From 1: Bed to, Stand to  Transfer to 1: Stand, Bed  Technique 1: Sit to stand, Stand to sit  Transfer Device 1: Walker, Gait belt  Transfer Level of Assistance 1: Maximum assistance, Maximum verbal cues, Maximum tactile cues, +2  Trials/Comments 1: x2 attempts, pt unable to acheive full standing position as demonstrated through increased hip/trunk flex. Pt achieved ~25% of stand. VCs for proper hand placement and body mechanics.    Outcome Measures:  [facility] Basic Mobility  Turning from your back to your side while in a flat bed without using bedrails: A lot  Moving from lying on your back to sitting on the side of a flat bed without using bedrails: A lot  Moving to and from bed to chair (including a wheelchair): Total  Standing up from a chair using your arms (e.g. wheelchair or bedside chair): A lot  To walk in hospital room: Total  Climbing 3-5 steps with  railing: Total  Basic Mobility - Total Score: 9     Goals:  Encounter Problems       Encounter Problems (Active)       Balance       Pt will demonstrate static/dynamic sitting balance for >/= 10 min close supervision with UE/LE support without evidence of retro/lateral leaning or LOB.        Start:  12/12/24    Expected End:  12/26/24               Mobility       Pt will be able to ambulate >/= 4 steps (forward, lateral, backward) with FWW max A x2 with good safety awareness.        Start:  12/12/24    Expected End:  12/26/24            Pt will complete supine, seated and standing exercises to maintain/improve overall strength with minimal verbal cues.         Start:  12/12/24    Expected End:  12/26/24               PT Transfers       Pt will be able to complete all bed mobility tasks mod A with use of bed HR.        Start:  12/12/24    Expected End:  12/26/24            Pt will be able to complete all transfers with FWW mod A x2 demonstrating good safety awareness and proper body mechanics.        Start:  12/12/24    Expected End:  12/26/24                 Education Documentation  Home Exercise Program, taught by Abigail White PT at 12/12/2024  2:46 PM.  Learner: Patient  Readiness: Acceptance  Method: Explanation  Response: Verbalizes Understanding, Demonstrated Understanding    Handouts, taught by Abigail White PT at 12/12/2024  2:46 PM.  Learner: Patient  Readiness: Acceptance  Method: Explanation  Response: Verbalizes Understanding, Demonstrated Understanding    Precautions, taught by Abigail White PT at 12/12/2024  2:46 PM.  Learner: Patient  Readiness: Acceptance  Method: Explanation  Response: Verbalizes Understanding, Demonstrated Understanding    Mobility Training, taught by Abigail White PT at 12/12/2024  2:46 PM.  Learner: Patient  Readiness: Acceptance  Method: Explanation  Response: Verbalizes Understanding, Demonstrated Understanding    Education Comments  No comments  found.

## 2024-12-13 ENCOUNTER — APPOINTMENT (OUTPATIENT)
Dept: DIALYSIS | Facility: HOSPITAL | Age: 84
End: 2024-12-13
Payer: MEDICARE

## 2024-12-13 ENCOUNTER — APPOINTMENT (OUTPATIENT)
Dept: RADIOLOGY | Facility: HOSPITAL | Age: 84
DRG: 871 | End: 2024-12-13
Payer: MEDICARE

## 2024-12-13 LAB
ALBUMIN SERPL BCP-MCNC: 2.5 G/DL (ref 3.4–5)
ALP SERPL-CCNC: 107 U/L (ref 33–136)
ALT SERPL W P-5'-P-CCNC: 59 U/L (ref 7–45)
ANION GAP SERPL CALC-SCNC: 14 MMOL/L (ref 10–20)
AST SERPL W P-5'-P-CCNC: 33 U/L (ref 9–39)
BILIRUB SERPL-MCNC: 0.5 MG/DL (ref 0–1.2)
BUN SERPL-MCNC: 47 MG/DL (ref 6–23)
CALCIUM SERPL-MCNC: 7.2 MG/DL (ref 8.6–10.3)
CHLORIDE SERPL-SCNC: 101 MMOL/L (ref 98–107)
CO2 SERPL-SCNC: 26 MMOL/L (ref 21–32)
CREAT SERPL-MCNC: 4.78 MG/DL (ref 0.5–1.05)
EGFRCR SERPLBLD CKD-EPI 2021: 9 ML/MIN/1.73M*2
ERYTHROCYTE [DISTWIDTH] IN BLOOD BY AUTOMATED COUNT: 14.2 % (ref 11.5–14.5)
GLUCOSE BLD MANUAL STRIP-MCNC: 114 MG/DL (ref 74–99)
GLUCOSE BLD MANUAL STRIP-MCNC: 114 MG/DL (ref 74–99)
GLUCOSE BLD MANUAL STRIP-MCNC: 141 MG/DL (ref 74–99)
GLUCOSE BLD MANUAL STRIP-MCNC: 159 MG/DL (ref 74–99)
GLUCOSE BLD MANUAL STRIP-MCNC: 85 MG/DL (ref 74–99)
GLUCOSE BLD MANUAL STRIP-MCNC: 99 MG/DL (ref 74–99)
GLUCOSE SERPL-MCNC: 115 MG/DL (ref 74–99)
HCT VFR BLD AUTO: 28 % (ref 36–46)
HGB BLD-MCNC: 8.3 G/DL (ref 12–16)
MAGNESIUM SERPL-MCNC: 2.23 MG/DL (ref 1.6–2.4)
MCH RBC QN AUTO: 28.8 PG (ref 26–34)
MCHC RBC AUTO-ENTMCNC: 29.6 G/DL (ref 32–36)
MCV RBC AUTO: 97 FL (ref 80–100)
NRBC BLD-RTO: 0 /100 WBCS (ref 0–0)
PHOSPHATE SERPL-MCNC: 4.5 MG/DL (ref 2.5–4.9)
PLATELET # BLD AUTO: 196 X10*3/UL (ref 150–450)
POTASSIUM SERPL-SCNC: 3.9 MMOL/L (ref 3.5–5.3)
PROT SERPL-MCNC: 4.8 G/DL (ref 6.4–8.2)
RBC # BLD AUTO: 2.88 X10*6/UL (ref 4–5.2)
SODIUM SERPL-SCNC: 137 MMOL/L (ref 136–145)
WBC # BLD AUTO: 13.7 X10*3/UL (ref 4.4–11.3)

## 2024-12-13 PROCEDURE — 71045 X-RAY EXAM CHEST 1 VIEW: CPT | Performed by: STUDENT IN AN ORGANIZED HEALTH CARE EDUCATION/TRAINING PROGRAM

## 2024-12-13 PROCEDURE — 85027 COMPLETE CBC AUTOMATED: CPT | Performed by: NURSE PRACTITIONER

## 2024-12-13 PROCEDURE — 8010000001 HC DIALYSIS - HEMODIALYSIS PER DAY

## 2024-12-13 PROCEDURE — 83735 ASSAY OF MAGNESIUM: CPT

## 2024-12-13 PROCEDURE — 84075 ASSAY ALKALINE PHOSPHATASE: CPT | Performed by: NURSE PRACTITIONER

## 2024-12-13 PROCEDURE — 36415 COLL VENOUS BLD VENIPUNCTURE: CPT | Performed by: NURSE PRACTITIONER

## 2024-12-13 PROCEDURE — 84100 ASSAY OF PHOSPHORUS: CPT

## 2024-12-13 PROCEDURE — 1200000002 HC GENERAL ROOM WITH TELEMETRY DAILY

## 2024-12-13 PROCEDURE — 2500000001 HC RX 250 WO HCPCS SELF ADMINISTERED DRUGS (ALT 637 FOR MEDICARE OP)

## 2024-12-13 PROCEDURE — 82947 ASSAY GLUCOSE BLOOD QUANT: CPT

## 2024-12-13 PROCEDURE — 2500000001 HC RX 250 WO HCPCS SELF ADMINISTERED DRUGS (ALT 637 FOR MEDICARE OP): Performed by: NURSE PRACTITIONER

## 2024-12-13 PROCEDURE — 2500000001 HC RX 250 WO HCPCS SELF ADMINISTERED DRUGS (ALT 637 FOR MEDICARE OP): Performed by: PHYSICIAN ASSISTANT

## 2024-12-13 PROCEDURE — 2500000005 HC RX 250 GENERAL PHARMACY W/O HCPCS: Performed by: NURSE PRACTITIONER

## 2024-12-13 PROCEDURE — 71045 X-RAY EXAM CHEST 1 VIEW: CPT

## 2024-12-13 PROCEDURE — 2500000004 HC RX 250 GENERAL PHARMACY W/ HCPCS (ALT 636 FOR OP/ED): Performed by: INTERNAL MEDICINE

## 2024-12-13 RX ORDER — OXYCODONE HYDROCHLORIDE 5 MG/1
5 TABLET ORAL ONCE
Status: COMPLETED | OUTPATIENT
Start: 2024-12-13 | End: 2024-12-13

## 2024-12-13 ASSESSMENT — COGNITIVE AND FUNCTIONAL STATUS - GENERAL
MOBILITY SCORE: 9
MOVING FROM LYING ON BACK TO SITTING ON SIDE OF FLAT BED WITH BEDRAILS: A LITTLE
STANDING UP FROM CHAIR USING ARMS: TOTAL
TOILETING: A LOT
CLIMB 3 TO 5 STEPS WITH RAILING: TOTAL
DRESSING REGULAR LOWER BODY CLOTHING: A LOT
MOVING TO AND FROM BED TO CHAIR: TOTAL
WALKING IN HOSPITAL ROOM: TOTAL
DRESSING REGULAR UPPER BODY CLOTHING: A LOT
HELP NEEDED FOR BATHING: A LOT
TURNING FROM BACK TO SIDE WHILE IN FLAT BAD: A LOT
DAILY ACTIVITIY SCORE: 14
EATING MEALS: A LITTLE
PERSONAL GROOMING: A LITTLE

## 2024-12-13 ASSESSMENT — PAIN - FUNCTIONAL ASSESSMENT
PAIN_FUNCTIONAL_ASSESSMENT: NO/DENIES PAIN
PAIN_FUNCTIONAL_ASSESSMENT: 0-10

## 2024-12-13 ASSESSMENT — PAIN DESCRIPTION - ORIENTATION: ORIENTATION: LEFT

## 2024-12-13 ASSESSMENT — PAIN SCALES - GENERAL
PAINLEVEL_OUTOF10: 0 - NO PAIN
PAINLEVEL_OUTOF10: 4
PAINLEVEL_OUTOF10: 0 - NO PAIN

## 2024-12-13 ASSESSMENT — PAIN DESCRIPTION - LOCATION: LOCATION: KNEE

## 2024-12-13 NOTE — PROGRESS NOTES
Julissa at [facility] can start dialysis Tuesday, pending chest xray and auth. Confirmed with Dr. Urbina that patient can skip dialysis Monday and start Tuesday.   Asked [facility] team to start auth.     [id number]  Chest xray completed and sent to [facility]

## 2024-12-13 NOTE — CARE PLAN
The patient's goals for the shift include      The clinical goals for the shift include See plan of care      Problem: Skin  Goal: Decreased wound size/increased tissue granulation at next dressing change  Outcome: Progressing  Flowsheets (Taken 12/12/2024 2210)  Decreased wound size/increased tissue granulation at next dressing change: Promote sleep for wound healing  Goal: Participates in plan/prevention/treatment measures  Outcome: Progressing  Flowsheets (Taken 12/12/2024 2210)  Participates in plan/prevention/treatment measures:   Elevate heels   Discuss with provider PT/OT consult  Goal: Promote/optimize nutrition  Outcome: Progressing  Flowsheets (Taken 12/12/2024 2210)  Promote/optimize nutrition: Monitor/record intake including meals  Goal: Promote skin healing  Outcome: Progressing  Flowsheets (Taken 12/12/2024 2210)  Promote skin healing:   Assess skin/pad under line(s)/device(s)   Turn/reposition every 2 hours/use positioning/transfer devices     Problem: Diabetes  Goal: Achieve decreasing blood glucose levels by end of shift  Outcome: Progressing  Goal: Increase stability of blood glucose readings by end of shift  Outcome: Progressing  Goal: Decrease in ketones present in urine by end of shift  Outcome: Progressing  Goal: Maintain electrolyte levels within acceptable range throughout shift  Outcome: Progressing  Goal: Learn about and adhere to nutrition recommendations by end of shift  Outcome: Progressing  Goal: Increase self care and/or family involovement by end of shift  Outcome: Progressing  Goal: Receive DSME education by end of shift  Outcome: Progressing     Problem: Discharge Planning  Goal: Discharge to home or other facility with appropriate resources  Outcome: Progressing     Problem: Chronic Conditions and Co-morbidities  Goal: Patient's chronic conditions and co-morbidity symptoms are monitored and maintained or improved  Outcome: Progressing     Problem: Fall/Injury  Goal: Use assistive  devices by end of the shift  Outcome: Progressing  Goal: Pace activities to prevent fatigue by end of the shift  Outcome: Progressing     Patient has had an uneventful night. Patient vital signs stable. Patient safety maintained.

## 2024-12-13 NOTE — PROGRESS NOTES
12/13/24 0844   Discharge Planning   Expected Discharge Disposition SNF  (ONNR)     ONNR SNF is requesting a chest xray for the HD approval. Notified NP and nursing. Patient will need precert for SNF once the HD is approved.

## 2024-12-13 NOTE — CONSULTS
"Subjective   Patient getting dialysis during examination. She reports she was feeling fair. She reports that she had some middle abdominal pain, felt more bloated, and passed a lot of gas. She had 2 episodes of diarrhea yesterday, but it is getting more solid. She denied chest pain, shortness of breath, or dysuria. She has an external catheter in place and is unsure of her urine output. She remained hemodynamically stable and afebrile.       Objective     Last Recorded Vitals:  Blood pressure 125/54, pulse 72, temperature 36.6 °C (97.9 °F), temperature source Temporal, resp. rate 20, height 1.676 m (5' 6\"), weight 86 kg (189 lb 9.5 oz), SpO2 100%.    I&O  I/O last 3 completed shifts:  In: 120 (1.4 mL/kg) [P.O.:120]  Out: 20 (0.2 mL/kg) [Urine:20 (0 mL/kg/hr)]  Weight: 86 kg   I/O this shift:  In: 200 [I.V.:200]  Out: -     Physical Exam  Constitutional:       General: She is not in acute distress.  HENT:      Mouth/Throat:      Mouth: Mucous membranes are moist.   Cardiovascular:      Rate and Rhythm: Normal rate and regular rhythm.      Heart sounds: Murmur heard.      Comments: Systolic murmur  Pulmonary:      Effort: Pulmonary effort is normal.      Breath sounds: Normal breath sounds.   Abdominal:      General: There is distension.      Tenderness: There is abdominal tenderness.      Comments: Mild tenderness to palpation of epigastric region   Musculoskeletal:      Right lower leg: No edema.      Left lower leg: No edema.   Skin:     General: Skin is warm and dry.   Neurological:      Mental Status: She is alert and oriented to person, place, and time.         Relevant results  Labs  Results for orders placed or performed during the hospital encounter of 12/04/24 (from the past 24 hours)   POCT GLUCOSE   Result Value Ref Range    POCT Glucose 123 (H) 74 - 99 mg/dL   POCT GLUCOSE   Result Value Ref Range    POCT Glucose 171 (H) 74 - 99 mg/dL   CBC   Result Value Ref Range    WBC 13.7 (H) 4.4 - 11.3 x10*3/uL    " nRBC 0.0 0.0 - 0.0 /100 WBCs    RBC 2.88 (L) 4.00 - 5.20 x10*6/uL    Hemoglobin 8.3 (L) 12.0 - 16.0 g/dL    Hematocrit 28.0 (L) 36.0 - 46.0 %    MCV 97 80 - 100 fL    MCH 28.8 26.0 - 34.0 pg    MCHC 29.6 (L) 32.0 - 36.0 g/dL    RDW 14.2 11.5 - 14.5 %    Platelets 196 150 - 450 x10*3/uL   Comprehensive metabolic panel   Result Value Ref Range    Glucose 115 (H) 74 - 99 mg/dL    Sodium 137 136 - 145 mmol/L    Potassium 3.9 3.5 - 5.3 mmol/L    Chloride 101 98 - 107 mmol/L    Bicarbonate 26 21 - 32 mmol/L    Anion Gap 14 10 - 20 mmol/L    Urea Nitrogen 47 (H) 6 - 23 mg/dL    Creatinine 4.78 (H) 0.50 - 1.05 mg/dL    eGFR 9 (L) >60 mL/min/1.73m*2    Calcium 7.2 (L) 8.6 - 10.3 mg/dL    Albumin 2.5 (L) 3.4 - 5.0 g/dL    Alkaline Phosphatase 107 33 - 136 U/L    Total Protein 4.8 (L) 6.4 - 8.2 g/dL    AST 33 9 - 39 U/L    Bilirubin, Total 0.5 0.0 - 1.2 mg/dL    ALT 59 (H) 7 - 45 U/L   Magnesium   Result Value Ref Range    Magnesium 2.23 1.60 - 2.40 mg/dL   Phosphorus   Result Value Ref Range    Phosphorus 4.5 2.5 - 4.9 mg/dL   POCT GLUCOSE   Result Value Ref Range    POCT Glucose 114 (H) 74 - 99 mg/dL       Assessment/Plan   This is a 84 y.o. female with past medical history of paroxysmal atrial fibrillation on Eliquis, HFp EF, obesity, CKD stage IV, DMII, glaucoma, history of C. difficile, hypertension and recent left total knee replacement presenting to [facility] with altered mental status, currently on oral vancomycin for acute C. Difficile infection and AIDAN on CKD stage IV      #Oliguric prerenal AIDAN/Ischemic ATN likely 2/2 dehydration and C. Diff diarrhea + contrast induced nephrotoxicity  # History of CKD stage IV 2/2 hypertensive nephrosclerosis  - Creatinine increased to 4.78 from 3.75  - Baseline creatinine 2.3-2.4  Plan:  - Continue home lasix and monitor urine output  - Receiving dialysis today. Continue HD MWF  - Monitor for renal recovery  - Plan on discharge to [facility] and continue dialysis there. Will  start dialysis there on Tuesday.     #Hypocalcemia 2/2 hyperphosphatemia and CKD   #Hypoalbuminemia  - Improving post dialysis sessions. Corrected calcium is 8.4 today.  - PTH elevated appropriately  - Hyperphosphatemia resolved  Plan:  - Continue with HD and monitoring Ca2+ and Phos levels      Patient was seen and case discussed with the attending.  NAHUM MOODY-4  Date: December 13, 2024

## 2024-12-13 NOTE — PROGRESS NOTES
"Nutrition Follow Up Assessment:   Nutrition Assessment         Nutrition Note:  Savita Snell \"FILOMENA\" is a 84 y.o. female presenting 12/4 from SNF with altered mental status. Pt found to have watery diarrhea and evidence of colitis on CT abdomen and pelvis; + severe C. difficile infection; ID involved in pt care.  PT too with   oliguric prerenal AIDAN vs. Ischemic ATN likely 2/2 dehydration and C. Diff diarrhea plus contrast induced nephrotoxicity on CKD IV with plan for permacath access and to start HD 12/6. NGT in place for medications--plan to exchange out for a corpak today or 12/7. Pt has remained too lethargic for ST intervention.     12/9/2024 Follow up: Chart reviewed and events noted. Right chest tunneled line placed 12/6 and HD started 12/6 with another treatment 12/9. Dobbhoff placed as well and EN initiated 12/6 and EN at ordered goal 40mL/hr since 12/7.   Pt more alert 12/9 and passed ST eval for regular/thin liquids; pt will need assistance feeding however. FMS remains in place with daily output 100-200mL noted.     12/13/24: follow up. Pt w/HD today and waiting on pre-cert. SLP eval 12/12 w/recommendations for regular solids and thin liquids. Pt remains w/diarrhea and +Cdiff. Per meal documentation: 12/12 0%L, 50%B; 12/11 50%B. Pt endorses never trying any ONS in the past, but willing to trial.  Nephrology and Neurology on consult.       Past Medical History  [facility] stay 11/26-11/27/2024 S/p left TKR 11/26/2024   has a past medical history of Afib (Multi), Arthritis, Balance problem, C. difficile colitis, Cataract, CKD (chronic kidney disease), stage III (Multi), COVID-19, Diabetic neuropathy (Multi), Endometrial cancer (Multi), Falls, Glaucoma, Heart failure with preserved ejection fraction, Hypertension, Hypokalemia, Obesity, Type 2 diabetes mellitus, and Venous ulcer (Multi).  Surgical History   has a past surgical history that includes Cataract extraction; Joint replacement; Cholecystectomy; " "Hysterectomy; Other surgical history; Other surgical history (Bilateral); Appendectomy; Tonsillectomy; Colonoscopy; Upper gastrointestinal endoscopy; and Total knee arthroplasty (Left).       Nutrition History:  Energy Intake: Fair 50-75 %, Poor < 50 %  Food and Nutrient History: 12/6: Pt from [facility]--there only ~7 days. Met with pt at bedside--pt opened eyes to her name and smiled. When asked pt how she was eating at the [facility], pt wrinkled her nose; when asked about food allergies, pt nodded no.  Pt fell asleep again during questioning. Diet at [facility] as regular/no concentrated sweets.  Vitamin/Herbal Supplement Use: home meds include januvia, lokelma, lasix, D3, B12, farxiga  Food Allergies/Intolerances:  None  GI Symptoms: Diarrhea + cdiff  Oral Problems:  ST 12/11 for regular/thin liquids.        Anthropometrics:  Height: 167.6 cm (5' 6\")   Weight: 86 kg (189 lb 9.5 oz)   BMI (Calculated): 30.62   Admit weight measured 87.9kg; 12/6 with rehydration 91.3kg   IBW 59.1kg       Weight History:   10/2024: 90.1kg  8/2024: 89kg  7/2024: 90.4kg  4/2024: 92.7kg  Weight Change %:  Significant Weight Loss: No0-10 (Numeric) Pain Score: 4     Nutrition Focused Physical Exam Findings:    Subcutaneous Fat Loss:   Orbital Fat Pads: Mild-Moderate (slight dark circles and slight hollowing)  Buccal Fat Pads: Mild-Moderate (flat cheeks, minimal bounce)  Triceps: Defer  Ribs: Defer  Muscle Wasting:  Temporalis: Mild-Moderate (slight depression)  Pectoralis (Clavicular Region): Mild-Moderate (some protrusion of clavicle)  Deltoid/Trapezius: Well nourished (rounded appearance at arm, shoulder, neck)  Interosseous: Well nourished (muscle bulges)  Trapezius/Infraspinatus/Supraspinatus (Scapular Region): Defer  Quadriceps: Defer  Gastrocnemius: Defer  Edema:  Edema: +1 trace  Edema Location: BLE  Physical Findings:  Skin: Positive (pale; pt with left TKR incision and generalized bruising. warm, dry.)    Nutrition " "Significant Labs:  BMP Trend:   Results from last 7 days   Lab Units 12/13/24  0631 12/12/24  0558 12/11/24  0459 12/10/24  0613   GLUCOSE mg/dL 115* 107* 110* 122*   CALCIUM mg/dL 7.2* 7.2* 7.3* 7.4*   SODIUM mmol/L 137 138 137 139   POTASSIUM mmol/L 3.9 3.8 3.5 3.7   CO2 mmol/L 26 28 26 27   CHLORIDE mmol/L 101 101 100 102   BUN mg/dL 47* 37* 54* 40*   CREATININE mg/dL 4.78* 3.75* 4.94* 3.78*    , A1C:  Lab Results   Component Value Date    HGBA1C 6.1 (H) 10/17/2024   , BG POCT trend:   Results from last 7 days   Lab Units 12/13/24  0713 12/12/24  2049 12/12/24  1532 12/12/24  1201 12/12/24  0929   POCT GLUCOSE mg/dL 114* 171* 123* 114* 105*    , Liver Function Trend:   Results from last 7 days   Lab Units 12/13/24  0631 12/12/24  0558 12/11/24  0459 12/10/24  0613   ALK PHOS U/L 107 126 137* 124   AST U/L 33 47* 63* 91*   ALT U/L 59* 71* 88* 102*   BILIRUBIN TOTAL mg/dL 0.5 0.5 0.5 0.6    , Vit D:   Lab Results   Component Value Date    VITD25 39 12/05/2024    , Vit B12: No results found for: \"QADZZOCC10\" , Folate: No results found for: \"FOLATE\"     Nutrition Specific Medications:  Scheduled medications  amLODIPine, 2.5 mg, oral, Daily  apixaban, 2.5 mg, oral, BID  furosemide, 40 mg, oral, Daily  insulin lispro, 0-10 Units, subcutaneous, 4x daily  latanoprost, 1 drop, Both Eyes, Nightly  lidocaine, 1 patch, transdermal, Daily  metoprolol tartrate, 12.5 mg, oral, BID  pantoprazole, 40 mg, oral, Daily before breakfast   Or  pantoprazole, 40 mg, intravenous, Daily before breakfast  vancomycin, 125 mg, oral, TID   Followed by  [START ON 12/19/2024] vancomycin, 125 mg, oral, BID   Followed by  [START ON 12/27/2024] vancomycin, 125 mg, oral, Daily   Followed by  [START ON 1/3/2025] vancomycin, 125 mg, oral, Every other day      Continuous medications     PRN medications  PRN medications: [Held by provider] acetaminophen **OR** [DISCONTINUED] acetaminophen **OR** [DISCONTINUED] acetaminophen, dextrose, dextrose, " glucagon, glucagon, heparin, heparin, metoprolol, oxygen     I/O:   Last BM Date: 12/12/24; Stool Appearance: Mucous, Liquid (12/13/24 0700)    Dietary Orders (From admission, onward)       Start     Ordered    12/13/24 1239  Oral nutritional supplements  Until discontinued        Comments: 4 oz vanilla.   Question Answer Comment   Deliver with Breakfast    Deliver with Dinner    Select supplement: Nepro Carbsteady        12/13/24 1239    12/11/24 2000  Adult diet Renal, Consistent Carb; CCD 60 gm/meal; Potassium Restricted 2 gm (50mEq); 2 - 3 grams Sodium  Diet effective now        Comments: Assist with feeding . Keep hob upright,   Question Answer Comment   Diet type Renal    Diet type Consistent Carb    Carb diet selection: CCD 60 gm/meal    Potassium restriction: Potassium Restricted 2 gm (50mEq)    Sodium restriction: 2 - 3 grams Sodium        12/11/24 2001 12/04/24 1635  May Participate in Room Service With Assistance  ( ROOM SERVICE MAY PARTICIPATE WITH ASSISTANCE)  Once        Question:  .  Answer:  Yes    12/04/24 1634                     Estimated Needs:   Total Energy Estimated Needs (kCal):  (1800-2100kcal (20-23kcal/kg of 91.3kg))     Total Protein Estimated Needs (g):  (83-118g pro (1.4-2g pro/kg IBW of 59.1kg))     Total Fluid Estimated Needs (mL):  (1mL/kcal/d or as per physician)           Nutrition Diagnosis   Malnutrition Diagnosis  Patient has Malnutrition Diagnosis: Yes  Diagnosis Status: Ongoing  Malnutrition Diagnosis: Moderate malnutrition related to acute disease or injury  As Evidenced by: infection, current NPO status due to altered mental status compliated by <75% of energy intakes compared to estimated nutrient needs >7 days with mild muscle and fat wasting. (12/9: Passed ST 12/9 for regular foods/thin liquids)  Additional Assessment Information: Pt diet advanced 12/11 and PO intakes slowly improving.            Nutrition Interventions/Recommendations         Nutrition  Prescription:  Individualized Nutrition Prescription Provided for : Diet: continue with oral diet as ordered - (Adult diet Renal, Consistent Carb; CCD 60 gm/meal; Potassium Restricted 2 gm (50mEq); 2 - 3 grams Sodium)        Nutrition Interventions:   Interventions: Medical food supplement, Meals and snacks  Meals and Snacks: Carbohydrate-modified diet, Mineral-modified diet  Goal: will consume >50% of meals  Enteral Intake: Other (Comment)  Goal: n/a - off EN  Medical Food Supplement: Commercial beverage, Commercial food  Goal: will provide Nepro 4 oz at breakfast and dinner meals (providing 420kcal, 19g pro per 8oz)    Collaboration and Referral of Nutrition Care: Collaboration by nutrition professional with other providers  Goal: IVELISSE Valenzuela    Nutrition Education:   12/9: Pt c/o thirst; on HD and ST pending at the time.   12/13: Discussed Renal diet parameters, such as limited sodium  and potassium rich foods.  Reviewed foods high in potassium. Encouraged PO intakes and trial of Nepro. Pt declined written information at this time d/t being overwhelmed w/health condition currently. Will follow for more opportune time. Discussed AYR ordering and having RD at outpatient HD when ready for education.       Nutrition Monitoring and Evaluation   Food/Nutrient Related History Monitoring  Monitoring and Evaluation Plan: Energy intake  Energy Intake: Estimated energy intake  Criteria: will meet >75% of estimated energy needs w/meals and ONS    Body Composition/Growth/Weight History  Monitoring and Evaluation Plan: Weight  Weight: Measured weight  Criteria: daily weights    Biochemical Data, Medical Tests and Procedures  Monitoring and Evaluation Plan: Electrolyte/renal panel  Electrolyte and Renal Panel: Sodium, Potassium, Phosphorus, Magnesium  Criteria: WNR              Time Spent (min): 30 minutes   Last RD note 12/13/24  Follow up 3-5 days

## 2024-12-13 NOTE — PROGRESS NOTES
Savita Snell  12/12/24  [id number]  America Vanegas MD  This is a patient with a past medical history as detailed below admitted to the  ED from nursing home [facility], wound care nurse went in to complete patient dressing and patient was unresponsive and unable to move limbs. Patient last known normal was this morning at 9am when patient received morning medications. Patient is alert and oriented x3-4 at baseline per facility. Is on eliquis and has tremors due to history of parkinsons. Being sent to ED for eval.  she is more awake speech therapy saw the patient heart rate is well-controlled after adding IV beta-blocker evaluated by nephrology pulmonary critical care and cardiology service had an MRI of the brain which was negative for acute infarct or bleed , patient is more awake today following commands able to answer questions no chest pain tightness or pressure no abdominal pain     Assessment and plan   1-sepsis    /C. difficile colitis remained to be on contact precaution blood pressure seem to be soft remained to be in the CCU initially placed on Flagyl and vancomycin subsequently changed to oral vancomycin tapering dose  2-acute renal failure could be related to ATN IV fluids has been given Mckeon catheter has been placed monitor urine output strict I's and O's and weight avoid any nephrotoxic agent consulted nephrology   discussed with the nephrology service at the bedside the patient continued to require hemodialysis   monitor electrolytes discussed with nephrology the patient will require to be on dialysis even after discharge for the time being  3- acute respiratory failure with hypoxia CT chest rule out pulmonary embolism negative monitor pulse ox O2 support to keep pulse ox above 92% try to wean down oxygen   4-elevated LFTs had a CT abdomen trend CMP    6-change of mental status likely metabolic encephalopathy  CT brain CTA monitor neurostatus aspiration precaution .  Status post  neurology consultation MRI of the brain  Resolved   7-status post left total knee replacement   8 -A-fib  RVR started IV beta-blocker monitor.  Consulted  cardiology .  Resolved    Discussed with the consultants.         Epic& consultants notes reviewed  Most recent images Reviewed  Last EKG/Rhythm reviewed      Vital signs has been reviewed  Sleepy open eyes to stimuli some answer question by yes or no does not follow commands   SKIN:  No rashes .   ENT mucosa,  Normal/nose normal   NECK: no jugulovenous distention  NO lymphadenopathy   LUNGS: Diminished air entry bilateral  CARDIAC:  S1 and S2 tachy    ABDOMEN: Abdomen soft, non-tender.    EXTREMITIES: Extremities normal.   NEURO: Open eyes follow simple commands neuro  nonfocal  PULSES: + pedal / radial                       Past Medical History  She has a past medical history of Afib (Multi), Arthritis, Balance problem, C. difficile colitis, Cataract, CKD (chronic kidney disease), stage III (Multi), COVID-19, Diabetic neuropathy (Multi), Endometrial cancer (Multi), Falls, Glaucoma, Heart failure with preserved ejection fraction, Hypertension, Hypokalemia, Obesity, Type 2 diabetes mellitus, and Venous ulcer (Multi).    Surgical History  She has a past surgical history that includes Cataract extraction; Joint replacement; Cholecystectomy; Hysterectomy; Other surgical history; Other surgical history (Bilateral); Appendectomy; Tonsillectomy; Colonoscopy; Upper gastrointestinal endoscopy; and Total knee arthroplasty (Left).     Social History  She reports that she has quit smoking. Her smoking use included cigarettes. She has never used smokeless tobacco. She reports that she does not currently use alcohol. She reports that she does not use drugs.       Family History  Family History   Problem Relation Name Age of Onset    Heart failure Mother      Other (MI) Mother      Stroke Father      Hyperlipidemia Sister      Hypertension Sister               America Vanegas,  MD

## 2024-12-13 NOTE — CARE PLAN
The patient's goals for the shift include      The clinical goals for the shift include See plan of care

## 2024-12-13 NOTE — PROGRESS NOTES
"Savita Snell \"FILOMENA\" is a 84 y.o. female on day 9 of admission presenting with No Principal Problem: There is no principal problem currently on the Problem List. Please update the Problem List and refresh..      Subjective   No new issues  Tolerated HD       Medication Documentation Review Audit       Reviewed by Sanya Mederos (Technician) on 12/04/24 at 1436      Medication Order Taking? Sig Documenting Provider Last Dose Status   amLODIPine (Norvasc) 5 mg tablet [id number] Yes Take 1 tablet (5 mg) by mouth once daily. Anatoly Ngo MD 12/4/2024 Morning Active   apixaban (Eliquis) 2.5 mg tablet [id number] Yes Take 1 tablet (2.5 mg) by mouth 2 times a day. Anatoly Ngo MD 12/4/2024 Morning Active   cholecalciferol (Vitamin D3) 50 mcg (2,000 unit) capsule [id number] Yes Take 1 capsule (50 mcg) by mouth early in the morning.. Anatoly Ngo MD 12/4/2024 Morning Active   cyanocobalamin (Vitamin B-12) 1,000 mcg tablet [id number] Yes Take 1 tablet (1,000 mcg) by mouth once daily. Anatoly Ngo MD 12/4/2024 Morning Active   cyclobenzaprine (Flexeril) 10 mg tablet [id number] Yes Take 1 tablet (10 mg) by mouth 3 times a day as needed for muscle spasms. Jeannine Tejeda PA-C Past Week Active   dapagliflozin propanediol (Farxiga) 5 mg [id number] Yes Take 1 tablet (5 mg) by mouth once daily. Anatoly Ngo MD 12/4/2024 Morning Active   furosemide (Lasix) 20 mg tablet [id number] Yes Take 2 tablets (40 mg) by mouth once daily. Anatoly Ngo MD 12/4/2024 Morning Active   latanoprost (Xalatan) 0.005 % ophthalmic solution [id number] Yes Administer 1 drop into affected eye(s) daily at bedtime. Anatoly Ngo MD 12/3/2024 Bedtime Active   Lokelma 5 gram packet [id number] Yes Take 5 g by mouth once daily. Anatoly Ngo MD 12/4/2024 Morning Active   losartan (Cozaar) 25 mg tablet [id number] Yes Take 1 tablet (25 mg) by mouth twice a day. Anatoly Provider, MD 12/4/2024 Morning Active "   metoprolol tartrate (Lopressor) 25 mg tablet [id number] Yes Take 0.5 tablets (12.5 mg) by mouth 2 times a day. Historical Provider, MD 12/4/2024 Morning Active   ondansetron ODT (Zofran-ODT) 4 mg disintegrating tablet [id number] Yes Dissolve 1 tablet (4 mg) in the mouth every 8 hours if needed for nausea. Jeannine Tejeda PA-C Past Week Active   oxyCODONE (Roxicodone) 5 mg immediate release tablet [id number] Yes Take 1 tablet (5 mg) by mouth every 6 hours if needed for moderate pain (4 - 6). Jeannine Tejeda PA-C Past Week Active   pregabalin (Lyrica) 75 mg capsule [id number] Yes Take 1 capsule (75 mg) by mouth 3 times a day. Historical Provider, MD 12/4/2024 Morning Active   SITagliptin phosphate (Januvia) 25 mg tablet [id number] Yes Take 1 tablet (25 mg) by mouth once daily. Historical Provider, MD 12/4/2024 Morning Active                    Objective     Last Recorded Vitals  /71 (BP Location: Right arm, Patient Position: Lying)   Pulse 75   Temp 37 °C (98.6 °F) (Temporal)   Resp 18   Wt 86 kg (189 lb 9.5 oz)   SpO2 94%   Intake/Output last 3 Shifts:    Intake/Output Summary (Last 24 hours) at 12/13/2024 1653  Last data filed at 12/13/2024 1302  Gross per 24 hour   Intake 400 ml   Output 500 ml   Net -100 ml       Admission Weight  Weight: 90.7 kg (200 lb) (12/04/24 1135)    Daily Weight  12/11/24 : 86 kg (189 lb 9.5 oz)    Image Results  XR chest 1 view  Narrative: Interpreted By:  Dwayne Mitchell,   STUDY:  XR CHEST 1 VIEW; 12/13/2024 2:59 pm      INDICATION:  Signs/Symptoms:for HD dialysis chair set up.      COMPARISON:  None      ACCESSION NUMBER(S):  [id number]      ORDERING CLINICIAN:  JUSTIN MENDEZ      TECHNIQUE:  1 view of the chest was performed.      FINDINGS:  Small bilateral pleural effusions surrounding atelectasis.. No  pneumothorax.  The cardiomediastinal silhouette is stable. Right  chest wall hemodialysis catheter noted.      Impression: 1. Small bilateral pleural effusion with  surrounding atelectasis.      Signed by: Dwayne Mitchell 12/13/2024 3:35 PM  Dictation workstation:   [id number]      Physical Exam  Neck: supple  Lung: no wheeze  Cv: [id number]  exT: noc/c  Relevant Results      Results for orders placed or performed during the hospital encounter of 12/04/24 (from the past 24 hours)   POCT GLUCOSE   Result Value Ref Range    POCT Glucose 171 (H) 74 - 99 mg/dL   CBC   Result Value Ref Range    WBC 13.7 (H) 4.4 - 11.3 x10*3/uL    nRBC 0.0 0.0 - 0.0 /100 WBCs    RBC 2.88 (L) 4.00 - 5.20 x10*6/uL    Hemoglobin 8.3 (L) 12.0 - 16.0 g/dL    Hematocrit 28.0 (L) 36.0 - 46.0 %    MCV 97 80 - 100 fL    MCH 28.8 26.0 - 34.0 pg    MCHC 29.6 (L) 32.0 - 36.0 g/dL    RDW 14.2 11.5 - 14.5 %    Platelets 196 150 - 450 x10*3/uL   Comprehensive metabolic panel   Result Value Ref Range    Glucose 115 (H) 74 - 99 mg/dL    Sodium 137 136 - 145 mmol/L    Potassium 3.9 3.5 - 5.3 mmol/L    Chloride 101 98 - 107 mmol/L    Bicarbonate 26 21 - 32 mmol/L    Anion Gap 14 10 - 20 mmol/L    Urea Nitrogen 47 (H) 6 - 23 mg/dL    Creatinine 4.78 (H) 0.50 - 1.05 mg/dL    eGFR 9 (L) >60 mL/min/1.73m*2    Calcium 7.2 (L) 8.6 - 10.3 mg/dL    Albumin 2.5 (L) 3.4 - 5.0 g/dL    Alkaline Phosphatase 107 33 - 136 U/L    Total Protein 4.8 (L) 6.4 - 8.2 g/dL    AST 33 9 - 39 U/L    Bilirubin, Total 0.5 0.0 - 1.2 mg/dL    ALT 59 (H) 7 - 45 U/L   Magnesium   Result Value Ref Range    Magnesium 2.23 1.60 - 2.40 mg/dL   Phosphorus   Result Value Ref Range    Phosphorus 4.5 2.5 - 4.9 mg/dL   POCT GLUCOSE   Result Value Ref Range    POCT Glucose 114 (H) 74 - 99 mg/dL   POCT GLUCOSE   Result Value Ref Range    POCT Glucose 85 74 - 99 mg/dL   POCT GLUCOSE   Result Value Ref Range    POCT Glucose 159 (H) 74 - 99 mg/dL              Assessment/Plan      Assessment  AIDAN/ATN required initiation of HD - MWF as inpt  Cdiff  Anemia    Plan  Maintain on HD  Outpt HD unit arrangements  Monitor for renal recovery  Serial labs         Wasearl A  MD Ciara

## 2024-12-13 NOTE — POST-PROCEDURE NOTE
Report to Receiving RN:    Report To: Bianca canales  Time Report Called: 1240  Hand-Off Communication: .5 liters off   Complications During Treatment: No  Ultrafiltration Treatment: No  Medications Administered During Dialysis: No  Blood Products Administered During Dialysis: No  Labs Sent During Dialysis: No      Last Updated: 1:00 PM by ROBB CHAMBERS

## 2024-12-13 NOTE — PRE-PROCEDURE NOTE
Report from Sending RN:    Report From: Bianca canales  Recent Surgery of Procedure: Yes  Baseline Level of Consciousness (LOC): a&ox3  Oxygen Use: Yes  Type: nc  Diabetic: Yes  Last BP Med Given Day of Dialysis: see mar  Last Pain Med Given: see mar  Lab Tests to be Obtained with Dialysis: No  Blood Transfusion to be Given During Dialysis: No  Available IV Access: Yes  Medications to be Administered During Dialysis: No  Continuous IV Infusion Running: No  Restraints on Currently or in the Last 24 Hours: No  Hand-Off Communication:   Dialysis Catheter Dressing: yes  Last Dressing Change: 12/9/24

## 2024-12-14 LAB
ALBUMIN SERPL BCP-MCNC: 2.7 G/DL (ref 3.4–5)
ALP SERPL-CCNC: 99 U/L (ref 33–136)
ALT SERPL W P-5'-P-CCNC: 51 U/L (ref 7–45)
ANION GAP SERPL CALC-SCNC: 14 MMOL/L (ref 10–20)
AST SERPL W P-5'-P-CCNC: 27 U/L (ref 9–39)
BACTERIA BLD CULT: NORMAL
BACTERIA BLD CULT: NORMAL
BILIRUB SERPL-MCNC: 0.5 MG/DL (ref 0–1.2)
BUN SERPL-MCNC: 22 MG/DL (ref 6–23)
CALCIUM SERPL-MCNC: 7.3 MG/DL (ref 8.6–10.3)
CHLORIDE SERPL-SCNC: 101 MMOL/L (ref 98–107)
CO2 SERPL-SCNC: 27 MMOL/L (ref 21–32)
CREAT SERPL-MCNC: 3.12 MG/DL (ref 0.5–1.05)
EGFRCR SERPLBLD CKD-EPI 2021: 14 ML/MIN/1.73M*2
ERYTHROCYTE [DISTWIDTH] IN BLOOD BY AUTOMATED COUNT: 14.1 % (ref 11.5–14.5)
GLUCOSE BLD MANUAL STRIP-MCNC: 114 MG/DL (ref 74–99)
GLUCOSE BLD MANUAL STRIP-MCNC: 118 MG/DL (ref 74–99)
GLUCOSE BLD MANUAL STRIP-MCNC: 122 MG/DL (ref 74–99)
GLUCOSE BLD MANUAL STRIP-MCNC: 143 MG/DL (ref 74–99)
GLUCOSE SERPL-MCNC: 124 MG/DL (ref 74–99)
HCT VFR BLD AUTO: 29.4 % (ref 36–46)
HGB BLD-MCNC: 8.7 G/DL (ref 12–16)
HOLD SPECIMEN: NORMAL
MAGNESIUM SERPL-MCNC: 1.91 MG/DL (ref 1.6–2.4)
MCH RBC QN AUTO: 28.8 PG (ref 26–34)
MCHC RBC AUTO-ENTMCNC: 29.6 G/DL (ref 32–36)
MCV RBC AUTO: 97 FL (ref 80–100)
NRBC BLD-RTO: 0 /100 WBCS (ref 0–0)
PHOSPHATE SERPL-MCNC: 3.5 MG/DL (ref 2.5–4.9)
PLATELET # BLD AUTO: 190 X10*3/UL (ref 150–450)
POTASSIUM SERPL-SCNC: 4.1 MMOL/L (ref 3.5–5.3)
PROT SERPL-MCNC: 5 G/DL (ref 6.4–8.2)
RBC # BLD AUTO: 3.02 X10*6/UL (ref 4–5.2)
SODIUM SERPL-SCNC: 138 MMOL/L (ref 136–145)
WBC # BLD AUTO: 14.7 X10*3/UL (ref 4.4–11.3)

## 2024-12-14 PROCEDURE — 1200000002 HC GENERAL ROOM WITH TELEMETRY DAILY

## 2024-12-14 PROCEDURE — 84075 ASSAY ALKALINE PHOSPHATASE: CPT | Performed by: NURSE PRACTITIONER

## 2024-12-14 PROCEDURE — 2500000001 HC RX 250 WO HCPCS SELF ADMINISTERED DRUGS (ALT 637 FOR MEDICARE OP): Performed by: NURSE PRACTITIONER

## 2024-12-14 PROCEDURE — 2500000001 HC RX 250 WO HCPCS SELF ADMINISTERED DRUGS (ALT 637 FOR MEDICARE OP)

## 2024-12-14 PROCEDURE — 83735 ASSAY OF MAGNESIUM: CPT

## 2024-12-14 PROCEDURE — 2500000005 HC RX 250 GENERAL PHARMACY W/O HCPCS: Performed by: NURSE PRACTITIONER

## 2024-12-14 PROCEDURE — 85027 COMPLETE CBC AUTOMATED: CPT | Performed by: NURSE PRACTITIONER

## 2024-12-14 PROCEDURE — 84100 ASSAY OF PHOSPHORUS: CPT

## 2024-12-14 PROCEDURE — 36415 COLL VENOUS BLD VENIPUNCTURE: CPT | Performed by: NURSE PRACTITIONER

## 2024-12-14 PROCEDURE — 82947 ASSAY GLUCOSE BLOOD QUANT: CPT

## 2024-12-14 RX ORDER — SODIUM CHLORIDE 9 MG/ML
INJECTION, SOLUTION INTRAMUSCULAR; INTRAVENOUS; SUBCUTANEOUS
Status: DISPENSED
Start: 2024-12-14 | End: 2024-12-14

## 2024-12-14 RX ORDER — OXYCODONE HYDROCHLORIDE 5 MG/1
2.5 TABLET ORAL ONCE
Status: COMPLETED | OUTPATIENT
Start: 2024-12-14 | End: 2024-12-14

## 2024-12-14 ASSESSMENT — COGNITIVE AND FUNCTIONAL STATUS - GENERAL
PERSONAL GROOMING: A LITTLE
TURNING FROM BACK TO SIDE WHILE IN FLAT BAD: A LOT
CLIMB 3 TO 5 STEPS WITH RAILING: TOTAL
HELP NEEDED FOR BATHING: A LOT
TOILETING: A LOT
PERSONAL GROOMING: A LITTLE
EATING MEALS: A LITTLE
DRESSING REGULAR UPPER BODY CLOTHING: A LOT
MOVING TO AND FROM BED TO CHAIR: TOTAL
DRESSING REGULAR UPPER BODY CLOTHING: A LOT
TOILETING: A LOT
MOVING FROM LYING ON BACK TO SITTING ON SIDE OF FLAT BED WITH BEDRAILS: A LITTLE
DAILY ACTIVITIY SCORE: 14
STANDING UP FROM CHAIR USING ARMS: TOTAL
MOBILITY SCORE: 9
TURNING FROM BACK TO SIDE WHILE IN FLAT BAD: A LOT
HELP NEEDED FOR BATHING: A LOT
WALKING IN HOSPITAL ROOM: TOTAL
MOVING FROM LYING ON BACK TO SITTING ON SIDE OF FLAT BED WITH BEDRAILS: A LITTLE
MOVING TO AND FROM BED TO CHAIR: TOTAL
WALKING IN HOSPITAL ROOM: TOTAL
CLIMB 3 TO 5 STEPS WITH RAILING: TOTAL
EATING MEALS: A LITTLE
STANDING UP FROM CHAIR USING ARMS: TOTAL
DAILY ACTIVITIY SCORE: 14
MOBILITY SCORE: 9
DRESSING REGULAR LOWER BODY CLOTHING: A LOT
DRESSING REGULAR LOWER BODY CLOTHING: A LOT

## 2024-12-14 ASSESSMENT — PAIN SCALES - WONG BAKER
WONGBAKER_NUMERICALRESPONSE: NO HURT
WONGBAKER_NUMERICALRESPONSE: NO HURT

## 2024-12-14 ASSESSMENT — PAIN - FUNCTIONAL ASSESSMENT
PAIN_FUNCTIONAL_ASSESSMENT: 0-10
PAIN_FUNCTIONAL_ASSESSMENT: 0-10

## 2024-12-14 ASSESSMENT — PAIN DESCRIPTION - LOCATION: LOCATION: BACK

## 2024-12-14 ASSESSMENT — PAIN SCALES - GENERAL
PAINLEVEL_OUTOF10: 0 - NO PAIN
PAINLEVEL_OUTOF10: 7
PAINLEVEL_OUTOF10: 0 - NO PAIN

## 2024-12-14 NOTE — NURSING NOTE
0120: Patient complains of 7/10 back pain that is acute and had a sudden onset. Provider, Starla Worley, notified and ordered a one time dose of oxycodone 2.5 mg PO.    EOS: Patient rested well throughout the night. Pain was managed with one time dose of oxycodone PO. Safety has been maintained. Patient is stable at the time of writing.

## 2024-12-14 NOTE — NURSING NOTE
End of shift note, no acute changes this shift. Patient tolerated dialysis well this shift. Patient complained of pain in left knee and medicated x1 with po oxycodone 5mg. Patient rested most this shift after med given.

## 2024-12-14 NOTE — PROGRESS NOTES
Savita Snell   12/14/24  [id number]  America Vanegas MD  This is a patient with a past medical history as detailed below admitted to the  ED from nursing home [facility], wound care nurse went in to complete patient dressing and patient was unresponsive and unable to move limbs. Patient last known normal was this morning at 9am when patient received morning medications.    Patient transferred from the CCU to regular medical floor  , patient continued to improve however she still feels weak generalized she is awake able to follow commands and answer questions mental status is back to baseline per family at the bedside no chest pain no shortness of breath no abdominal pain diarrhea is slowing down       Assessment and plan   1- C. difficile colitis continue with a tapering dose of vancomycin symptoms has improved continue with the contact precaution  2-acute renal failure /status post nephrology consult initiated on hemodialysis she will need to continue with hemodialysis in the nursing home  3-debility PT OT placement  4-acute respiratory failure with hypoxia CT chest rule out pulmonary embolism negative monitor pulse ox O2 support to keep pulse ox above 92% currently she is on room air  5-elevated LFTs had a CT abdomen trend CMP if persist consider ultrasound right upper quadrant  6-change of mental status likely metabolic encephalopathy  CT brain CTA monitor neurostatus aspiration precaution .  7- -hyperglycemia last hemoglobin A1c October of this year was 5.9 will repeat  Discussed with the  consultants.      Review of other systems negative other than HPI  Past medical history    Epic& consultants notes reviewed  Most recent images Reviewed  Last EKG/Rhythm reviewed      Vital signs reviewed  SKIN:  No rashes .   ENT mucosa,  Normal/nose normal   NECK: no jugulovenous distention  NO lymphadenopathy   LUNGS: Diminished air entry bilateral  CARDIAC:  S1 and S2  ABDOMEN: Abdomen soft, non-tender.     EXTREMITIES: Extremities normal.   NEURO: No focal neurodeficit  PULSES: + pedal / radial                  Past Medical History  She has a past medical history of Afib (Multi), Arthritis, Balance problem, C. difficile colitis, Cataract, CKD (chronic kidney disease), stage III (Multi), COVID-19, Diabetic neuropathy (Multi), Endometrial cancer (Multi), Falls, Glaucoma, Heart failure with preserved ejection fraction, Hypertension, Hypokalemia, Obesity, Type 2 diabetes mellitus, and Venous ulcer (Multi).    Surgical History  She has a past surgical history that includes Cataract extraction; Joint replacement; Cholecystectomy; Hysterectomy; Other surgical history; Other surgical history (Bilateral); Appendectomy; Tonsillectomy; Colonoscopy; Upper gastrointestinal endoscopy; and Total knee arthroplasty (Left).     Social History  She reports that she has quit smoking. Her smoking use included cigarettes. She has never used smokeless tobacco. She reports that she does not currently use alcohol. She reports that she does not use drugs.    Family History  Family History   Problem Relation Name Age of Onset    Heart failure Mother      Other (MI) Mother      Stroke Father      Hyperlipidemia Sister      Hypertension Sister          Allergies  Cephalexin, Penicillins, and Sulfamethoxazole-trimethoprim       Last Recorded Vitals  /80 (BP Location: Right arm, Patient Position: Lying)   Pulse 74   Temp 36.1 °C (97 °F) (Temporal)   Resp 18   Wt 86 kg (189 lb 9.5 oz)   SpO2 99%                  America Vanegas MD

## 2024-12-14 NOTE — PROGRESS NOTES
Savita Snell   12/13/24  [id number]  America Vanegas MD  This is a patient with a past medical history as detailed below admitted to the  ED from nursing home [facility], wound care nurse went in to complete patient dressing and patient was unresponsive and unable to move limbs. Patient last known normal was this morning at 9am when patient received morning medications.    Patient transferred from the CCU to regular medical floor she has no chest pain tightness or pressure no shortness of breath no abdominal pain overall awake following commands more oriented         Assessment and plan   1- C. difficile colitis continue with a tapering dose of vancomycin  2-acute renal failure could be related to ATN IV fluids has been given Mckeon catheter has been placed monitor urine output strict I's and O's and weight avoid any nephrotoxic agent consulted nephrology monitor electrolytes need to stay on hemodialysis for the time being    3-debility PT OT  4-acute respiratory failure with hypoxia CT chest rule out pulmonary embolism negative monitor pulse ox O2 support to keep pulse ox above 92%   5-elevated LFTs had a CT abdomen trend CMP if persist consider ultrasound right upper quadrant  6-change of mental status likely metabolic encephalopathy  CT brain CTA monitor neurostatus aspiration precaution .  7- -hyperglycemia last hemoglobin A1c October of this year was 5.9 will repeat  Discussed with the  consultants.      Review of other systems negative other than HPI  Past medical history    Epic& consultants notes reviewed  Most recent images Reviewed  Last EKG/Rhythm reviewed      Vital signs reviewed  SKIN:  No rashes .   ENT mucosa,  Normal/nose normal   NECK: no jugulovenous distention  NO lymphadenopathy   LUNGS: Diminished air entry bilateral  CARDIAC:  S1 and S2  ABDOMEN: Abdomen soft, non-tender.    EXTREMITIES: Extremities normal.   NEURO: No focal neurodeficit  PULSES: + pedal / radial   No  edema  No goiter                       Past Medical History  She has a past medical history of Afib (Multi), Arthritis, Balance problem, C. difficile colitis, Cataract, CKD (chronic kidney disease), stage III (Multi), COVID-19, Diabetic neuropathy (Multi), Endometrial cancer (Multi), Falls, Glaucoma, Heart failure with preserved ejection fraction, Hypertension, Hypokalemia, Obesity, Type 2 diabetes mellitus, and Venous ulcer (Multi).    Surgical History  She has a past surgical history that includes Cataract extraction; Joint replacement; Cholecystectomy; Hysterectomy; Other surgical history; Other surgical history (Bilateral); Appendectomy; Tonsillectomy; Colonoscopy; Upper gastrointestinal endoscopy; and Total knee arthroplasty (Left).     Social History  She reports that she has quit smoking. Her smoking use included cigarettes. She has never used smokeless tobacco. She reports that she does not currently use alcohol. She reports that she does not use drugs.    Family History  Family History   Problem Relation Name Age of Onset    Heart failure Mother      Other (MI) Mother      Stroke Father      Hyperlipidemia Sister      Hypertension Sister          Allergies  Cephalexin, Penicillins, and Sulfamethoxazole-trimethoprim       Last Recorded Vitals  /53 (BP Location: Right arm, Patient Position: Lying)   Pulse 70   Temp 37.1 °C (98.8 °F) (Temporal)   Resp 18   Wt 86 kg (189 lb 9.5 oz)   SpO2 94%                  America Vanegas MD

## 2024-12-15 ENCOUNTER — APPOINTMENT (OUTPATIENT)
Dept: CARDIOLOGY | Facility: HOSPITAL | Age: 84
DRG: 871 | End: 2024-12-15
Payer: MEDICARE

## 2024-12-15 LAB
ALBUMIN SERPL BCP-MCNC: 2.6 G/DL (ref 3.4–5)
ALP SERPL-CCNC: 91 U/L (ref 33–136)
ALT SERPL W P-5'-P-CCNC: 39 U/L (ref 7–45)
ANION GAP SERPL CALC-SCNC: 15 MMOL/L (ref 10–20)
AST SERPL W P-5'-P-CCNC: 24 U/L (ref 9–39)
ATRIAL RATE: 95 BPM
BILIRUB SERPL-MCNC: 0.5 MG/DL (ref 0–1.2)
BUN SERPL-MCNC: 33 MG/DL (ref 6–23)
CALCIUM SERPL-MCNC: 7.1 MG/DL (ref 8.6–10.3)
CHLORIDE SERPL-SCNC: 100 MMOL/L (ref 98–107)
CO2 SERPL-SCNC: 26 MMOL/L (ref 21–32)
CREAT SERPL-MCNC: 4.45 MG/DL (ref 0.5–1.05)
EGFRCR SERPLBLD CKD-EPI 2021: 9 ML/MIN/1.73M*2
ERYTHROCYTE [DISTWIDTH] IN BLOOD BY AUTOMATED COUNT: 14.3 % (ref 11.5–14.5)
GLUCOSE BLD MANUAL STRIP-MCNC: 101 MG/DL (ref 74–99)
GLUCOSE BLD MANUAL STRIP-MCNC: 106 MG/DL (ref 74–99)
GLUCOSE BLD MANUAL STRIP-MCNC: 116 MG/DL (ref 74–99)
GLUCOSE BLD MANUAL STRIP-MCNC: 139 MG/DL (ref 74–99)
GLUCOSE SERPL-MCNC: 110 MG/DL (ref 74–99)
HCT VFR BLD AUTO: 30.2 % (ref 36–46)
HGB BLD-MCNC: 9.2 G/DL (ref 12–16)
MAGNESIUM SERPL-MCNC: 1.91 MG/DL (ref 1.6–2.4)
MCH RBC QN AUTO: 29.3 PG (ref 26–34)
MCHC RBC AUTO-ENTMCNC: 30.5 G/DL (ref 32–36)
MCV RBC AUTO: 96 FL (ref 80–100)
NRBC BLD-RTO: 0 /100 WBCS (ref 0–0)
P AXIS: 38 DEGREES
P OFFSET: 211 MS
P ONSET: 148 MS
PHOSPHATE SERPL-MCNC: 4.6 MG/DL (ref 2.5–4.9)
PLATELET # BLD AUTO: 207 X10*3/UL (ref 150–450)
POTASSIUM SERPL-SCNC: 4.1 MMOL/L (ref 3.5–5.3)
PR INTERVAL: 142 MS
PROT SERPL-MCNC: 4.9 G/DL (ref 6.4–8.2)
Q ONSET: 219 MS
QRS COUNT: 16 BEATS
QRS DURATION: 80 MS
QT INTERVAL: 334 MS
QTC CALCULATION(BAZETT): 419 MS
QTC FREDERICIA: 389 MS
R AXIS: -9 DEGREES
RBC # BLD AUTO: 3.14 X10*6/UL (ref 4–5.2)
SODIUM SERPL-SCNC: 137 MMOL/L (ref 136–145)
T AXIS: -6 DEGREES
T OFFSET: 386 MS
VENTRICULAR RATE: 95 BPM
WBC # BLD AUTO: 13.6 X10*3/UL (ref 4.4–11.3)

## 2024-12-15 PROCEDURE — 84075 ASSAY ALKALINE PHOSPHATASE: CPT | Performed by: NURSE PRACTITIONER

## 2024-12-15 PROCEDURE — 2500000005 HC RX 250 GENERAL PHARMACY W/O HCPCS: Performed by: NURSE PRACTITIONER

## 2024-12-15 PROCEDURE — 82947 ASSAY GLUCOSE BLOOD QUANT: CPT

## 2024-12-15 PROCEDURE — 84100 ASSAY OF PHOSPHORUS: CPT

## 2024-12-15 PROCEDURE — 2500000001 HC RX 250 WO HCPCS SELF ADMINISTERED DRUGS (ALT 637 FOR MEDICARE OP): Performed by: NURSE PRACTITIONER

## 2024-12-15 PROCEDURE — 2500000004 HC RX 250 GENERAL PHARMACY W/ HCPCS (ALT 636 FOR OP/ED): Performed by: NURSE PRACTITIONER

## 2024-12-15 PROCEDURE — 93971 EXTREMITY STUDY: CPT

## 2024-12-15 PROCEDURE — 85027 COMPLETE CBC AUTOMATED: CPT | Performed by: NURSE PRACTITIONER

## 2024-12-15 PROCEDURE — 93971 EXTREMITY STUDY: CPT | Performed by: INTERNAL MEDICINE

## 2024-12-15 PROCEDURE — 36415 COLL VENOUS BLD VENIPUNCTURE: CPT | Performed by: NURSE PRACTITIONER

## 2024-12-15 PROCEDURE — 1200000002 HC GENERAL ROOM WITH TELEMETRY DAILY

## 2024-12-15 PROCEDURE — 2500000001 HC RX 250 WO HCPCS SELF ADMINISTERED DRUGS (ALT 637 FOR MEDICARE OP)

## 2024-12-15 PROCEDURE — 83735 ASSAY OF MAGNESIUM: CPT

## 2024-12-15 RX ORDER — MORPHINE SULFATE 2 MG/ML
1 INJECTION, SOLUTION INTRAMUSCULAR; INTRAVENOUS ONCE
Status: COMPLETED | OUTPATIENT
Start: 2024-12-15 | End: 2024-12-15

## 2024-12-15 ASSESSMENT — COGNITIVE AND FUNCTIONAL STATUS - GENERAL
HELP NEEDED FOR BATHING: A LOT
MOVING TO AND FROM BED TO CHAIR: TOTAL
MOBILITY SCORE: 9
DAILY ACTIVITIY SCORE: 14
DRESSING REGULAR UPPER BODY CLOTHING: A LOT
MOVING FROM LYING ON BACK TO SITTING ON SIDE OF FLAT BED WITH BEDRAILS: A LITTLE
EATING MEALS: A LITTLE
DRESSING REGULAR LOWER BODY CLOTHING: A LOT
DRESSING REGULAR LOWER BODY CLOTHING: A LOT
TURNING FROM BACK TO SIDE WHILE IN FLAT BAD: A LOT
PERSONAL GROOMING: A LITTLE
MOBILITY SCORE: 9
TOILETING: A LOT
CLIMB 3 TO 5 STEPS WITH RAILING: TOTAL
MOVING FROM LYING ON BACK TO SITTING ON SIDE OF FLAT BED WITH BEDRAILS: A LITTLE
CLIMB 3 TO 5 STEPS WITH RAILING: TOTAL
EATING MEALS: A LITTLE
STANDING UP FROM CHAIR USING ARMS: TOTAL
DAILY ACTIVITIY SCORE: 14
TURNING FROM BACK TO SIDE WHILE IN FLAT BAD: A LOT
WALKING IN HOSPITAL ROOM: TOTAL
TOILETING: A LOT
PERSONAL GROOMING: A LITTLE
MOVING TO AND FROM BED TO CHAIR: TOTAL
STANDING UP FROM CHAIR USING ARMS: TOTAL
HELP NEEDED FOR BATHING: A LOT
DRESSING REGULAR UPPER BODY CLOTHING: A LOT
WALKING IN HOSPITAL ROOM: TOTAL

## 2024-12-15 ASSESSMENT — PAIN SCALES - WONG BAKER: WONGBAKER_NUMERICALRESPONSE: NO HURT

## 2024-12-15 ASSESSMENT — PAIN SCALES - GENERAL
PAINLEVEL_OUTOF10: 10 - WORST POSSIBLE PAIN
PAINLEVEL_OUTOF10: 6
PAINLEVEL_OUTOF10: 10 - WORST POSSIBLE PAIN
PAINLEVEL_OUTOF10: 2

## 2024-12-15 ASSESSMENT — PAIN DESCRIPTION - LOCATION: LOCATION: KNEE

## 2024-12-15 NOTE — CARE PLAN
The patient's goals for the shift include      The clinical goals for the shift include see poc      **0700 PT Assessment:**    The patient is alert and oriented x4, vital signs stable. Medications were administered as ordered and tolerated well by the patient.     I was notified by the PCNA that the patient was complaining of pain in her left leg, extending down to her foot, which she described as pulsating. In response, the SCDs were removed.    Upon re-assessment, I observed the following:  - 2+ pedal pulses in both feet.  - Bilateral swelling, noted to be even in both legs.  - The patient was crying and moaning when I entered the room, clearly in distress.  - The left leg was unable to remain still due to pain and discomfort.    I immediately notified the medical team. A 1 mg dose of morphine was ordered for pain management. Additionally, a venous duplex ultrasound was requested for further evaluation of the leg pain.    1236  The patient reports that her leg pain has returned, rated 10/10, with the same leg and symptoms as before.      Action Needed: A duplex ultrasound is pending and must be ordered as STAT for radiology to complete it today.      Updates  EYAL Dickson, confirmed she will change the order to STAT.    The patient remains in significant pain and has been calling frequently for assistance.   1mg of morphine ordered.     1327  Patient Status: The patient has not been out of bed, which may be contributing to her condition. Attempts were made to encourage her to move.    Actions Taken:  The patient initially agreed to sit on the edge of the bed, and EYAL Dickson, approved movement of the lower extremity (LE).     When attempting to move her to the side of the bed, the patient became nauseated and returned to bed.    Current Situation:The patient remains in bed, and mobility efforts were unsuccessful due to nausea.

## 2024-12-15 NOTE — NURSING NOTE
EOS:  Slept well through the night.  Remains incontinent of urine and stool, heavy 2 assist for clean up, external catheter remains in place.  Remains on RA, VSS.

## 2024-12-16 ENCOUNTER — APPOINTMENT (OUTPATIENT)
Dept: DIALYSIS | Facility: HOSPITAL | Age: 84
End: 2024-12-16
Payer: MEDICARE

## 2024-12-16 VITALS
BODY MASS INDEX: 30.47 KG/M2 | HEART RATE: 64 BPM | TEMPERATURE: 96.8 F | WEIGHT: 189.6 LBS | DIASTOLIC BLOOD PRESSURE: 69 MMHG | OXYGEN SATURATION: 97 % | SYSTOLIC BLOOD PRESSURE: 131 MMHG | RESPIRATION RATE: 18 BRPM | HEIGHT: 66 IN

## 2024-12-16 LAB
ALBUMIN SERPL BCP-MCNC: 2.5 G/DL (ref 3.4–5)
ALP SERPL-CCNC: 161 U/L (ref 33–136)
ALT SERPL W P-5'-P-CCNC: 49 U/L (ref 7–45)
ANION GAP SERPL CALC-SCNC: 17 MMOL/L (ref 10–20)
AST SERPL W P-5'-P-CCNC: 39 U/L (ref 9–39)
BILIRUB SERPL-MCNC: 0.5 MG/DL (ref 0–1.2)
BUN SERPL-MCNC: 43 MG/DL (ref 6–23)
CALCIUM SERPL-MCNC: 7 MG/DL (ref 8.6–10.3)
CHLORIDE SERPL-SCNC: 100 MMOL/L (ref 98–107)
CO2 SERPL-SCNC: 24 MMOL/L (ref 21–32)
CREAT SERPL-MCNC: 5.66 MG/DL (ref 0.5–1.05)
EGFRCR SERPLBLD CKD-EPI 2021: 7 ML/MIN/1.73M*2
ERYTHROCYTE [DISTWIDTH] IN BLOOD BY AUTOMATED COUNT: 14.4 % (ref 11.5–14.5)
GLUCOSE BLD MANUAL STRIP-MCNC: 101 MG/DL (ref 74–99)
GLUCOSE BLD MANUAL STRIP-MCNC: 136 MG/DL (ref 74–99)
GLUCOSE SERPL-MCNC: 98 MG/DL (ref 74–99)
HCT VFR BLD AUTO: 29.6 % (ref 36–46)
HGB BLD-MCNC: 9 G/DL (ref 12–16)
MAGNESIUM SERPL-MCNC: 1.96 MG/DL (ref 1.6–2.4)
MCH RBC QN AUTO: 29 PG (ref 26–34)
MCHC RBC AUTO-ENTMCNC: 30.4 G/DL (ref 32–36)
MCV RBC AUTO: 96 FL (ref 80–100)
NRBC BLD-RTO: 0 /100 WBCS (ref 0–0)
PHOSPHATE SERPL-MCNC: 6.1 MG/DL (ref 2.5–4.9)
PLATELET # BLD AUTO: 224 X10*3/UL (ref 150–450)
POTASSIUM SERPL-SCNC: 4.5 MMOL/L (ref 3.5–5.3)
PROT SERPL-MCNC: 4.9 G/DL (ref 6.4–8.2)
RBC # BLD AUTO: 3.1 X10*6/UL (ref 4–5.2)
SODIUM SERPL-SCNC: 136 MMOL/L (ref 136–145)
WBC # BLD AUTO: 13.6 X10*3/UL (ref 4.4–11.3)

## 2024-12-16 PROCEDURE — 2500000001 HC RX 250 WO HCPCS SELF ADMINISTERED DRUGS (ALT 637 FOR MEDICARE OP)

## 2024-12-16 PROCEDURE — 80053 COMPREHEN METABOLIC PANEL: CPT | Performed by: NURSE PRACTITIONER

## 2024-12-16 PROCEDURE — 83735 ASSAY OF MAGNESIUM: CPT

## 2024-12-16 PROCEDURE — 84100 ASSAY OF PHOSPHORUS: CPT

## 2024-12-16 PROCEDURE — 2500000005 HC RX 250 GENERAL PHARMACY W/O HCPCS: Performed by: NURSE PRACTITIONER

## 2024-12-16 PROCEDURE — 36415 COLL VENOUS BLD VENIPUNCTURE: CPT | Performed by: NURSE PRACTITIONER

## 2024-12-16 PROCEDURE — 82947 ASSAY GLUCOSE BLOOD QUANT: CPT

## 2024-12-16 PROCEDURE — 2500000001 HC RX 250 WO HCPCS SELF ADMINISTERED DRUGS (ALT 637 FOR MEDICARE OP): Performed by: INTERNAL MEDICINE

## 2024-12-16 PROCEDURE — 2500000001 HC RX 250 WO HCPCS SELF ADMINISTERED DRUGS (ALT 637 FOR MEDICARE OP): Performed by: NURSE PRACTITIONER

## 2024-12-16 PROCEDURE — 85027 COMPLETE CBC AUTOMATED: CPT | Performed by: NURSE PRACTITIONER

## 2024-12-16 RX ORDER — CALCIUM ACETATE 667 MG/1
1334 CAPSULE ORAL
Status: DISCONTINUED | OUTPATIENT
Start: 2024-12-16 | End: 2024-12-16 | Stop reason: HOSPADM

## 2024-12-16 RX ORDER — TRAMADOL HYDROCHLORIDE 50 MG/1
50 TABLET ORAL EVERY 12 HOURS PRN
Start: 2024-12-16

## 2024-12-16 RX ORDER — TRAMADOL HYDROCHLORIDE 50 MG/1
50 TABLET ORAL EVERY 12 HOURS PRN
Status: DISCONTINUED | OUTPATIENT
Start: 2024-12-16 | End: 2024-12-16 | Stop reason: HOSPADM

## 2024-12-16 RX ORDER — CALCIUM ACETATE 667 MG/1
1334 CAPSULE ORAL
Start: 2024-12-16

## 2024-12-16 RX ORDER — AMLODIPINE BESYLATE 2.5 MG/1
2.5 TABLET ORAL
Start: 2024-12-17

## 2024-12-16 ASSESSMENT — PAIN - FUNCTIONAL ASSESSMENT
PAIN_FUNCTIONAL_ASSESSMENT: 0-10
PAIN_FUNCTIONAL_ASSESSMENT: 0-10

## 2024-12-16 ASSESSMENT — PAIN SCALES - GENERAL
PAINLEVEL_OUTOF10: 7
PAINLEVEL_OUTOF10: 6

## 2024-12-16 ASSESSMENT — COGNITIVE AND FUNCTIONAL STATUS - GENERAL
DRESSING REGULAR UPPER BODY CLOTHING: A LITTLE
PERSONAL GROOMING: A LITTLE
TOILETING: A LOT
MOBILITY SCORE: 8
WALKING IN HOSPITAL ROOM: TOTAL
STANDING UP FROM CHAIR USING ARMS: TOTAL
MOVING TO AND FROM BED TO CHAIR: TOTAL
EATING MEALS: A LITTLE
HELP NEEDED FOR BATHING: A LOT
MOVING FROM LYING ON BACK TO SITTING ON SIDE OF FLAT BED WITH BEDRAILS: A LOT
CLIMB 3 TO 5 STEPS WITH RAILING: TOTAL
DAILY ACTIVITIY SCORE: 15
TURNING FROM BACK TO SIDE WHILE IN FLAT BAD: A LOT
DRESSING REGULAR LOWER BODY CLOTHING: A LOT

## 2024-12-16 NOTE — NURSING NOTE
EOS:  Slept well through the night with no complaints.  Incontinent once of loose brown mucous stool.  Tolerating p.o. well.  VSS.  Remains alert and oriented, on room air.

## 2024-12-16 NOTE — NURSING NOTE
0920- Messaged Alexia Beth NP about patient complaint of 7/10 pain in her left knee. Lidoderm patch applied but inquired about something else for the pain.    1340- Patient received tramadol with a little decrease in pain. Patient denies any shortness of breath. Report called to Tiffanie at [facility]. Belongings sent with patient and DANA rivero out. Safety maintained and call light within reach.

## 2024-12-16 NOTE — PROGRESS NOTES
Savita Snell  12/15/24  [id number]  America Vanegas MD  This is a patient with a past medical history as detailed below admitted to the  ED from nursing home [facility], wound care nurse went in to complete patient dressing and patient was unresponsive and unable to move limbs. Patient last known normal was this morning at 9am when patient received morning medications.     l feels weak generalized she is awake able to follow commands and answer questions mental status is back to baseline per family at the bedside no chest pain no shortness of breath no abdominal pain .  She has knee pain she is status post knee surgery     Assessment and plan   1- C. difficile colitis continue with a tapering dose of vancomycin symptoms has improved continue with the contact precaution  2-acute renal failure / CRF started on hemodialysis continue the hemodialysis  3-debility PT OT status post knee surgery placement  4-acute respiratory failure with hypoxia CT chest rule out pulmonary embolism negative monitor pulse ox O2 support to keep pulse ox above 92% currently she is on room air  5-elevated LFTs had a CT abdomen trend CMP if persist consider ultrasound right upper quadrant  6-change of mental status likely metabolic encephalopathy  CT brain CTA monitor neurostatus aspiration precaution .     Discussed with the  consultants.      Review of other systems negative other than HPI  Past medical history    Epic& consultants notes reviewed  Most recent images Reviewed  Last EKG/Rhythm reviewed      Vital signs reviewed  SKIN:  No rashes .   ENT mucosa,  Normal/nose normal   NECK: no jugulovenous distention  NO lymphadenopathy   LUNGS: Diminished air entry bilateral  CARDIAC:  S1 and S2  ABDOMEN: Abdomen soft, non-tender.    EXTREMITIES: Extremities normal.   NEURO: No focal neurodeficit  PULSES: + pedal / radial                  Past Medical History  She has a past medical history of Afib (Multi), Arthritis, Balance  problem, C. difficile colitis, Cataract, CKD (chronic kidney disease), stage III (Multi), COVID-19, Diabetic neuropathy (Multi), Endometrial cancer (Multi), Falls, Glaucoma, Heart failure with preserved ejection fraction, Hypertension, Hypokalemia, Obesity, Type 2 diabetes mellitus, and Venous ulcer (Multi).    Surgical History  She has a past surgical history that includes Cataract extraction; Joint replacement; Cholecystectomy; Hysterectomy; Other surgical history; Other surgical history (Bilateral); Appendectomy; Tonsillectomy; Colonoscopy; Upper gastrointestinal endoscopy; and Total knee arthroplasty (Left).     Social History  She reports that she has quit smoking. Her smoking use included cigarettes. She has never used smokeless tobacco. She reports that she does not currently use alcohol. She reports that she does not use drugs.    Family History  Family History   Problem Relation Name Age of Onset    Heart failure Mother      Other (MI) Mother      Stroke Father      Hyperlipidemia Sister      Hypertension Sister          Allergies  Cephalexin, Penicillins, and Sulfamethoxazole-trimethoprim       Last Recorded Vitals  /60 (BP Location: Right arm, Patient Position: Lying)   Pulse 65   Temp 36.8 °C (98.2 °F) (Temporal)   Resp 16   Wt 86 kg (189 lb 9.5 oz)   SpO2 96%                  America Vanegas MD

## 2024-12-16 NOTE — PROGRESS NOTES
"Savita Snell \"FILOMENA\" is a 84 y.o. female on day 12 of admission presenting with No Principal Problem: There is no principal problem currently on the Problem List. Please update the Problem List and refresh..      Subjective   C/o mild abd discomfort     Medication Documentation Review Audit       Reviewed by Sanya Mederos (Technician) on 12/04/24 at 1436      Medication Order Taking? Sig Documenting Provider Last Dose Status   amLODIPine (Norvasc) 5 mg tablet [id number] Yes Take 1 tablet (5 mg) by mouth once daily. Anatoly Ngo MD 12/4/2024 Morning Active   apixaban (Eliquis) 2.5 mg tablet [id number] Yes Take 1 tablet (2.5 mg) by mouth 2 times a day. Anatoly Ngo MD 12/4/2024 Morning Active   cholecalciferol (Vitamin D3) 50 mcg (2,000 unit) capsule [id number] Yes Take 1 capsule (50 mcg) by mouth early in the morning.. Anatoly Ngo MD 12/4/2024 Morning Active   cyanocobalamin (Vitamin B-12) 1,000 mcg tablet [id number] Yes Take 1 tablet (1,000 mcg) by mouth once daily. Anatoly Ngo MD 12/4/2024 Morning Active   cyclobenzaprine (Flexeril) 10 mg tablet [id number] Yes Take 1 tablet (10 mg) by mouth 3 times a day as needed for muscle spasms. Jeannine Tejeda PA-C Past Week Active   dapagliflozin propanediol (Farxiga) 5 mg [id number] Yes Take 1 tablet (5 mg) by mouth once daily. Anatoly Ngo MD 12/4/2024 Morning Active   furosemide (Lasix) 20 mg tablet [id number] Yes Take 2 tablets (40 mg) by mouth once daily. Anatoly Ngo MD 12/4/2024 Morning Active   latanoprost (Xalatan) 0.005 % ophthalmic solution [id number] Yes Administer 1 drop into affected eye(s) daily at bedtime. Anatoly Ngo MD 12/3/2024 Bedtime Active   Lokelma 5 gram packet [id number] Yes Take 5 g by mouth once daily. Anatoly Ngo MD 12/4/2024 Morning Active   losartan (Cozaar) 25 mg tablet [id number] Yes Take 1 tablet (25 mg) by mouth twice a day. Anatoly Ngo MD 12/4/2024 Morning Active "   metoprolol tartrate (Lopressor) 25 mg tablet [id number] Yes Take 0.5 tablets (12.5 mg) by mouth 2 times a day. Historical Provider, MD 12/4/2024 Morning Active   ondansetron ODT (Zofran-ODT) 4 mg disintegrating tablet [id number] Yes Dissolve 1 tablet (4 mg) in the mouth every 8 hours if needed for nausea. Jeannine Tejeda PA-C Past Week Active   oxyCODONE (Roxicodone) 5 mg immediate release tablet [id number] Yes Take 1 tablet (5 mg) by mouth every 6 hours if needed for moderate pain (4 - 6). Jeannine Tejeda PA-C Past Week Active   pregabalin (Lyrica) 75 mg capsule [id number] Yes Take 1 capsule (75 mg) by mouth 3 times a day. Historical Provider, MD 12/4/2024 Morning Active   SITagliptin phosphate (Januvia) 25 mg tablet [id number] Yes Take 1 tablet (25 mg) by mouth once daily. Historical Provider, MD 12/4/2024 Morning Active                    Objective     Last Recorded Vitals  /68 (BP Location: Right arm, Patient Position: Lying)   Pulse 65   Temp 36.8 °C (98.2 °F) (Temporal)   Resp 18   Wt 86 kg (189 lb 9.5 oz)   SpO2 93%   Intake/Output last 3 Shifts:    Intake/Output Summary (Last 24 hours) at 12/16/2024 1118  Last data filed at 12/15/2024 1400  Gross per 24 hour   Intake 400 ml   Output --   Net 400 ml       Admission Weight  Weight: 90.7 kg (200 lb) (12/04/24 1135)    Daily Weight  12/11/24 : 86 kg (189 lb 9.5 oz)    Image Results  Lower extremity venous duplex left              [facility]  [address] [place] [address]      Tel [phone] Fax [phone]       Vascular Lab Report     VASC US LOWER EXTREMITY VENOUS DUPLEX LEFT    Patient Name:     SEAN Marvin Physician: [id number] Cinthya Welsh MD  Study Date:       12/15/2024          Ordering Provider: [id number] JUSTIN MENDEZ  MRN/PID:          [id number]            Fellow:  Accession#:        [id number]        Technologist:      Yany Martinez  Date of           1940 / 84      Technologist 2:  Birth/Age:        years  Gender:           F                   Encounter#:        [id number]  Admission Status: Inpatient           Location           [facility]                                        Performed:       Diagnosis/ICD: Pain in left lower leg-M79.662  Indication:    Limb pain.  CPT Codes:     67642 Peripheral venous duplex scan for DVT Limited       CONCLUSIONS:  Right Lower Venous: The right common femoral vein demonstrates normal spontaneous and respirophasic flow.  Left Lower Venous: No evidence of acute deep vein thrombus visualized in the left lower extremity. Waveforms are somehow continous suggesting possible proximal stenosis or compression.     Imaging & Doppler Findings:     Left                  Compress Thrombus        Flow  Distal External Iliac            None  CFV                     Yes      None   Spontaneous/Phasic  PFV                     Yes      None  FV Proximal             Yes      None   Spontaneous/Phasic  FV Mid                  Yes      None  FV Distal               Yes      None  Popliteal               Yes      None   Spontaneous/Phasic  Peroneal                Yes      None  PTV                     Yes      None       [id number] Cinthya Welsh MD  Electronically signed by [id number] Cinthya Welsh MD on 12/15/2024 at 3:42:15 PM       ** Final **  Electrocardiogram, 12-lead PRN ACS symptoms  Normal sinus rhythm  Nonspecific T wave abnormality  Abnormal ECG  When compared with ECG of 04-DEC-2024 11:37, (unconfirmed)  Questionable change in QRS axis  T wave inversion no longer evident in Anterior leads  Confirmed by Jessica Macias ([id number]) on 12/15/2024 2:01:01 PM      Physical Exam    No edema    Relevant Results      Results for orders placed or performed during the hospital encounter of 12/04/24 (from the past 24 hours)   POCT GLUCOSE   Result Value Ref Range     POCT Glucose 139 (H) 74 - 99 mg/dL   POCT GLUCOSE   Result Value Ref Range    POCT Glucose 106 (H) 74 - 99 mg/dL   POCT GLUCOSE   Result Value Ref Range    POCT Glucose 101 (H) 74 - 99 mg/dL   CBC   Result Value Ref Range    WBC 13.6 (H) 4.4 - 11.3 x10*3/uL    nRBC 0.0 0.0 - 0.0 /100 WBCs    RBC 3.10 (L) 4.00 - 5.20 x10*6/uL    Hemoglobin 9.0 (L) 12.0 - 16.0 g/dL    Hematocrit 29.6 (L) 36.0 - 46.0 %    MCV 96 80 - 100 fL    MCH 29.0 26.0 - 34.0 pg    MCHC 30.4 (L) 32.0 - 36.0 g/dL    RDW 14.4 11.5 - 14.5 %    Platelets 224 150 - 450 x10*3/uL   Comprehensive metabolic panel   Result Value Ref Range    Glucose 98 74 - 99 mg/dL    Sodium 136 136 - 145 mmol/L    Potassium 4.5 3.5 - 5.3 mmol/L    Chloride 100 98 - 107 mmol/L    Bicarbonate 24 21 - 32 mmol/L    Anion Gap 17 10 - 20 mmol/L    Urea Nitrogen 43 (H) 6 - 23 mg/dL    Creatinine 5.66 (H) 0.50 - 1.05 mg/dL    eGFR 7 (L) >60 mL/min/1.73m*2    Calcium 7.0 (L) 8.6 - 10.3 mg/dL    Albumin 2.5 (L) 3.4 - 5.0 g/dL    Alkaline Phosphatase 161 (H) 33 - 136 U/L    Total Protein 4.9 (L) 6.4 - 8.2 g/dL    AST 39 9 - 39 U/L    Bilirubin, Total 0.5 0.0 - 1.2 mg/dL    ALT 49 (H) 7 - 45 U/L   Magnesium   Result Value Ref Range    Magnesium 1.96 1.60 - 2.40 mg/dL   Phosphorus   Result Value Ref Range    Phosphorus 6.1 (H) 2.5 - 4.9 mg/dL   POCT GLUCOSE   Result Value Ref Range    POCT Glucose 101 (H) 74 - 99 mg/dL              Assessment/Plan      Assessment  AIDAN/ATN required initiation of HD - switch to TTS schedule  Cdiff  Anemia  HTN  hyperphosphatemia     Plan  Add renal MVI  Add phoslo with meals  No signs of renal recovery yet  Will monitor for renal recovery  HD TTS schedule going forward  Next HD 12/17         Prachi Urbina MD

## 2024-12-16 NOTE — PROGRESS NOTES
12/16/24 0910   Discharge Planning   Expected Discharge Disposition SNF   Intensity of Service   Intensity of Service >30 min     Auth obtained for [facility] SN[facility]. Notified LILIAN. No dc orders at this time.     1145- Patient ready to d/c per medical team. Message sent to [facility] to arrange stretcher transport, complete HENS and send d/c orders to [facility]. Updated patient's son Blaise.     1205- Transport confirmed for 1:45pm to [facility]. Nursing and facility updated.

## 2024-12-17 ENCOUNTER — OFFICE VISIT (OUTPATIENT)
Dept: GERIATRIC MEDICINE | Age: 84
End: 2024-12-17
Payer: MEDICARE

## 2024-12-17 DIAGNOSIS — I50.30 DIASTOLIC CONGESTIVE HEART FAILURE, UNSPECIFIED HF CHRONICITY (HCC): ICD-10-CM

## 2024-12-17 DIAGNOSIS — N18.6 ESRF (END STAGE RENAL FAILURE) (HCC): ICD-10-CM

## 2024-12-17 DIAGNOSIS — I48.91 ATRIAL FIBRILLATION, UNSPECIFIED TYPE (HCC): ICD-10-CM

## 2024-12-17 DIAGNOSIS — I13.2 HYPERTENSIVE HEART AND CHRONIC KIDNEY DISEASE WITH HEART FAILURE AND WITH STAGE 5 CHRONIC KIDNEY DISEASE, OR END STAGE RENAL DISEASE (HCC): ICD-10-CM

## 2024-12-17 DIAGNOSIS — A04.72 ENTEROCOLITIS DUE TO CLOSTRIDIUM DIFFICILE, NOT SPECIFIED AS RECURRENT: Primary | ICD-10-CM

## 2024-12-17 PROBLEM — Z96.652 PRESENCE OF LEFT ARTIFICIAL KNEE JOINT: Status: ACTIVE | Noted: 2024-12-17

## 2024-12-17 PROBLEM — G93.41 METABOLIC ENCEPHALOPATHY: Status: ACTIVE | Noted: 2024-12-17

## 2024-12-17 PROBLEM — E11.22 TYPE 2 DIABETES MELLITUS WITH DIABETIC CHRONIC KIDNEY DISEASE (HCC): Status: ACTIVE | Noted: 2024-12-17

## 2024-12-17 PROBLEM — E78.2 MIXED HYPERLIPIDEMIA: Status: ACTIVE | Noted: 2024-12-17

## 2024-12-17 PROBLEM — N17.9 ACUTE KIDNEY FAILURE, UNSPECIFIED (HCC): Status: ACTIVE | Noted: 2024-12-17

## 2024-12-17 PROBLEM — E44.0 MODERATE PROTEIN-CALORIE MALNUTRITION (HCC): Status: ACTIVE | Noted: 2024-12-17

## 2024-12-17 PROCEDURE — 99306 1ST NF CARE HIGH MDM 50: CPT | Performed by: INTERNAL MEDICINE

## 2024-12-17 PROCEDURE — 1123F ACP DISCUSS/DSCN MKR DOCD: CPT | Performed by: INTERNAL MEDICINE

## 2024-12-17 PROCEDURE — G8484 FLU IMMUNIZE NO ADMIN: HCPCS | Performed by: INTERNAL MEDICINE

## 2024-12-18 ENCOUNTER — OFFICE VISIT (OUTPATIENT)
Dept: GERIATRIC MEDICINE | Age: 84
End: 2024-12-18

## 2024-12-18 DIAGNOSIS — A04.72 ENTEROCOLITIS DUE TO CLOSTRIDIUM DIFFICILE, NOT SPECIFIED AS RECURRENT: Primary | ICD-10-CM

## 2024-12-18 DIAGNOSIS — I13.2 HYPERTENSIVE HEART AND CHRONIC KIDNEY DISEASE WITH HEART FAILURE AND WITH STAGE 5 CHRONIC KIDNEY DISEASE, OR END STAGE RENAL DISEASE (HCC): ICD-10-CM

## 2024-12-18 DIAGNOSIS — N18.6 ESRF (END STAGE RENAL FAILURE) (HCC): ICD-10-CM

## 2024-12-18 DIAGNOSIS — I50.30 DIASTOLIC CONGESTIVE HEART FAILURE, UNSPECIFIED HF CHRONICITY (HCC): ICD-10-CM

## 2024-12-18 DIAGNOSIS — I48.91 ATRIAL FIBRILLATION, UNSPECIFIED TYPE (HCC): ICD-10-CM

## 2024-12-19 ENCOUNTER — OFFICE VISIT (OUTPATIENT)
Dept: GERIATRIC MEDICINE | Age: 84
End: 2024-12-19

## 2024-12-19 DIAGNOSIS — N18.6 ESRF (END STAGE RENAL FAILURE) (HCC): ICD-10-CM

## 2024-12-19 DIAGNOSIS — I13.2 HYPERTENSIVE HEART AND CHRONIC KIDNEY DISEASE WITH HEART FAILURE AND WITH STAGE 5 CHRONIC KIDNEY DISEASE, OR END STAGE RENAL DISEASE (HCC): ICD-10-CM

## 2024-12-19 DIAGNOSIS — I48.91 ATRIAL FIBRILLATION, UNSPECIFIED TYPE (HCC): ICD-10-CM

## 2024-12-19 DIAGNOSIS — A04.72 ENTEROCOLITIS DUE TO CLOSTRIDIUM DIFFICILE, NOT SPECIFIED AS RECURRENT: Primary | ICD-10-CM

## 2024-12-19 DIAGNOSIS — I50.30 DIASTOLIC CONGESTIVE HEART FAILURE, UNSPECIFIED HF CHRONICITY (HCC): ICD-10-CM

## 2024-12-20 ENCOUNTER — HOSPITAL ENCOUNTER (INPATIENT)
Facility: HOSPITAL | Age: 84
LOS: 1 days | Discharge: NURSING, REHAB OR LONG-TERM CARE | End: 2024-12-22
Attending: EMERGENCY MEDICINE | Admitting: STUDENT IN AN ORGANIZED HEALTH CARE EDUCATION/TRAINING PROGRAM
Payer: MEDICARE

## 2024-12-20 ENCOUNTER — APPOINTMENT (OUTPATIENT)
Dept: RADIOLOGY | Facility: HOSPITAL | Age: 84
DRG: 371 | End: 2024-12-20
Payer: MEDICARE

## 2024-12-20 ENCOUNTER — OFFICE VISIT (OUTPATIENT)
Dept: GERIATRIC MEDICINE | Age: 84
End: 2024-12-20

## 2024-12-20 ENCOUNTER — APPOINTMENT (OUTPATIENT)
Dept: CARDIOLOGY | Facility: HOSPITAL | Age: 84
DRG: 371 | End: 2024-12-20
Payer: MEDICARE

## 2024-12-20 DIAGNOSIS — N17.9 ACUTE KIDNEY INJURY SUPERIMPOSED ON CKD (CMS-HCC): ICD-10-CM

## 2024-12-20 DIAGNOSIS — N18.9 ACUTE KIDNEY INJURY SUPERIMPOSED ON CKD (CMS-HCC): ICD-10-CM

## 2024-12-20 DIAGNOSIS — A04.72 ENTEROCOLITIS DUE TO CLOSTRIDIUM DIFFICILE, NOT SPECIFIED AS RECURRENT: Primary | ICD-10-CM

## 2024-12-20 DIAGNOSIS — K92.2 GASTROINTESTINAL HEMORRHAGE, UNSPECIFIED GASTROINTESTINAL HEMORRHAGE TYPE: Primary | ICD-10-CM

## 2024-12-20 DIAGNOSIS — I50.30 DIASTOLIC CONGESTIVE HEART FAILURE, UNSPECIFIED HF CHRONICITY (HCC): ICD-10-CM

## 2024-12-20 DIAGNOSIS — Z79.01 ANTICOAGULATED BY ANTICOAGULATION TREATMENT: ICD-10-CM

## 2024-12-20 DIAGNOSIS — I48.91 ATRIAL FIBRILLATION, UNSPECIFIED TYPE (HCC): ICD-10-CM

## 2024-12-20 DIAGNOSIS — N18.6 ESRF (END STAGE RENAL FAILURE) (HCC): ICD-10-CM

## 2024-12-20 DIAGNOSIS — I13.2 HYPERTENSIVE HEART AND CHRONIC KIDNEY DISEASE WITH HEART FAILURE AND WITH STAGE 5 CHRONIC KIDNEY DISEASE, OR END STAGE RENAL DISEASE (HCC): ICD-10-CM

## 2024-12-20 PROBLEM — K62.5 BRBPR (BRIGHT RED BLOOD PER RECTUM): Status: ACTIVE | Noted: 2024-12-20

## 2024-12-20 LAB
ALBUMIN SERPL BCP-MCNC: 2.8 G/DL (ref 3.4–5)
ALP SERPL-CCNC: 97 U/L (ref 33–136)
ALT SERPL W P-5'-P-CCNC: 16 U/L (ref 7–45)
ANION GAP SERPL CALC-SCNC: 13 MMOL/L (ref 10–20)
AST SERPL W P-5'-P-CCNC: 14 U/L (ref 9–39)
BASOPHILS # BLD AUTO: 0.07 X10*3/UL (ref 0–0.1)
BASOPHILS NFR BLD AUTO: 0.9 %
BILIRUB DIRECT SERPL-MCNC: 0.1 MG/DL (ref 0–0.3)
BILIRUB SERPL-MCNC: 0.4 MG/DL (ref 0–1.2)
BUN SERPL-MCNC: 27 MG/DL (ref 6–23)
CALCIUM SERPL-MCNC: 7.4 MG/DL (ref 8.6–10.3)
CHLORIDE SERPL-SCNC: 97 MMOL/L (ref 98–107)
CO2 SERPL-SCNC: 27 MMOL/L (ref 21–32)
CREAT SERPL-MCNC: 3.94 MG/DL (ref 0.5–1.05)
EGFRCR SERPLBLD CKD-EPI 2021: 11 ML/MIN/1.73M*2
EOSINOPHIL # BLD AUTO: 0.46 X10*3/UL (ref 0–0.4)
EOSINOPHIL NFR BLD AUTO: 5.8 %
ERYTHROCYTE [DISTWIDTH] IN BLOOD BY AUTOMATED COUNT: 14.1 % (ref 11.5–14.5)
GLUCOSE BLD MANUAL STRIP-MCNC: 97 MG/DL (ref 74–99)
GLUCOSE SERPL-MCNC: 117 MG/DL (ref 74–99)
HCT VFR BLD AUTO: 29.1 % (ref 36–46)
HGB BLD-MCNC: 8.9 G/DL (ref 12–16)
IMM GRANULOCYTES # BLD AUTO: 0.06 X10*3/UL (ref 0–0.5)
IMM GRANULOCYTES NFR BLD AUTO: 0.8 % (ref 0–0.9)
INR PPP: 1.5 (ref 0.9–1.1)
LACTATE SERPL-SCNC: 2.5 MMOL/L (ref 0.4–2)
LYMPHOCYTES # BLD AUTO: 0.94 X10*3/UL (ref 0.8–3)
LYMPHOCYTES NFR BLD AUTO: 11.8 %
MCH RBC QN AUTO: 29.7 PG (ref 26–34)
MCHC RBC AUTO-ENTMCNC: 30.6 G/DL (ref 32–36)
MCV RBC AUTO: 97 FL (ref 80–100)
MONOCYTES # BLD AUTO: 0.81 X10*3/UL (ref 0.05–0.8)
MONOCYTES NFR BLD AUTO: 10.2 %
NEUTROPHILS # BLD AUTO: 5.61 X10*3/UL (ref 1.6–5.5)
NEUTROPHILS NFR BLD AUTO: 70.5 %
NRBC BLD-RTO: 0 /100 WBCS (ref 0–0)
PLATELET # BLD AUTO: 223 X10*3/UL (ref 150–450)
POTASSIUM SERPL-SCNC: 4.2 MMOL/L (ref 3.5–5.3)
PROT SERPL-MCNC: 5.4 G/DL (ref 6.4–8.2)
PROTHROMBIN TIME: 16.8 SECONDS (ref 9.8–12.8)
RBC # BLD AUTO: 3 X10*6/UL (ref 4–5.2)
SODIUM SERPL-SCNC: 133 MMOL/L (ref 136–145)
WBC # BLD AUTO: 8 X10*3/UL (ref 4.4–11.3)

## 2024-12-20 PROCEDURE — 2500000001 HC RX 250 WO HCPCS SELF ADMINISTERED DRUGS (ALT 637 FOR MEDICARE OP): Performed by: STUDENT IN AN ORGANIZED HEALTH CARE EDUCATION/TRAINING PROGRAM

## 2024-12-20 PROCEDURE — 85025 COMPLETE CBC W/AUTO DIFF WBC: CPT | Performed by: EMERGENCY MEDICINE

## 2024-12-20 PROCEDURE — 82374 ASSAY BLOOD CARBON DIOXIDE: CPT | Performed by: EMERGENCY MEDICINE

## 2024-12-20 PROCEDURE — 85610 PROTHROMBIN TIME: CPT | Performed by: EMERGENCY MEDICINE

## 2024-12-20 PROCEDURE — 93005 ELECTROCARDIOGRAM TRACING: CPT

## 2024-12-20 PROCEDURE — 83605 ASSAY OF LACTIC ACID: CPT | Performed by: EMERGENCY MEDICINE

## 2024-12-20 PROCEDURE — G0378 HOSPITAL OBSERVATION PER HR: HCPCS

## 2024-12-20 PROCEDURE — 36415 COLL VENOUS BLD VENIPUNCTURE: CPT | Performed by: EMERGENCY MEDICINE

## 2024-12-20 PROCEDURE — 74174 CTA ABD&PLVS W/CONTRAST: CPT | Performed by: RADIOLOGY

## 2024-12-20 PROCEDURE — 99285 EMERGENCY DEPT VISIT HI MDM: CPT | Mod: 25 | Performed by: EMERGENCY MEDICINE

## 2024-12-20 PROCEDURE — 80076 HEPATIC FUNCTION PANEL: CPT | Performed by: EMERGENCY MEDICINE

## 2024-12-20 PROCEDURE — 82248 BILIRUBIN DIRECT: CPT | Performed by: EMERGENCY MEDICINE

## 2024-12-20 PROCEDURE — 99223 1ST HOSP IP/OBS HIGH 75: CPT | Performed by: STUDENT IN AN ORGANIZED HEALTH CARE EDUCATION/TRAINING PROGRAM

## 2024-12-20 PROCEDURE — 2500000005 HC RX 250 GENERAL PHARMACY W/O HCPCS: Performed by: STUDENT IN AN ORGANIZED HEALTH CARE EDUCATION/TRAINING PROGRAM

## 2024-12-20 PROCEDURE — 74174 CTA ABD&PLVS W/CONTRAST: CPT

## 2024-12-20 PROCEDURE — 2550000001 HC RX 255 CONTRASTS: Performed by: EMERGENCY MEDICINE

## 2024-12-20 PROCEDURE — 82947 ASSAY GLUCOSE BLOOD QUANT: CPT

## 2024-12-20 PROCEDURE — 80053 COMPREHEN METABOLIC PANEL: CPT | Performed by: EMERGENCY MEDICINE

## 2024-12-20 RX ORDER — VANCOMYCIN HCL 50 MG/ML
125 SOLUTION, RECONSTITUTED, ORAL ORAL EVERY OTHER DAY
Status: DISCONTINUED | OUTPATIENT
Start: 2025-01-02 | End: 2024-12-22 | Stop reason: HOSPADM

## 2024-12-20 RX ORDER — AMLODIPINE BESYLATE 5 MG/1
2.5 TABLET ORAL DAILY
Status: DISCONTINUED | OUTPATIENT
Start: 2024-12-21 | End: 2024-12-22 | Stop reason: HOSPADM

## 2024-12-20 RX ORDER — ACETAMINOPHEN 160 MG/5ML
650 SOLUTION ORAL EVERY 4 HOURS PRN
Status: DISCONTINUED | OUTPATIENT
Start: 2024-12-20 | End: 2024-12-22 | Stop reason: HOSPADM

## 2024-12-20 RX ORDER — VIT C/E/ZN/COPPR/LUTEIN/ZEAXAN 250MG-90MG
1000 CAPSULE ORAL DAILY
Status: DISCONTINUED | OUTPATIENT
Start: 2024-12-21 | End: 2024-12-22 | Stop reason: HOSPADM

## 2024-12-20 RX ORDER — VANCOMYCIN HCL 50 MG/ML
125 SOLUTION, RECONSTITUTED, ORAL ORAL 2 TIMES DAILY
Status: DISCONTINUED | OUTPATIENT
Start: 2024-12-20 | End: 2024-12-22 | Stop reason: HOSPADM

## 2024-12-20 RX ORDER — ACETAMINOPHEN 325 MG/1
650 TABLET ORAL EVERY 4 HOURS PRN
Status: DISCONTINUED | OUTPATIENT
Start: 2024-12-20 | End: 2024-12-22 | Stop reason: HOSPADM

## 2024-12-20 RX ORDER — ACETAMINOPHEN 650 MG/1
650 SUPPOSITORY RECTAL EVERY 4 HOURS PRN
Status: DISCONTINUED | OUTPATIENT
Start: 2024-12-20 | End: 2024-12-22 | Stop reason: HOSPADM

## 2024-12-20 RX ORDER — FUROSEMIDE 40 MG/1
40 TABLET ORAL DAILY
Status: DISCONTINUED | OUTPATIENT
Start: 2024-12-21 | End: 2024-12-22 | Stop reason: HOSPADM

## 2024-12-20 RX ORDER — LIDOCAINE 560 MG/1
1 PATCH PERCUTANEOUS; TOPICAL; TRANSDERMAL DAILY
Status: DISCONTINUED | OUTPATIENT
Start: 2024-12-21 | End: 2024-12-22 | Stop reason: HOSPADM

## 2024-12-20 RX ORDER — TRAMADOL HYDROCHLORIDE 50 MG/1
50 TABLET ORAL EVERY 12 HOURS PRN
Status: DISCONTINUED | OUTPATIENT
Start: 2024-12-20 | End: 2024-12-22 | Stop reason: HOSPADM

## 2024-12-20 RX ORDER — VANCOMYCIN HCL 50 MG/ML
125 SOLUTION, RECONSTITUTED, ORAL ORAL DAILY
Status: DISCONTINUED | OUTPATIENT
Start: 2024-12-26 | End: 2024-12-22 | Stop reason: HOSPADM

## 2024-12-20 RX ORDER — METOPROLOL TARTRATE 25 MG/1
12.5 TABLET, FILM COATED ORAL 2 TIMES DAILY
Status: DISCONTINUED | OUTPATIENT
Start: 2024-12-20 | End: 2024-12-22 | Stop reason: HOSPADM

## 2024-12-20 RX ORDER — CALCIUM ACETATE 667 MG/1
1334 CAPSULE ORAL
Status: DISCONTINUED | OUTPATIENT
Start: 2024-12-21 | End: 2024-12-22 | Stop reason: HOSPADM

## 2024-12-20 RX ORDER — CHOLECALCIFEROL (VITAMIN D3) 25 MCG
2000 TABLET ORAL DAILY
Status: DISCONTINUED | OUTPATIENT
Start: 2024-12-21 | End: 2024-12-22 | Stop reason: HOSPADM

## 2024-12-20 RX ORDER — POLYETHYLENE GLYCOL 3350 17 G/17G
17 POWDER, FOR SOLUTION ORAL DAILY PRN
Status: DISCONTINUED | OUTPATIENT
Start: 2024-12-20 | End: 2024-12-22 | Stop reason: HOSPADM

## 2024-12-20 RX ORDER — LATANOPROST 50 UG/ML
1 SOLUTION/ DROPS OPHTHALMIC NIGHTLY
Status: DISCONTINUED | OUTPATIENT
Start: 2024-12-20 | End: 2024-12-22 | Stop reason: HOSPADM

## 2024-12-20 RX ADMIN — METOPROLOL TARTRATE 12.5 MG: 25 TABLET, FILM COATED ORAL at 22:01

## 2024-12-20 RX ADMIN — ACETAMINOPHEN 650 MG: 325 TABLET ORAL at 20:41

## 2024-12-20 RX ADMIN — VANCOMYCIN HYDROCHLORIDE 125 MG: KIT at 21:44

## 2024-12-20 RX ADMIN — IOHEXOL 75 ML: 350 INJECTION, SOLUTION INTRAVENOUS at 17:32

## 2024-12-20 RX ADMIN — TRAMADOL HYDROCHLORIDE 50 MG: 50 TABLET, FILM COATED ORAL at 22:02

## 2024-12-20 SDOH — ECONOMIC STABILITY: FOOD INSECURITY: WITHIN THE PAST 12 MONTHS, THE FOOD YOU BOUGHT JUST DIDN'T LAST AND YOU DIDN'T HAVE MONEY TO GET MORE.: NEVER TRUE

## 2024-12-20 SDOH — HEALTH STABILITY: PHYSICAL HEALTH: ON AVERAGE, HOW MANY MINUTES DO YOU ENGAGE IN EXERCISE AT THIS LEVEL?: 30 MIN

## 2024-12-20 SDOH — ECONOMIC STABILITY: TRANSPORTATION INSECURITY: IN THE PAST 12 MONTHS, HAS LACK OF TRANSPORTATION KEPT YOU FROM MEDICAL APPOINTMENTS OR FROM GETTING MEDICATIONS?: NO

## 2024-12-20 SDOH — HEALTH STABILITY: MENTAL HEALTH: HOW MANY DRINKS CONTAINING ALCOHOL DO YOU HAVE ON A TYPICAL DAY WHEN YOU ARE DRINKING?: PATIENT DOES NOT DRINK

## 2024-12-20 SDOH — HEALTH STABILITY: MENTAL HEALTH: HOW OFTEN DO YOU HAVE SIX OR MORE DRINKS ON ONE OCCASION?: NEVER

## 2024-12-20 SDOH — SOCIAL STABILITY: SOCIAL INSECURITY: ARE YOU OR HAVE YOU BEEN THREATENED OR ABUSED PHYSICALLY, EMOTIONALLY, OR SEXUALLY BY ANYONE?: NO

## 2024-12-20 SDOH — SOCIAL STABILITY: SOCIAL NETWORK
DO YOU BELONG TO ANY CLUBS OR ORGANIZATIONS SUCH AS CHURCH GROUPS, UNIONS, FRATERNAL OR ATHLETIC GROUPS, OR SCHOOL GROUPS?: NO

## 2024-12-20 SDOH — ECONOMIC STABILITY: HOUSING INSECURITY: IN THE LAST 12 MONTHS, WAS THERE A TIME WHEN YOU WERE NOT ABLE TO PAY THE MORTGAGE OR RENT ON TIME?: NO

## 2024-12-20 SDOH — ECONOMIC STABILITY: INCOME INSECURITY: IN THE PAST 12 MONTHS HAS THE ELECTRIC, GAS, OIL, OR WATER COMPANY THREATENED TO SHUT OFF SERVICES IN YOUR HOME?: NO

## 2024-12-20 SDOH — HEALTH STABILITY: MENTAL HEALTH: HOW OFTEN DO YOU HAVE A DRINK CONTAINING ALCOHOL?: NEVER

## 2024-12-20 SDOH — HEALTH STABILITY: MENTAL HEALTH
DO YOU FEEL STRESS - TENSE, RESTLESS, NERVOUS, OR ANXIOUS, OR UNABLE TO SLEEP AT NIGHT BECAUSE YOUR MIND IS TROUBLED ALL THE TIME - THESE DAYS?: NOT AT ALL

## 2024-12-20 SDOH — SOCIAL STABILITY: SOCIAL INSECURITY: HAS ANYONE EVER THREATENED TO HURT YOUR FAMILY OR YOUR PETS?: NO

## 2024-12-20 SDOH — ECONOMIC STABILITY: FOOD INSECURITY: WITHIN THE PAST 12 MONTHS, YOU WORRIED THAT YOUR FOOD WOULD RUN OUT BEFORE YOU GOT THE MONEY TO BUY MORE.: NEVER TRUE

## 2024-12-20 SDOH — SOCIAL STABILITY: SOCIAL INSECURITY
WITHIN THE LAST YEAR, HAVE YOU BEEN RAPED OR FORCED TO HAVE ANY KIND OF SEXUAL ACTIVITY BY YOUR PARTNER OR EX-PARTNER?: NO

## 2024-12-20 SDOH — ECONOMIC STABILITY: HOUSING INSECURITY: AT ANY TIME IN THE PAST 12 MONTHS, WERE YOU HOMELESS OR LIVING IN A SHELTER (INCLUDING NOW)?: NO

## 2024-12-20 SDOH — SOCIAL STABILITY: SOCIAL INSECURITY: HAVE YOU HAD THOUGHTS OF HARMING ANYONE ELSE?: NO

## 2024-12-20 SDOH — SOCIAL STABILITY: SOCIAL INSECURITY: WITHIN THE LAST YEAR, HAVE YOU BEEN HUMILIATED OR EMOTIONALLY ABUSED IN OTHER WAYS BY YOUR PARTNER OR EX-PARTNER?: NO

## 2024-12-20 SDOH — SOCIAL STABILITY: SOCIAL NETWORK
IN A TYPICAL WEEK, HOW MANY TIMES DO YOU TALK ON THE PHONE WITH FAMILY, FRIENDS, OR NEIGHBORS?: MORE THAN THREE TIMES A WEEK

## 2024-12-20 SDOH — HEALTH STABILITY: PHYSICAL HEALTH
HOW OFTEN DO YOU NEED TO HAVE SOMEONE HELP YOU WHEN YOU READ INSTRUCTIONS, PAMPHLETS, OR OTHER WRITTEN MATERIAL FROM YOUR DOCTOR OR PHARMACY?: OFTEN

## 2024-12-20 SDOH — SOCIAL STABILITY: SOCIAL INSECURITY: WITHIN THE LAST YEAR, HAVE YOU BEEN AFRAID OF YOUR PARTNER OR EX-PARTNER?: NO

## 2024-12-20 SDOH — HEALTH STABILITY: PHYSICAL HEALTH: ON AVERAGE, HOW MANY DAYS PER WEEK DO YOU ENGAGE IN MODERATE TO STRENUOUS EXERCISE (LIKE A BRISK WALK)?: 5 DAYS

## 2024-12-20 SDOH — SOCIAL STABILITY: SOCIAL INSECURITY: ARE YOU MARRIED, WIDOWED, DIVORCED, SEPARATED, NEVER MARRIED, OR LIVING WITH A PARTNER?: MARRIED

## 2024-12-20 SDOH — SOCIAL STABILITY: SOCIAL NETWORK: HOW OFTEN DO YOU ATTEND MEETINGS OF THE CLUBS OR ORGANIZATIONS YOU BELONG TO?: NEVER

## 2024-12-20 SDOH — SOCIAL STABILITY: SOCIAL INSECURITY: ARE THERE ANY APPARENT SIGNS OF INJURIES/BEHAVIORS THAT COULD BE RELATED TO ABUSE/NEGLECT?: NO

## 2024-12-20 SDOH — SOCIAL STABILITY: SOCIAL INSECURITY
WITHIN THE LAST YEAR, HAVE YOU BEEN KICKED, HIT, SLAPPED, OR OTHERWISE PHYSICALLY HURT BY YOUR PARTNER OR EX-PARTNER?: NO

## 2024-12-20 SDOH — SOCIAL STABILITY: SOCIAL INSECURITY: ABUSE: ADULT

## 2024-12-20 SDOH — ECONOMIC STABILITY: HOUSING INSECURITY: IN THE PAST 12 MONTHS, HOW MANY TIMES HAVE YOU MOVED WHERE YOU WERE LIVING?: 0

## 2024-12-20 SDOH — SOCIAL STABILITY: SOCIAL NETWORK: HOW OFTEN DO YOU ATTEND CHURCH OR RELIGIOUS SERVICES?: NEVER

## 2024-12-20 SDOH — SOCIAL STABILITY: SOCIAL INSECURITY: WERE YOU ABLE TO COMPLETE ALL THE BEHAVIORAL HEALTH SCREENINGS?: YES

## 2024-12-20 SDOH — SOCIAL STABILITY: SOCIAL NETWORK: HOW OFTEN DO YOU GET TOGETHER WITH FRIENDS OR RELATIVES?: MORE THAN THREE TIMES A WEEK

## 2024-12-20 SDOH — SOCIAL STABILITY: SOCIAL INSECURITY: DO YOU FEEL ANYONE HAS EXPLOITED OR TAKEN ADVANTAGE OF YOU FINANCIALLY OR OF YOUR PERSONAL PROPERTY?: NO

## 2024-12-20 SDOH — SOCIAL STABILITY: SOCIAL INSECURITY: HAVE YOU HAD ANY THOUGHTS OF HARMING ANYONE ELSE?: NO

## 2024-12-20 SDOH — ECONOMIC STABILITY: FOOD INSECURITY: HOW HARD IS IT FOR YOU TO PAY FOR THE VERY BASICS LIKE FOOD, HOUSING, MEDICAL CARE, AND HEATING?: NOT VERY HARD

## 2024-12-20 SDOH — SOCIAL STABILITY: SOCIAL INSECURITY: DOES ANYONE TRY TO KEEP YOU FROM HAVING/CONTACTING OTHER FRIENDS OR DOING THINGS OUTSIDE YOUR HOME?: NO

## 2024-12-20 SDOH — SOCIAL STABILITY: SOCIAL INSECURITY: DO YOU FEEL UNSAFE GOING BACK TO THE PLACE WHERE YOU ARE LIVING?: NO

## 2024-12-20 ASSESSMENT — COGNITIVE AND FUNCTIONAL STATUS - GENERAL
PERSONAL GROOMING: A LOT
PATIENT BASELINE BEDBOUND: NO
WALKING IN HOSPITAL ROOM: TOTAL
MOBILITY SCORE: 12
DRESSING REGULAR LOWER BODY CLOTHING: A LITTLE
TOILETING: A LOT
TURNING FROM BACK TO SIDE WHILE IN FLAT BAD: A LITTLE
DAILY ACTIVITIY SCORE: 16
DRESSING REGULAR UPPER BODY CLOTHING: A LITTLE
MOVING TO AND FROM BED TO CHAIR: A LOT
STANDING UP FROM CHAIR USING ARMS: A LOT
CLIMB 3 TO 5 STEPS WITH RAILING: TOTAL
MOVING FROM LYING ON BACK TO SITTING ON SIDE OF FLAT BED WITH BEDRAILS: A LITTLE
HELP NEEDED FOR BATHING: A LOT

## 2024-12-20 ASSESSMENT — ACTIVITIES OF DAILY LIVING (ADL)
HEARING - LEFT EAR: FUNCTIONAL
TOILETING: NEEDS ASSISTANCE
PATIENT'S MEMORY ADEQUATE TO SAFELY COMPLETE DAILY ACTIVITIES?: YES
FEEDING YOURSELF: INDEPENDENT
LACK_OF_TRANSPORTATION: NO
GROOMING: NEEDS ASSISTANCE
ADEQUATE_TO_COMPLETE_ADL: YES
WALKS IN HOME: NEEDS ASSISTANCE
BATHING: NEEDS ASSISTANCE
ASSISTIVE_DEVICE: WALKER
DRESSING YOURSELF: NEEDS ASSISTANCE
HEARING - RIGHT EAR: FUNCTIONAL
JUDGMENT_ADEQUATE_SAFELY_COMPLETE_DAILY_ACTIVITIES: YES
LACK_OF_TRANSPORTATION: NO

## 2024-12-20 ASSESSMENT — LIFESTYLE VARIABLES
SUBSTANCE_ABUSE_PAST_12_MONTHS: NO
AUDIT-C TOTAL SCORE: 0
AUDIT-C TOTAL SCORE: 0
SKIP TO QUESTIONS 9-10: 1
HOW MANY STANDARD DRINKS CONTAINING ALCOHOL DO YOU HAVE ON A TYPICAL DAY: PATIENT DOES NOT DRINK
EVER FELT BAD OR GUILTY ABOUT YOUR DRINKING: NO
PRESCIPTION_ABUSE_PAST_12_MONTHS: NO
SKIP TO QUESTIONS 9-10: 1
TOTAL SCORE: 0
HOW OFTEN DO YOU HAVE 6 OR MORE DRINKS ON ONE OCCASION: NEVER
HOW OFTEN DO YOU HAVE A DRINK CONTAINING ALCOHOL: NEVER
HAVE YOU EVER FELT YOU SHOULD CUT DOWN ON YOUR DRINKING: NO
EVER HAD A DRINK FIRST THING IN THE MORNING TO STEADY YOUR NERVES TO GET RID OF A HANGOVER: NO
HAVE PEOPLE ANNOYED YOU BY CRITICIZING YOUR DRINKING: NO
AUDIT-C TOTAL SCORE: 0

## 2024-12-20 ASSESSMENT — PAIN DESCRIPTION - ORIENTATION
ORIENTATION: LEFT

## 2024-12-20 ASSESSMENT — PATIENT HEALTH QUESTIONNAIRE - PHQ9
2. FEELING DOWN, DEPRESSED OR HOPELESS: NOT AT ALL
1. LITTLE INTEREST OR PLEASURE IN DOING THINGS: SEVERAL DAYS
SUM OF ALL RESPONSES TO PHQ9 QUESTIONS 1 & 2: 1

## 2024-12-20 ASSESSMENT — PAIN DESCRIPTION - LOCATION
LOCATION: KNEE

## 2024-12-20 ASSESSMENT — PAIN SCALES - GENERAL
PAINLEVEL_OUTOF10: 7
PAINLEVEL_OUTOF10: 7
PAINLEVEL_OUTOF10: 5 - MODERATE PAIN

## 2024-12-20 ASSESSMENT — PAIN SCALES - PAIN ASSESSMENT IN ADVANCED DEMENTIA (PAINAD)
TOTALSCORE: MEDICATION (SEE MAR)
TOTALSCORE: MEDICATION (SEE MAR)

## 2024-12-20 ASSESSMENT — PAIN - FUNCTIONAL ASSESSMENT: PAIN_FUNCTIONAL_ASSESSMENT: 0-10

## 2024-12-20 NOTE — ED PROVIDER NOTES
HPI   Chief Complaint   Patient presents with    Black or Bloody Stool     Per Kirill        Patient is a 84-year-old female with history of A-fib CHF on Eliquis was sent in because there is blood in the stools, patient denies any complaint of fever no chills no nausea no vomiting              Patient History   Past Medical History:   Diagnosis Date    Afib (Multi)     Arthritis     Balance problem     C. difficile colitis     Cataract     CKD (chronic kidney disease), stage III (Multi)     COVID-19     VACCINATED    Diabetic neuropathy (Multi)     Endometrial cancer (Multi)     Falls     HAS FALLEN WITHIN LAST 6 MONTHS    Glaucoma     Heart failure with preserved ejection fraction     Hypertension     Hypokalemia     Obesity     Type 2 diabetes mellitus     Venous ulcer (Multi)      Past Surgical History:   Procedure Laterality Date    APPENDECTOMY      CATARACT EXTRACTION      CHOLECYSTECTOMY      COLONOSCOPY      HYSTERECTOMY      JOINT REPLACEMENT      RIGHT KNEE    OTHER SURGICAL HISTORY      TAILBONE REMOVED DUE TO FRACTURE    OTHER SURGICAL HISTORY Bilateral     DOUBLE MUSCLE EYE SURGERY    TONSILLECTOMY      TOTAL KNEE ARTHROPLASTY Left     UPPER GASTROINTESTINAL ENDOSCOPY       Family History   Problem Relation Name Age of Onset    Heart failure Mother      Other (MI) Mother      Stroke Father      Hyperlipidemia Sister      Hypertension Sister       Social History     Tobacco Use    Smoking status: Former     Types: Cigarettes    Smokeless tobacco: Never   Vaping Use    Vaping status: Never Used   Substance Use Topics    Alcohol use: Not Currently    Drug use: Never       Physical Exam   ED Triage Vitals [12/20/24 1614]   Temperature Heart Rate Respirations BP   36.9 °C (98.4 °F) 73 18 (!) 128/49      Pulse Ox Temp src Heart Rate Source Patient Position   97 % -- -- --      BP Location FiO2 (%)     -- --       Physical Exam  Vitals and nursing note reviewed. Exam conducted with a chaperone present.    Constitutional:       Appearance: She is obese.   HENT:      Head: Normocephalic and atraumatic.   Cardiovascular:      Rate and Rhythm: Normal rate and regular rhythm.   Pulmonary:      Effort: Pulmonary effort is normal.      Breath sounds: Normal breath sounds.   Abdominal:      Palpations: Abdomen is soft.      Tenderness: There is no abdominal tenderness.   Genitourinary:     Comments: Loose stools with a reddish-maroon color in the diapers free-flowing  Musculoskeletal:         General: Normal range of motion.   Skin:     General: Skin is warm and dry.   Neurological:      General: No focal deficit present.      Mental Status: She is alert.           ED Course & MDM   Diagnoses as of 12/20/24 1826   Gastrointestinal hemorrhage, unspecified gastrointestinal hemorrhage type   Anticoagulated by anticoagulation treatment                 No data recorded     Shana Coma Scale Score: 15 (12/20/24 1618 : Shawna Tapia RN)                           Medical Decision Making  EKG interpreted by me shows normal sinus rhythm rate of 70 normal QRS and normal ST segment  My differential diagnosis  Acute GI bleed coagulopathy anemia  Ordered CBC chemistries lactate and INR, workup showed patient to be needed but he hemoglobin at baseline at this time discussed with the hospitalist and patient accepted service.  Labs Reviewed  CBC WITH AUTO DIFFERENTIAL - Abnormal     WBC                           8.0                    nRBC                          0.0                    RBC                           3.00 (*)               Hemoglobin                    8.9 (*)                Hematocrit                    29.1 (*)               MCV                           97                     MCH                           29.7                   MCHC                          30.6 (*)               RDW                           14.1                   Platelets                     223                    Neutrophils %                  70.5                   Immature Granulocytes %, Automated   0.8                    Lymphocytes %                 11.8                   Monocytes %                   10.2                   Eosinophils %                 5.8                    Basophils %                   0.9                    Neutrophils Absolute          5.61 (*)               Immature Granulocytes Absolute, Au*   0.06                   Lymphocytes Absolute          0.94                   Monocytes Absolute            0.81 (*)               Eosinophils Absolute          0.46 (*)               Basophils Absolute            0.07                BASIC METABOLIC PANEL - Abnormal     Glucose                       117 (*)                Sodium                        133 (*)                Potassium                     4.2                    Chloride                      97 (*)                 Bicarbonate                   27                     Anion Gap                     13                     Urea Nitrogen                 27 (*)                 Creatinine                    3.94 (*)               eGFR                          11 (*)                 Calcium                       7.4 (*)             HEPATIC FUNCTION PANEL - Abnormal     Albumin                       2.8 (*)                Bilirubin, Total              0.4                    Bilirubin, Direct             0.1                    Alkaline Phosphatase          97                     ALT                           16                     AST                           14                     Total Protein                 5.4 (*)             LACTATE - Abnormal     Lactate                       2.5 (*)                  Narrative: Venipuncture immediately after or during the administration of Metamizole may lead to falsely low results. Testing should be performed immediately prior to Metamizole dosing.  PROTIME-INR - Abnormal     Protime                       16.8 (*)               INR                            1.5 (*)             C. DIFFICILE, PCR  LACTATE     CT angio abdomen pelvis w and or wo IV IV contrast   Final Result    1. Nonspecific findings of proctitis. No active bleeding within the    GI tract.          MACRO:    none          Signed by: Efrain Shah 12/20/2024 5:50 PM    Dictation workstation:   [id number]              Procedure  Procedures     Yessy Jacome MD  12/20/24 6051

## 2024-12-21 PROBLEM — K92.2 GASTROINTESTINAL HEMORRHAGE, UNSPECIFIED GASTROINTESTINAL HEMORRHAGE TYPE: Status: ACTIVE | Noted: 2024-12-21

## 2024-12-21 LAB
ANION GAP SERPL CALC-SCNC: 11 MMOL/L (ref 10–20)
BASOPHILS # BLD AUTO: 0.09 X10*3/UL (ref 0–0.1)
BASOPHILS NFR BLD AUTO: 1.2 %
BUN SERPL-MCNC: 31 MG/DL (ref 6–23)
CALCIUM SERPL-MCNC: 7.1 MG/DL (ref 8.6–10.3)
CHLORIDE SERPL-SCNC: 96 MMOL/L (ref 98–107)
CO2 SERPL-SCNC: 28 MMOL/L (ref 21–32)
CREAT SERPL-MCNC: 4.5 MG/DL (ref 0.5–1.05)
EGFRCR SERPLBLD CKD-EPI 2021: 9 ML/MIN/1.73M*2
EOSINOPHIL # BLD AUTO: 0.44 X10*3/UL (ref 0–0.4)
EOSINOPHIL NFR BLD AUTO: 5.7 %
ERYTHROCYTE [DISTWIDTH] IN BLOOD BY AUTOMATED COUNT: 14.3 % (ref 11.5–14.5)
GLUCOSE BLD MANUAL STRIP-MCNC: 106 MG/DL (ref 74–99)
GLUCOSE SERPL-MCNC: 92 MG/DL (ref 74–99)
HCT VFR BLD AUTO: 28.3 % (ref 36–46)
HGB BLD-MCNC: 8.7 G/DL (ref 12–16)
HOLD SPECIMEN: NORMAL
IMM GRANULOCYTES # BLD AUTO: 0.06 X10*3/UL (ref 0–0.5)
IMM GRANULOCYTES NFR BLD AUTO: 0.8 % (ref 0–0.9)
LYMPHOCYTES # BLD AUTO: 0.96 X10*3/UL (ref 0.8–3)
LYMPHOCYTES NFR BLD AUTO: 12.5 %
MAGNESIUM SERPL-MCNC: 1.78 MG/DL (ref 1.6–2.4)
MCH RBC QN AUTO: 29.6 PG (ref 26–34)
MCHC RBC AUTO-ENTMCNC: 30.7 G/DL (ref 32–36)
MCV RBC AUTO: 96 FL (ref 80–100)
MONOCYTES # BLD AUTO: 0.89 X10*3/UL (ref 0.05–0.8)
MONOCYTES NFR BLD AUTO: 11.6 %
NEUTROPHILS # BLD AUTO: 5.22 X10*3/UL (ref 1.6–5.5)
NEUTROPHILS NFR BLD AUTO: 68.2 %
NRBC BLD-RTO: 0 /100 WBCS (ref 0–0)
PLATELET # BLD AUTO: 199 X10*3/UL (ref 150–450)
POTASSIUM SERPL-SCNC: 4.2 MMOL/L (ref 3.5–5.3)
RBC # BLD AUTO: 2.94 X10*6/UL (ref 4–5.2)
SODIUM SERPL-SCNC: 131 MMOL/L (ref 136–145)
WBC # BLD AUTO: 7.7 X10*3/UL (ref 4.4–11.3)

## 2024-12-21 PROCEDURE — 99232 SBSQ HOSP IP/OBS MODERATE 35: CPT | Performed by: STUDENT IN AN ORGANIZED HEALTH CARE EDUCATION/TRAINING PROGRAM

## 2024-12-21 PROCEDURE — 1200000002 HC GENERAL ROOM WITH TELEMETRY DAILY

## 2024-12-21 PROCEDURE — 2500000005 HC RX 250 GENERAL PHARMACY W/O HCPCS: Performed by: STUDENT IN AN ORGANIZED HEALTH CARE EDUCATION/TRAINING PROGRAM

## 2024-12-21 PROCEDURE — 5A1D70Z PERFORMANCE OF URINARY FILTRATION, INTERMITTENT, LESS THAN 6 HOURS PER DAY: ICD-10-PCS | Performed by: STUDENT IN AN ORGANIZED HEALTH CARE EDUCATION/TRAINING PROGRAM

## 2024-12-21 PROCEDURE — 80048 BASIC METABOLIC PNL TOTAL CA: CPT | Performed by: STUDENT IN AN ORGANIZED HEALTH CARE EDUCATION/TRAINING PROGRAM

## 2024-12-21 PROCEDURE — 97162 PT EVAL MOD COMPLEX 30 MIN: CPT | Mod: GP

## 2024-12-21 PROCEDURE — 2500000001 HC RX 250 WO HCPCS SELF ADMINISTERED DRUGS (ALT 637 FOR MEDICARE OP): Performed by: STUDENT IN AN ORGANIZED HEALTH CARE EDUCATION/TRAINING PROGRAM

## 2024-12-21 PROCEDURE — 97166 OT EVAL MOD COMPLEX 45 MIN: CPT | Mod: GO

## 2024-12-21 PROCEDURE — G0378 HOSPITAL OBSERVATION PER HR: HCPCS

## 2024-12-21 PROCEDURE — 82947 ASSAY GLUCOSE BLOOD QUANT: CPT

## 2024-12-21 PROCEDURE — 97530 THERAPEUTIC ACTIVITIES: CPT | Mod: GP

## 2024-12-21 PROCEDURE — 85025 COMPLETE CBC W/AUTO DIFF WBC: CPT | Performed by: STUDENT IN AN ORGANIZED HEALTH CARE EDUCATION/TRAINING PROGRAM

## 2024-12-21 PROCEDURE — 83735 ASSAY OF MAGNESIUM: CPT | Performed by: STUDENT IN AN ORGANIZED HEALTH CARE EDUCATION/TRAINING PROGRAM

## 2024-12-21 PROCEDURE — 36415 COLL VENOUS BLD VENIPUNCTURE: CPT | Performed by: STUDENT IN AN ORGANIZED HEALTH CARE EDUCATION/TRAINING PROGRAM

## 2024-12-21 RX ORDER — HEPARIN SODIUM 1000 [USP'U]/ML
3000 INJECTION, SOLUTION INTRAVENOUS; SUBCUTANEOUS
Status: CANCELLED | OUTPATIENT
Start: 2024-12-21

## 2024-12-21 RX ORDER — ALBUMIN HUMAN 250 G/1000ML
12.5 SOLUTION INTRAVENOUS AS NEEDED
Status: CANCELLED | OUTPATIENT
Start: 2024-12-21

## 2024-12-21 RX ADMIN — METOPROLOL TARTRATE 12.5 MG: 25 TABLET, FILM COATED ORAL at 20:34

## 2024-12-21 RX ADMIN — FUROSEMIDE 40 MG: 40 TABLET ORAL at 09:09

## 2024-12-21 RX ADMIN — CALCIUM ACETATE 1334 MG: 667 CAPSULE ORAL at 08:12

## 2024-12-21 RX ADMIN — LIDOCAINE 4% 1 PATCH: 40 PATCH TOPICAL at 09:07

## 2024-12-21 RX ADMIN — VANCOMYCIN HYDROCHLORIDE 125 MG: KIT at 20:34

## 2024-12-21 RX ADMIN — CALCIUM ACETATE 1334 MG: 667 CAPSULE ORAL at 17:02

## 2024-12-21 RX ADMIN — TRAMADOL HYDROCHLORIDE 50 MG: 50 TABLET, FILM COATED ORAL at 18:43

## 2024-12-21 RX ADMIN — Medication 2000 UNITS: at 06:40

## 2024-12-21 RX ADMIN — VANCOMYCIN HYDROCHLORIDE 125 MG: KIT at 09:08

## 2024-12-21 RX ADMIN — LATANOPROST 1 DROP: 50 SOLUTION OPHTHALMIC at 01:03

## 2024-12-21 RX ADMIN — Medication 1000 MCG: at 09:08

## 2024-12-21 RX ADMIN — Medication 1 CAPSULE: at 09:08

## 2024-12-21 ASSESSMENT — COGNITIVE AND FUNCTIONAL STATUS - GENERAL
WALKING IN HOSPITAL ROOM: TOTAL
TOILETING: A LOT
PERSONAL GROOMING: A LOT
WALKING IN HOSPITAL ROOM: TOTAL
DAILY ACTIVITIY SCORE: 16
DRESSING REGULAR UPPER BODY CLOTHING: A LITTLE
TOILETING: A LOT
DRESSING REGULAR LOWER BODY CLOTHING: A LOT
STANDING UP FROM CHAIR USING ARMS: A LOT
DRESSING REGULAR LOWER BODY CLOTHING: A LITTLE
TURNING FROM BACK TO SIDE WHILE IN FLAT BAD: A LITTLE
PERSONAL GROOMING: A LOT
TOILETING: TOTAL
CLIMB 3 TO 5 STEPS WITH RAILING: TOTAL
MOBILITY SCORE: 12
TURNING FROM BACK TO SIDE WHILE IN FLAT BAD: A LITTLE
TURNING FROM BACK TO SIDE WHILE IN FLAT BAD: TOTAL
HELP NEEDED FOR BATHING: A LOT
WALKING IN HOSPITAL ROOM: TOTAL
MOVING TO AND FROM BED TO CHAIR: A LOT
PERSONAL GROOMING: A LOT
CLIMB 3 TO 5 STEPS WITH RAILING: TOTAL
MOVING FROM LYING ON BACK TO SITTING ON SIDE OF FLAT BED WITH BEDRAILS: TOTAL
MOVING FROM LYING ON BACK TO SITTING ON SIDE OF FLAT BED WITH BEDRAILS: A LITTLE
DAILY ACTIVITIY SCORE: 16
CLIMB 3 TO 5 STEPS WITH RAILING: TOTAL
MOVING TO AND FROM BED TO CHAIR: TOTAL
MOVING TO AND FROM BED TO CHAIR: A LOT
DAILY ACTIVITIY SCORE: 14
DRESSING REGULAR UPPER BODY CLOTHING: A LITTLE
DRESSING REGULAR UPPER BODY CLOTHING: A LITTLE
MOVING FROM LYING ON BACK TO SITTING ON SIDE OF FLAT BED WITH BEDRAILS: A LITTLE
DRESSING REGULAR LOWER BODY CLOTHING: A LITTLE
MOBILITY SCORE: 12
MOBILITY SCORE: 6
STANDING UP FROM CHAIR USING ARMS: TOTAL
HELP NEEDED FOR BATHING: A LOT
STANDING UP FROM CHAIR USING ARMS: A LOT
HELP NEEDED FOR BATHING: A LOT

## 2024-12-21 ASSESSMENT — PAIN SCALES - GENERAL
PAINLEVEL_OUTOF10: 3
PAINLEVEL_OUTOF10: 7
PAINLEVEL_OUTOF10: 0 - NO PAIN

## 2024-12-21 ASSESSMENT — ENCOUNTER SYMPTOMS
WEAKNESS: 1
AGITATION: 0
ADENOPATHY: 0
BLOOD IN STOOL: 1
DIFFICULTY URINATING: 0
APNEA: 0
ACTIVITY CHANGE: 1
EYE DISCHARGE: 0
ARTHRALGIAS: 0

## 2024-12-21 ASSESSMENT — PAIN DESCRIPTION - LOCATION: LOCATION: KNEE

## 2024-12-21 ASSESSMENT — PAIN - FUNCTIONAL ASSESSMENT
PAIN_FUNCTIONAL_ASSESSMENT: 0-10

## 2024-12-21 ASSESSMENT — ACTIVITIES OF DAILY LIVING (ADL): BATHING_ASSISTANCE: MAXIMAL

## 2024-12-21 NOTE — PROGRESS NOTES
"Savita Snell \"FILOMENA\" is a 84 y.o. female on day 0 of admission presenting with BRBPR (bright red blood per rectum).      Subjective   Patient reports that she feels better today, no nausea or vomiting, no bleeding    Objective     Last Recorded Vitals  /53   Pulse 72   Temp 36.3 °C (97.3 °F)   Resp 14   Wt 88.9 kg (196 lb)   SpO2 94%   Intake/Output last 3 Shifts:  No intake or output data in the 24 hours ending 12/21/24 1035    Admission Weight  Weight: 88.9 kg (196 lb) (12/20/24 1614)    Daily Weight  12/20/24 : 88.9 kg (196 lb)    Image Results  CT angio abdomen pelvis w and or wo IV IV contrast  Narrative: Interpreted By:  Efrain Shah,   STUDY:  CT ANGIO ABDOMEN PELVIS W AND/OR WO IV IV CONTRAST;  12/20/2024 5:30  pm      INDICATION:  Signs/Symptoms:GI bleed patient on Eliquis      COMPARISON:  12/04/2024      ACCESSION NUMBER(S):  [id number]      ORDERING CLINICIAN:  EMELY JORDAN      TECHNIQUE:  Thin-section axial images of the abdomen and pelvis in the arterial  phase after intravenous administration of  75 mL Omnipaque 350.  Sagittal and coronal reconstructions. 3D reconstructions were  obtained at a separate workstation.      FINDINGS:  Vascular:  No aortic aneurysm or dissection.   The celiac trunk,  superior mesenteric artery, renal arteries and inferior mesenteric  artery are patent.      LOWER CHEST: Bibasilar atelectatic changes/scarring. Dense coronary  artery calcifications and/or stents partly visualized. BONES: No  acute osseous abnormality. ABDOMINAL WALL: Within normal limits.      ABDOMEN:      LIVER: Within normal limits.  BILE DUCTS: Normal caliber.  GALLBLADDER: Surgically absent.  PANCREAS: Within normal limits.  SPLEEN: Within normal limits.  ADRENALS: Within normal limits.  KIDNEYS and URETERS: Symmetric renal enhancement. No hydronephrosis.      RETROPERITONEUM: No pathologically enlarged retroperitoneal lymph  nodes.      PELVIS:      REPRODUCTIVE ORGANS: No pelvic " masses.  BLADDER: Within normal limits.      BOWEL: No obstruction. No active hemorrhage within the GI tract.  Marked thickening and surrounding inflammatory changes about the  rectum. Findings similar to the recent prior. No denise perforation or  fluid collection at this time. Mucosal hyperemia. PERITONEUM: No  ascites or free air, no fluid collection.      Impression: 1. Nonspecific findings of proctitis. No active bleeding within the  GI tract.      MACRO:  none      Signed by: Efrain Shah 12/20/2024 5:50 PM  Dictation workstation:   [id number]      Physical Exam    General: Well-developed frail elderly female, in no acute distress, ill-appearing  HEENT: AT, NC, no JVD, no lymphadenopathy, neck supple, pale  Lungs: Clear, no wheezing, no crackles, Rt chest wall cath noted  Cardiac: Normal S1-S2, no murmur, no gallop  Abdomen: Soft, nontender, no distention, positive bowel sound  Extremities: No deformity, b/l LE edema noted, pulses intact, ROM intact  Neurological: Alert awake oriented x3, sensation intact, clear speech      This patient has a central line   Reason for the central line remaining today? Dialysis/Hemapheresis      Assessment & Plan  BRBPR (bright red blood per rectum)        This is an 84-year-old female with a significant past medical history of paroxysmal atrial fibrillation, long-term anticoagulation use apixaban, CKD stage III now advanced, ESRD, obesity that presented from [facility] with bright red bleeding per rectum.     Bright red bleeding per rectum  Recurrent C. difficile colitis  - Presenting from SNF with unknown number of days of bright red bleeding per rectum.  On rectal exam per ED, patient with maroon-colored stools  - CTA abdomen/pelvis with no acute source of GI bleed but mild proctitis seen  - Continue clear liquid diet  - Continue vancomycin taper, completing the already completed vancomycin 125 mg 4 times daily 3 times daily, will be starting twice daily dosing 12/21 for 7 days  followed by once daily for 7 days followed by every other day for 7 days to completion  - Holding apixaban  - Follow-up morning CBC  - Pending GI recommendation     AIDAN on CKD stage III now progressed to ESRD in setting of ATN, on dialysis TTS  Hyponatremia  - Patient with originally CKD stage III status however after admission to [place] in septic shock, patient developed ATN without improving renal function and required initiation of hemodialysis  - Tunneled dialysis catheter present in the right IJ  - Nephrology consulted, pending recs   - Monitor renal function, avoid nephrotoxic agent     Anemia of chronic disease  - Hemoglobin 8.7, baseline 8-9  - Monitor H&H, transfuse to keep Hgb >7     Paroxysmal atrial fibrillation  Long-term anticoagulation use  - Patient follows with [facility] cardiology   - Continue holding apixaban at this time, defer further reinitiation to primary service/gastroenterology if cleared  - Continue rate control meds     Essential hypertension  - Continue home medications once reconciled     Lactic acidosis     Left knee osteoarthritis status post TKA (11/26)  - PT/OT     VTE Prophylaxis: SCDs (no chemoprophylaxis with BRBPR)  Disposition: Pending PT/OT evaluation        Armen Scherer MD

## 2024-12-21 NOTE — PROGRESS NOTES
"Physical Therapy    Physical Therapy Evaluation    Patient Name: Savita Snell \"FILOMENA\"  MRN: [id number]  Today's Date: 12/21/2024   Time Calculation  Start Time: 0937  Stop Time: 1013  Time Calculation (min): 36 min  916/916-A    Assessment/Plan   PT Assessment  PT Assessment Results: Decreased strength, Decreased endurance, Impaired balance, Decreased mobility, Decreased safety awareness, Obesity  Rehab Prognosis: Fair  Evaluation/Treatment Tolerance: Patient limited by fatigue  End of Session Communication: Bedside nurse  Assessment Comment: Pt presents with decreased functional mobility secondary to weakness, decreased balance, decreased endurance, and decreased safety awareness. Pt requires maximal assist for mobility. She will continue to benefit from physical therapy to address these deficits.  End of Session Patient Position: Bed, 3 rail up, Alarm off, not on at start of session (PCT present)  IP OR SWING BED PT PLAN  Inpatient or Swing Bed: Inpatient  PT Plan  Treatment/Interventions: Bed mobility, Transfer training, Gait training, Stair training, Balance training, Neuromuscular re-education, Strengthening, Endurance training, Range of motion, Therapeutic exercise, Therapeutic activity, Home exercise program  PT Plan: Ongoing PT  PT Frequency: 3 times per week  PT Discharge Recommendations: Moderate intensity level of continued care (24 hour assist)  PT Recommended Transfer Status: Assist x2, Assistive device  PT - OK to Discharge: Yes (PT eval complete, ok to d/c once deemed medically appropriate.)    Subjective     Current Problem:  1. Gastrointestinal hemorrhage, unspecified gastrointestinal hemorrhage type        2. Anticoagulated by anticoagulation treatment          Patient Active Problem List   Diagnosis    Unilateral primary osteoarthritis, left knee    Pain in left knee    S/P TKR (total knee replacement), left    Acute kidney injury superimposed on CKD (CMS-HCC)    Metabolic encephalopathy    " Colitis    Metabolic acidosis    Acute hypoxic respiratory failure (Multi)    Total knee replacement status, left    Sepsis with acute renal failure and septic shock (Multi)    Elevated LFTs    Moderate protein-calorie malnutrition (Multi)    BRBPR (bright red blood per rectum)       General Visit Information:  General  Reason for Referral: impaired mobility  Referred By: PT/OT MD Becky 12/20  Past Medical History Relevant to Rehab: HTN, neuropathy, CHR, arthritis, Afib, CKD, DM, hypokalemia, C.diff, cataracts, glaucoma, endometrial cancer, ESRD  Family/Caregiver Present: No  Co-Treatment: OT  Co-Treatment Reason: To maximize pt and staff safety while completing discipline specific evaluations  Prior to Session Communication: Bedside nurse  Patient Position Received: Bed, 3 rail up, Alarm off, not on at start of session  General Comment: Pt is an 84yoF who presented to the ED from [place] with black/bloody stool. CT abd/pelvis is (-) acute with no active bleed in the GI tract. 11/26: L TKA ([facility]); 12/4-12/16 went to [facility] from SNF with AMS, septic shock, AIDAN, metabolic encephalopathy, IJ catheter. Current Dx: acute GI bleed coagulopathy anemia. C-diff infection precautions.    Home Living:  Home Living  Home Living Comments: Pt presents to ED from [facility]. Prior to hospitalizations, pt was in a single story home + basement with . 3 RAJENDRA with HR. Has walk in shower with seat and grab bars.    Prior Level of Function:  Prior Function Per Pt/Caregiver Report  Prior Function Comments: Prior to hospitalizations, pt was independent with ADLs, shared IADLs with . Used RW for mobility, owns FWW. Per EMR, (-) falls and  drives. At [facility], pt reports she was being hanna lifted into/out of bed.    Precautions:  Precautions  LE Weight Bearing Status: Weight Bearing as Tolerated (LLE)  Medical Precautions: Fall precautions, Infection precautions (C.diff contact plus precautions)  Precautions Comment:  Activity: no restrictions    Vital Signs:     Objective     Pain:  Pain Assessment  Pain Assessment: 0-10  0-10 (Numeric) Pain Score: 0 - No pain    Cognition:  Cognition  Overall Cognitive Status: Within Functional Limits    General Assessments:      Activity Tolerance  Endurance: Decreased tolerance for upright activites     Strength  Strength Comments: BLE MMT 3-/5 overall           Static Sitting Balance  Static Sitting-Comment/Number of Minutes: FAir +  Dynamic Sitting Balance  Dynamic Sitting-Comments: Fair  Static Standing Balance  Static Standing-Comment/Number of Minutes: Poor    Functional Assessments:     Bed Mobility  Bed Mobility:  (Sup <> sit with maxA x2 to manage BLEs and trunk. With vc's for sequencing and hand position. Rolling R and L: max Ax1.)  Transfers  Transfer:  (Sit <> stand with maxAx2 with assist to transfer weight into standing position and FWW management. Requires vc's for sequencing and hand position. Retropulsive and requires BL feet blocked.)  Ambulation/Gait Training  Ambulation/Gait Training Performed:  (Pt unable to lift either foot from floor while standing)          Extremity/Trunk Assessments:        RLE   RLE :  (ROM WFL)  LLE   LLE :  (ROM WFL)    Outcome Measures:     [facility] Basic Mobility  Turning from your back to your side while in a flat bed without using bedrails: Total  Moving from lying on your back to sitting on the side of a flat bed without using bedrails: Total  Moving to and from bed to chair (including a wheelchair): Total  Standing up from a chair using your arms (e.g. wheelchair or bedside chair): Total  To walk in hospital room: Total  Climbing 3-5 steps with railing: Total  Basic Mobility - Total Score: 6                                                             Goals:  Encounter Problems       Encounter Problems (Active)       PT Problem       Pt will demonstrate sup > sit and sit > sup bed mobility mod Ax1 (Progressing)       Start:  12/21/24    Expected  End:  01/04/25            Pt will demo sit > stand and stand > sit transfer with FWW and mod Ax1  (Progressing)       Start:  12/21/24    Expected End:  01/04/25            Pt will ambulate 10' with FWW and mod Ax1, without LOB  (Progressing)       Start:  12/21/24    Expected End:  01/04/25            Pt will demonstrate ability to tolerate 8 minutes of seated or standing therapeutic exercise to demonstrate improved activity tolerance.  (Progressing)       Start:  12/21/24    Expected End:  01/04/25                 Education Documentation  Mobility Training, taught by Beth Alarcon, PT at 12/21/2024 11:35 AM.  Learner: Patient  Readiness: Acceptance  Method: Explanation  Response: Needs Reinforcement  Comment: PT POC, assistive device recommendation

## 2024-12-21 NOTE — CONSULTS
[facility]  Consult Note           Reason for Consult:  parker on hd  Requesting Physician:  Dr. Scherer    Chief Complaint:  blood in stool  History Obtained From:  patient, electronic medical record    History of Present Ilness:    84 y.o. year old female admitted with blood in stool.  Pt has underlying ckd stage 4 with b/l cr in 2s.  Was admitted to [facility] at which time she had septic shock C. difficile colitis.  She developed acute kidney injury and was started on dialysis.  Gets dialysis at [facility].  Supposed to have dialysis Tuesday Thursday Saturday.  Sees Dr. Andi Fulton.  She does make some urine.  Says she urinated this morning    Past Medical History:    Past Medical History:   Diagnosis Date    Afib (Multi)     Arthritis     Balance problem     C. difficile colitis     Cataract     CKD (chronic kidney disease), stage III (Multi)     COVID-19     VACCINATED    Diabetic neuropathy (Multi)     Endometrial cancer (Multi)     Falls     HAS FALLEN WITHIN LAST 6 MONTHS    Glaucoma     Heart failure with preserved ejection fraction     Hypertension     Hypokalemia     Obesity     Type 2 diabetes mellitus     Venous ulcer (Multi)         Past Surgical History:    Past Surgical History:   Procedure Laterality Date    APPENDECTOMY      CATARACT EXTRACTION      CHOLECYSTECTOMY      COLONOSCOPY      HYSTERECTOMY      JOINT REPLACEMENT      RIGHT KNEE    OTHER SURGICAL HISTORY      TAILBONE REMOVED DUE TO FRACTURE    OTHER SURGICAL HISTORY Bilateral     DOUBLE MUSCLE EYE SURGERY    TONSILLECTOMY      TOTAL KNEE ARTHROPLASTY Left     UPPER GASTROINTESTINAL ENDOSCOPY          Home Medications:    No current facility-administered medications on file prior to encounter.     Current Outpatient Medications on File Prior to Encounter   Medication Sig Dispense Refill    amLODIPine (Norvasc) 2.5 mg tablet Take 1 tablet (2.5 mg) by mouth once daily.      apixaban (Eliquis) 2.5 mg tablet Take 1  tablet (2.5 mg) by mouth 2 times a day.      calcium acetate (Phoslo) 667 mg capsule Take 2 capsules (1,334 mg) by mouth 3 times daily (morning, midday, late afternoon).      cholecalciferol (Vitamin D3) 50 mcg (2,000 unit) capsule Take 1 capsule (50 mcg) by mouth early in the morning..      cyanocobalamin (Vitamin B-12) 1,000 mcg tablet Take 1 tablet (1,000 mcg) by mouth once daily.      furosemide (Lasix) 20 mg tablet Take 2 tablets (40 mg) by mouth once daily.      latanoprost (Xalatan) 0.005 % ophthalmic solution Administer 1 drop into affected eye(s) daily at bedtime.      lidocaine 4 % patch Place 1 patch over 12 hours on the skin once daily. Remove & discard patch within 12 hours or as directed by MD.      metoprolol tartrate (Lopressor) 25 mg tablet Take 0.5 tablets (12.5 mg) by mouth 2 times a day.      traMADol (Ultram) 50 mg tablet Take 1 tablet (50 mg) by mouth every 12 hours if needed for severe pain (7 - 10).      vancomycin (Firvanq) 50 mg/mL oral solution Take 2.5 mL (125 mg) by mouth 3 times a day for 7 days, THEN 2.5 mL (125 mg) 2 times a day for 7 days, THEN 2.5 mL (125 mg) once daily for 7 days, THEN 2.5 mL (125 mg) every other day for 8 days. 115 mL 0    vitamin B complex-vitamin C-folic acid (Nephrocaps) 1 mg capsule Take 1 capsule by mouth once daily.      [DISCONTINUED] amLODIPine (Norvasc) 5 mg tablet Take 1 tablet (5 mg) by mouth once daily.      [DISCONTINUED] cyclobenzaprine (Flexeril) 10 mg tablet Take 1 tablet (10 mg) by mouth 3 times a day as needed for muscle spasms. 21 tablet 0    [DISCONTINUED] dapagliflozin propanediol (Farxiga) 5 mg Take 1 tablet (5 mg) by mouth once daily.      [DISCONTINUED] Lokelma 5 gram packet Take 5 g by mouth once daily.      [DISCONTINUED] losartan (Cozaar) 25 mg tablet Take 1 tablet (25 mg) by mouth twice a day.      [DISCONTINUED] ondansetron ODT (Zofran-ODT) 4 mg disintegrating tablet Dissolve 1 tablet (4 mg) in the mouth every 8 hours if needed for  nausea. 20 tablet 0    [DISCONTINUED] oxyCODONE (Roxicodone) 5 mg immediate release tablet Take 1 tablet (5 mg) by mouth every 6 hours if needed for moderate pain (4 - 6). 28 tablet 0    [DISCONTINUED] pregabalin (Lyrica) 75 mg capsule Take 1 capsule (75 mg) by mouth 3 times a day.      [DISCONTINUED] SITagliptin phosphate (Januvia) 25 mg tablet Take 1 tablet (25 mg) by mouth once daily.         Allergies:  Cephalexin, Penicillins, and Sulfamethoxazole-trimethoprim    Social History:    Social History     Socioeconomic History    Marital status:      Spouse name: Not on file    Number of children: Not on file    Years of education: Not on file    Highest education level: Not on file   Occupational History    Not on file   Tobacco Use    Smoking status: Former     Types: Cigarettes    Smokeless tobacco: Never   Vaping Use    Vaping status: Never Used   Substance and Sexual Activity    Alcohol use: Not Currently    Drug use: Never    Sexual activity: Defer     Comment: Hysterectomy   Other Topics Concern    Not on file   Social History Narrative    Not on file     Social Drivers of Health     Financial Resource Strain: Low Risk  (12/20/2024)    Overall Financial Resource Strain (CARDIA)     Difficulty of Paying Living Expenses: Not very hard   Food Insecurity: No Food Insecurity (12/20/2024)    Hunger Vital Sign     Worried About Running Out of Food in the Last Year: Never true     Ran Out of Food in the Last Year: Never true   Transportation Needs: No Transportation Needs (12/20/2024)    PRAPARE - Transportation     Lack of Transportation (Medical): No     Lack of Transportation (Non-Medical): No   Recent Concern: Transportation Needs - Unmet Transportation Needs (10/27/2024)    Received from [facility]    PRAPARE - Transportation     Lack of Transportation (Medical): No     Lack of Transportation (Non-Medical): Yes   Physical Activity: Sufficiently Active (12/20/2024)    Exercise Vital Sign     Days of  Exercise per Week: 5 days     Minutes of Exercise per Session: 30 min   Recent Concern: Physical Activity - Inactive (10/27/2024)    Received from [facility]    Exercise Vital Sign     Days of Exercise per Week: 0 days     Minutes of Exercise per Session: 10 min   Stress: No Stress Concern Present (12/20/2024)    Citizen of the [place] of Occupational Health - Occupational Stress Questionnaire     Feeling of Stress : Not at all   Social Connections: Moderately Isolated (12/20/2024)    Social Connection and Isolation Panel [NHANES]     Frequency of Communication with Friends and Family: More than three times a week     Frequency of Social Gatherings with Friends and Family: More than three times a week     Attends Anglican Services: Never     Active Member of Clubs or Organizations: No     Attends Club or Organization Meetings: Never     Marital Status:    Intimate Partner Violence: Not At Risk (12/20/2024)    Humiliation, Afraid, Rape, and Kick questionnaire     Fear of Current or Ex-Partner: No     Emotionally Abused: No     Physically Abused: No     Sexually Abused: No   Housing Stability: Low Risk  (12/20/2024)    Housing Stability Vital Sign     Unable to Pay for Housing in the Last Year: No     Number of Times Moved in the Last Year: 0     Homeless in the Last Year: No       Family History:   Family History   Problem Relation Name Age of Onset    Heart failure Mother      Other (MI) Mother      Stroke Father      Hyperlipidemia Sister      Hypertension Sister         Review of Systems:   Review of Systems   Constitutional:  Positive for activity change.   HENT:  Negative for congestion.    Eyes:  Negative for discharge.   Respiratory:  Negative for apnea.    Cardiovascular:  Negative for chest pain.   Gastrointestinal:  Positive for blood in stool.   Endocrine: Negative for cold intolerance.   Genitourinary:  Negative for difficulty urinating.   Musculoskeletal:  Negative for arthralgias.  "  Allergic/Immunologic: Negative for environmental allergies.   Neurological:  Positive for weakness.   Hematological:  Negative for adenopathy.   Psychiatric/Behavioral:  Negative for agitation.          Physical exam:   Constitutional:  VITALS:  /53 (BP Location: Right arm, Patient Position: Lying)   Pulse 73   Temp 36.6 °C (97.9 °F) (Temporal)   Resp 18   Ht 1.575 m (5' 2\")   Wt 88.9 kg (196 lb)   SpO2 97%   BMI 35.85 kg/m²      Wt Readings from Last 3 Encounters:   12/20/24 88.9 kg (196 lb)   12/11/24 86 kg (189 lb 9.5 oz)   11/26/24 87.9 kg (193 lb 12.6 oz)      Gen: alert, awake, nad  Head: atraumatic, normocephalic  Skin: no rash, turgor wnl  Heent:  eomi, mmm  Neck: no bruits or jvd noted, thyroid normal  Lungs:  clear to auscultation  Heart:  regular rate and rhythm, no murmurs  Abdomen:  +bs, soft, nt, nd, no hepatosplenomegaly   Extremities: no edema  Psychiatric: mood and affect appropriate; judgement and insight intact  Musculoskeletal:  Rom, muscular strength intact; digits, nails normal    Data/  CBC:   Lab Results   Component Value Date    WBC 7.7 12/21/2024    RBC 2.94 (L) 12/21/2024    HGB 8.7 (L) 12/21/2024    HCT 28.3 (L) 12/21/2024    MCV 96 12/21/2024    MCH 29.6 12/21/2024    MCHC 30.7 (L) 12/21/2024    RDW 14.3 12/21/2024     12/21/2024     BMP:    Lab Results   Component Value Date     (L) 12/21/2024    K 4.2 12/21/2024    CL 96 (L) 12/21/2024    CO2 28 12/21/2024    BUN 31 (H) 12/21/2024    CREATININE 4.50 (H) 12/21/2024    CALCIUM 7.1 (L) 12/21/2024    GLUCOSE 92 12/21/2024     ECG 12 lead  Normal sinus rhythm  Normal ECG  When compared with ECG of 09-DEC-2024 22:41, (unconfirmed)  Vent. rate has decreased BY  66 BPM  QRS duration has decreased  Non-specific change in ST segment in Lateral leads  T wave inversion less evident in Inferior leads  Nonspecific T wave abnormality no longer evident in Lateral leads    LFT:   Lab Results   Component Value Date    AST 14 " "12/20/2024    ALT 16 12/20/2024    ALKPHOS 97 12/20/2024    BILITOT 0.4 12/20/2024      Urinalysis: No results found for: \"ADARSH\", \"PROTUR\", \"GLUCOSEU\", \"BLOODU\", \"KETONESU\", \"BILIRUBINU\", \"NITRITEU\", \"LEUKOCYTESU\", \"UTPCR\"   Imaging: ECG 12 lead  Normal sinus rhythm  Normal ECG  When compared with ECG of 09-DEC-2024 22:41, (unconfirmed)  Vent. rate has decreased BY  66 BPM  QRS duration has decreased  Non-specific change in ST segment in Lateral leads  T wave inversion less evident in Inferior leads  Nonspecific T wave abnormality no longer evident in Lateral leads           Assessment/  84 y.o. yo female admitted with blood in stool.  Patient has underlying CKD stage IV with creatinine of around 2.4 as recently as November 27.  Readmitted to [facility] with sepsis.  Started on dialysis 12/9.  Nonresolving AIDAN/ATN.  Followed by Dr. PATTERSON.  Currently at [facility].  Tuesday Thursday Saturday.  Making some urine      Plan/  HD tomorrow and then try to get back on schedule  Retacrit for anemia  Follow up Dr. Hernandez  Hopeful for renal recovery but as of now still HD dependant  Cont lasix  Cont phoslo.  Check phos  Outpatient follow up from renal standpoint: His/her regular nephrologist    Thank you for the consultation.  Please do not hesitate to call with questions.    Darwin Morrow MD              "

## 2024-12-21 NOTE — PROGRESS NOTES
"Occupational Therapy    Evaluation    Patient Name: Savita Snell \"FILOMENA\"  MRN: [id number]  Department: [facility]  Room: [address] [place] [address]  Today's Date: 12/21/2024  Time Calculation  Start Time: 0937  Stop Time: 1013  Time Calculation (min): 36 min        Assessment:  OT Assessment: Pt demonstrates an ADL impairment with deficits with functional transfers and mobility. Pt would benefit from skilled OT to address these deficits.  Prognosis: Fair  Barriers to Discharge Home: Caregiver assistance, Physical needs  Caregiver Assistance: Caregiver assistance needed per identified barriers - however, level of patient's required assistance exceeds assistance available at home  Physical Needs: Stair navigation into home limited by function/safety, Ambulating household distances limited by function/safety, 24hr ADL assistance needed, High falls risk due to function or environment  Evaluation/Treatment Tolerance: Treatment limited secondary to medical complications (Comment) (incontinence of bowels)  Medical Staff Made Aware: Yes  End of Session Communication: Bedside nurse  End of Session Patient Position: Bed, 3 rail up, Alarm off, not on at start of session (with PCNA)  OT Assessment Results: Decreased ADL status, Decreased endurance, Decreased functional mobility  Prognosis: Fair  Barriers to Discharge: Decreased caregiver support  Evaluation/Treatment Tolerance: Treatment limited secondary to medical complications (Comment) (incontinence of bowels)  Medical Staff Made Aware: Yes  Strengths: Housing layout, Living arrangement secure, Support of Caregivers  Barriers to Participation: Comorbidities  Plan:  Treatment Interventions: ADL retraining, Functional transfer training, Patient/family training, Equipment evaluation/education, Compensatory technique education  OT Frequency: 2 times per week  OT Discharge Recommendations: Moderate intensity level of continued care  OT Recommended Transfer Status: Assist of 2  OT - OK to " Discharge: Yes (Pt ok to d/c to next level of care pending medical clearance.)  Treatment Interventions: ADL retraining, Functional transfer training, Patient/family training, Equipment evaluation/education, Compensatory technique education    Subjective   Current Problem:  1. Gastrointestinal hemorrhage, unspecified gastrointestinal hemorrhage type        2. Anticoagulated by anticoagulation treatment          General:  General  Reason for Referral: ADL impairment  Referred By: PT/OT MD Becky 12/20  Past Medical History Relevant to Rehab: HTN, neuropathy, CHR, arthritis, Afib, CKD, DM, hypokalemia, C.diff, cataracts, glaucoma, endometrial cancer, ESRD  Family/Caregiver Present: No  Co-Treatment: PT  Co-Treatment Reason: To maximize pt and staff safety while completing discipline specific evaluations  Prior to Session Communication: Bedside nurse  Patient Position Received: Bed, 3 rail up, Alarm off, not on at start of session  General Comment: Pt is an 84yoF who presented to the ED from [place] with black/bloody stool. CT abd/pelvis is (-) acute with no active bleed in the GI tract. 11/26: L TKA ([facility]); 12/4-12/16 went to [facility] from SNF with AMS, septic shock, AIDAN, metabolic encephalopathy, IJ catheter. Current Dx: acute GI bleed coagulopathy anemia. C-diff infection precautions.  Precautions:  LE Weight Bearing Status: Weight Bearing as Tolerated (LLE)  Medical Precautions: Fall precautions, Infection precautions (C.diff contact plus precautions)  Precautions Comment: Activity: no restrictions    Pain:  Pain Assessment  Pain Assessment: 0-10  0-10 (Numeric) Pain Score: 0 - No pain    Objective   Cognition:  Overall Cognitive Status: Within Functional Limits     Home Living:  Home Living Comments: Pt presents to ED from [facility]. Prior to hospitalizations, pt was in a single story home + basement with . 3 RAJENDRA with HR. Has walk in shower with seat and grab bars.  Prior Function:  Prior Function Comments:  Prior to hospitalizations, pt was independent with ADLs, shared IADLs with . Used RW for mobility, owns FWW. Per EMR, - falls and  drives. In SNF, pt reports she was being hanna lifted into/out of bed.    ADL:  Eating Assistance: Independent  Grooming Assistance: Moderate  Bathing Assistance: Maximal  UE Dressing Assistance: Moderate  LE Dressing Assistance: Maximal  Toileting Assistance with Device: Total  Functional Assistance: Maximal  ADL Comments: Pt required maxA for dressing and bathing on this date and total assist following incontinence of bowels for pericare and hygiene. Pt requires maxAx2 for all functional transfers and mobility at this time.  Activity Tolerance:  Endurance: Decreased tolerance for upright activites  Bed Mobility/Transfers: Bed Mobility  Bed Mobility:  (Sup <> sit with maxA x2 to manage BLEs and trunk. With vc's for sequencing and hand position.)    Transfers  Transfer:  (Sit <> stand with maxAx2 with assist to transfer weight into standing position and FWW management. Requires vc's for sequencing and hand position)    Functional Mobility:  Functional Mobility  Functional Mobility Performed:  (Unable to attempt d/t retropulsive lean, weakness, and incontinence of bowels.)  Sitting Balance:  Static Sitting Balance  Static Sitting-Comment/Number of Minutes: Good -  Dynamic Sitting Balance  Dynamic Sitting-Comments: Fair  Standing Balance:  Static Standing Balance  Static Standing-Comment/Number of Minutes: Poor  Dynamic Standing Balance  Dynamic Standing-Comments: Poor     Vision:Vision - Basic Assessment  Current Vision: Wears glasses all the time    Sensation:  Light Touch: No apparent deficits (Neuropathy per EMR)  Strength:  Strength Comments: BUE strength grossly 3+/5    Hand Function:  Gross Grasp: Functional  Extremities: RUE   RUE : Within Functional Limits and LUE   LUE: Within Functional Limits    Outcome Measures:[facility] Daily Activity  Putting on and taking off  regular lower body clothing: A lot  Bathing (including washing, rinsing, drying): A lot  Putting on and taking off regular upper body clothing: A little  Toileting, which includes using toilet, bedpan or urinal: Total  Taking care of personal grooming such as brushing teeth: A lot  Eating Meals: None  Daily Activity - Total Score: 14    Education Documentation  Body Mechanics, taught by Laverne Naranjo, OT at 12/21/2024 11:03 AM.  Learner: Patient  Readiness: Acceptance  Method: Explanation  Response: Verbalizes Understanding    ADL Training, taught by Laverne Naranjo, OT at 12/21/2024 11:03 AM.  Learner: Patient  Readiness: Acceptance  Method: Explanation  Response: Verbalizes Understanding    Education Comments  No comments found.    Goals:  Encounter Problems       Encounter Problems (Active)       ADL impairment       Pt will complete LB dressing with modA and for use of AE.   (Progressing)       Start:  12/21/24    Expected End:  01/04/25            Patient will transfer to bed/chair/toilet with modA and FWW. (Progressing)       Start:  12/21/24    Expected End:  01/04/25            Pt will tolerate 15 minutes of activity with 1 rest break to improve endurance with I/ADL tasks.  (Progressing)       Start:  12/21/24    Expected End:  01/04/25            Pt will improve dynamic standing balance to good level in order to complete standing grooming task.   (Progressing)       Start:  12/21/24    Expected End:  01/04/25

## 2024-12-21 NOTE — CONSULTS
Reason For Consult  BRBPR    History Of Present Illness  FILOMENA Snell is a 84 y.o. femalewith hx of Atrial fibrillation on apixaban, ESRD on iHD, rrecent admission for Cdiff colitis (discharged 12/16)  who was brought in from nursing home due to BRBPR.     Patient states she has had improvement in her diarrhea since starting vancomycin.  She has not noticed any blood in her stool however was told by nursing staff at facility regarding this which is why she was brought to the emergency department.    Patient states this is  second bout with C. difficile and believes it was precipitated by antibiotic course.    On arrival to the ED she was HDS. Labs notable for Hg at baseline of 8.7. CTA with no acute intraabdominal process, noting findings of proctitis.     She reports a colonoscopy completed at [facility] in 2018 and was told she had no polyps in the she will need a repeat in 10 years.     Past Medical History  She has a past medical history of Afib (Multi), Arthritis, Balance problem, C. difficile colitis, Cataract, CKD (chronic kidney disease), stage III (Multi), COVID-19, Diabetic neuropathy (Multi), Endometrial cancer (Multi), Falls, Glaucoma, Heart failure with preserved ejection fraction, Hypertension, Hypokalemia, Obesity, Type 2 diabetes mellitus, and Venous ulcer (Multi).    Surgical History  She has a past surgical history that includes Cataract extraction; Joint replacement; Cholecystectomy; Hysterectomy; Other surgical history; Other surgical history (Bilateral); Appendectomy; Tonsillectomy; Colonoscopy; Upper gastrointestinal endoscopy; and Total knee arthroplasty (Left).     Social History  She reports that she has quit smoking. Her smoking use included cigarettes. She has never used smokeless tobacco. She reports that she does not currently use alcohol. She reports that she does not use drugs.    Family History  Family History   Problem Relation Name Age of Onset    Heart failure Mother       "Other (MI) Mother      Stroke Father      Hyperlipidemia Sister      Hypertension Sister          Allergies  Cephalexin, Penicillins, and Sulfamethoxazole-trimethoprim    Review of Systems  As documented in AP and HPI     Physical Exam  Visit Vitals  /53 (BP Location: Right arm, Patient Position: Lying)   Pulse 73   Temp 36.6 °C (97.9 °F) (Temporal)   Resp 18        GEN: in no acute distress  Head/Eyes: sclera anicteric  Lungs: normal work of breathing  CV: RRR   ABD: normal active bowel sounds, nontender, nondistended, nontympanitic, no hepatosplenomegaly, no palpable masses.  Skin: No rashes on extremities   Neuro: grossly non-focal.  Alert and oriented.  Psych: Appropriate affect       Last Recorded Vitals  Blood pressure 109/53, pulse 72, temperature 36.3 °C (97.3 °F), resp. rate 14, height 1.575 m (5' 2\"), weight 88.9 kg (196 lb), SpO2 94%.    As documented in HPI and AP  Assessment/Plan   FILOMENA Snell is a 84 y.o. femalewith hx of Atrial fibrillation on apixaban, ESRD on iHD, rrecent admission for Cdiff colitis (discharged 12/16)  who was brought in from nursing home due to BRBPR.     On evaluation today her stool is not c/w hematochezia or melena and is orange in color with no active bleeding. + purulence. Suspect this is sequale of her Cdiff, particularly with stable Hg.    Favor avoiding endoscopy in setting of Cdiff colitis., especially given Hg remains stable. Differential includes mucosal bleeding from Cdiff colitis in setting of acg use. Rectal ulcer (hx of rectal tube use). At this time would recommend resuming apixaban in 48 hours and continue with vancomycin taper. If she has bleeding off anticoagulation or if she re bleeds with resumption of anticoagulation will consider colonoscopy for further evaluation at that time. Patient still has a remaining 3 weeks of oral vancomycin to complete prolonged taper.     I spent 60 minutes in the professional and overall care of this patient.      Roya " MD Ethan

## 2024-12-21 NOTE — NURSING NOTE
Message sent to Dr. Morrow re: patient missed her dialysis today. The orders he placed at 1300 were placed  for Sunday, so Dialysis never came Saturday. He responded in secure chat that he did not realize she missed dialysis for Saturday. He stated they will come on Sunday, and he will figure out her dialysis schedule from there.

## 2024-12-22 ENCOUNTER — APPOINTMENT (OUTPATIENT)
Dept: DIALYSIS | Facility: HOSPITAL | Age: 84
End: 2024-12-22
Payer: MEDICARE

## 2024-12-22 VITALS
SYSTOLIC BLOOD PRESSURE: 110 MMHG | DIASTOLIC BLOOD PRESSURE: 52 MMHG | TEMPERATURE: 97.3 F | OXYGEN SATURATION: 92 % | RESPIRATION RATE: 16 BRPM | HEIGHT: 62 IN | HEART RATE: 75 BPM | BODY MASS INDEX: 36.07 KG/M2 | WEIGHT: 196 LBS

## 2024-12-22 LAB
ANION GAP SERPL CALC-SCNC: 14 MMOL/L (ref 10–20)
ATRIAL RATE: 70 BPM
BASOPHILS # BLD AUTO: 0.06 X10*3/UL (ref 0–0.1)
BASOPHILS NFR BLD AUTO: 1.1 %
BUN SERPL-MCNC: 37 MG/DL (ref 6–23)
CALCIUM SERPL-MCNC: 7.2 MG/DL (ref 8.6–10.3)
CHLORIDE SERPL-SCNC: 92 MMOL/L (ref 98–107)
CO2 SERPL-SCNC: 27 MMOL/L (ref 21–32)
CREAT SERPL-MCNC: 5.3 MG/DL (ref 0.5–1.05)
EGFRCR SERPLBLD CKD-EPI 2021: 8 ML/MIN/1.73M*2
EOSINOPHIL # BLD AUTO: 0.48 X10*3/UL (ref 0–0.4)
EOSINOPHIL NFR BLD AUTO: 8.6 %
ERYTHROCYTE [DISTWIDTH] IN BLOOD BY AUTOMATED COUNT: 14.1 % (ref 11.5–14.5)
GLUCOSE BLD MANUAL STRIP-MCNC: 79 MG/DL (ref 74–99)
GLUCOSE SERPL-MCNC: 71 MG/DL (ref 74–99)
HCT VFR BLD AUTO: 26.7 % (ref 36–46)
HGB BLD-MCNC: 8.4 G/DL (ref 12–16)
HOLD SPECIMEN: NORMAL
IMM GRANULOCYTES # BLD AUTO: 0.05 X10*3/UL (ref 0–0.5)
IMM GRANULOCYTES NFR BLD AUTO: 0.9 % (ref 0–0.9)
LYMPHOCYTES # BLD AUTO: 0.97 X10*3/UL (ref 0.8–3)
LYMPHOCYTES NFR BLD AUTO: 17.4 %
MAGNESIUM SERPL-MCNC: 1.74 MG/DL (ref 1.6–2.4)
MCH RBC QN AUTO: 29.2 PG (ref 26–34)
MCHC RBC AUTO-ENTMCNC: 31.5 G/DL (ref 32–36)
MCV RBC AUTO: 93 FL (ref 80–100)
MONOCYTES # BLD AUTO: 0.75 X10*3/UL (ref 0.05–0.8)
MONOCYTES NFR BLD AUTO: 13.5 %
NEUTROPHILS # BLD AUTO: 3.26 X10*3/UL (ref 1.6–5.5)
NEUTROPHILS NFR BLD AUTO: 58.5 %
NRBC BLD-RTO: 0 /100 WBCS (ref 0–0)
P AXIS: 32 DEGREES
P OFFSET: 190 MS
P ONSET: 139 MS
PHOSPHATE SERPL-MCNC: 4.7 MG/DL (ref 2.5–4.9)
PLATELET # BLD AUTO: 198 X10*3/UL (ref 150–450)
POTASSIUM SERPL-SCNC: 4.6 MMOL/L (ref 3.5–5.3)
PR INTERVAL: 154 MS
Q ONSET: 216 MS
QRS COUNT: 12 BEATS
QRS DURATION: 86 MS
QT INTERVAL: 384 MS
QTC CALCULATION(BAZETT): 414 MS
QTC FREDERICIA: 404 MS
R AXIS: -2 DEGREES
RBC # BLD AUTO: 2.88 X10*6/UL (ref 4–5.2)
SODIUM SERPL-SCNC: 128 MMOL/L (ref 136–145)
T AXIS: 10 DEGREES
T OFFSET: 408 MS
VENTRICULAR RATE: 70 BPM
WBC # BLD AUTO: 5.6 X10*3/UL (ref 4.4–11.3)

## 2024-12-22 PROCEDURE — 8010000001 HC DIALYSIS - HEMODIALYSIS PER DAY

## 2024-12-22 PROCEDURE — 80048 BASIC METABOLIC PNL TOTAL CA: CPT | Performed by: STUDENT IN AN ORGANIZED HEALTH CARE EDUCATION/TRAINING PROGRAM

## 2024-12-22 PROCEDURE — 99239 HOSP IP/OBS DSCHRG MGMT >30: CPT | Performed by: STUDENT IN AN ORGANIZED HEALTH CARE EDUCATION/TRAINING PROGRAM

## 2024-12-22 PROCEDURE — G0378 HOSPITAL OBSERVATION PER HR: HCPCS

## 2024-12-22 PROCEDURE — 90937 HEMODIALYSIS REPEATED EVAL: CPT

## 2024-12-22 PROCEDURE — 2500000001 HC RX 250 WO HCPCS SELF ADMINISTERED DRUGS (ALT 637 FOR MEDICARE OP): Performed by: STUDENT IN AN ORGANIZED HEALTH CARE EDUCATION/TRAINING PROGRAM

## 2024-12-22 PROCEDURE — 82947 ASSAY GLUCOSE BLOOD QUANT: CPT

## 2024-12-22 PROCEDURE — 2500000005 HC RX 250 GENERAL PHARMACY W/O HCPCS: Performed by: STUDENT IN AN ORGANIZED HEALTH CARE EDUCATION/TRAINING PROGRAM

## 2024-12-22 PROCEDURE — 85025 COMPLETE CBC W/AUTO DIFF WBC: CPT | Performed by: STUDENT IN AN ORGANIZED HEALTH CARE EDUCATION/TRAINING PROGRAM

## 2024-12-22 PROCEDURE — 36415 COLL VENOUS BLD VENIPUNCTURE: CPT | Performed by: STUDENT IN AN ORGANIZED HEALTH CARE EDUCATION/TRAINING PROGRAM

## 2024-12-22 PROCEDURE — 84100 ASSAY OF PHOSPHORUS: CPT | Performed by: INTERNAL MEDICINE

## 2024-12-22 PROCEDURE — 83735 ASSAY OF MAGNESIUM: CPT | Performed by: STUDENT IN AN ORGANIZED HEALTH CARE EDUCATION/TRAINING PROGRAM

## 2024-12-22 RX ADMIN — METOPROLOL TARTRATE 12.5 MG: 25 TABLET, FILM COATED ORAL at 08:32

## 2024-12-22 RX ADMIN — CALCIUM ACETATE 1334 MG: 667 CAPSULE ORAL at 07:44

## 2024-12-22 RX ADMIN — AMLODIPINE BESYLATE 2.5 MG: 5 TABLET ORAL at 08:33

## 2024-12-22 RX ADMIN — Medication 1 CAPSULE: at 08:32

## 2024-12-22 RX ADMIN — TRAMADOL HYDROCHLORIDE 50 MG: 50 TABLET, FILM COATED ORAL at 06:48

## 2024-12-22 RX ADMIN — FUROSEMIDE 40 MG: 40 TABLET ORAL at 08:33

## 2024-12-22 RX ADMIN — ACETAMINOPHEN 650 MG: 325 TABLET ORAL at 01:32

## 2024-12-22 RX ADMIN — Medication 1000 MCG: at 08:32

## 2024-12-22 RX ADMIN — Medication 2000 UNITS: at 06:29

## 2024-12-22 RX ADMIN — TRAMADOL HYDROCHLORIDE 50 MG: 50 TABLET, FILM COATED ORAL at 16:27

## 2024-12-22 RX ADMIN — VANCOMYCIN HYDROCHLORIDE 125 MG: KIT at 08:34

## 2024-12-22 RX ADMIN — LIDOCAINE 4% 1 PATCH: 40 PATCH TOPICAL at 08:33

## 2024-12-22 ASSESSMENT — PAIN - FUNCTIONAL ASSESSMENT
PAIN_FUNCTIONAL_ASSESSMENT: 0-10

## 2024-12-22 ASSESSMENT — PAIN SCALES - GENERAL
PAINLEVEL_OUTOF10: 4
PAINLEVEL_OUTOF10: 3
PAINLEVEL_OUTOF10: 7
PAINLEVEL_OUTOF10: 7

## 2024-12-22 ASSESSMENT — COGNITIVE AND FUNCTIONAL STATUS - GENERAL
STANDING UP FROM CHAIR USING ARMS: A LOT
HELP NEEDED FOR BATHING: A LOT
TURNING FROM BACK TO SIDE WHILE IN FLAT BAD: A LITTLE
WALKING IN HOSPITAL ROOM: TOTAL
PERSONAL GROOMING: A LOT
DAILY ACTIVITIY SCORE: 16
DRESSING REGULAR LOWER BODY CLOTHING: A LITTLE
TOILETING: A LOT
DRESSING REGULAR UPPER BODY CLOTHING: A LITTLE
MOVING TO AND FROM BED TO CHAIR: A LOT
MOBILITY SCORE: 12
MOVING FROM LYING ON BACK TO SITTING ON SIDE OF FLAT BED WITH BEDRAILS: A LITTLE
CLIMB 3 TO 5 STEPS WITH RAILING: TOTAL

## 2024-12-22 ASSESSMENT — PAIN DESCRIPTION - DESCRIPTORS: DESCRIPTORS: SORE

## 2024-12-22 ASSESSMENT — PAIN DESCRIPTION - LOCATION
LOCATION: KNEE

## 2024-12-22 ASSESSMENT — PAIN DESCRIPTION - ORIENTATION
ORIENTATION: LEFT

## 2024-12-22 NOTE — PROGRESS NOTES
12/22/24 1147   Discharge Planning   Home or Post Acute Services Post acute facilities (Rehab/SNF/etc)   Type of Post Acute Facility Services Rehab;Skilled nursing   Expected Discharge Disposition SNF   Does the patient need discharge transport arranged? Yes   RoundTrip coordination needed? Yes   Has discharge transport been arranged? Yes   What day is the transport expected? 12/22/24     Notified by Attending that pt is medically ready to DC/return to facility today after Hemodialysis. Referral built and sent to [facility] confirms that pt is from their SNF and will not require a new precert if she returns today. KIMBERLY Nguyen notified.

## 2024-12-22 NOTE — DISCHARGE INSTRUCTIONS
Eliquis to be resumed on 12/23 at SNF.  Please monitor for bloody stools.  Will continue vancomycin tapering course as she was previously discharged on to your facility.  She will need GI follow-up in 1 month to discuss potential need for colonoscopy after recovery from C. difficile.

## 2024-12-22 NOTE — PROGRESS NOTES
Nephrology Progress Note  84 y.o. yo female admitted with blood in stool.  Patient has underlying CKD stage IV with creatinine of around 2.4 as recently as November 27.  Readmitted to [facility] with sepsis.  Started on dialysis 12/9.  Nonresolving AIDAN/ATN.  Followed by Dr. PATTERSON.  Currently at [facility].  Tuesday Thursday Saturday.  Making some urine    Plan:  HD today then try to get her back on schedule   Retacrit for anemia  Hopeful for renal recovery but as of now still HD dependent  Cont lasix  Cont phoslo    Outpatient follow up from renal standpoint: Dr. Urbina      Subjective:  Admit Date: 12/20/2024     Interval History: seen on dialysis, tolerating, says she is voiding well, still has high interdialytic solute rise      Medications:  Scheduled Meds:amLODIPine, 2.5 mg, oral, Daily  [Held by provider] apixaban, 2.5 mg, oral, BID  calcium acetate, 1,334 mg, oral, TID  cholecalciferol, 2,000 Units, oral, Daily  cyanocobalamin, 1,000 mcg, oral, Daily  furosemide, 40 mg, oral, Daily  latanoprost, 1 drop, Both Eyes, Nightly  lidocaine, 1 patch, transdermal, Daily  metoprolol tartrate, 12.5 mg, oral, BID  vancomycin, 125 mg, oral, BID   Followed by  [START ON 12/26/2024] vancomycin, 125 mg, oral, Daily   Followed by  [START ON 1/2/2025] vancomycin, 125 mg, oral, Every other day  vitamin B complex-vitamin C-folic acid, 1 capsule, oral, Daily      Continuous Infusions:     CBC:   Lab Results   Component Value Date    WBC 5.6 12/22/2024    RBC 2.88 (L) 12/22/2024    HGB 8.4 (L) 12/22/2024    HCT 26.7 (L) 12/22/2024    MCV 93 12/22/2024    MCH 29.2 12/22/2024    MCHC 31.5 (L) 12/22/2024    RDW 14.1 12/22/2024     12/22/2024     BMP:    Lab Results   Component Value Date     (L) 12/22/2024    K 4.6 12/22/2024    CL 92 (L) 12/22/2024    CO2 27 12/22/2024    BUN 37 (H) 12/22/2024    CREATININE 5.30 (H) 12/22/2024    CALCIUM 7.2 (L) 12/22/2024    GLUCOSE 71 (L) 12/22/2024  "      Objective:  Vitals: /70 (Patient Position: Lying)   Pulse 63   Temp 36.6 °C (97.9 °F) (Temporal)   Resp 16   Ht 1.575 m (5' 2\")   Wt 88.9 kg (196 lb)   SpO2 96%   BMI 35.85 kg/m²    Wt Readings from Last 3 Encounters:   12/20/24 88.9 kg (196 lb)   12/11/24 86 kg (189 lb 9.5 oz)   11/26/24 87.9 kg (193 lb 12.6 oz)      24HR INTAKE/OUTPUT:    Intake/Output Summary (Last 24 hours) at 12/22/2024 1217  Last data filed at 12/22/2024 1000  Gross per 24 hour   Intake 680 ml   Output --   Net 680 ml       General: alert, in no apparent distress  HEENT: normocephalic, atraumatic, anicteric  Lungs: non-labored respirations, clear to auscultation bilaterally  Heart: regular rate and rhythm, no murmurs or rubs  Abdomen: soft, non-tender, non-distended  Ext: no peripheral edema  Neuro: alert, follows commands      Electronically signed by Gordon Garcia MD, MD              "

## 2024-12-22 NOTE — DISCHARGE SUMMARY
[facility] Discharge Summary  DISCHARGE DIAGNOSIS     C. difficile colitis  Anemia of chronic disease  Paroxysmal atrial fibrillation  Long-term anticoagulation use  AIDAN from ATN in setting of CKD stage III now requiring hemodialysis     HOSPITAL COURSE AND DETAILS     This is AN 84-year-old female with a significant past medical history of paroxysmal atrial fibrillation, long-term anticoagulation as apixaban, diastolic CHF, previous CKD stage III which progressed to AIDAN from ATN currently requiring hemodialysis, recurrent C. difficile being treated once again for C. difficile with a prolonged taper that presented from a local skilled nursing facility with bright red bleeding per rectum.    Patient's hemoglobin remained unremarkable and at baseline during hospitalization.  Was seen by GI and with no further episodes of bleeding recommended continuing apixaban as well as completing a long taper of vancomycin.  Not a candidate for acute colonoscopy at this time due to increased risk of perforation with active C. difficile.  Was seen by nephrology and continued on her hemodialysis schedule as well.    Patient is being discharged back to SNF today.  Apixaban has been resumed.  Will need regular lab work to check hemoglobin.  Will resume her Tuesday Thursday dialysis schedule with her primary nephrologist at the [facility] nursing [facility].    Total time spent on discharge: >30min      ---Of note, this documentation is completed using the Dragon Dictation system (voice recognition software). There may be spelling and/or grammatical errors that were not corrected prior to final submission.---    George Pena MD    DISCHARGE PHYSICAL EXAM     Last Recorded Vitals:  Vitals:    12/22/24 0734 12/22/24 0955 12/22/24 1111 12/22/24 1333   BP: 131/63  123/70    BP Location: Right arm      Patient Position: Lying  Lying    Pulse: 71 63 63 74   Resp: 16  16    Temp: 36.3 °C (97.3 °F) 36.8 °C (98.2 °F) 36.6 °C (97.9 °F) 36.8  °C (98.2 °F)   TempSrc: Temporal Temporal Temporal Temporal   SpO2: 98%  96%    Weight:       Height:         Physical Exam  Vitals reviewed.   Constitutional:       General: She is not in acute distress.     Appearance: Normal appearance. She is not toxic-appearing.   HENT:      Head: Normocephalic and atraumatic.   Eyes:      Extraocular Movements: Extraocular movements intact.      Conjunctiva/sclera: Conjunctivae normal.   Cardiovascular:      Rate and Rhythm: Normal rate and regular rhythm.      Pulses: Normal pulses.      Heart sounds: Normal heart sounds.   Pulmonary:      Effort: Pulmonary effort is normal. No respiratory distress.      Breath sounds: Normal breath sounds. No wheezing.   Chest:      Comments: Right chest wall tunneled dialysis catheter present.  Abdominal:      General: Bowel sounds are normal. There is no distension.      Palpations: Abdomen is soft.      Tenderness: There is no abdominal tenderness. There is no guarding.   Musculoskeletal:         General: Normal range of motion.      Cervical back: Normal range of motion and neck supple.      Right lower leg: Edema present.      Left lower leg: Edema present.      Comments: Chronic lower extremity edema.   Neurological:      General: No focal deficit present.      Mental Status: She is alert and oriented to person, place, and time. Mental status is at baseline.   Psychiatric:         Mood and Affect: Mood normal.         Behavior: Behavior normal.         Thought Content: Thought content normal.       DISCHARGE MEDICATIONS        Your medication list        CONTINUE taking these medications        Instructions Last Dose Given Next Dose Due   amLODIPine 2.5 mg tablet  Commonly known as: Norvasc      Take 1 tablet (2.5 mg) by mouth once daily.       apixaban 2.5 mg tablet  Commonly known as: Eliquis           calcium acetate 667 mg capsule  Commonly known as: Phoslo      Take 2 capsules (1,334 mg) by mouth 3 times daily (morning, midday,  late afternoon).       cyanocobalamin 1,000 mcg tablet  Commonly known as: Vitamin B-12           furosemide 20 mg tablet  Commonly known as: Lasix           latanoprost 0.005 % ophthalmic solution  Commonly known as: Xalatan           lidocaine 4 % patch      Place 1 patch over 12 hours on the skin once daily. Remove & discard patch within 12 hours or as directed by MD.       metoprolol tartrate 25 mg tablet  Commonly known as: Lopressor           traMADol 50 mg tablet  Commonly known as: Ultram      Take 1 tablet (50 mg) by mouth every 12 hours if needed for severe pain (7 - 10).       vancomycin 50 mg/mL oral solution  Commonly known as: Firvanq  Start taking on: December 12, 2024      Take 2.5 mL (125 mg) by mouth 3 times a day for 7 days, THEN 2.5 mL (125 mg) 2 times a day for 7 days, THEN 2.5 mL (125 mg) once daily for 7 days, THEN 2.5 mL (125 mg) every other day for 8 days.       vitamin B complex-vitamin C-folic acid 1 mg capsule  Commonly known as: Nephrocaps      Take 1 capsule by mouth once daily.       Vitamin D3 50 mcg (2,000 unit) capsule  Generic drug: cholecalciferol                      OUTPATIENT FOLLOW-UP     No future appointments.

## 2024-12-22 NOTE — PROGRESS NOTES
Subjective     Pateint states she has one bowel movement in the  morning, but she does not know if it was bloody  Hg remains stable  Family at bedside while patient undergoing HD    Past Medical History   has a past medical history of Afib (Multi), Arthritis, Balance problem, C. difficile colitis, Cataract, CKD (chronic kidney disease), stage III (Multi), COVID-19, Diabetic neuropathy (Multi), Endometrial cancer (Multi), Falls, Glaucoma, Heart failure with preserved ejection fraction, Hypertension, Hypokalemia, Obesity, Type 2 diabetes mellitus, and Venous ulcer (Multi).     Social History   reports that she has quit smoking. Her smoking use included cigarettes. She has never used smokeless tobacco. She reports that she does not currently use alcohol. She reports that she does not use drugs.     Family History  family history includes Heart failure in her mother; Hyperlipidemia in her sister; Hypertension in her sister; MI in her mother; Stroke in her father.     Allergies  Allergies   Allergen Reactions    Cephalexin Diarrhea     Caused C-diff    Penicillins Other     States severe FH PCN allergy-has never had PCN herself  FAMILY HISTORY, PATIENT NEVER HAD    TOLERATED ZOSYN 12/4    Sulfamethoxazole-Trimethoprim Other     REDNESS       Medications  Current Outpatient Medications   Medication Instructions    amLODIPine (NORVASC) 2.5 mg, oral, Daily RT    apixaban (ELIQUIS) 2.5 mg, 2 times daily    calcium acetate (PHOSLO) 1,334 mg, oral, 3 times daily (morning, midday, late afternoon)    cholecalciferol (VITAMIN D3) 2,000 Units, Daily    cyanocobalamin (VITAMIN B-12) 1,000 mcg, Daily RT    furosemide (LASIX) 40 mg, Daily RT    latanoprost (Xalatan) 0.005 % ophthalmic solution 1 drop, Daily at bedtime    lidocaine 4 % patch 1 patch, transdermal, Daily, Remove & discard patch within 12 hours or as directed by MD.    metoprolol tartrate (LOPRESSOR) 12.5 mg, 2 times daily    traMADol (ULTRAM) 50 mg, oral, Every 12 hours  PRN    vancomycin (Firvanq) 50 mg/mL oral solution Take 2.5 mL (125 mg) by mouth 3 times a day for 7 days, THEN 2.5 mL (125 mg) 2 times a day for 7 days, THEN 2.5 mL (125 mg) once daily for 7 days, THEN 2.5 mL (125 mg) every other day for 8 days.    vitamin B complex-vitamin C-folic acid (Nephrocaps) 1 mg capsule 1 capsule, oral, Daily        Objective   Visit Vitals  /70 (Patient Position: Lying)   Pulse 63   Temp 36.6 °C (97.9 °F) (Temporal)   Resp 16      Physical Exam  Constitutional:       General: She is not in acute distress.     Appearance: Normal appearance.   HENT:      Head: Normocephalic and atraumatic.      Mouth/Throat:      Mouth: Mucous membranes are moist.   Eyes:      Extraocular Movements: Extraocular movements intact.   Pulmonary:      Effort: Pulmonary effort is normal.      Breath sounds: No stridor. No wheezing.   Abdominal:      General: Abdomen is flat. There is no distension.   Musculoskeletal:         General: Normal range of motion.   Skin:     General: Skin is warm and dry.   Neurological:      General: No focal deficit present.      Mental Status: She is alert.   Psychiatric:         Mood and Affect: Mood normal.         Judgment: Judgment normal.         Assessment/Plan   FILOMENA Snell is a 84 y.o. femalewith hx of Atrial fibrillation on apixaban, ESRD on iHD, rrecent admission for Cdiff colitis (discharged 12/16)  who was brought in from nursing home due to BRBPR.      On evaluation today her stool is not c/w hematochezia or melena and is orange in color with no active bleeding. + purulence. Suspect this is sequale of her Cdiff, particularly with stable Hg.     Favor avoiding endoscopy in setting of Cdiff colitis., especially given Hg remains stable. She may have  had  mucosal bleeding from Cdiff colitis in setting of acg use. Rectal ulcer (hx of rectal tube use). At this time would recommend resuming apixaban and continue with vancomycin taper. If she has significant bleeding  will need to consider colonoscopy for further evaluation at that time. Patient still has a remaining 3 weeks of oral vancomycin to complete prolonged taper.     Roya Long MD

## 2024-12-22 NOTE — PROCEDURES
Report to Receiving RN:    Report To: Leeanna Meade  Time Report Called: [id number]  Hand-Off Communication: 2 Liters RMVD  Complications During Treatment: No  Ultrafiltration Treatment: Yes  Medications Administered During Dialysis: No  Blood Products Administered During Dialysis: No  Labs Sent During Dialysis: No  Heparin Drip Rate Changes: No  Dialysis Catheter Dressing: clean, dry, intact  Last Dressing Change: 12/22/24      Last Updated: 3:13 PM by ANUJ APRIL

## 2024-12-22 NOTE — PROGRESS NOTES
Social Work Note Per physician order, pt is to discharge back to SNF today.  AVS & GF sent in Care Port to SNF.  Transportation via ambulance (bed bound, fall risk) arranged for 4:30PM pickup.  Number for report and transport time provided to RN.  Pt states she will notify spouse of transport time.  SNF notified of transport time.  BECKY Cee

## 2024-12-22 NOTE — PROCEDURES
Report from Sending RN:    Report From: Leeanna Meade  Recent Surgery of Procedure: No  Baseline Level of Consciousness (LOC): A&OX4  Oxygen Use: No  Type: N/A  Diabetic: Yes  Last BP Med Given Day of Dialysis: see MAR  Last Pain Med Given: see MAR  Lab Tests to be Obtained with Dialysis: No  Blood Transfusion to be Given During Dialysis: No  Available IV Access: Yes  Medications to be Administered During Dialysis: No  Continuous IV Infusion Running: No  Restraints on Currently or in the Last 24 Hours: No  Hand-Off Communication: Pt stable for HD, discharge after HD  Dialysis Catheter Dressing: will assess @bedside  Last Dressing Change: will assess @bedside

## 2024-12-22 NOTE — CARE PLAN
Problem: Fall/Injury  Goal: Not fall by end of shift  Outcome: Progressing     The patient's goals for the shift include comfort/safety    The clinical goals for the shift include Controlled pain , safety

## 2024-12-22 NOTE — PROCEDURES
Report to Receiving RN:    Report To: Leeanna Meade  Time Report Called: 1343  Hand-Off Communication: Pt RMVD 2 Liters   Complications During Treatment: No  Ultrafiltration Treatment: Yes  Medications Administered During Dialysis: No  Blood Products Administered During Dialysis: No  Labs Sent During Dialysis: No  Heparin Drip Rate Changes: No  Last Dressing Change: 12-22-24        Last Updated: 12:00 PM by ANUJ, APRIL          no

## 2024-12-23 ENCOUNTER — OFFICE VISIT (OUTPATIENT)
Dept: GERIATRIC MEDICINE | Age: 84
End: 2024-12-23

## 2024-12-23 DIAGNOSIS — I13.2 HYPERTENSIVE HEART AND CHRONIC KIDNEY DISEASE WITH HEART FAILURE AND WITH STAGE 5 CHRONIC KIDNEY DISEASE, OR END STAGE RENAL DISEASE (HCC): ICD-10-CM

## 2024-12-23 DIAGNOSIS — N18.6 ESRF (END STAGE RENAL FAILURE) (HCC): ICD-10-CM

## 2024-12-23 DIAGNOSIS — I50.30 DIASTOLIC CONGESTIVE HEART FAILURE, UNSPECIFIED HF CHRONICITY (HCC): ICD-10-CM

## 2024-12-23 DIAGNOSIS — I48.91 ATRIAL FIBRILLATION, UNSPECIFIED TYPE (HCC): ICD-10-CM

## 2024-12-23 DIAGNOSIS — A04.72 ENTEROCOLITIS DUE TO CLOSTRIDIUM DIFFICILE, NOT SPECIFIED AS RECURRENT: Primary | ICD-10-CM

## 2024-12-24 ENCOUNTER — OFFICE VISIT (OUTPATIENT)
Dept: GERIATRIC MEDICINE | Age: 84
End: 2024-12-24

## 2024-12-24 DIAGNOSIS — N18.6 ESRF (END STAGE RENAL FAILURE) (HCC): ICD-10-CM

## 2024-12-24 DIAGNOSIS — A04.72 ENTEROCOLITIS DUE TO CLOSTRIDIUM DIFFICILE, NOT SPECIFIED AS RECURRENT: Primary | ICD-10-CM

## 2024-12-24 DIAGNOSIS — I13.2 HYPERTENSIVE HEART AND CHRONIC KIDNEY DISEASE WITH HEART FAILURE AND WITH STAGE 5 CHRONIC KIDNEY DISEASE, OR END STAGE RENAL DISEASE (HCC): ICD-10-CM

## 2024-12-24 DIAGNOSIS — I48.91 ATRIAL FIBRILLATION, UNSPECIFIED TYPE (HCC): ICD-10-CM

## 2024-12-24 DIAGNOSIS — I50.30 DIASTOLIC CONGESTIVE HEART FAILURE, UNSPECIFIED HF CHRONICITY (HCC): ICD-10-CM

## 2024-12-25 NOTE — PROGRESS NOTES
History and Physical      CHIEF COMPLAINT:  c diff colitis     History of Present Illness:      A 84 y.o. female who is being seen at [facility] for C diff colitis. She was originally at [place]. She was started on hemodialysis. She was placed on oral vanco on a long tapering dose.     REVIEW OF SYSTEMS:  A complete 10 Point review of systems was preformed and negative unless previously stated      PMH:  RLS   A fib   ESRD  Chronic diastolic CHF   DM  HTN    Surgical History:    Cataract   Cholecystectomy   Hysterectomy   Appendectomy   Tonstillectomy   L TKA     Medications Prior to Admission:    Prior to Admission medications    Not on File       Allergies:    Cephalexin, Pcn [penicillins], and Sulfamethoxazole-trimethoprim    Social History:    Former smoker, denies ETOH and drugs.     Family History:   Mother- CHF   Father- CVA     PHYSICAL EXAM:      Vitals were reviewed and stable     Gen- appears stated age.   at bedside.   HENT- Head atraumtic, hearing intact, oral mucosa moist. Original dentition and fair.   Eye- EOMI, No pale conjunctiva. No scleral icterus   Neck- No thyromegalgy. No JVD. Thick neck   Heart- RRR no murmur No LE edema  Lungs- CTA b/l no respiratory distress. RA oxygen   Abd- soft, Nontender, BS x 4, obese  Musculoskel- muscle strength 5/5 UE bilaterally.  5/5 lower extremity bilaterally.   Neuro- grossly intact. No acute deficits noted. No sensation disturbances. No facial droop   Skin- intact and dry. No rashes or ulcerations  Psych-  Mood and affect normal. Alert and oriented x 3       + r chest permacath      ASSESSMENT/ PLAN::        1.  C diff colitis  Vanco   F/u GI 1/21/25  2.  RLS   Requip   3.  A fib   Eliquis   Metoprolol   4.  ESRD  T,Th, Sat  Secondary to ATN   phoslo  5.  Chronic diastolic CHF   Metoprolol   6.  HTN  Metoprolol   Norvasc           YEE Bradley DO     Electronically signed 12/25/2024        NOTE: This report was transcribed using voice

## 2024-12-27 ENCOUNTER — OFFICE VISIT (OUTPATIENT)
Dept: GERIATRIC MEDICINE | Age: 84
End: 2024-12-27

## 2024-12-27 DIAGNOSIS — I50.30 DIASTOLIC CONGESTIVE HEART FAILURE, UNSPECIFIED HF CHRONICITY (HCC): ICD-10-CM

## 2024-12-27 DIAGNOSIS — A04.72 ENTEROCOLITIS DUE TO CLOSTRIDIUM DIFFICILE, NOT SPECIFIED AS RECURRENT: Primary | ICD-10-CM

## 2024-12-27 DIAGNOSIS — I48.91 ATRIAL FIBRILLATION, UNSPECIFIED TYPE (HCC): ICD-10-CM

## 2024-12-27 DIAGNOSIS — N18.6 ESRF (END STAGE RENAL FAILURE) (HCC): ICD-10-CM

## 2024-12-27 DIAGNOSIS — I13.2 HYPERTENSIVE HEART AND CHRONIC KIDNEY DISEASE WITH HEART FAILURE AND WITH STAGE 5 CHRONIC KIDNEY DISEASE, OR END STAGE RENAL DISEASE (HCC): ICD-10-CM

## 2024-12-27 NOTE — PROGRESS NOTES
[facility] PROGRESS NOTE      Cc- c diff colitis       Patient is a Niru Kumar 84 y.o. female who is being seen at [facility] for C diff colitis. She was originally at [place]. She was started on hemodialysis. She was placed on oral vanco on a long tapering dose.     Patient is eating well. She remains getting her dialysis regimen. She did have a little vaginal discharge in her brief today and will monitor.         Past Medical History:   Diagnosis Date    Atrial fibrillation (HCC)     CHF (congestive heart failure) ([facility])     Chronic kidney disease     Hyperlipidemia     Hypertension     Restless legs syndrome     Type 2 diabetes mellitus without complication ([facility])      Cephalexin, Pcn [penicillins], and Sulfamethoxazole-trimethoprim    VS reviewed    Gen- Alert and oriented x 3   Heart- RRR no murmur no LE edema   Lungs- CTA b/l no resp distress RA oxygen   Abd- bs x 4     + R chest permacath       Assessment and Plan    1.  C diff colitis  Vanco   F/u GI 1/21/25  2.  RLS   Requip   3.  A fib   Eliquis   Metoprolol   4.  ESRD  T,Th, Sat  Secondary to ATN   phoslo  5.  Chronic diastolic CHF   Metoprolol   6.  HTN  Metoprolol   Norvasc      YEE Bradley DO     Electronically signed by: Taniya Genao DO on 12/18/2024

## 2024-12-28 ENCOUNTER — OFFICE VISIT (OUTPATIENT)
Dept: GERIATRIC MEDICINE | Age: 84
End: 2024-12-28

## 2024-12-28 DIAGNOSIS — N18.6 ESRF (END STAGE RENAL FAILURE) (HCC): ICD-10-CM

## 2024-12-28 DIAGNOSIS — I13.2 HYPERTENSIVE HEART AND CHRONIC KIDNEY DISEASE WITH HEART FAILURE AND WITH STAGE 5 CHRONIC KIDNEY DISEASE, OR END STAGE RENAL DISEASE (HCC): ICD-10-CM

## 2024-12-28 DIAGNOSIS — A04.72 ENTEROCOLITIS DUE TO CLOSTRIDIUM DIFFICILE, NOT SPECIFIED AS RECURRENT: Primary | ICD-10-CM

## 2024-12-28 DIAGNOSIS — I50.30 DIASTOLIC CONGESTIVE HEART FAILURE, UNSPECIFIED HF CHRONICITY (HCC): ICD-10-CM

## 2024-12-28 DIAGNOSIS — I48.91 ATRIAL FIBRILLATION, UNSPECIFIED TYPE (HCC): ICD-10-CM

## 2024-12-29 ENCOUNTER — OFFICE VISIT (OUTPATIENT)
Dept: GERIATRIC MEDICINE | Age: 84
End: 2024-12-29

## 2024-12-29 DIAGNOSIS — A04.72 ENTEROCOLITIS DUE TO CLOSTRIDIUM DIFFICILE, NOT SPECIFIED AS RECURRENT: Primary | ICD-10-CM

## 2024-12-29 DIAGNOSIS — I50.30 DIASTOLIC CONGESTIVE HEART FAILURE, UNSPECIFIED HF CHRONICITY (HCC): ICD-10-CM

## 2024-12-29 DIAGNOSIS — I13.2 HYPERTENSIVE HEART AND CHRONIC KIDNEY DISEASE WITH HEART FAILURE AND WITH STAGE 5 CHRONIC KIDNEY DISEASE, OR END STAGE RENAL DISEASE (HCC): ICD-10-CM

## 2024-12-29 DIAGNOSIS — N18.6 ESRF (END STAGE RENAL FAILURE) (HCC): ICD-10-CM

## 2024-12-29 DIAGNOSIS — I48.91 ATRIAL FIBRILLATION, UNSPECIFIED TYPE (HCC): ICD-10-CM

## 2024-12-30 ENCOUNTER — OFFICE VISIT (OUTPATIENT)
Dept: GERIATRIC MEDICINE | Age: 84
End: 2024-12-30

## 2024-12-30 DIAGNOSIS — I50.30 DIASTOLIC CONGESTIVE HEART FAILURE, UNSPECIFIED HF CHRONICITY (HCC): ICD-10-CM

## 2024-12-30 DIAGNOSIS — I48.91 ATRIAL FIBRILLATION, UNSPECIFIED TYPE (HCC): ICD-10-CM

## 2024-12-30 DIAGNOSIS — N18.6 ESRF (END STAGE RENAL FAILURE) (HCC): ICD-10-CM

## 2024-12-30 DIAGNOSIS — I13.2 HYPERTENSIVE HEART AND CHRONIC KIDNEY DISEASE WITH HEART FAILURE AND WITH STAGE 5 CHRONIC KIDNEY DISEASE, OR END STAGE RENAL DISEASE (HCC): ICD-10-CM

## 2024-12-30 DIAGNOSIS — A04.72 ENTEROCOLITIS DUE TO CLOSTRIDIUM DIFFICILE, NOT SPECIFIED AS RECURRENT: Primary | ICD-10-CM

## 2024-12-31 ENCOUNTER — OFFICE VISIT (OUTPATIENT)
Dept: GERIATRIC MEDICINE | Age: 84
End: 2024-12-31

## 2024-12-31 DIAGNOSIS — I50.30 DIASTOLIC CONGESTIVE HEART FAILURE, UNSPECIFIED HF CHRONICITY (HCC): ICD-10-CM

## 2024-12-31 DIAGNOSIS — N18.6 ESRF (END STAGE RENAL FAILURE) (HCC): ICD-10-CM

## 2024-12-31 DIAGNOSIS — I48.91 ATRIAL FIBRILLATION, UNSPECIFIED TYPE (HCC): ICD-10-CM

## 2024-12-31 DIAGNOSIS — I13.2 HYPERTENSIVE HEART AND CHRONIC KIDNEY DISEASE WITH HEART FAILURE AND WITH STAGE 5 CHRONIC KIDNEY DISEASE, OR END STAGE RENAL DISEASE (HCC): ICD-10-CM

## 2024-12-31 DIAGNOSIS — A04.72 ENTEROCOLITIS DUE TO CLOSTRIDIUM DIFFICILE, NOT SPECIFIED AS RECURRENT: Primary | ICD-10-CM

## 2025-01-01 NOTE — PROGRESS NOTES
SNF PROGRESS NOTE      Cc- c diff colitis       Patient is a Niru Kumar 84 y.o. female who is being seen at [facility] for C diff colitis. She was originally at [place]. She was started on hemodialysis. She was placed on oral vanco on a long tapering dose.     Patient is eating well. She is sitting up in bed. She states that dialysis is going as usual.         Past Medical History:   Diagnosis Date    Atrial fibrillation (HCC)     CHF (congestive heart failure) ([facility])     Chronic kidney disease     Hyperlipidemia     Hypertension     Restless legs syndrome     Type 2 diabetes mellitus without complication ([facility])      Cephalexin, Pcn [penicillins], and Sulfamethoxazole-trimethoprim    VS reviewed      Gen- Alert and oriented x 3   Heart- RRR no murmur no LE edema   Lungs- CTA b/l no resp distress RA oxygen   Abd- bs x 4      + R chest permacath            Assessment and Plan    1.  C diff colitis  Vanco   F/u GI 1/21/25  2.  RLS   Requip   3.  A fib   Eliquis   Metoprolol   4.  ESRD  T,Th, Sat  Secondary to ATN   phoslo  5.  Chronic diastolic CHF   Metoprolol   6.  HTN              1. Metoprolol               2. Norvasc.       YEE Bradley DO     Electronically signed by: Taniya Genao DO on 12/24/2024

## 2025-01-01 NOTE — PROGRESS NOTES
SNF PROGRESS NOTE      Cc- c diff colitis       Patient is a Niru Kumar 84 y.o. female who is being seen at [facility] for C diff colitis. She was originally at [place]. She was started on hemodialysis. She was placed on oral vanco on a long tapering dose.     Patient is tolerating oral vanco well. No complaints at this time. She is eating well.         Past Medical History:   Diagnosis Date    Atrial fibrillation (HCC)     CHF (congestive heart failure) ([facility])     Chronic kidney disease     Hyperlipidemia     Hypertension     Restless legs syndrome     Type 2 diabetes mellitus without complication ([facility])      Cephalexin, Pcn [penicillins], and Sulfamethoxazole-trimethoprim    VS reviewed    Gen- Alert and oriented x 3   Heart- RRR no murmur no LE edema   Lungs- CTA b/l no resp distress RA oxygen   Abd- bs x 4      + R chest permacath           Assessment and Plan    1.  C diff colitis  Vanco   F/u GI 1/21/25  2.  RLS   Requip   3.  A fib   Eliquis   Metoprolol   4.  ESRD  T,Th, Sat  Secondary to ATN   phoslo  5.  Chronic diastolic CHF   Metoprolol   6.  HTN              1. Metoprolol               2. Norvasc.       YEE Bradley DO     Electronically signed by: Taniya Genao DO on 12/23/2024

## 2025-01-03 ENCOUNTER — OFFICE VISIT (OUTPATIENT)
Dept: GERIATRIC MEDICINE | Age: 85
End: 2025-01-03

## 2025-01-03 DIAGNOSIS — I13.2 HYPERTENSIVE HEART AND CHRONIC KIDNEY DISEASE WITH HEART FAILURE AND WITH STAGE 5 CHRONIC KIDNEY DISEASE, OR END STAGE RENAL DISEASE (HCC): ICD-10-CM

## 2025-01-03 DIAGNOSIS — N18.6 ESRF (END STAGE RENAL FAILURE) (HCC): ICD-10-CM

## 2025-01-03 DIAGNOSIS — I50.30 DIASTOLIC CONGESTIVE HEART FAILURE, UNSPECIFIED HF CHRONICITY (HCC): ICD-10-CM

## 2025-01-03 DIAGNOSIS — A04.72 ENTEROCOLITIS DUE TO CLOSTRIDIUM DIFFICILE, NOT SPECIFIED AS RECURRENT: Primary | ICD-10-CM

## 2025-01-03 DIAGNOSIS — I48.91 ATRIAL FIBRILLATION, UNSPECIFIED TYPE (HCC): ICD-10-CM

## 2025-01-03 NOTE — PROGRESS NOTES
SNF PROGRESS NOTE      Cc- c diff colitis       Patient is a Niru Kumra 84 y.o. female  who is being seen at [facility] for C diff colitis. She was originally at [place]. She was started on hemodialysis. She was placed on oral vanco on a long tapering dose.     Patient is eating well. She is sitting up in bed. She is tolerating dialysis.         Past Medical History:   Diagnosis Date    Atrial fibrillation (HCC)     CHF (congestive heart failure) ([facility])     Chronic kidney disease     Hyperlipidemia     Hypertension     Restless legs syndrome     Type 2 diabetes mellitus without complication ([facility])      Cephalexin, Pcn [penicillins], and Sulfamethoxazole-trimethoprim    VS reviewed      Gen- Alert and oriented x 3   Heart- RRR no murmur no LE edema   Lungs- CTA b/l no resp distress RA oxygen   Abd- bs x 4      + R chest permacath         Assessment and Plan      1.  C diff colitis  Vanco   F/u GI 1/21/25  2.  RLS   Requip   3.  A fib   Eliquis   Metoprolol   4.  ESRD  T,Th, Sat  Secondary to ATN   phoslo  5.  Chronic diastolic CHF   Metoprolol   6.  HTN              1. Metoprolol               2. Norvasc.     YEE Bradley DO     Electronically signed by: Taniya Genao DO on 12/27/2024

## 2025-01-04 NOTE — PROGRESS NOTES
SNF PROGRESS NOTE      Cc- c diff colitis       Patient is a Niru Kumar 84 y.o. female who is being seen at [facility] for C diff colitis. She was originally at [place]. She was started on hemodialysis. She was placed on oral vanco on a long tapering dose.     Patient is eating ok. The patient is doing well with dialysis. She denies any diarrhea.         Past Medical History:   Diagnosis Date    Atrial fibrillation (HCC)     CHF (congestive heart failure) ([facility])     Chronic kidney disease     Hyperlipidemia     Hypertension     Restless legs syndrome     Type 2 diabetes mellitus without complication ([facility])      Cephalexin, Pcn [penicillins], and Sulfamethoxazole-trimethoprim    VS reviewed    Gen- Alert and oriented x 3   Heart- RRR no murmur no LE edema   Lungs- CTA b/l no resp distress RA oxygen   Abd- bs x 4      + R chest permacath          Assessment and Plan    1.  C diff colitis  Vanco   F/u GI 1/21/25  2.  RLS   Requip   3.  A fib   Eliquis   Metoprolol   4.  ESRD  T,Th, Sat  Secondary to ATN   phoslo  5.  Chronic diastolic CHF   Metoprolol   6.  HTN              1. Metoprolol               2. Norvasc.          YEE Bradley DO     Electronically signed by: Taniya Genao DO on 12/28/2024

## 2025-01-06 ENCOUNTER — OFFICE VISIT (OUTPATIENT)
Dept: GERIATRIC MEDICINE | Age: 85
End: 2025-01-06

## 2025-01-06 DIAGNOSIS — I13.2 HYPERTENSIVE HEART AND CHRONIC KIDNEY DISEASE WITH HEART FAILURE AND WITH STAGE 5 CHRONIC KIDNEY DISEASE, OR END STAGE RENAL DISEASE (HCC): ICD-10-CM

## 2025-01-06 DIAGNOSIS — I48.91 ATRIAL FIBRILLATION, UNSPECIFIED TYPE (HCC): ICD-10-CM

## 2025-01-06 DIAGNOSIS — N18.6 ESRF (END STAGE RENAL FAILURE) (HCC): ICD-10-CM

## 2025-01-06 DIAGNOSIS — A04.72 ENTEROCOLITIS DUE TO CLOSTRIDIUM DIFFICILE, NOT SPECIFIED AS RECURRENT: Primary | ICD-10-CM

## 2025-01-06 DIAGNOSIS — I50.30 DIASTOLIC CONGESTIVE HEART FAILURE, UNSPECIFIED HF CHRONICITY (HCC): ICD-10-CM

## 2025-01-07 ENCOUNTER — OFFICE VISIT (OUTPATIENT)
Dept: GERIATRIC MEDICINE | Age: 85
End: 2025-01-07

## 2025-01-07 DIAGNOSIS — I13.2 HYPERTENSIVE HEART AND CHRONIC KIDNEY DISEASE WITH HEART FAILURE AND WITH STAGE 5 CHRONIC KIDNEY DISEASE, OR END STAGE RENAL DISEASE (HCC): ICD-10-CM

## 2025-01-07 DIAGNOSIS — N18.6 ESRF (END STAGE RENAL FAILURE) (HCC): ICD-10-CM

## 2025-01-07 DIAGNOSIS — A04.72 ENTEROCOLITIS DUE TO CLOSTRIDIUM DIFFICILE, NOT SPECIFIED AS RECURRENT: Primary | ICD-10-CM

## 2025-01-07 DIAGNOSIS — I48.91 ATRIAL FIBRILLATION, UNSPECIFIED TYPE (HCC): ICD-10-CM

## 2025-01-07 DIAGNOSIS — I50.30 DIASTOLIC CONGESTIVE HEART FAILURE, UNSPECIFIED HF CHRONICITY (HCC): ICD-10-CM

## 2025-01-08 LAB
ATRIAL RATE: 136 BPM
P OFFSET: 181 MS
P ONSET: 122 MS
PR INTERVAL: 186 MS
Q ONSET: 215 MS
QRS COUNT: 22 BEATS
QRS DURATION: 124 MS
QT INTERVAL: 256 MS
QTC CALCULATION(BAZETT): 385 MS
QTC FREDERICIA: 336 MS
R AXIS: -12 DEGREES
T AXIS: 151 DEGREES
T OFFSET: 343 MS
VENTRICULAR RATE: 136 BPM

## 2025-01-08 NOTE — PROGRESS NOTES
SNF PROGRESS NOTE      Cc- c diff colitis       Patient is a Niru Kumar 84 y.o. female who is being seen at [place] for C diff colitis. She was originally at [place]. She was started on hemodialysis. She was placed on oral vanco on a long tapering dose.     Patient is sitting up in bed. Her  is visiting her. She denies any complaints of SOB. She denies any pain.         Past Medical History:   Diagnosis Date    Atrial fibrillation (HCC)     CHF (congestive heart failure) ([facility])     Chronic kidney disease     Hyperlipidemia     Hypertension     Restless legs syndrome     Type 2 diabetes mellitus without complication ([facility])      Cephalexin, Pcn [penicillins], and Sulfamethoxazole-trimethoprim    VS reviewed    Gen- Alert and oriented x 3   Heart- RRR no murmur no LE edema   Lungs- CTA b/l no resp distress RA oxygen   Abd- bs x 4      + R chest permacath          Assessment and Plan    1.  C diff colitis  Vanco   F/u GI 1/21/25  2.  RLS   Requip   3.  A fib   Eliquis   Metoprolol   4.  ESRD  T,Th, Sat  Secondary to ATN   phoslo  5.  Chronic diastolic CHF   Metoprolol   6.  HTN              1. Metoprolol               2. Norvasc.      YEE Bradley DO     Electronically signed by: Taniya Genao DO on 12/31/2024

## 2025-01-08 NOTE — PROGRESS NOTES
SNF PROGRESS NOTE      Cc- c diff colitis       Patient is a Niru Kumar 84 y.o. female who is being seen at [place] for C diff colitis. She was originally at [place]. She was started on hemodialysis. She was placed on oral vanco on a long tapering dose.     Patient is eating well. She is sitting up in bed and dialysis regimen is going well.         Past Medical History:   Diagnosis Date    Atrial fibrillation (HCC)     CHF (congestive heart failure) ([facility])     Chronic kidney disease     Hyperlipidemia     Hypertension     Restless legs syndrome     Type 2 diabetes mellitus without complication ([facility])      Cephalexin, Pcn [penicillins], and Sulfamethoxazole-trimethoprim    VS reviewed    Gen- Alert and oriented x 3   Heart- RRR no murmur no LE edema   Lungs- CTA b/l no resp distress RA oxygen   Abd- bs x 4      + R chest permacath        Assessment and Plan      1.  C diff colitis  Vanco   F/u GI 1/21/25  2.  RLS   Requip   3.  A fib   Eliquis   Metoprolol   4.  ESRD  T,Th, Sat  Secondary to ATN   phoslo  5.  Chronic diastolic CHF   Metoprolol   6.  HTN              1. Metoprolol               2. Norvasc.    YEE Bradley DO     Electronically signed by: Taniya Genao DO on 12/30/2024

## 2025-01-08 NOTE — PROGRESS NOTES
SNF PROGRESS NOTE      Cc-  c diff colitis       Patient is a Niru Kumar 84 y.o. female who is being seen at [place] for C diff colitis. She was originally at [place]. She was started on hemodialysis. She was placed on oral vanco on a long tapering dose.     Patient remains on abx. She is eating good. No issues from patient at this time.         Past Medical History:   Diagnosis Date    Atrial fibrillation (HCC)     CHF (congestive heart failure) ([facility])     Chronic kidney disease     Hyperlipidemia     Hypertension     Restless legs syndrome     Type 2 diabetes mellitus without complication ([facility])      Cephalexin, Pcn [penicillins], and Sulfamethoxazole-trimethoprim    VS reviewed    Gen- Alert and oriented x 3   Heart- RRR no murmur no LE edema   Lungs- CTA b/l no resp distress RA oxygen   Abd- bs x 4      + R chest permacath             Assessment and Plan    1.  C diff colitis  Vanco   F/u GI 1/21/25  2.  RLS   Requip   3.  A fib   Eliquis   Metoprolol   4.  ESRD  T,Th, Sat  Secondary to ATN   phoslo  5.  Chronic diastolic CHF   Metoprolol   6.  HTN              1. Metoprolol               2. Norvasc.      YEE Bradley DO     Electronically signed by: Taniya Genao DO on 1/3/2025

## 2025-01-09 ENCOUNTER — OFFICE VISIT (OUTPATIENT)
Dept: GERIATRIC MEDICINE | Age: 85
End: 2025-01-09

## 2025-01-09 DIAGNOSIS — I50.30 DIASTOLIC CONGESTIVE HEART FAILURE, UNSPECIFIED HF CHRONICITY (HCC): ICD-10-CM

## 2025-01-09 DIAGNOSIS — N18.6 ESRF (END STAGE RENAL FAILURE) (HCC): ICD-10-CM

## 2025-01-09 DIAGNOSIS — I13.2 HYPERTENSIVE HEART AND CHRONIC KIDNEY DISEASE WITH HEART FAILURE AND WITH STAGE 5 CHRONIC KIDNEY DISEASE, OR END STAGE RENAL DISEASE (HCC): ICD-10-CM

## 2025-01-09 DIAGNOSIS — I48.91 ATRIAL FIBRILLATION, UNSPECIFIED TYPE (HCC): ICD-10-CM

## 2025-01-09 DIAGNOSIS — A04.72 ENTEROCOLITIS DUE TO CLOSTRIDIUM DIFFICILE, NOT SPECIFIED AS RECURRENT: Primary | ICD-10-CM

## 2025-01-10 ENCOUNTER — OFFICE VISIT (OUTPATIENT)
Dept: GERIATRIC MEDICINE | Age: 85
End: 2025-01-10

## 2025-01-10 DIAGNOSIS — A04.72 ENTEROCOLITIS DUE TO CLOSTRIDIUM DIFFICILE, NOT SPECIFIED AS RECURRENT: Primary | ICD-10-CM

## 2025-01-10 DIAGNOSIS — N18.6 ESRF (END STAGE RENAL FAILURE) (HCC): ICD-10-CM

## 2025-01-10 DIAGNOSIS — I48.91 ATRIAL FIBRILLATION, UNSPECIFIED TYPE (HCC): ICD-10-CM

## 2025-01-10 DIAGNOSIS — I13.2 HYPERTENSIVE HEART AND CHRONIC KIDNEY DISEASE WITH HEART FAILURE AND WITH STAGE 5 CHRONIC KIDNEY DISEASE, OR END STAGE RENAL DISEASE (HCC): ICD-10-CM

## 2025-01-10 DIAGNOSIS — I50.30 DIASTOLIC CONGESTIVE HEART FAILURE, UNSPECIFIED HF CHRONICITY (HCC): ICD-10-CM

## 2025-01-11 NOTE — PROGRESS NOTES
SNF PROGRESS NOTE      Cc- c diff colitis       Patient is a Niru Kumar 84 y.o. female who is being seen at [facility] for C diff colitis. She was originally at [place]. She was started on hemodialysis. She was placed on oral vanco on a long tapering dose.     Patient continues on vanco and tolerating well. She is eating well. She is sitting up in the bed.         Past Medical History:   Diagnosis Date    Atrial fibrillation (HCC)     CHF (congestive heart failure) ([facility])     Chronic kidney disease     Hyperlipidemia     Hypertension     Restless legs syndrome     Type 2 diabetes mellitus without complication ([facility])      Cephalexin, Pcn [penicillins], and Sulfamethoxazole-trimethoprim    VS reviewed    Gen- Alert and oriented x 3   Heart- RRR no murmur no LE edema   Lungs- CTA b/l no resp distress RA oxygen   Abd- bs x 4      + R chest permacath             Assessment and Plan       1.  C diff colitis  Vanco   F/u GI 1/21/25  2.  RLS   Requip   3.  A fib   Eliquis   Metoprolol   4.  ESRD  T,Th, Sat  Secondary to ATN   phoslo  5.  Chronic diastolic CHF   Metoprolol   6.  HTN              1. Metoprolol               2. Norvasc.         YEE Bradley DO     Electronically signed by: Taniya Genao DO on 1/6/2025

## 2025-01-13 ENCOUNTER — OFFICE VISIT (OUTPATIENT)
Dept: GERIATRIC MEDICINE | Age: 85
End: 2025-01-13

## 2025-01-13 DIAGNOSIS — N18.6 ESRF (END STAGE RENAL FAILURE) (HCC): ICD-10-CM

## 2025-01-13 DIAGNOSIS — A04.72 ENTEROCOLITIS DUE TO CLOSTRIDIUM DIFFICILE, NOT SPECIFIED AS RECURRENT: Primary | ICD-10-CM

## 2025-01-13 DIAGNOSIS — I50.30 DIASTOLIC CONGESTIVE HEART FAILURE, UNSPECIFIED HF CHRONICITY (HCC): ICD-10-CM

## 2025-01-13 DIAGNOSIS — I48.91 ATRIAL FIBRILLATION, UNSPECIFIED TYPE (HCC): ICD-10-CM

## 2025-01-13 DIAGNOSIS — I13.2 HYPERTENSIVE HEART AND CHRONIC KIDNEY DISEASE WITH HEART FAILURE AND WITH STAGE 5 CHRONIC KIDNEY DISEASE, OR END STAGE RENAL DISEASE (HCC): ICD-10-CM

## 2025-01-13 NOTE — DISCHARGE SUMMARY
Internal Medicine discharge summer[place]     PATIENT NAME: Savita Snell    MRN: [id number]  Discharge  DATE:   12/16/2024        This is a patient with a past medical history as detailed below admitted to the  ED from [facility], wound care nurse went in to complete patient dressing and patient was unresponsive and unable to move limbs. Patient last known normal was this morning at 9am when patient received morning medications.     l feels weak generalized she is awake able to follow commands and answer questions mental status is back to baseline per family at the bedside no chest pain no shortness of breath no abdominal pain .       Assessment and plan   1- C. difficile colitis   2-acute renal failure / CRF   3-debility PT OT    4-acute respiratory failure with hypoxia    5-elevated LFTs   6-change of mental status resolved     No new complain no cp no sob no abd pain  Clear by consultants to D/C   meds reviewed /condition is stable    emphasize on F/u 1 week with PCP as well as consultants   Phone number and cards provided by the consultants and RN   Question answered   Total time over 30 ms   Last EKG reviewed   Last CXR reviewed   Epic& consultants notes reviewed  Labs reviewed      Vitals:    12/16/24 0000 12/16/24 0400 12/16/24 0800 12/16/24 1348   BP: 117/72 150/65 136/68 131/69   BP Location:   Right arm Right arm   Patient Position:   Lying Lying   Pulse: 64  65 64   Resp: 16 14 18 18   Temp: 37 °C (98.6 °F) 36.5 °C (97.7 °F) 36.8 °C (98.2 °F) 36 °C (96.8 °F)   TempSrc: Temporal Temporal Temporal Temporal   SpO2: 94% 96% 93% 97%   Weight:       Height:         GENERAL:no  distress, cooperative   LUNGS: Lungs clear to auscultation. No wheezing,ronchi  or rales./CARDIAC:  S1 , S2;  ABDOMEN: Abdomen soft, non-tender NEURO: same /no new rash   ENT unremarkable  Plan D/W consultants/PT/available family         Your medication list        START taking these medications         Instructions Last Dose Given Next Dose Due   calcium acetate 667 mg capsule  Commonly known as: Phoslo      Take 2 capsules (1,334 mg) by mouth 3 times daily (morning, midday, late afternoon).       lidocaine 4 % patch      Place 1 patch over 12 hours on the skin once daily. Remove & discard patch within 12 hours or as directed by MD.       traMADol 50 mg tablet  Commonly known as: Ultram      Take 1 tablet (50 mg) by mouth every 12 hours if needed for severe pain (7 - 10).       vancomycin 50 mg/mL oral solution  Commonly known as: Firvanq  Start taking on: December 12, 2024      Take 2.5 mL (125 mg) by mouth 3 times a day for 7 days, THEN 2.5 mL (125 mg) 2 times a day for 7 days, THEN 2.5 mL (125 mg) once daily for 7 days, THEN 2.5 mL (125 mg) every other day for 8 days.       vitamin B complex-vitamin C-folic acid 1 mg capsule  Commonly known as: Nephrocaps      Take 1 capsule by mouth once daily.              CHANGE how you take these medications        Instructions Last Dose Given Next Dose Due   amLODIPine 2.5 mg tablet  Commonly known as: Norvasc  Start taking on: December 17, 2024  What changed:   medication strength  how much to take      Take 1 tablet (2.5 mg) by mouth once daily.              CONTINUE taking these medications        Instructions Last Dose Given Next Dose Due   apixaban 2.5 mg tablet  Commonly known as: Eliquis           cyanocobalamin 1,000 mcg tablet  Commonly known as: Vitamin B-12           furosemide 20 mg tablet  Commonly known as: Lasix           latanoprost 0.005 % ophthalmic solution  Commonly known as: Xalatan           metoprolol tartrate 25 mg tablet  Commonly known as: Lopressor           Vitamin D3 50 mcg (2,000 unit) capsule  Generic drug: cholecalciferol                  STOP taking these medications      cyclobenzaprine 10 mg tablet  Commonly known as: Flexeril        dapagliflozin propanediol 5 mg  Commonly known as: Farxiga        Januvia 25 mg tablet  Generic drug:  SITagliptin phosphate        Lokelma 5 gram packet  Generic drug: sodium zirconium cyclosilicate        losartan 25 mg tablet  Commonly known as: Cozaar        ondansetron ODT 4 mg disintegrating tablet  Commonly known as: Zofran-ODT        oxyCODONE 5 mg immediate release tablet  Commonly known as: Roxicodone        pregabalin 75 mg capsule  Commonly known as: Lyrica                  Where to Get Your Medications        These medications were sent to [facility] [place] - [address] [address]  [address] [address], [facility] [address]      Phone: [phone]   vancomycin 50 mg/mL oral solution       Information about where to get these medications is not yet available    Ask your nurse or doctor about these medications  amLODIPine 2.5 mg tablet  calcium acetate 667 mg capsule  lidocaine 4 % patch  traMADol 50 mg tablet  vitamin B complex-vitamin C-folic acid 1 mg capsule              Discharge Meds     Medication List      START taking these medications     calcium acetate 667 mg capsule; Commonly known as: Phoslo; Take 2   capsules (1,334 mg) by mouth 3 times daily (morning, midday, late   afternoon).   lidocaine 4 % patch; Place 1 patch over 12 hours on the skin once daily.   Remove & discard patch within 12 hours or as directed by MD.   traMADol 50 mg tablet; Commonly known as: Ultram; Take 1 tablet (50 mg)   by mouth every 12 hours if needed for severe pain (7 - 10).   vitamin B complex-vitamin C-folic acid 1 mg capsule; Commonly known as:   Nephrocaps; Take 1 capsule by mouth once daily.     CHANGE how you take these medications     amLODIPine 2.5 mg tablet; Commonly known as: Norvasc; Take 1 tablet (2.5   mg) by mouth once daily.; What changed: medication strength, how much to   take     CONTINUE taking these medications     apixaban 2.5 mg tablet; Commonly known as: Eliquis   cyanocobalamin 1,000 mcg tablet; Commonly known as: Vitamin B-12   furosemide 20 mg tablet; Commonly known as:  Lasix   latanoprost 0.005 % ophthalmic solution; Commonly known as: Xalatan   metoprolol tartrate 25 mg tablet; Commonly known as: Lopressor   Vitamin D3 50 mcg (2,000 unit) capsule; Generic drug: cholecalciferol     STOP taking these medications     cyclobenzaprine 10 mg tablet; Commonly known as: Flexeril   dapagliflozin propanediol 5 mg; Commonly known as: Farxiga   Januvia 25 mg tablet; Generic drug: SITagliptin phosphate   Lokelma 5 gram packet; Generic drug: sodium zirconium cyclosilicate   losartan 25 mg tablet; Commonly known as: Cozaar   ondansetron ODT 4 mg disintegrating tablet; Commonly known as:   Zofran-ODT   oxyCODONE 5 mg immediate release tablet; Commonly known as: Roxicodone   pregabalin 75 mg capsule; Commonly known as: Lyrica     ASK your doctor about these medications     vancomycin 50 mg/mL oral solution; Commonly known as: Firvanq; Take 2.5   mL (125 mg) by mouth 3 times a day for 7 days, THEN 2.5 mL (125 mg) 2   times a day for 7 days, THEN 2.5 mL (125 mg) once daily for 7 days, THEN   2.5 mL (125 mg) every other day for 8 days.; Start taking on: December 12, 2024; Ask about: Should I take this medication?       Test Results Pending At Discharge  Pending Labs       No current pending labs.                 Outpatient Follow-Up  Future Appointments   Date Time Provider Department Center   1/21/2025  8:00 AM ANA MARIA Bryant-CNP [id number] Kris Vanegas MD

## 2025-01-14 NOTE — PROGRESS NOTES
SNF PROGRESS NOTE      Cc- c diff colitis       Patient is a Niru Kumar 84 y.o. female who is being seen at [place] for C diff colitis. She was originally at [place]. She was started on hemodialysis. She was placed on oral vanco on a long tapering dose.     Patient had dialysis today. Won appeal. She is eating well.         Past Medical History:   Diagnosis Date    Atrial fibrillation (HCC)     CHF (congestive heart failure) ([facility])     Chronic kidney disease     Hyperlipidemia     Hypertension     Restless legs syndrome     Type 2 diabetes mellitus without complication ([facility])      Cephalexin, Pcn [penicillins], and Sulfamethoxazole-trimethoprim    VS reviewed      Gen- Alert and oriented x 3   Heart- RRR no murmur no LE edema   Lungs- CTA b/l no resp distress RA oxygen   Abd- bs x 4      + R chest permacath        Assessment and Plan    1.  C diff colitis  S/p Vanco   F/u GI 1/21/25  2.  RLS   Requip   3.  A fib   Eliquis   Metoprolol   4.  ESRD  T,Th, Sat  Secondary to ATN   phoslo  5.  Chronic diastolic CHF   Metoprolol   6.  HTN              1. Metoprolol               2. Norvasc.      Taniya Genao DO, FACLEE     Electronically signed by: Taniya Genao DO on 1/9/2025

## 2025-01-14 NOTE — PROGRESS NOTES
SNF PROGRESS NOTE      Cc-  c diff colitis       Patient is a Niru Kumar 84 y.o. female who is being seen at [place] for C diff colitis. She was originally at [place]. She was started on hemodialysis. She was placed on oral vanco on a long tapering dose.     Patient is sitting up and she is eating well. She continues dialysis regimen.         Past Medical History:   Diagnosis Date    Atrial fibrillation (HCC)     CHF (congestive heart failure) ([facility])     Chronic kidney disease     Hyperlipidemia     Hypertension     Restless legs syndrome     Type 2 diabetes mellitus without complication ([facility])      Cephalexin, Pcn [penicillins], and Sulfamethoxazole-trimethoprim    VS reviewed    Gen- Alert and oriented x 3   Heart- RRR no murmur no LE edema   Lungs- CTA b/l no resp distress RA oxygen   Abd- bs x 4      + R chest permacath          Assessment and Plan    1.  C diff colitis  S/p Vanco   F/u GI 1/21/25  2.  RLS   Requip   3.  A fib   Eliquis   Metoprolol   4.  ESRD  T,Th, Sat  Secondary to ATN   phoslo  5.  Chronic diastolic CHF   Metoprolol   6.  HTN              1. Metoprolol               2. Norvasc.         YEE Bradley DO     Electronically signed by: Taniya Genao DO on 1/7/2025

## 2025-01-15 ENCOUNTER — OFFICE VISIT (OUTPATIENT)
Dept: GERIATRIC MEDICINE | Age: 85
End: 2025-01-15

## 2025-01-15 DIAGNOSIS — I13.2 HYPERTENSIVE HEART AND CHRONIC KIDNEY DISEASE WITH HEART FAILURE AND WITH STAGE 5 CHRONIC KIDNEY DISEASE, OR END STAGE RENAL DISEASE (HCC): ICD-10-CM

## 2025-01-15 DIAGNOSIS — A04.72 ENTEROCOLITIS DUE TO CLOSTRIDIUM DIFFICILE, NOT SPECIFIED AS RECURRENT: Primary | ICD-10-CM

## 2025-01-15 DIAGNOSIS — I50.30 DIASTOLIC CONGESTIVE HEART FAILURE, UNSPECIFIED HF CHRONICITY (HCC): ICD-10-CM

## 2025-01-15 DIAGNOSIS — I48.91 ATRIAL FIBRILLATION, UNSPECIFIED TYPE (HCC): ICD-10-CM

## 2025-01-15 DIAGNOSIS — N18.6 ESRF (END STAGE RENAL FAILURE) (HCC): ICD-10-CM

## 2025-01-17 ENCOUNTER — OFFICE VISIT (OUTPATIENT)
Dept: GERIATRIC MEDICINE | Age: 85
End: 2025-01-17

## 2025-01-17 DIAGNOSIS — I48.91 ATRIAL FIBRILLATION, UNSPECIFIED TYPE (HCC): ICD-10-CM

## 2025-01-17 DIAGNOSIS — I50.30 DIASTOLIC CONGESTIVE HEART FAILURE, UNSPECIFIED HF CHRONICITY (HCC): ICD-10-CM

## 2025-01-17 DIAGNOSIS — I13.2 HYPERTENSIVE HEART AND CHRONIC KIDNEY DISEASE WITH HEART FAILURE AND WITH STAGE 5 CHRONIC KIDNEY DISEASE, OR END STAGE RENAL DISEASE (HCC): ICD-10-CM

## 2025-01-17 DIAGNOSIS — A04.72 ENTEROCOLITIS DUE TO CLOSTRIDIUM DIFFICILE, NOT SPECIFIED AS RECURRENT: Primary | ICD-10-CM

## 2025-01-17 DIAGNOSIS — N18.6 ESRF (END STAGE RENAL FAILURE) (HCC): ICD-10-CM

## 2025-01-20 ENCOUNTER — OFFICE VISIT (OUTPATIENT)
Dept: GERIATRIC MEDICINE | Age: 85
End: 2025-01-20

## 2025-01-20 DIAGNOSIS — I50.30 DIASTOLIC CONGESTIVE HEART FAILURE, UNSPECIFIED HF CHRONICITY (HCC): ICD-10-CM

## 2025-01-20 DIAGNOSIS — I48.91 ATRIAL FIBRILLATION, UNSPECIFIED TYPE (HCC): ICD-10-CM

## 2025-01-20 DIAGNOSIS — N18.6 ESRF (END STAGE RENAL FAILURE) (HCC): ICD-10-CM

## 2025-01-20 DIAGNOSIS — A04.72 ENTEROCOLITIS DUE TO CLOSTRIDIUM DIFFICILE, NOT SPECIFIED AS RECURRENT: Primary | ICD-10-CM

## 2025-01-20 DIAGNOSIS — I13.2 HYPERTENSIVE HEART AND CHRONIC KIDNEY DISEASE WITH HEART FAILURE AND WITH STAGE 5 CHRONIC KIDNEY DISEASE, OR END STAGE RENAL DISEASE (HCC): ICD-10-CM

## 2025-01-21 ENCOUNTER — APPOINTMENT (OUTPATIENT)
Dept: GASTROENTEROLOGY | Facility: CLINIC | Age: 85
End: 2025-01-21
Payer: MEDICARE

## 2025-01-21 NOTE — PROGRESS NOTES
SNF PROGRESS NOTE      Cc- c diff colitis       Patient is a Niru Kumar 84 y.o. female who is being seen at [place] for C diff colitis. She was originally at [place]. She was started on hemodialysis. She was placed on oral vanco on a long tapering dose.     Patient tolerating abx well. The patient is eating well. She continues her dialysis regimen.         Past Medical History:   Diagnosis Date    Atrial fibrillation (HCC)     CHF (congestive heart failure) ([facility])     Chronic kidney disease     Hyperlipidemia     Hypertension     Restless legs syndrome     Type 2 diabetes mellitus without complication ([facility])      Cephalexin, Pcn [penicillins], and Sulfamethoxazole-trimethoprim    VS reviewed      Gen- Alert and oriented x 3   Heart- RRR no murmur no LE edema   Lungs- CTA b/l no resp distress RA oxygen   Abd- bs x 4      + R chest permacath        Assessment and Plan    1.  C diff colitis  S/p Vanco   F/u GI 1/21/25  2.  RLS   Requip   3.  A fib   Eliquis   Metoprolol   4.  ESRD  T,Th, Sat  Secondary to ATN   phoslo  5.  Chronic diastolic CHF   Metoprolol   6.  HTN              1. Metoprolol               2. Norvasc.         YEE Bradley DO     Electronically signed by: Taniya Genao DO on 1/13/2025

## 2025-01-22 ENCOUNTER — OFFICE VISIT (OUTPATIENT)
Dept: GERIATRIC MEDICINE | Age: 85
End: 2025-01-22

## 2025-01-22 DIAGNOSIS — N18.6 ESRF (END STAGE RENAL FAILURE) (HCC): ICD-10-CM

## 2025-01-22 DIAGNOSIS — A04.72 ENTEROCOLITIS DUE TO CLOSTRIDIUM DIFFICILE, NOT SPECIFIED AS RECURRENT: Primary | ICD-10-CM

## 2025-01-22 DIAGNOSIS — I13.2 HYPERTENSIVE HEART AND CHRONIC KIDNEY DISEASE WITH HEART FAILURE AND WITH STAGE 5 CHRONIC KIDNEY DISEASE, OR END STAGE RENAL DISEASE (HCC): ICD-10-CM

## 2025-01-22 DIAGNOSIS — I48.91 ATRIAL FIBRILLATION, UNSPECIFIED TYPE (HCC): ICD-10-CM

## 2025-01-22 DIAGNOSIS — I50.30 DIASTOLIC CONGESTIVE HEART FAILURE, UNSPECIFIED HF CHRONICITY (HCC): ICD-10-CM

## 2025-01-23 NOTE — PROGRESS NOTES
SNF PROGRESS NOTE      Cc-C. difficile colitis        Patient is a Niru Kumar 84 y.o. female who is being seen at [place] for C. difficile colitis.  She was originally at [facility].  She was started on hemodialysis and placed on a long tapering dose of oral vancomycin.    Patient is eating well.  She has noted weight loss.  She is tolerating antibiotics well        Past Medical History:   Diagnosis Date    Atrial fibrillation (HCC)     CHF (congestive heart failure) ([facility])     Chronic kidney disease     Hyperlipidemia     Hypertension     Restless legs syndrome     Type 2 diabetes mellitus without complication ([facility])      Cephalexin, Pcn [penicillins], and Sulfamethoxazole-trimethoprim    VS reviewed      Gen- Alert and oriented x 3   Heart- RRR no murmur no LE edema   Lungs- CTA b/l no resp distress RA oxygen   Abd- bs x 4      + R chest permacath        Assessment and Plan    1.  C diff colitis  S/p Vanco   F/u GI 1/21/25  2.  RLS   Requip   3.  A fib   Eliquis   Metoprolol   4.  ESRD  T,Th, Sat  Secondary to ATN   phoslo  5.  Chronic diastolic CHF   Metoprolol   6.  HTN              1. Metoprolol               2. Norvasc.      YEE Bradley DO     Electronically signed by: Taniya Genao DO on 1/15/2025

## 2025-01-24 ENCOUNTER — OFFICE VISIT (OUTPATIENT)
Dept: GERIATRIC MEDICINE | Age: 85
End: 2025-01-24
Payer: MEDICARE

## 2025-01-24 DIAGNOSIS — N18.6 ESRF (END STAGE RENAL FAILURE) (HCC): ICD-10-CM

## 2025-01-24 DIAGNOSIS — I13.2 HYPERTENSIVE HEART AND CHRONIC KIDNEY DISEASE WITH HEART FAILURE AND WITH STAGE 5 CHRONIC KIDNEY DISEASE, OR END STAGE RENAL DISEASE (HCC): ICD-10-CM

## 2025-01-24 DIAGNOSIS — I48.91 ATRIAL FIBRILLATION, UNSPECIFIED TYPE (HCC): ICD-10-CM

## 2025-01-24 DIAGNOSIS — A04.72 ENTEROCOLITIS DUE TO CLOSTRIDIUM DIFFICILE, NOT SPECIFIED AS RECURRENT: Primary | ICD-10-CM

## 2025-01-24 DIAGNOSIS — I50.30 DIASTOLIC CONGESTIVE HEART FAILURE, UNSPECIFIED HF CHRONICITY (HCC): ICD-10-CM

## 2025-01-24 PROCEDURE — 1123F ACP DISCUSS/DSCN MKR DOCD: CPT | Performed by: INTERNAL MEDICINE

## 2025-01-24 PROCEDURE — 99308 SBSQ NF CARE LOW MDM 20: CPT | Performed by: INTERNAL MEDICINE

## 2025-01-27 ENCOUNTER — OFFICE VISIT (OUTPATIENT)
Dept: GERIATRIC MEDICINE | Age: 85
End: 2025-01-27
Payer: MEDICARE

## 2025-01-27 DIAGNOSIS — I48.91 ATRIAL FIBRILLATION, UNSPECIFIED TYPE (HCC): ICD-10-CM

## 2025-01-27 DIAGNOSIS — N18.6 ESRF (END STAGE RENAL FAILURE) (HCC): ICD-10-CM

## 2025-01-27 DIAGNOSIS — I50.30 DIASTOLIC CONGESTIVE HEART FAILURE, UNSPECIFIED HF CHRONICITY (HCC): ICD-10-CM

## 2025-01-27 DIAGNOSIS — I13.2 HYPERTENSIVE HEART AND CHRONIC KIDNEY DISEASE WITH HEART FAILURE AND WITH STAGE 5 CHRONIC KIDNEY DISEASE, OR END STAGE RENAL DISEASE (HCC): ICD-10-CM

## 2025-01-27 DIAGNOSIS — A04.72 ENTEROCOLITIS DUE TO CLOSTRIDIUM DIFFICILE, NOT SPECIFIED AS RECURRENT: Primary | ICD-10-CM

## 2025-01-27 PROCEDURE — 1123F ACP DISCUSS/DSCN MKR DOCD: CPT | Performed by: INTERNAL MEDICINE

## 2025-01-27 PROCEDURE — 99308 SBSQ NF CARE LOW MDM 20: CPT | Performed by: INTERNAL MEDICINE

## 2025-01-28 ENCOUNTER — OFFICE VISIT (OUTPATIENT)
Dept: GERIATRIC MEDICINE | Age: 85
End: 2025-01-28

## 2025-01-28 DIAGNOSIS — I48.91 ATRIAL FIBRILLATION, UNSPECIFIED TYPE (HCC): ICD-10-CM

## 2025-01-28 DIAGNOSIS — N18.6 ESRF (END STAGE RENAL FAILURE) (HCC): ICD-10-CM

## 2025-01-28 DIAGNOSIS — I13.2 HYPERTENSIVE HEART AND CHRONIC KIDNEY DISEASE WITH HEART FAILURE AND WITH STAGE 5 CHRONIC KIDNEY DISEASE, OR END STAGE RENAL DISEASE (HCC): ICD-10-CM

## 2025-01-28 DIAGNOSIS — I50.30 DIASTOLIC CONGESTIVE HEART FAILURE, UNSPECIFIED HF CHRONICITY (HCC): ICD-10-CM

## 2025-01-28 DIAGNOSIS — A04.72 ENTEROCOLITIS DUE TO CLOSTRIDIUM DIFFICILE, NOT SPECIFIED AS RECURRENT: Primary | ICD-10-CM

## 2025-01-29 NOTE — PROGRESS NOTES
SNF PROGRESS NOTE      Cc- C. difficile colitis       Patient is a Niru Kumar 84 y.o. female who is being seen at [place] for C. difficile colitis. She was originally at [facility]. She was started on hemodialysis and placed on a long tapering dose of oral vancomycin.     Patient is getting ready to go to dialysis. She denies any complaints of pain.         Past Medical History:   Diagnosis Date    Atrial fibrillation (HCC)     CHF (congestive heart failure) ([facility])     Chronic kidney disease     Hyperlipidemia     Hypertension     Restless legs syndrome     Type 2 diabetes mellitus without complication ([facility])      Cephalexin, Pcn [penicillins], and Sulfamethoxazole-trimethoprim    VS reviewed    Gen- Alert and oriented x 3   Heart- RRR no murmur no LE edema   Lungs- CTA b/l no resp distress RA oxygen   Abd- bs x 4      + R chest permacath          Assessment and Plan    1.  C diff colitis  S/p Vanco   F/u GI 1/21/25  2.  RLS   Requip   3.  A fib   Eliquis   Metoprolol   4.  ESRD  T,Th, Sat  Secondary to ATN   phoslo  5.  Chronic diastolic CHF   Metoprolol   6.  HTN              1. Metoprolol               2. Norvasc.      YEE Bradley DO     Electronically signed by: Taniya Genao DO on 1/22/2025

## 2025-02-01 ENCOUNTER — OFFICE VISIT (OUTPATIENT)
Dept: GERIATRIC MEDICINE | Age: 85
End: 2025-02-01

## 2025-02-01 DIAGNOSIS — N18.6 ESRF (END STAGE RENAL FAILURE) (HCC): ICD-10-CM

## 2025-02-01 DIAGNOSIS — A04.72 ENTEROCOLITIS DUE TO CLOSTRIDIUM DIFFICILE, NOT SPECIFIED AS RECURRENT: Primary | ICD-10-CM

## 2025-02-01 DIAGNOSIS — I48.91 ATRIAL FIBRILLATION, UNSPECIFIED TYPE (HCC): ICD-10-CM

## 2025-02-01 DIAGNOSIS — I13.2 HYPERTENSIVE HEART AND CHRONIC KIDNEY DISEASE WITH HEART FAILURE AND WITH STAGE 5 CHRONIC KIDNEY DISEASE, OR END STAGE RENAL DISEASE (HCC): ICD-10-CM

## 2025-02-01 DIAGNOSIS — I50.30 DIASTOLIC CONGESTIVE HEART FAILURE, UNSPECIFIED HF CHRONICITY (HCC): ICD-10-CM

## 2025-02-01 NOTE — PROGRESS NOTES
SNF PROGRESS NOTE      Cc- C. difficile colitis       Patient is a Niru Kumar 84 y.o. female who is being seen at [place] for C. difficile colitis. She was originally at [facility]. She was started on hemodialysis and placed on a long tapering dose of oral vancomycin.     Patient has had a weight loss. Patient is eating ok and she is tolerating dialysis well.         Past Medical History:   Diagnosis Date    Atrial fibrillation (HCC)     CHF (congestive heart failure) ([facility])     Chronic kidney disease     Hyperlipidemia     Hypertension     Restless legs syndrome     Type 2 diabetes mellitus without complication ([facility])      Cephalexin, Pcn [penicillins], and Sulfamethoxazole-trimethoprim    VS reviewed      Gen- Alert and oriented x 3   Heart- RRR no murmur no LE edema   Lungs- CTA b/l no resp distress RA oxygen   Abd- bs x 4      + R chest permacath        Assessment and Plan      1.  C diff colitis  S/p Vanco   F/u GI 1/21/25  2.  RLS   Requip   3.  A fib   Eliquis   Metoprolol   4.  ESRD  T,Th, Sat  Secondary to ATN   phoslo  5.  Chronic diastolic CHF   Metoprolol   6.  HTN              1. Metoprolol               2. Norvasc.    Discharge planning until 2/1/25      YEE Bradley DO     Electronically signed by: Taniya Genao DO on 1/28/2025

## 2025-02-01 NOTE — PROGRESS NOTES
Discharge Summary       Discharge Disposition home with home care    Discharge Condition stable       Discharge time 36 min       Medications   No current outpatient medications on file.     No current facility-administered medications for this visit.       Diet- renal     Activity - as tolerated        Brief SNF Course--     This is an 84 y.o. female who is being seen for c diff colitis. The patient was treated with vanco and recommended to follow up with GI. The patient had a pretty unremarkable SNF course.           Discharge Diagnosis :    1.  C diff colitis  2.  RLS   3.  A fib   4.  ESRD  5.  Chronic diastolic CHF   6.  HTN        Follow up --     PCP In 1-2 weeks   GI as directed.         YEE Bradley DO     Electronically signed by: Taniya Genao DO on 2/1/2025

## 2025-02-03 ENCOUNTER — APPOINTMENT (OUTPATIENT)
Dept: RADIOLOGY | Facility: HOSPITAL | Age: 85
End: 2025-02-03
Payer: MEDICARE

## 2025-02-03 ENCOUNTER — HOSPITAL ENCOUNTER (INPATIENT)
Facility: HOSPITAL | Age: 85
LOS: 2 days | Discharge: NURSING, REHAB OR LONG-TERM CARE | End: 2025-02-07
Attending: STUDENT IN AN ORGANIZED HEALTH CARE EDUCATION/TRAINING PROGRAM | Admitting: STUDENT IN AN ORGANIZED HEALTH CARE EDUCATION/TRAINING PROGRAM
Payer: MEDICARE

## 2025-02-03 ENCOUNTER — APPOINTMENT (OUTPATIENT)
Dept: CARDIOLOGY | Facility: HOSPITAL | Age: 85
End: 2025-02-03
Payer: MEDICARE

## 2025-02-03 DIAGNOSIS — Z99.2 END-STAGE RENAL DISEASE ON HEMODIALYSIS (MULTI): ICD-10-CM

## 2025-02-03 DIAGNOSIS — U07.1 COVID: ICD-10-CM

## 2025-02-03 DIAGNOSIS — R53.1 GENERALIZED WEAKNESS: Primary | ICD-10-CM

## 2025-02-03 DIAGNOSIS — N18.6 END-STAGE RENAL DISEASE ON HEMODIALYSIS (MULTI): ICD-10-CM

## 2025-02-03 LAB
ALBUMIN SERPL BCP-MCNC: 4 G/DL (ref 3.4–5)
ALP SERPL-CCNC: 52 U/L (ref 33–136)
ALT SERPL W P-5'-P-CCNC: 10 U/L (ref 7–45)
ANION GAP SERPL CALC-SCNC: 14 MMOL/L (ref 10–20)
AST SERPL W P-5'-P-CCNC: 16 U/L (ref 9–39)
BASOPHILS # BLD AUTO: 0.04 X10*3/UL (ref 0–0.1)
BASOPHILS NFR BLD AUTO: 0.4 %
BILIRUB SERPL-MCNC: 0.3 MG/DL (ref 0–1.2)
BNP SERPL-MCNC: 139 PG/ML (ref 0–99)
BUN SERPL-MCNC: 34 MG/DL (ref 6–23)
CALCIUM SERPL-MCNC: 9.1 MG/DL (ref 8.6–10.3)
CARDIAC TROPONIN I PNL SERPL HS: 6 NG/L (ref 0–13)
CARDIAC TROPONIN I PNL SERPL HS: 7 NG/L (ref 0–13)
CHLORIDE SERPL-SCNC: 102 MMOL/L (ref 98–107)
CO2 SERPL-SCNC: 28 MMOL/L (ref 21–32)
CREAT SERPL-MCNC: 3.83 MG/DL (ref 0.5–1.05)
EGFRCR SERPLBLD CKD-EPI 2021: 11 ML/MIN/1.73M*2
EOSINOPHIL # BLD AUTO: 0.44 X10*3/UL (ref 0–0.4)
EOSINOPHIL NFR BLD AUTO: 4.8 %
ERYTHROCYTE [DISTWIDTH] IN BLOOD BY AUTOMATED COUNT: 14.9 % (ref 11.5–14.5)
FLUAV RNA RESP QL NAA+PROBE: NOT DETECTED
FLUBV RNA RESP QL NAA+PROBE: NOT DETECTED
GLUCOSE SERPL-MCNC: 216 MG/DL (ref 74–99)
HCT VFR BLD AUTO: 39 % (ref 36–46)
HGB BLD-MCNC: 11.9 G/DL (ref 12–16)
IMM GRANULOCYTES # BLD AUTO: 0.03 X10*3/UL (ref 0–0.5)
IMM GRANULOCYTES NFR BLD AUTO: 0.3 % (ref 0–0.9)
INR PPP: 1.1 (ref 0.9–1.1)
LACTATE SERPL-SCNC: 1 MMOL/L (ref 0.4–2)
LACTATE SERPL-SCNC: 2.1 MMOL/L (ref 0.4–2)
LYMPHOCYTES # BLD AUTO: 1.43 X10*3/UL (ref 0.8–3)
LYMPHOCYTES NFR BLD AUTO: 15.5 %
MAGNESIUM SERPL-MCNC: 1.9 MG/DL (ref 1.6–2.4)
MCH RBC QN AUTO: 30.1 PG (ref 26–34)
MCHC RBC AUTO-ENTMCNC: 30.5 G/DL (ref 32–36)
MCV RBC AUTO: 99 FL (ref 80–100)
MONOCYTES # BLD AUTO: 0.45 X10*3/UL (ref 0.05–0.8)
MONOCYTES NFR BLD AUTO: 4.9 %
NEUTROPHILS # BLD AUTO: 6.82 X10*3/UL (ref 1.6–5.5)
NEUTROPHILS NFR BLD AUTO: 74.1 %
NRBC BLD-RTO: 0 /100 WBCS (ref 0–0)
PLATELET # BLD AUTO: 180 X10*3/UL (ref 150–450)
POTASSIUM SERPL-SCNC: 3.6 MMOL/L (ref 3.5–5.3)
PROT SERPL-MCNC: 7.4 G/DL (ref 6.4–8.2)
PROTHROMBIN TIME: 12.5 SECONDS (ref 9.8–12.8)
RBC # BLD AUTO: 3.96 X10*6/UL (ref 4–5.2)
SARS-COV-2 RNA RESP QL NAA+PROBE: DETECTED
SODIUM SERPL-SCNC: 140 MMOL/L (ref 136–145)
WBC # BLD AUTO: 9.2 X10*3/UL (ref 4.4–11.3)

## 2025-02-03 PROCEDURE — 96374 THER/PROPH/DIAG INJ IV PUSH: CPT

## 2025-02-03 PROCEDURE — 36415 COLL VENOUS BLD VENIPUNCTURE: CPT | Performed by: PHYSICIAN ASSISTANT

## 2025-02-03 PROCEDURE — 80053 COMPREHEN METABOLIC PANEL: CPT | Performed by: PHYSICIAN ASSISTANT

## 2025-02-03 PROCEDURE — 84484 ASSAY OF TROPONIN QUANT: CPT | Performed by: PHYSICIAN ASSISTANT

## 2025-02-03 PROCEDURE — 99222 1ST HOSP IP/OBS MODERATE 55: CPT | Performed by: NURSE PRACTITIONER

## 2025-02-03 PROCEDURE — 83605 ASSAY OF LACTIC ACID: CPT | Performed by: PHYSICIAN ASSISTANT

## 2025-02-03 PROCEDURE — G0378 HOSPITAL OBSERVATION PER HR: HCPCS

## 2025-02-03 PROCEDURE — 71045 X-RAY EXAM CHEST 1 VIEW: CPT

## 2025-02-03 PROCEDURE — 85025 COMPLETE CBC W/AUTO DIFF WBC: CPT | Performed by: PHYSICIAN ASSISTANT

## 2025-02-03 PROCEDURE — 2500000004 HC RX 250 GENERAL PHARMACY W/ HCPCS (ALT 636 FOR OP/ED): Performed by: PHYSICIAN ASSISTANT

## 2025-02-03 PROCEDURE — 84075 ASSAY ALKALINE PHOSPHATASE: CPT | Performed by: PHYSICIAN ASSISTANT

## 2025-02-03 PROCEDURE — 85610 PROTHROMBIN TIME: CPT | Performed by: PHYSICIAN ASSISTANT

## 2025-02-03 PROCEDURE — 93005 ELECTROCARDIOGRAM TRACING: CPT

## 2025-02-03 PROCEDURE — 71045 X-RAY EXAM CHEST 1 VIEW: CPT | Performed by: RADIOLOGY

## 2025-02-03 PROCEDURE — 83735 ASSAY OF MAGNESIUM: CPT | Performed by: PHYSICIAN ASSISTANT

## 2025-02-03 PROCEDURE — 83880 ASSAY OF NATRIURETIC PEPTIDE: CPT | Performed by: PHYSICIAN ASSISTANT

## 2025-02-03 PROCEDURE — 99285 EMERGENCY DEPT VISIT HI MDM: CPT | Performed by: STUDENT IN AN ORGANIZED HEALTH CARE EDUCATION/TRAINING PROGRAM

## 2025-02-03 PROCEDURE — 87636 SARSCOV2 & INF A&B AMP PRB: CPT | Performed by: PHYSICIAN ASSISTANT

## 2025-02-03 RX ORDER — ONDANSETRON HYDROCHLORIDE 2 MG/ML
4 INJECTION, SOLUTION INTRAVENOUS ONCE
Status: COMPLETED | OUTPATIENT
Start: 2025-02-03 | End: 2025-02-03

## 2025-02-03 RX ADMIN — ONDANSETRON 4 MG: 2 INJECTION INTRAMUSCULAR; INTRAVENOUS at 21:30

## 2025-02-03 ASSESSMENT — ENCOUNTER SYMPTOMS
FLANK PAIN: 0
ABDOMINAL PAIN: 0
DYSURIA: 0
PALPITATIONS: 0
FREQUENCY: 0
DIARRHEA: 0
WEAKNESS: 1
VOMITING: 0
FEVER: 0
CONSTIPATION: 0
CHILLS: 0
HEMATURIA: 0
SHORTNESS OF BREATH: 0
NAUSEA: 0

## 2025-02-03 ASSESSMENT — PAIN - FUNCTIONAL ASSESSMENT: PAIN_FUNCTIONAL_ASSESSMENT: 0-10

## 2025-02-03 ASSESSMENT — LIFESTYLE VARIABLES
HAVE PEOPLE ANNOYED YOU BY CRITICIZING YOUR DRINKING: NO
HAVE YOU EVER FELT YOU SHOULD CUT DOWN ON YOUR DRINKING: NO
TOTAL SCORE: 0
EVER HAD A DRINK FIRST THING IN THE MORNING TO STEADY YOUR NERVES TO GET RID OF A HANGOVER: NO
EVER FELT BAD OR GUILTY ABOUT YOUR DRINKING: NO

## 2025-02-03 ASSESSMENT — PAIN SCALES - GENERAL: PAINLEVEL_OUTOF10: 2

## 2025-02-03 ASSESSMENT — PAIN DESCRIPTION - PAIN TYPE: TYPE: CHRONIC PAIN

## 2025-02-03 ASSESSMENT — PAIN DESCRIPTION - ORIENTATION: ORIENTATION: LEFT

## 2025-02-03 ASSESSMENT — PAIN DESCRIPTION - LOCATION: LOCATION: KNEE

## 2025-02-03 NOTE — PROGRESS NOTES
[facility] PROGRESS NOTE      Cc- C. difficile colitis       Patient is a Niru Kumar 84 y.o. female  who is being seen at [place] for C. difficile colitis. She was originally at [facility]. She was started on hemodialysis and placed on a long tapering dose of oral vancomycin.     Patient is doing well and tolerating dialysis well. She is making progress.         Past Medical History:   Diagnosis Date    Atrial fibrillation (HCC)     CHF (congestive heart failure) ([facility])     Chronic kidney disease     Hyperlipidemia     Hypertension     Restless legs syndrome     Type 2 diabetes mellitus without complication ([facility])      Cephalexin, Pcn [penicillins], and Sulfamethoxazole-trimethoprim    VS reviewed      Gen- Alert and oriented x 3   Heart- RRR no murmur no LE edema   Lungs- CTA b/l no resp distress RA oxygen   Abd- bs x 4      + R chest permacath        Assessment and Plan    1.  C diff colitis  S/p Vanco   F/u GI 1/21/25  2.  RLS   Requip   3.  A fib   Eliquis   Metoprolol   4.  ESRD  T,Th, Sat  Secondary to ATN   phoslo  5.  Chronic diastolic CHF   Metoprolol   6.  HTN              1. Metoprolol               2. Norvasc.      YEE Bradley DO     Electronically signed by: Taniya Genao DO on 1/24/2025

## 2025-02-03 NOTE — PROGRESS NOTES
SNF PROGRESS NOTE      Cc- C. difficile colitis       Patient is a Niru Kumar 84 y.o. female who is being seen at [facility] for C. difficile colitis. She was originally at [facility]. She was started on hemodialysis and placed on a long tapering dose of oral vancomycin.     Patient is tolerating dialysis well. She is sitting up in the bed.         Past Medical History:   Diagnosis Date    Atrial fibrillation (HCC)     CHF (congestive heart failure) ([facility])     Chronic kidney disease     Hyperlipidemia     Hypertension     Restless legs syndrome     Type 2 diabetes mellitus without complication ([facility])      Cephalexin, Pcn [penicillins], and Sulfamethoxazole-trimethoprim    VS reviewed    Gen- Alert and oriented x 3   Heart- RRR no murmur no LE edema   Lungs- CTA b/l no resp distress RA oxygen   Abd- bs x 4      + R chest permacath        Assessment and Plan    1.  C diff colitis  S/p Vanco   F/u GI 1/21/25  2.  RLS   Requip   3.  A fib   Eliquis   Metoprolol   4.  ESRD  T,Th, Sat  Secondary to ATN   phoslo  5.  Chronic diastolic CHF   Metoprolol   6.  HTN              1. Metoprolol               2. Norvasc.      YEE Bradley DO     Electronically signed by: Taniya Genao DO on 1/27/2025

## 2025-02-04 LAB
ANION GAP SERPL CALC-SCNC: 12 MMOL/L (ref 10–20)
ATRIAL RATE: 85 BPM
BUN SERPL-MCNC: 33 MG/DL (ref 6–23)
CALCIUM SERPL-MCNC: 8.3 MG/DL (ref 8.6–10.3)
CHLORIDE SERPL-SCNC: 106 MMOL/L (ref 98–107)
CO2 SERPL-SCNC: 29 MMOL/L (ref 21–32)
CREAT SERPL-MCNC: 4.08 MG/DL (ref 0.5–1.05)
EGFRCR SERPLBLD CKD-EPI 2021: 10 ML/MIN/1.73M*2
ERYTHROCYTE [DISTWIDTH] IN BLOOD BY AUTOMATED COUNT: 15 % (ref 11.5–14.5)
GLUCOSE BLD MANUAL STRIP-MCNC: 120 MG/DL (ref 74–99)
GLUCOSE BLD MANUAL STRIP-MCNC: 136 MG/DL (ref 74–99)
GLUCOSE BLD MANUAL STRIP-MCNC: 144 MG/DL (ref 74–99)
GLUCOSE SERPL-MCNC: 145 MG/DL (ref 74–99)
HCT VFR BLD AUTO: 31.7 % (ref 36–46)
HGB BLD-MCNC: 9.6 G/DL (ref 12–16)
HOLD SPECIMEN: NORMAL
MCH RBC QN AUTO: 29.3 PG (ref 26–34)
MCHC RBC AUTO-ENTMCNC: 30.3 G/DL (ref 32–36)
MCV RBC AUTO: 97 FL (ref 80–100)
NRBC BLD-RTO: 0 /100 WBCS (ref 0–0)
P AXIS: 35 DEGREES
P OFFSET: 204 MS
P ONSET: 144 MS
PLATELET # BLD AUTO: 156 X10*3/UL (ref 150–450)
POTASSIUM SERPL-SCNC: 3.8 MMOL/L (ref 3.5–5.3)
PR INTERVAL: 152 MS
Q ONSET: 220 MS
QRS COUNT: 14 BEATS
QRS DURATION: 74 MS
QT INTERVAL: 352 MS
QTC CALCULATION(BAZETT): 418 MS
QTC FREDERICIA: 395 MS
R AXIS: -13 DEGREES
RBC # BLD AUTO: 3.28 X10*6/UL (ref 4–5.2)
SODIUM SERPL-SCNC: 143 MMOL/L (ref 136–145)
T AXIS: -9 DEGREES
T OFFSET: 396 MS
VENTRICULAR RATE: 85 BPM
WBC # BLD AUTO: 6.4 X10*3/UL (ref 4.4–11.3)

## 2025-02-04 PROCEDURE — 2500000001 HC RX 250 WO HCPCS SELF ADMINISTERED DRUGS (ALT 637 FOR MEDICARE OP)

## 2025-02-04 PROCEDURE — 82947 ASSAY GLUCOSE BLOOD QUANT: CPT

## 2025-02-04 PROCEDURE — 85027 COMPLETE CBC AUTOMATED: CPT | Performed by: NURSE PRACTITIONER

## 2025-02-04 PROCEDURE — 36415 COLL VENOUS BLD VENIPUNCTURE: CPT | Performed by: NURSE PRACTITIONER

## 2025-02-04 PROCEDURE — 99232 SBSQ HOSP IP/OBS MODERATE 35: CPT

## 2025-02-04 PROCEDURE — 97161 PT EVAL LOW COMPLEX 20 MIN: CPT | Mod: GP | Performed by: PHYSICAL THERAPIST

## 2025-02-04 PROCEDURE — G0378 HOSPITAL OBSERVATION PER HR: HCPCS

## 2025-02-04 PROCEDURE — 97165 OT EVAL LOW COMPLEX 30 MIN: CPT | Mod: GO

## 2025-02-04 PROCEDURE — 86706 HEP B SURFACE ANTIBODY: CPT | Mod: ELYLAB | Performed by: STUDENT IN AN ORGANIZED HEALTH CARE EDUCATION/TRAINING PROGRAM

## 2025-02-04 PROCEDURE — 80048 BASIC METABOLIC PNL TOTAL CA: CPT | Performed by: NURSE PRACTITIONER

## 2025-02-04 PROCEDURE — 87340 HEPATITIS B SURFACE AG IA: CPT | Mod: ELYLAB | Performed by: STUDENT IN AN ORGANIZED HEALTH CARE EDUCATION/TRAINING PROGRAM

## 2025-02-04 RX ORDER — ACETAMINOPHEN 160 MG/5ML
650 SOLUTION ORAL EVERY 4 HOURS PRN
Status: DISCONTINUED | OUTPATIENT
Start: 2025-02-04 | End: 2025-02-07 | Stop reason: HOSPADM

## 2025-02-04 RX ORDER — AMLODIPINE BESYLATE 5 MG/1
2.5 TABLET ORAL
Status: DISCONTINUED | OUTPATIENT
Start: 2025-02-04 | End: 2025-02-07 | Stop reason: HOSPADM

## 2025-02-04 RX ORDER — CALCIUM ACETATE 667 MG/1
1334 CAPSULE ORAL
Status: DISCONTINUED | OUTPATIENT
Start: 2025-02-04 | End: 2025-02-07 | Stop reason: HOSPADM

## 2025-02-04 RX ORDER — NYSTATIN 100000 [USP'U]/G
1 POWDER TOPICAL 2 TIMES DAILY
Status: DISCONTINUED | OUTPATIENT
Start: 2025-02-04 | End: 2025-02-07 | Stop reason: HOSPADM

## 2025-02-04 RX ORDER — POLYETHYLENE GLYCOL 3350 17 G/17G
17 POWDER, FOR SOLUTION ORAL DAILY PRN
Status: DISCONTINUED | OUTPATIENT
Start: 2025-02-04 | End: 2025-02-07 | Stop reason: HOSPADM

## 2025-02-04 RX ORDER — ACETAMINOPHEN 500 MG
10 TABLET ORAL NIGHTLY PRN
Status: DISCONTINUED | OUTPATIENT
Start: 2025-02-04 | End: 2025-02-07 | Stop reason: HOSPADM

## 2025-02-04 RX ORDER — TRAMADOL HYDROCHLORIDE 50 MG/1
50 TABLET ORAL EVERY 12 HOURS PRN
Status: DISCONTINUED | OUTPATIENT
Start: 2025-02-04 | End: 2025-02-07 | Stop reason: HOSPADM

## 2025-02-04 RX ORDER — METOPROLOL TARTRATE 25 MG/1
12.5 TABLET, FILM COATED ORAL 2 TIMES DAILY
Status: DISCONTINUED | OUTPATIENT
Start: 2025-02-04 | End: 2025-02-07 | Stop reason: HOSPADM

## 2025-02-04 RX ORDER — PREGABALIN 75 MG/1
75 CAPSULE ORAL NIGHTLY
COMMUNITY

## 2025-02-04 RX ORDER — ACETAMINOPHEN 650 MG/1
650 SUPPOSITORY RECTAL EVERY 4 HOURS PRN
Status: DISCONTINUED | OUTPATIENT
Start: 2025-02-04 | End: 2025-02-07 | Stop reason: HOSPADM

## 2025-02-04 RX ORDER — ACETAMINOPHEN 325 MG/1
650 TABLET ORAL EVERY 4 HOURS PRN
Status: DISCONTINUED | OUTPATIENT
Start: 2025-02-04 | End: 2025-02-07 | Stop reason: HOSPADM

## 2025-02-04 RX ORDER — VIT C/E/ZN/COPPR/LUTEIN/ZEAXAN 250MG-90MG
1000 CAPSULE ORAL
Status: DISCONTINUED | OUTPATIENT
Start: 2025-02-04 | End: 2025-02-07 | Stop reason: HOSPADM

## 2025-02-04 RX ORDER — FUROSEMIDE 40 MG/1
40 TABLET ORAL
Status: DISCONTINUED | OUTPATIENT
Start: 2025-02-04 | End: 2025-02-07 | Stop reason: HOSPADM

## 2025-02-04 RX ORDER — LATANOPROST 50 UG/ML
1 SOLUTION/ DROPS OPHTHALMIC NIGHTLY
Status: DISCONTINUED | OUTPATIENT
Start: 2025-02-04 | End: 2025-02-07 | Stop reason: HOSPADM

## 2025-02-04 RX ADMIN — FUROSEMIDE 40 MG: 40 TABLET ORAL at 08:48

## 2025-02-04 RX ADMIN — Medication 1 CAPSULE: at 08:48

## 2025-02-04 RX ADMIN — CALCIUM ACETATE 1334 MG: 667 CAPSULE ORAL at 16:02

## 2025-02-04 RX ADMIN — TRAMADOL HYDROCHLORIDE 50 MG: 50 TABLET, FILM COATED ORAL at 21:50

## 2025-02-04 RX ADMIN — APIXABAN 2.5 MG: 5 TABLET, FILM COATED ORAL at 08:48

## 2025-02-04 RX ADMIN — CALCIUM ACETATE 1334 MG: 667 CAPSULE ORAL at 08:48

## 2025-02-04 RX ADMIN — CALCIUM ACETATE 1334 MG: 667 CAPSULE ORAL at 12:33

## 2025-02-04 RX ADMIN — Medication 1000 MCG: at 08:48

## 2025-02-04 RX ADMIN — APIXABAN 2.5 MG: 5 TABLET, FILM COATED ORAL at 21:52

## 2025-02-04 RX ADMIN — LATANOPROST 1 DROP: 50 SOLUTION OPHTHALMIC at 23:51

## 2025-02-04 RX ADMIN — NYSTATIN 1 APPLICATION: 100000 POWDER TOPICAL at 21:50

## 2025-02-04 RX ADMIN — AMLODIPINE BESYLATE 2.5 MG: 5 TABLET ORAL at 08:48

## 2025-02-04 RX ADMIN — METOPROLOL TARTRATE 12.5 MG: 25 TABLET, FILM COATED ORAL at 21:50

## 2025-02-04 RX ADMIN — NYSTATIN 1 APPLICATION: 100000 POWDER TOPICAL at 16:04

## 2025-02-04 RX ADMIN — PREGABALIN 75 MG: 50 CAPSULE ORAL at 21:52

## 2025-02-04 RX ADMIN — METOPROLOL TARTRATE 12.5 MG: 25 TABLET, FILM COATED ORAL at 08:48

## 2025-02-04 SDOH — ECONOMIC STABILITY: FOOD INSECURITY: WITHIN THE PAST 12 MONTHS, THE FOOD YOU BOUGHT JUST DIDN'T LAST AND YOU DIDN'T HAVE MONEY TO GET MORE.: NEVER TRUE

## 2025-02-04 SDOH — ECONOMIC STABILITY: FOOD INSECURITY: HOW HARD IS IT FOR YOU TO PAY FOR THE VERY BASICS LIKE FOOD, HOUSING, MEDICAL CARE, AND HEATING?: NOT VERY HARD

## 2025-02-04 SDOH — SOCIAL STABILITY: SOCIAL INSECURITY: WERE YOU ABLE TO COMPLETE ALL THE BEHAVIORAL HEALTH SCREENINGS?: YES

## 2025-02-04 SDOH — SOCIAL STABILITY: SOCIAL INSECURITY: ARE THERE ANY APPARENT SIGNS OF INJURIES/BEHAVIORS THAT COULD BE RELATED TO ABUSE/NEGLECT?: NO

## 2025-02-04 SDOH — ECONOMIC STABILITY: HOUSING INSECURITY: AT ANY TIME IN THE PAST 12 MONTHS, WERE YOU HOMELESS OR LIVING IN A SHELTER (INCLUDING NOW)?: NO

## 2025-02-04 SDOH — SOCIAL STABILITY: SOCIAL INSECURITY: ABUSE: ADULT

## 2025-02-04 SDOH — SOCIAL STABILITY: SOCIAL INSECURITY: DO YOU FEEL UNSAFE GOING BACK TO THE PLACE WHERE YOU ARE LIVING?: NO

## 2025-02-04 SDOH — ECONOMIC STABILITY: INCOME INSECURITY: IN THE PAST 12 MONTHS HAS THE ELECTRIC, GAS, OIL, OR WATER COMPANY THREATENED TO SHUT OFF SERVICES IN YOUR HOME?: NO

## 2025-02-04 SDOH — SOCIAL STABILITY: SOCIAL INSECURITY: DO YOU FEEL ANYONE HAS EXPLOITED OR TAKEN ADVANTAGE OF YOU FINANCIALLY OR OF YOUR PERSONAL PROPERTY?: NO

## 2025-02-04 SDOH — ECONOMIC STABILITY: HOUSING INSECURITY: IN THE LAST 12 MONTHS, WAS THERE A TIME WHEN YOU WERE NOT ABLE TO PAY THE MORTGAGE OR RENT ON TIME?: NO

## 2025-02-04 SDOH — ECONOMIC STABILITY: TRANSPORTATION INSECURITY: IN THE PAST 12 MONTHS, HAS LACK OF TRANSPORTATION KEPT YOU FROM MEDICAL APPOINTMENTS OR FROM GETTING MEDICATIONS?: NO

## 2025-02-04 SDOH — HEALTH STABILITY: MENTAL HEALTH: HOW OFTEN DO YOU HAVE SIX OR MORE DRINKS ON ONE OCCASION?: NEVER

## 2025-02-04 SDOH — SOCIAL STABILITY: SOCIAL INSECURITY: WITHIN THE LAST YEAR, HAVE YOU BEEN AFRAID OF YOUR PARTNER OR EX-PARTNER?: NO

## 2025-02-04 SDOH — SOCIAL STABILITY: SOCIAL INSECURITY: DOES ANYONE TRY TO KEEP YOU FROM HAVING/CONTACTING OTHER FRIENDS OR DOING THINGS OUTSIDE YOUR HOME?: NO

## 2025-02-04 SDOH — SOCIAL STABILITY: SOCIAL INSECURITY: HAS ANYONE EVER THREATENED TO HURT YOUR FAMILY OR YOUR PETS?: NO

## 2025-02-04 SDOH — SOCIAL STABILITY: SOCIAL INSECURITY: ARE YOU OR HAVE YOU BEEN THREATENED OR ABUSED PHYSICALLY, EMOTIONALLY, OR SEXUALLY BY ANYONE?: NO

## 2025-02-04 SDOH — SOCIAL STABILITY: SOCIAL INSECURITY: HAVE YOU HAD THOUGHTS OF HARMING ANYONE ELSE?: NO

## 2025-02-04 SDOH — ECONOMIC STABILITY: HOUSING INSECURITY: IN THE PAST 12 MONTHS, HOW MANY TIMES HAVE YOU MOVED WHERE YOU WERE LIVING?: 6

## 2025-02-04 SDOH — ECONOMIC STABILITY: FOOD INSECURITY: WITHIN THE PAST 12 MONTHS, YOU WORRIED THAT YOUR FOOD WOULD RUN OUT BEFORE YOU GOT THE MONEY TO BUY MORE.: NEVER TRUE

## 2025-02-04 SDOH — SOCIAL STABILITY: SOCIAL INSECURITY: WITHIN THE LAST YEAR, HAVE YOU BEEN HUMILIATED OR EMOTIONALLY ABUSED IN OTHER WAYS BY YOUR PARTNER OR EX-PARTNER?: NO

## 2025-02-04 ASSESSMENT — LIFESTYLE VARIABLES
HOW OFTEN DO YOU HAVE A DRINK CONTAINING ALCOHOL: NEVER
AUDIT-C TOTAL SCORE: 0
SKIP TO QUESTIONS 9-10: 1
HOW MANY STANDARD DRINKS CONTAINING ALCOHOL DO YOU HAVE ON A TYPICAL DAY: PATIENT DOES NOT DRINK
HOW OFTEN DO YOU HAVE A DRINK CONTAINING ALCOHOL: NEVER
HOW OFTEN DO YOU HAVE 6 OR MORE DRINKS ON ONE OCCASION: NEVER
HOW MANY STANDARD DRINKS CONTAINING ALCOHOL DO YOU HAVE ON A TYPICAL DAY: PATIENT DOES NOT DRINK
AUDIT-C TOTAL SCORE: 0
HOW OFTEN DO YOU HAVE 6 OR MORE DRINKS ON ONE OCCASION: NEVER
AUDIT-C TOTAL SCORE: 0
SKIP TO QUESTIONS 9-10: 1
AUDIT-C TOTAL SCORE: 0

## 2025-02-04 ASSESSMENT — COGNITIVE AND FUNCTIONAL STATUS - GENERAL
DRESSING REGULAR UPPER BODY CLOTHING: A LITTLE
DAILY ACTIVITIY SCORE: 16
WALKING IN HOSPITAL ROOM: A LOT
MOVING TO AND FROM BED TO CHAIR: A LITTLE
MOBILITY SCORE: 15
PERSONAL GROOMING: A LITTLE
PATIENT BASELINE BEDBOUND: NO
CLIMB 3 TO 5 STEPS WITH RAILING: TOTAL
MOVING FROM LYING ON BACK TO SITTING ON SIDE OF FLAT BED WITH BEDRAILS: A LITTLE
WALKING IN HOSPITAL ROOM: A LOT
STANDING UP FROM CHAIR USING ARMS: A LITTLE
HELP NEEDED FOR BATHING: A LOT
MOVING FROM LYING ON BACK TO SITTING ON SIDE OF FLAT BED WITH BEDRAILS: A LITTLE
MOVING TO AND FROM BED TO CHAIR: A LOT
DAILY ACTIVITIY SCORE: 20
HELP NEEDED FOR BATHING: A LITTLE
TURNING FROM BACK TO SIDE WHILE IN FLAT BAD: A LITTLE
MOVING FROM LYING ON BACK TO SITTING ON SIDE OF FLAT BED WITH BEDRAILS: A LITTLE
DRESSING REGULAR LOWER BODY CLOTHING: A LOT
TURNING FROM BACK TO SIDE WHILE IN FLAT BAD: A LITTLE
WALKING IN HOSPITAL ROOM: A LOT
MOVING TO AND FROM BED TO CHAIR: A LITTLE
DRESSING REGULAR LOWER BODY CLOTHING: A LITTLE
DRESSING REGULAR LOWER BODY CLOTHING: A LITTLE
TOILETING: A LOT
TOILETING: A LITTLE
STANDING UP FROM CHAIR USING ARMS: A LITTLE
CLIMB 3 TO 5 STEPS WITH RAILING: TOTAL
CLIMB 3 TO 5 STEPS WITH RAILING: TOTAL
MOBILITY SCORE: 15
DRESSING REGULAR UPPER BODY CLOTHING: A LITTLE
STANDING UP FROM CHAIR USING ARMS: A LOT
MOBILITY SCORE: 13
TURNING FROM BACK TO SIDE WHILE IN FLAT BAD: A LITTLE
DAILY ACTIVITIY SCORE: 20
TOILETING: A LITTLE
DRESSING REGULAR UPPER BODY CLOTHING: A LITTLE
HELP NEEDED FOR BATHING: A LITTLE

## 2025-02-04 ASSESSMENT — PATIENT HEALTH QUESTIONNAIRE - PHQ9
SUM OF ALL RESPONSES TO PHQ9 QUESTIONS 1 & 2: 0
1. LITTLE INTEREST OR PLEASURE IN DOING THINGS: NOT AT ALL
1. LITTLE INTEREST OR PLEASURE IN DOING THINGS: NOT AT ALL
2. FEELING DOWN, DEPRESSED OR HOPELESS: NOT AT ALL
2. FEELING DOWN, DEPRESSED OR HOPELESS: NOT AT ALL
SUM OF ALL RESPONSES TO PHQ9 QUESTIONS 1 & 2: 0

## 2025-02-04 ASSESSMENT — ACTIVITIES OF DAILY LIVING (ADL)
FEEDING YOURSELF: INDEPENDENT
JUDGMENT_ADEQUATE_SAFELY_COMPLETE_DAILY_ACTIVITIES: YES
ADEQUATE_TO_COMPLETE_ADL: YES
LACK_OF_TRANSPORTATION: NO
TOILETING: NEEDS ASSISTANCE
GROOMING: NEEDS ASSISTANCE
HEARING - RIGHT EAR: FUNCTIONAL
LACK_OF_TRANSPORTATION: NO
ASSISTIVE_DEVICE: WALKER;EYEGLASSES;WHEELCHAIR
HEARING - LEFT EAR: FUNCTIONAL
BATHING: NEEDS ASSISTANCE
WALKS IN HOME: DEPENDENT
PATIENT'S MEMORY ADEQUATE TO SAFELY COMPLETE DAILY ACTIVITIES?: YES
BATHING_ASSISTANCE: MINIMAL
DRESSING YOURSELF: NEEDS ASSISTANCE

## 2025-02-04 ASSESSMENT — PAIN SCALES - GENERAL
PAINLEVEL_OUTOF10: 0 - NO PAIN

## 2025-02-04 ASSESSMENT — PAIN - FUNCTIONAL ASSESSMENT: PAIN_FUNCTIONAL_ASSESSMENT: 0-10

## 2025-02-04 ASSESSMENT — PAIN SCALES - WONG BAKER: WONGBAKER_NUMERICALRESPONSE: NO HURT

## 2025-02-04 NOTE — PROGRESS NOTES
"Occupational Therapy    Evaluation    Patient Name: Savita Snell \"FILOMENA\"  MRN: [id number]  : 1940  Today's Date: 25  Time Calculation  Start Time: 841  Stop Time: 906  Time Calculation (min): 25 min     901/901-A    Assessment:  OT Assessment: pt presents with decreased strength for ADLS and functional mobility, will benefit from con't skilled OT  Prognosis: Good  Barriers to Discharge Home: Physical needs  End of Session Patient Position: Up in chair, Alarm on (PCT present with pt)  OT Assessment Results: Decreased ADL status, Decreased endurance, Decreased functional mobility  Prognosis: Good    Plan:  Treatment Interventions: ADL retraining, Functional transfer training, Endurance training, Patient/family training  OT Frequency: 2 times per week  OT Discharge Recommendations: Moderate intensity level of continued care, Low intensity level of continued care  OT Recommended Transfer Status: Moderate assist, Assist of 2  OT - OK to Discharge: Yes  Treatment Interventions: ADL retraining, Functional transfer training, Endurance training, Patient/family training  Subjective     Current Problem:  1. Generalized weakness        2. End-stage renal disease on hemodialysis (Multi)                General:   OT Received On: 25  General  Reason for Referral: ADL impairment  Referred By: Mary PT/OT eval and tx 25  Past Medical History Relevant to Rehab: dyslipidemia, HTN, neuropathy, afib, anemia, DM, covid 19, glaucoma, obesity, cdiff, L TKA 24, GIB, HD T, Th, Sat, endometrial CA, hyster, appey, falls  Family/Caregiver Present: No  Co-Treatment: PT  Co-Treatment Reason: to maximize pt/staff safety  Patient Position Received: Bed, 2 rail up, Alarm on (purewick external catheter)  General Comment: pt to ED 2/3 for weakness, dc from SNF , difficulty transferring in/out of wc, unable to stand/ambulate/toilet. Flu negative, covid 19 + 2/3. Chest xray 2/3 L LL, " atelectasis.    Precautions:  Medical Precautions: Fall precautions, Infection precautions (droplet + Covid +)           Pain:  Pain Assessment  Pain Assessment: 0-10  0-10 (Numeric) Pain Score: 0 - No pain  Objective     Cognition:  Overall Cognitive Status: Within Functional Limits  Attention:  (easily distracted, cue to redirect, demo difficulty answering questions about prior level of function)  Processing Speed: Delayed             Home Living:  Home Living Comments: lives with spouse, 1 story with basement, ramp to enter (reports spouse is unable to push her up ramp in wc). Walk in shower iwth seat    Prior Function:  Prior Function Comments: pt indep with UB ADLS, assist for LB ADLS, assist for transfers and mobility. Was ambulating short distances at SNF with ww. Reports one fall in past 3 months trying to get out of van at home in garage on 2/1/25, son and DIL assisted pt to get up           Activities of Daily Living:   Eating Assistance: Independent  Grooming Assistance: Stand by  Grooming Deficit:  (sitting/set up)  Bathing Assistance: Minimal  UE Dressing Assistance: Minimal  LE Dressing Assistance: Maximal  Toileting Assistance with Device: Maximal  Functional Assistance: Maximal                         Activity Tolerance:  Endurance:  (poor)           Bed Mobility/Transfers: Bed Mobility  Bed Mobility:  (Supine > sit with HOB 45 degrees + 2 minimal assist. Slow transitioning up to sit Increased time to get hips forward)  Transfers  Transfer:  (Sit > stand at bed level + 2 moderate assist with feet blocked and use of bed pad. Difficulty transititioning hands up to ww. VC to straighten up fully. Retropulsive. Stand > sit vc to reach back+ 1minmimal assist to lower)                Balance:  Static Sitting: good  Dynamic Sitting: fair   Static Standing: fair -  Dynamic Standing: poor +         Vision:Vision - Basic Assessment  Current Vision: Wears glasses all the time        Sensation:  Light Touch: No  apparent deficits    Strength:  Strength Comments: B UE 4/5 throughout            Extremities: RUE   RUE : Within Functional Limits and LUE   LUE: Within Functional Limits    Outcome Measures: [facility] Daily Activity  Putting on and taking off regular lower body clothing: A lot  Bathing (including washing, rinsing, drying): A lot  Putting on and taking off regular upper body clothing: A little  Toileting, which includes using toilet, bedpan or urinal: A lot  Taking care of personal grooming such as brushing teeth: A little  Eating Meals: None  Daily Activity - Total Score: 16    Education Documentation  ADL Training, taught by Tea Khan, OT at 2/4/2025 12:36 PM.  Learner: Patient  Readiness: Acceptance  Method: Explanation  Response: Verbalizes Understanding, Needs Reinforcement                 Goals:  Encounter Problems       Encounter Problems (Active)       OT Goals       Pt will dress UB indep (Progressing)       Start:  02/04/25    Expected End:  02/18/25            Pt will transfer to bed, chair, toilet with CGA (Progressing)       Start:  02/04/25    Expected End:  02/18/25            Pt will dress LB with modified indep (Progressing)       Start:  02/04/25    Expected End:  02/18/25            Pt will tolerate 10 minutes of functional activity with one rest break (Progressing)       Start:  02/04/25    Expected End:  02/18/25

## 2025-02-04 NOTE — H&P
"History Of Present Illness  Savita Snell \"FILOMENA\" is a 84 y.o. female with a significant past medical history of paroxysmal atrial fibrillation, long-term anticoagulation as apixaban, diastolic CHF, ESRD on HD, recurrent C. difficile who presented to the ED with weakness. She was discharged from [facility] on Saturday. The weakness is reported to cause trouble transferring from wheelchair to chair, and she does not have help at home. She is scheduled for HD tomorrow. She denies any fever, chills, cough, shortness of breath, chest pain, abdominal pain, nausea, or vomiting. She reports she has PT set up for home and transportation for HD, however the transportation apparently requires her to be at the curb side which she is unable to do. She fears her  is unable to push her down a ramp they have installed, stating if he was to lose his  she could end up in the road.      Past Medical History  Past Medical History:   Diagnosis Date    Afib (Multi)     Arthritis     Balance problem     C. difficile colitis     Cataract     CKD (chronic kidney disease), stage III (Multi)     COVID-19     VACCINATED    Diabetic neuropathy (Multi)     Endometrial cancer (Multi)     Falls     HAS FALLEN WITHIN LAST 6 MONTHS    Glaucoma     Heart failure with preserved ejection fraction     Hypertension     Hypokalemia     Obesity     Type 2 diabetes mellitus     Venous ulcer (Multi)        Surgical History  Past Surgical History:   Procedure Laterality Date    APPENDECTOMY      CATARACT EXTRACTION      CHOLECYSTECTOMY      COLONOSCOPY      HYSTERECTOMY      JOINT REPLACEMENT      RIGHT KNEE    OTHER SURGICAL HISTORY      TAILBONE REMOVED DUE TO FRACTURE    OTHER SURGICAL HISTORY Bilateral     DOUBLE MUSCLE EYE SURGERY    TONSILLECTOMY      TOTAL KNEE ARTHROPLASTY Left     UPPER GASTROINTESTINAL ENDOSCOPY          Social History  She reports that she has quit smoking. Her smoking use included cigarettes. She has never used " smokeless tobacco. She reports that she does not currently use alcohol. She reports that she does not use drugs.    Family History  Family History   Problem Relation Name Age of Onset    Heart failure Mother      Other (MI) Mother      Stroke Father      Hyperlipidemia Sister      Hypertension Sister          Allergies  Cephalexin, Penicillins, and Sulfamethoxazole-trimethoprim    Review of Systems   Constitutional:  Negative for chills and fever.   Respiratory:  Negative for shortness of breath.    Cardiovascular:  Negative for chest pain and palpitations.   Gastrointestinal:  Negative for abdominal pain, constipation, diarrhea, nausea and vomiting.   Genitourinary:  Negative for dysuria, flank pain, frequency, hematuria and urgency.   Neurological:  Positive for weakness.   All other systems reviewed and are negative.       Physical Exam  Vitals reviewed.   Constitutional:       Appearance: She is obese.   HENT:      Head: Normocephalic and atraumatic.   Cardiovascular:      Rate and Rhythm: Normal rate and regular rhythm.      Heart sounds: Normal heart sounds.   Pulmonary:      Effort: Pulmonary effort is normal.      Breath sounds: Normal air entry.   Abdominal:      General: Bowel sounds are normal.      Palpations: Abdomen is soft.      Tenderness: There is no abdominal tenderness.   Musculoskeletal:         General: No deformity.   Skin:     General: Skin is warm and dry.   Neurological:      General: No focal deficit present.      Mental Status: She is alert and oriented to person, place, and time.   Psychiatric:         Mood and Affect: Mood normal.         Behavior: Behavior normal.          Last Recorded Vitals  Blood pressure 144/61, pulse 82, temperature 36.9 °C (98.4 °F), temperature source Temporal, resp. rate 18, SpO2 94%.    Relevant Results      Lab Results   Component Value Date    WBC 9.2 02/03/2025    HGB 11.9 (L) 02/03/2025    HCT 39.0 02/03/2025    MCV 99 02/03/2025     02/03/2025      Lab Results   Component Value Date    GLUCOSE 216 (H) 02/03/2025    CALCIUM 9.1 02/03/2025     02/03/2025    K 3.6 02/03/2025    CO2 28 02/03/2025     02/03/2025    BUN 34 (H) 02/03/2025    CREATININE 3.83 (H) 02/03/2025     XR chest 1 view  Narrative: Interpreted By:  Jayant Wheeler,   STUDY:  Chest dated  2/3/2025.      INDICATION:  Signs/Symptoms:Generalized weakness      COMPARISON:  Chest dated 12/13/2024.      ACCESSION NUMBER(S):  [id number]      ORDERING CLINICIAN:  JOAN DOVER      TECHNIQUE:  One view of the chest.      FINDINGS:  Linear opacity is seen in the left lower lung zone.  No pneumothorax  or effusion is evident. The cardiomediastinal silhouette is  not  enlarged.There is a right IJ line with the tip of the distal lumen  projecting over the lower SVC.Degenerative change is seen of the  spine and shoulders.      Impression: Left lower lung zone atelectasis.      MACRO:  None      Signed by: Jayant Wheeler 2/3/2025 9:19 PM  Dictation workstation:   [id number]    ED Medication Administration from 02/03/2025 2017 to 02/03/2025 2247         Date/Time Order Dose Route Action Action by     02/03/2025 2130 EST ondansetron (Zofran) injection 4 mg 4 mg intravenous Given TERE Aviles               Assessment/Plan   Assessment & Plan  Weakness      #Weakness  -PT OT  -Falls precautions    #Covid-19  -Incidental finding  -Droplet plus precautions  -Monitor    #ESRD on HD  -Consult nephrology for HD management    #Afib  #HTN  #CHF  -Continue home meds pending nursing review             ANA MARIA Gamble-CNP

## 2025-02-04 NOTE — ED TRIAGE NOTES
From home via EMS for c/o weakness and inability to transfer from chair. Recent L TKR, in and out of [facility]. Hx dialysis next treatment scheduled for tomorrow w [facility]. BG en route 467.  @ home alone, son in [place] not reachable thus far

## 2025-02-04 NOTE — PROGRESS NOTES
"Physical Therapy    Physical Therapy    Physical Therapy Evaluation    Patient Name: Savita Snell \"FILOMENA\"  MRN: [id number]  Today's Date: 2/4/2025   Time Calculation  Start Time: 0842  Stop Time: 0906  Time Calculation (min): 24 min  901/901-A    Assessment/Plan   PT Assessment  PT Assessment Results: Decreased strength, Decreased range of motion, Decreased endurance, Impaired balance, Decreased mobility, Decreased safety awareness  Rehab Prognosis: Good  Barriers to Discharge Home:  (Spouse limited in ability to assist patient)  Evaluation/Treatment Tolerance: Patient limited by fatigue  End of Session Communication: Bedside nurse  Assessment Comment: Patient will benefit from additonal PT to increase strength, mobiltiy and activity tolerance.  End of Session Patient Position: Up in chair, Alarm off, caregiver present  IP OR SWING BED PT PLAN  Inpatient or Swing Bed: Inpatient  PT Plan  Treatment/Interventions: Bed mobility, Transfer training, Gait training, Balance training, Therapeutic exercise, Therapeutic activity, Strengthening  PT Frequency: 3 times per week  PT Discharge Recommendations:  (Moderate intensity at moderate frequency with 24/7 care)  PT Recommended Transfer Status: Assist x2  PT - OK to Discharge: (once deemed medically appropriate with continued PT)    Subjective     General Visit Information:  General  Reason for Referral: Impaired mobility  Referred By: PT/OT 2/4/425 Mary CASTILLO  Past Medical History Relevant to Rehab: HTn, HLD< Neuropathy, AFib, Anmia, Dm, C19, Glaucoma, Obesity, C Diff, Left TKA 11/26/24, GIB, HD TRS, endometirial cancer. hyster appey, falls.  Co-Treatment: OT  Co-Treatment Reason: to maximize patient/staff safety  Prior to Session Communication: Bedside nurse  Patient Position Received: Bed, 2 rail up, Alarm on  General Comment: To ED 2/3/25 for weakness. Discharged from SNF 2/1/25. Difficulty  getting up from wheelchair. Scr 3.83; BNp 139, Fllu A/B (-); C19 + 2/3/25 " CXR 2/3/25 LLL atelectasis;    Home Living:  Home Living  Home Living Comments: Per patient report resdes with spouse in 1 story home with basement. Has ramp. Walk in shower wiith seat. Owns wheelchiar, rollator, and WW    Prior Level of Function:  Prior Function Per Pt/Caregiver Report  Prior Function Comments: Per patient report prior to all hospitalizaitons independent in mobility, ADLs and IADLs with rollator. Since return home from SNF has been staying in lift  Difficulty getting up. Reports 1 fall getting out of van when earline home from SNF. Spouse not able to push patient up ramp.    Precautions:  Precautions  Medical Precautions: Fall precautions, Infection precautions ([place] +)       Objective     Pain:  Pain Assessment  0-10 (Numeric) Pain Score: 0 - No pain    Cognition:  Cognition  Overall Cognitive Status:  ([place]ang[place] conversation)    General Assessments:  General Observation  General Observation: Patient lying in bed. Agreeable to PT/OT. Purewick external catheter in place, however patient incontinent of urine.   Activity Tolerance  Endurance:  (Poor)                 Static Sitting Balance: Good  Dynamic Sitting Balance Fair  Static Standing Balance: Fair -  Dynamic Standing Balance Poor    Functional Assessments:     Bed Mobility  Bed Mobility:  (Supine > sit with HOB 45 degrees + 2 minimal assist. Slow transitioning up to sit Increased time to get hips forward)  Transfers  Transfer:  (Sit > stand at bed level + 2 moderate assist with feet blocked and use of bed pad. Difficulty transititioning hands up to ww. VC to straighten up fully. Retropulsive. Stand > sit vc to reach back+ 1minmimal assist to lower)  Ambulation/Gait Training  Ambulation/Gait Training Performed:  (Patient ambulated with ww 3' + 2 moderate assist. Retropoulsive with ambulation, Requires assistance to maintain balance. Decreased step height.)          Extremity/Trunk Assessments:  RUE   RUE : Within Functional Limits  LUE    LUE: Within Functional Limits  RLE   RLE :  (AROM Hip/knee/ankle WFL MMT hip 3/5, knee 4/5, ankle 4/5)  LLE   LLE :  (AROM hip/ankle WFL knee 10-90 degrees MMT hip 3/5, knee 3+/5; ankle 4/5)    Outcome Measures:     [facility] Basic Mobility  Turning from your back to your side while in a flat bed without using bedrails: A little  Moving from lying on your back to sitting on the side of a flat bed without using bedrails: A little  Moving to and from bed to chair (including a wheelchair): A lot  Standing up from a chair using your arms (e.g. wheelchair or bedside chair): A lot  To walk in hospital room: A lot  Climbing 3-5 steps with railing: Total  Basic Mobility - Total Score: 13        Goals:  Encounter Problems       Encounter Problems (Active)       PT Problem       Bed mobility supine <> sit supervised (Progressing)       Start:  02/04/25    Expected End:  02/18/25            Transfers sit <> stand /bed <> chair with  CGA (Progressing)       Start:  02/04/25    Expected End:  02/18/25            Patient to ambulate 10' with  CGA (Progressing)       Start:  02/04/25    Expected End:  02/18/25            AAROM Left knee 0-100 degrees (Progressing)       Start:  02/04/25    Expected End:  02/18/25                 Education Documentation  Mobility Training, taught by Megan Mueller, PT at 2/4/2025 12:20 PM.  Learner: Patient  Readiness: Acceptance  Method: Explanation, Demonstration  Response: Needs Reinforcement

## 2025-02-04 NOTE — ED PROVIDER NOTES
HPI   Chief Complaint   Patient presents with    Weakness, Gen       This is an 84-year-old female presents from home by EMS with report of worsening generalized weakness.  She was just discharged from a nursing home on Saturday after being admitted for physical therapy and dialysis.  The patient states that her last dialysis treatment was on Saturday.  She reports that since she has been home she has been having difficulty transferring herself from the wheelchair to the chair.  The patient states her  is very weak and elderly and he is too weak to help her, she has a son who lives far away and has a bad shoulder and is unable to come over and help.  Patient states she called EMS this afternoon due to the worsening weakness where she could not get herself out of her chair.  She is due for dialysis tomorrow.  She denies any fevers, chills, cough, shortness of breath, chest pain, abdominal pain, nausea or vomiting.  She does have a past medical history of C. difficile colitis, anemia of chronic disease, paroxysmal atrial fibrillation on long-term anticoagulation, chronic kidney disease stage III on hemodialysis every Tuesday Thursday Saturday, history of cataracts, diabetic neuropathy, endometrial cancer, frequent falls, glaucoma, heart failure, hypertension, hypokalemia, morbid obesity, venous ulcers, status post total left knee arthroplasty.      History provided by:  Patient, EMS personnel and medical records          Patient History   Past Medical History:   Diagnosis Date    Afib (Multi)     Arthritis     Balance problem     C. difficile colitis     Cataract     CKD (chronic kidney disease), stage III (Multi)     COVID-19     VACCINATED    Diabetic neuropathy (Multi)     Endometrial cancer (Multi)     Falls     HAS FALLEN WITHIN LAST 6 MONTHS    Glaucoma     Heart failure with preserved ejection fraction     Hypertension     Hypokalemia     Obesity     Type 2 diabetes mellitus     Venous ulcer (Multi)       Past Surgical History:   Procedure Laterality Date    APPENDECTOMY      CATARACT EXTRACTION      CHOLECYSTECTOMY      COLONOSCOPY      HYSTERECTOMY      JOINT REPLACEMENT      RIGHT KNEE    OTHER SURGICAL HISTORY      TAILBONE REMOVED DUE TO FRACTURE    OTHER SURGICAL HISTORY Bilateral     DOUBLE MUSCLE EYE SURGERY    TONSILLECTOMY      TOTAL KNEE ARTHROPLASTY Left     UPPER GASTROINTESTINAL ENDOSCOPY       Family History   Problem Relation Name Age of Onset    Heart failure Mother      Other (MI) Mother      Stroke Father      Hyperlipidemia Sister      Hypertension Sister       Social History     Tobacco Use    Smoking status: Former     Types: Cigarettes    Smokeless tobacco: Never   Vaping Use    Vaping status: Never Used   Substance Use Topics    Alcohol use: Not Currently    Drug use: Never       Physical Exam   ED Triage Vitals [02/03/25 2020]   Temperature Heart Rate Respirations BP   36.8 °C (98.2 °F) 86 18 152/73      Pulse Ox Temp Source Heart Rate Source Patient Position   98 % Temporal Monitor Lying      BP Location FiO2 (%)     Left arm --       Physical Exam  Vitals and nursing note reviewed.   Constitutional:       General: She is awake. She is not in acute distress.     Appearance: Normal appearance. She is morbidly obese. She is not ill-appearing, toxic-appearing or diaphoretic.   HENT:      Head: Normocephalic and atraumatic.      Right Ear: Tympanic membrane, ear canal and external ear normal.      Left Ear: Tympanic membrane, ear canal and external ear normal.      Nose: Nose normal.      Mouth/Throat:      Mouth: Mucous membranes are moist.      Pharynx: Oropharynx is clear.   Eyes:      Extraocular Movements: Extraocular movements intact.      Conjunctiva/sclera: Conjunctivae normal.      Pupils: Pupils are equal, round, and reactive to light.   Cardiovascular:      Rate and Rhythm: Normal rate. Rhythm irregular.      Pulses: Normal pulses.      Heart sounds: Normal heart sounds.    Pulmonary:      Effort: Pulmonary effort is normal.      Breath sounds: Normal breath sounds. No wheezing, rhonchi or rales.   Abdominal:      General: Bowel sounds are normal.      Palpations: Abdomen is soft. There is no mass.      Tenderness: There is no abdominal tenderness. There is no guarding.   Musculoskeletal:         General: No swelling or tenderness. Normal range of motion.      Cervical back: Normal range of motion and neck supple.      Right lower leg: Edema present.      Left lower leg: Edema present.   Skin:     General: Skin is warm and dry.      Capillary Refill: Capillary refill takes less than 2 seconds.      Findings: No rash.   Neurological:      General: No focal deficit present.      Mental Status: She is alert and oriented to person, place, and time. Mental status is at baseline.      GCS: GCS eye subscore is 4. GCS verbal subscore is 5. GCS motor subscore is 6.      Sensory: Sensation is intact.      Motor: Motor function is intact.      Coordination: Coordination is intact.   Psychiatric:         Mood and Affect: Mood normal.         Behavior: Behavior normal. Behavior is cooperative.         Thought Content: Thought content normal.         Judgment: Judgment normal.           ED Course & MDM   Diagnoses as of 02/03/25 2057   Generalized weakness   End-stage renal disease on hemodialysis (Multi)                 No data recorded     [place] Coma Scale Score: 15 (02/03/25 2034 : David Aviles RN)                           Medical Decision Making  Temperature is 36.8, heart rate 86, respirations 18, blood pressure is 152/73, pulse ox is 98% on room air  IV was started, she was given Zofran 4 mg IV push.  I have ordered lab work and a portable chest x-ray.  CBC shows a white count 9.2, hemoglobin 11.9, hematocrit 39, platelet count was 180, comprehensive metabolic panel glucose 216, BUN 34, creatinine 3.83, GFR of 11 which is consistent with her chronic kidney disease currently on  hemodialysis, lactic is 2.1, PT 12.5, INR 1.1, portable chest x-ray shows left lower lung zone atelectasis but no evidence of pneumonia.  EKG interpreted by me at 2110 hrs. normal sinus rhythm rate 85, , QRS was 74,  QTc was 418.  When compared to previous EKG performed back on 20 December 2024 there are no changes  I discussed results of the workup with the patient.  The patient states that she is too weak to go home and has no one to take care of her.  Her  is 87 years old and is very weak and she has no friend members around who can help her.  The plan is to page out to the hospice and readmitted for PT OT and placement        Procedure  Procedures     Gen Parr PA-C  02/03/25 [id number]       Gen Parr PA-C  02/03/25 [id number]

## 2025-02-04 NOTE — CARE PLAN
Over the shift, the patient did make progress toward the following goals.     The clinical goals for the shift include pt safety

## 2025-02-04 NOTE — PROGRESS NOTES
"   02/04/25 0908   Discharge Planning   Living Arrangements Spouse/significant other   Support Systems Spouse/significant other;Children   Assistance Needed PTA - lives with her spouse in a 1 level home with a basement. Ramp to enter. Has walk in shower with seat and grab bars. At baseline, pt reports she was independent for ambulation and ADLs, using a walker. Shares IADLs with spouse. Owns wheelchair, rollator, and walker.  Since coming home from SNF recently, pt reports she has been sleeping in her lift recliner. Had a fall when trying to get out of their van and into the house. Reports her spouse cannot push patient up the ramp at home.   Type of Residence Private residence   Number of Stairs to Enter Residence 0   Number of Stairs Within Residence 0  (has basement but does not go down)   Do you have animals or pets at home? No   Home or Post Acute Services Post acute facilities (Rehab/SNF/etc)   Type of Post Acute Facility Services Skilled nursing   Expected Discharge Disposition SNF  ([facility])   Does the patient need discharge transport arranged? Yes   RoundTrip coordination needed? Yes   Patient Choice   Provider Choice list and CMS website (https://medicare.gov/care-compare#search) for post-acute Quality and Resource Measure Data were provided and reviewed with: Patient;Family   Patient / Family choosing to utilize agency / facility established prior to hospitalization No     Patient has been admitted in the hospital or at SNF since late November after elective left TKR. She was started on new HD in December, T-T-S at [facility] under Dr Andi Fulton. Pt reports she was dc home with [facility] from [facility] on Saturday stating \"insurance would not pay anymore\". While at home, pt was unable to get out of her lift recliner and that her spouse is unable to assist as needed at home. PT/OT [facility]  13/16, recommending moderate level needs at [place]. TCC spoke with pt and her spouse regarding SNF at [place], they are " "agreeable. Choice list provided from [facility] of facilities that take her insurance as well as have on-site HD units. Pt reports not wanting to return to [facility] SNF but was not happy about the choices on the provided list being \"so far away\".  TCC explained that unfortunately the list of facilities that do on-site HD is limited. Pt agrees she is too weak to have family transport her to her HD sessions. She requested time to look over the provided list. Will need precert once accepted as well as approve HD chair if not returning to [facility].  "

## 2025-02-04 NOTE — CARE PLAN
The patient's goals for the shift include sleep and comfort.    The clinical goals for the shift include safety and comfort.    Over the shift, the patient did not make progress toward the following goals. Barriers to progression include generalized weakness. Recommendations to address these barriers include physical therapy.

## 2025-02-04 NOTE — PROGRESS NOTES
"Daily Progress Note    Savita Snell \"FILOMENA\" is a 84 y.o. female on day 0 of admission presenting with Weakness.    Subjective   Patient seen and examined, labs and vitals reviewed. Endorses generalized weakness, slightly improved from admission. Also notes a mild cough. Denies CP, SOB, abd pain, n/v/d. Patient and  agreeable to SNF, awaiting FOC. Nephrology consulted for HD.       Objective     Physical Exam  Constitutional:       Appearance: Normal appearance. She is obese.   HENT:      Head: Normocephalic.      Mouth/Throat:      Mouth: Mucous membranes are moist.   Eyes:      Pupils: Pupils are equal, round, and reactive to light.   Cardiovascular:      Rate and Rhythm: Normal rate and regular rhythm.      Heart sounds: Normal heart sounds, S1 normal and S2 normal.   Pulmonary:      Effort: Pulmonary effort is normal.      Breath sounds: Normal breath sounds.   Abdominal:      General: Bowel sounds are normal.      Palpations: Abdomen is soft.      Tenderness: There is no abdominal tenderness.   Musculoskeletal:         General: Normal range of motion.      Cervical back: Neck supple.      Right lower leg: No edema.      Left lower leg: No edema.   Skin:     General: Skin is warm.   Neurological:      Mental Status: She is alert and oriented to person, place, and time.   Psychiatric:         Mood and Affect: Mood normal.         Behavior: Behavior normal.         Last Recorded Vitals  Blood pressure 130/61, pulse 86, temperature 37.4 °C (99.3 °F), resp. rate 16, height 1.581 m (5' 2.24\"), weight 79.4 kg (175 lb), SpO2 93%.  Intake/Output last 3 Shifts:  No intake/output data recorded.    Medications  Scheduled medications  amLODIPine, 2.5 mg, oral, Daily  apixaban, 2.5 mg, oral, BID  calcium acetate, 1,334 mg, oral, TID  cyanocobalamin, 1,000 mcg, oral, Daily  furosemide, 40 mg, oral, Daily  latanoprost, 1 drop, ophthalmic (eye), Nightly  metoprolol tartrate, 12.5 mg, oral, BID  nystatin, 1 " Application, Topical, BID  pregabalin, 75 mg, oral, Nightly  vitamin B complex-vitamin C-folic acid, 1 capsule, oral, Daily      Continuous medications     PRN medications  PRN medications: acetaminophen **OR** acetaminophen **OR** acetaminophen, melatonin, polyethylene glycol, traMADol    Labs  CBC:   Results from last 7 days   Lab Units 02/04/25 0628 02/03/25 2119   WBC AUTO x10*3/uL 6.4 9.2   RBC AUTO x10*6/uL 3.28* 3.96*   HEMOGLOBIN g/dL 9.6* 11.9*   HEMATOCRIT % 31.7* 39.0   MCV fL 97 99   MCH pg 29.3 30.1   MCHC g/dL 30.3* 30.5*   RDW % 15.0* 14.9*   PLATELETS AUTO x10*3/uL 156 180     CMP:    Results from last 7 days   Lab Units 02/04/25 0628 02/03/25 2119   SODIUM mmol/L 143 140   POTASSIUM mmol/L 3.8 3.6   CHLORIDE mmol/L 106 102   CO2 mmol/L 29 28   BUN mg/dL 33* 34*   CREATININE mg/dL 4.08* 3.83*   GLUCOSE mg/dL 145* 216*   PROTEIN TOTAL g/dL  --  7.4   CALCIUM mg/dL 8.3* 9.1   BILIRUBIN TOTAL mg/dL  --  0.3   ALK PHOS U/L  --  52   AST U/L  --  16   ALT U/L  --  10     BMP:    Results from last 7 days   Lab Units 02/04/25 0628 02/03/25 2119   SODIUM mmol/L 143 140   POTASSIUM mmol/L 3.8 3.6   CHLORIDE mmol/L 106 102   CO2 mmol/L 29 28   BUN mg/dL 33* 34*   CREATININE mg/dL 4.08* 3.83*   CALCIUM mg/dL 8.3* 9.1   GLUCOSE mg/dL 145* 216*     Magnesium:  Results from last 7 days   Lab Units 02/03/25 2119   MAGNESIUM mg/dL 1.90     Troponin:    Results from last 7 days   Lab Units 02/03/25 2236 02/03/25 2119   TROPHS ng/L 6 7     BNP:   Results from last 7 days   Lab Units 02/03/25 2119   BNP pg/mL 139*     Lipid Panel:  Results from last 7 days   Lab Units 02/03/25 2119   INR  1.1   PROTIME seconds 12.5        Nutrition             Relevant Results  Results from last 7 days   Lab Units 02/04/25  0628 02/04/25  0615 02/04/25  0047 02/03/25 2119   POCT GLUCOSE mg/dL  --  144* 120*  --    GLUCOSE mg/dL 145*  --   --  216*     Lab Results   Component Value Date    HGBA1C 6.1 (H) 10/17/2024         Assessment/Plan    COVID 19 infection  Generalized weakness  -CXR shows LLL atelectasis  -supportive care for COVID, droplet plus precautions  -not requiring O2, lungs CTAB, will defer remdesivir and prednisone  -PT/OT eval and treat  -falls precautions  -daily CBC, BMP, Mg    ESRD on HD T/TH/SAT  -nephrology consult  -resume home nephrocaps and phoslo capsule  -Cr appears near baseline    PAF on long term anticoagulation: continue home eliquis  HTN/HLD/CHF: continue home amlodipine, lasix, lopressor  Rash: erythematous rash under left breast, add nystatin powder    DVTp: eliquis  PLAN: SNF    Soha Briceno PA-C    Plan of care was discussed extensively with patient.  Patient verbalized understanding through teach back method.  All question and concerns addressed upon examination.    Of note, this documentation is completed using the Dragon Dictation system (voice recognition software). There may be spelling and/or grammatical errors that were not corrected prior to final submission.

## 2025-02-05 ENCOUNTER — APPOINTMENT (OUTPATIENT)
Dept: DIALYSIS | Facility: HOSPITAL | Age: 85
End: 2025-02-05
Payer: MEDICARE

## 2025-02-05 PROBLEM — R53.1 GENERALIZED WEAKNESS: Status: ACTIVE | Noted: 2025-02-05

## 2025-02-05 LAB
ANION GAP SERPL CALC-SCNC: 15 MMOL/L (ref 10–20)
BUN SERPL-MCNC: 36 MG/DL (ref 6–23)
CALCIUM SERPL-MCNC: 8.5 MG/DL (ref 8.6–10.3)
CHLORIDE SERPL-SCNC: 103 MMOL/L (ref 98–107)
CO2 SERPL-SCNC: 26 MMOL/L (ref 21–32)
CREAT SERPL-MCNC: 4.64 MG/DL (ref 0.5–1.05)
EGFRCR SERPLBLD CKD-EPI 2021: 9 ML/MIN/1.73M*2
ERYTHROCYTE [DISTWIDTH] IN BLOOD BY AUTOMATED COUNT: 15 % (ref 11.5–14.5)
GLUCOSE SERPL-MCNC: 132 MG/DL (ref 74–99)
HBV SURFACE AB SER-ACNC: <3.1 MIU/ML
HBV SURFACE AG SERPL QL IA: NONREACTIVE
HCT VFR BLD AUTO: 38.2 % (ref 36–46)
HGB BLD-MCNC: 11.2 G/DL (ref 12–16)
MAGNESIUM SERPL-MCNC: 1.78 MG/DL (ref 1.6–2.4)
MCH RBC QN AUTO: 29.7 PG (ref 26–34)
MCHC RBC AUTO-ENTMCNC: 29.3 G/DL (ref 32–36)
MCV RBC AUTO: 101 FL (ref 80–100)
NRBC BLD-RTO: 0 /100 WBCS (ref 0–0)
PLATELET # BLD AUTO: 150 X10*3/UL (ref 150–450)
POTASSIUM SERPL-SCNC: 4.6 MMOL/L (ref 3.5–5.3)
RBC # BLD AUTO: 3.77 X10*6/UL (ref 4–5.2)
SODIUM SERPL-SCNC: 139 MMOL/L (ref 136–145)
WBC # BLD AUTO: 8.5 X10*3/UL (ref 4.4–11.3)

## 2025-02-05 PROCEDURE — 97530 THERAPEUTIC ACTIVITIES: CPT | Mod: GP,CQ

## 2025-02-05 PROCEDURE — 2500000004 HC RX 250 GENERAL PHARMACY W/ HCPCS (ALT 636 FOR OP/ED)

## 2025-02-05 PROCEDURE — 87081 CULTURE SCREEN ONLY: CPT | Mod: ELYLAB | Performed by: STUDENT IN AN ORGANIZED HEALTH CARE EDUCATION/TRAINING PROGRAM

## 2025-02-05 PROCEDURE — 2500000001 HC RX 250 WO HCPCS SELF ADMINISTERED DRUGS (ALT 637 FOR MEDICARE OP)

## 2025-02-05 PROCEDURE — 2500000004 HC RX 250 GENERAL PHARMACY W/ HCPCS (ALT 636 FOR OP/ED): Performed by: STUDENT IN AN ORGANIZED HEALTH CARE EDUCATION/TRAINING PROGRAM

## 2025-02-05 PROCEDURE — 99232 SBSQ HOSP IP/OBS MODERATE 35: CPT

## 2025-02-05 PROCEDURE — 1100000001 HC PRIVATE ROOM DAILY

## 2025-02-05 PROCEDURE — 85027 COMPLETE CBC AUTOMATED: CPT

## 2025-02-05 PROCEDURE — 97530 THERAPEUTIC ACTIVITIES: CPT | Mod: GO

## 2025-02-05 PROCEDURE — 2500000005 HC RX 250 GENERAL PHARMACY W/O HCPCS

## 2025-02-05 PROCEDURE — 83735 ASSAY OF MAGNESIUM: CPT

## 2025-02-05 PROCEDURE — 8010000001 HC DIALYSIS - HEMODIALYSIS PER DAY

## 2025-02-05 PROCEDURE — 36415 COLL VENOUS BLD VENIPUNCTURE: CPT

## 2025-02-05 PROCEDURE — 80048 BASIC METABOLIC PNL TOTAL CA: CPT

## 2025-02-05 PROCEDURE — 5A1D70Z PERFORMANCE OF URINARY FILTRATION, INTERMITTENT, LESS THAN 6 HOURS PER DAY: ICD-10-PCS | Performed by: STUDENT IN AN ORGANIZED HEALTH CARE EDUCATION/TRAINING PROGRAM

## 2025-02-05 RX ORDER — GUAIFENESIN 600 MG/1
600 TABLET, EXTENDED RELEASE ORAL 2 TIMES DAILY PRN
Status: DISCONTINUED | OUTPATIENT
Start: 2025-02-05 | End: 2025-02-07 | Stop reason: HOSPADM

## 2025-02-05 RX ORDER — ONDANSETRON 4 MG/1
4 TABLET, FILM COATED ORAL EVERY 8 HOURS PRN
Status: DISCONTINUED | OUTPATIENT
Start: 2025-02-05 | End: 2025-02-07 | Stop reason: HOSPADM

## 2025-02-05 RX ORDER — HEPARIN SODIUM 1000 [USP'U]/ML
1700 INJECTION, SOLUTION INTRAVENOUS; SUBCUTANEOUS
Status: DISCONTINUED | OUTPATIENT
Start: 2025-02-05 | End: 2025-02-07 | Stop reason: HOSPADM

## 2025-02-05 RX ORDER — PREDNISONE 20 MG/1
40 TABLET ORAL DAILY
Status: DISCONTINUED | OUTPATIENT
Start: 2025-02-05 | End: 2025-02-07 | Stop reason: HOSPADM

## 2025-02-05 RX ORDER — ONDANSETRON HYDROCHLORIDE 2 MG/ML
4 INJECTION, SOLUTION INTRAVENOUS EVERY 8 HOURS PRN
Status: DISCONTINUED | OUTPATIENT
Start: 2025-02-05 | End: 2025-02-07 | Stop reason: HOSPADM

## 2025-02-05 RX ADMIN — CALCIUM ACETATE 1334 MG: 667 CAPSULE ORAL at 13:03

## 2025-02-05 RX ADMIN — PREDNISONE 40 MG: 20 TABLET ORAL at 13:03

## 2025-02-05 RX ADMIN — FUROSEMIDE 40 MG: 40 TABLET ORAL at 06:23

## 2025-02-05 RX ADMIN — CALCIUM ACETATE 1334 MG: 667 CAPSULE ORAL at 09:47

## 2025-02-05 RX ADMIN — NYSTATIN 1 APPLICATION: 100000 POWDER TOPICAL at 09:50

## 2025-02-05 RX ADMIN — METOPROLOL TARTRATE 12.5 MG: 25 TABLET, FILM COATED ORAL at 20:57

## 2025-02-05 RX ADMIN — HEPARIN SODIUM 1700 UNITS: 1000 INJECTION INTRAVENOUS; SUBCUTANEOUS at 18:36

## 2025-02-05 RX ADMIN — Medication 2 L/MIN: at 20:58

## 2025-02-05 RX ADMIN — Medication 2 L/MIN: at 14:29

## 2025-02-05 RX ADMIN — ONDANSETRON 4 MG: 2 INJECTION INTRAMUSCULAR; INTRAVENOUS at 13:01

## 2025-02-05 RX ADMIN — AMLODIPINE BESYLATE 2.5 MG: 5 TABLET ORAL at 06:23

## 2025-02-05 RX ADMIN — APIXABAN 2.5 MG: 5 TABLET, FILM COATED ORAL at 09:48

## 2025-02-05 RX ADMIN — Medication 1 CAPSULE: at 09:48

## 2025-02-05 RX ADMIN — APIXABAN 2.5 MG: 5 TABLET, FILM COATED ORAL at 20:57

## 2025-02-05 RX ADMIN — PREGABALIN 75 MG: 50 CAPSULE ORAL at 20:57

## 2025-02-05 RX ADMIN — METOPROLOL TARTRATE 12.5 MG: 25 TABLET, FILM COATED ORAL at 09:47

## 2025-02-05 RX ADMIN — Medication 1000 MCG: at 06:22

## 2025-02-05 RX ADMIN — NYSTATIN 1 APPLICATION: 100000 POWDER TOPICAL at 21:00

## 2025-02-05 RX ADMIN — LATANOPROST 1 DROP: 50 SOLUTION OPHTHALMIC at 20:57

## 2025-02-05 ASSESSMENT — COGNITIVE AND FUNCTIONAL STATUS - GENERAL
DRESSING REGULAR UPPER BODY CLOTHING: A LITTLE
DRESSING REGULAR LOWER BODY CLOTHING: A LITTLE
DAILY ACTIVITIY SCORE: 20
MOVING TO AND FROM BED TO CHAIR: A LITTLE
STANDING UP FROM CHAIR USING ARMS: A LITTLE
HELP NEEDED FOR BATHING: A LITTLE
DAILY ACTIVITIY SCORE: 16
WALKING IN HOSPITAL ROOM: A LOT
MOVING TO AND FROM BED TO CHAIR: A LOT
MOVING FROM LYING ON BACK TO SITTING ON SIDE OF FLAT BED WITH BEDRAILS: A LITTLE
TURNING FROM BACK TO SIDE WHILE IN FLAT BAD: A LITTLE
MOBILITY SCORE: 15
WALKING IN HOSPITAL ROOM: A LOT
CLIMB 3 TO 5 STEPS WITH RAILING: TOTAL
MOVING FROM LYING ON BACK TO SITTING ON SIDE OF FLAT BED WITH BEDRAILS: A LITTLE
TURNING FROM BACK TO SIDE WHILE IN FLAT BAD: A LOT
DRESSING REGULAR LOWER BODY CLOTHING: A LOT
DRESSING REGULAR UPPER BODY CLOTHING: A LITTLE
CLIMB 3 TO 5 STEPS WITH RAILING: TOTAL
STANDING UP FROM CHAIR USING ARMS: A LOT
HELP NEEDED FOR BATHING: A LOT
MOBILITY SCORE: 12
TOILETING: A LOT
PERSONAL GROOMING: A LITTLE
TOILETING: A LITTLE

## 2025-02-05 ASSESSMENT — PAIN SCALES - GENERAL
PAINLEVEL_OUTOF10: 0 - NO PAIN

## 2025-02-05 ASSESSMENT — PAIN - FUNCTIONAL ASSESSMENT
PAIN_FUNCTIONAL_ASSESSMENT: 0-10
PAIN_FUNCTIONAL_ASSESSMENT: 0-10
PAIN_FUNCTIONAL_ASSESSMENT: NO/DENIES PAIN
PAIN_FUNCTIONAL_ASSESSMENT: 0-10

## 2025-02-05 NOTE — PROGRESS NOTES
"Occupational Therapy    Occupational Therapy Treatment    Name: Savita Snell \"FILOMENA\"  MRN: [id number]  Department: [facility]  Room: [address] [place] [address]  Date: 02/05/25  Time Calculation  Start Time: 1143  Stop Time: 1214  Time Calculation (min): 31 min    Assessment:  End of Session Communication: Bedside nurse (RN aware of vitals and patient report of nausea once up in the recliner)  End of Session Patient Position: Up in chair, Alarm on (LEs elevated in recliner, all needs in reach, RN entering to check on patient)  Plan:  Treatment Interventions: ADL retraining, Functional transfer training, Endurance training, Patient/family training  OT Frequency: 2 times per week  OT Discharge Recommendations: Moderate intensity level of continued care, Low intensity level of continued care  OT Recommended Transfer Status: Moderate assist, Assist of 2  OT - OK to Discharge: Yes    Subjective   Previous Visit Info:  OT Last Visit  OT Received On: 02/05/25  General:  General  Co-Treatment: PT  Co-Treatment Reason: safety  Prior to Session Communication: Bedside nurse  Patient Position Received: Bed, 3 rail up, Alarm on  General Comment: Agreeable to therapy. Continual incontinence of urine. Purewick with underwear and pads used to manage.  Patient is to have dialysis at bedside at 2:00 pm per RN.  Precautions:  Medical Precautions: Fall precautions (Decreased flexion of bilat. knees, pt. demonstrates poor body mechanics and retropulsion)  Precautions Comment: droplet plus    Pain Assessment:  Pain Assessment  Pain Assessment: 0-10  0-10 (Numeric) Pain Score: 0 - No pain     Objective   Cognition:  Overall Cognitive Status:  (Easily distracted; v/c for redirection to task and safe/proper technique.  Demonstrates impaired insight into effect of actions of results.)  Activities of Daily Living:      LE Dressing  LE Dressing: Yes  Pants Level of Assistance: Maximum assistance  LE Dressing Where Assessed: Edge of bed  LE Dressing Comments: Max " "assist to don underwear over feet while seated EOB.  Asked if patient managed this at home and if she had a reacher.  Pt. responded, \"No, I just have my  do it.\" Note retropulsion and impaired bilat. knee flexion, bilat. TKAs.  Educated on importance of doing as much for herself as she can at home in order to maintain her own strength and ROM for self care and mobility.  Pt. also reports that she spends most of her time in a recliner. Dependent to pull underwear up in standing with mod assist for static standing balance at walker.     Bed Mobility/Transfers: Bed Mobility 1  Bed Mobility Comments 1: Min assist sup to sit with cues to reach to bed rail, lift trunk and scoot to EOB to get feet to floor.  Min assist for balance. Retropulsive at EOB with cues and assist to correct and to keep feet on the floor.    Transfers  Transfer: Yes  Transfer 1  Trials/Comments 1: 2 mod assist sit<>stand at EOB x 1 and recliner x 4.  Pt. needs max cues for hand placements, to pull feet back under knees, forward shift trunk and push directly down through feet.  Impaired carryover.  Pt. leans backward, slides feet forward, then states, \"the floor is just too slippery.\" Requires assistance to  forward shift trunk, requires downward proprioceptive input at knees for feet to floor and bilat. foot blocking as well as assist for lift, balance and descent.  Slow transition of hands to/from walker.  Increased time to achieve upright position and balance in standing. Max verbal cues as well as tactile cues for hip and knee extension and upright trunk in standing.    Functional Mobility:  Functional Mobility  Functional Mobility Performed: Yes  Functional Mobility 1  Comments 1: 2 mod assist stand/step transfer ~ 4' bed to chair with FWW and gait belt.  Small shuffling steps.  Assist with balance.  Sitting Balance:  Static Sitting Balance  Static Sitting-Comment/Number of Minutes: fair/fair (-)  Dynamic Sitting Balance  Dynamic " Sitting-Comments: fair (-)  Standing Balance:  Static Standing Balance  Static Standing-Comment/Number of Minutes: poor (+)/poor  Dynamic Standing Balance  Dynamic Standing-Comments: poor    Outcome Measures:  [facility] Daily Activity  Putting on and taking off regular lower body clothing: A lot  Bathing (including washing, rinsing, drying): A lot  Putting on and taking off regular upper body clothing: A little  Toileting, which includes using toilet, bedpan or urinal: A lot  Taking care of personal grooming such as brushing teeth: A little  Eating Meals: None  Daily Activity - Total Score: 16    Education Documentation  Body Mechanics, taught by Colleen Chopra, OT at 2/5/2025  3:51 PM.  Learner: Patient  Readiness: Acceptance  Method: Explanation, Demonstration  Response: Needs Reinforcement    Education Comments  No comments found.      Goals:  Encounter Problems       Encounter Problems (Active)       OT Goals       Pt will dress UB indep (Progressing)       Start:  02/04/25    Expected End:  02/18/25            Pt will transfer to bed, chair, toilet with CGA (Progressing)       Start:  02/04/25    Expected End:  02/18/25            Pt will dress LB with modified indep (Progressing)       Start:  02/04/25    Expected End:  02/18/25            Pt will tolerate 10 minutes of functional activity with one rest break (Progressing)       Start:  02/04/25    Expected End:  02/18/25

## 2025-02-05 NOTE — CONSULTS
[facility] Nephrology  Consult Note           Reason for Consult:  ESRD  Requesting Physician:  Dr. Gomez    Chief Complaint:  generalized weakness  History Obtained From:  patient, electronic medical record    History of Present Ilness:    84 y.o. female with history s/f dialysis dependent AIDAN on IHD, HFpEF, A.fib on AC, recurrent C.diff who presented for generalized weakness. Did not get HD yesterday. On presentation here pt hemodynamically stable but found to have covid. Notably pt started HD on 12/9 at [facility] for non-resolving AIDAN/ATN. Gets HD at [facility].  Pt of Dr. Urbina     Past Medical History:    Past Medical History:   Diagnosis Date    Afib (Multi)     Arthritis     Balance problem     C. difficile colitis     Cataract     CKD (chronic kidney disease), stage III (Multi)     COVID-19     VACCINATED    Diabetic neuropathy (Multi)     Endometrial cancer (Multi)     Falls     HAS FALLEN WITHIN LAST 6 MONTHS    Glaucoma     Heart failure with preserved ejection fraction     Hypertension     Hypokalemia     Obesity     Type 2 diabetes mellitus     Venous ulcer (Multi)        Past Surgical History:    Past Surgical History:   Procedure Laterality Date    APPENDECTOMY      CATARACT EXTRACTION      CHOLECYSTECTOMY      COLONOSCOPY      HYSTERECTOMY      JOINT REPLACEMENT      RIGHT KNEE    OTHER SURGICAL HISTORY      TAILBONE REMOVED DUE TO FRACTURE    OTHER SURGICAL HISTORY Bilateral     DOUBLE MUSCLE EYE SURGERY    TONSILLECTOMY      TOTAL KNEE ARTHROPLASTY Left     UPPER GASTROINTESTINAL ENDOSCOPY         Home Medications:    No current facility-administered medications on file prior to encounter.     Current Outpatient Medications on File Prior to Encounter   Medication Sig Dispense Refill    pregabalin (Lyrica) 75 mg capsule Take 1 capsule (75 mg) by mouth once daily at bedtime.      traMADol (Ultram) 50 mg tablet Take 1 tablet (50 mg) by mouth every 12 hours if needed for severe pain (7  - 10).      amLODIPine (Norvasc) 2.5 mg tablet Take 1 tablet (2.5 mg) by mouth once daily.      apixaban (Eliquis) 2.5 mg tablet Take 1 tablet (2.5 mg) by mouth 2 times a day.      calcium acetate (Phoslo) 667 mg capsule Take 2 capsules (1,334 mg) by mouth 3 times daily (morning, midday, late afternoon).      cholecalciferol (Vitamin D3) 50 mcg (2,000 unit) capsule Take 1 capsule (50 mcg) by mouth early in the morning..      cyanocobalamin (Vitamin B-12) 1,000 mcg tablet Take 1 tablet (1,000 mcg) by mouth once daily.      furosemide (Lasix) 20 mg tablet Take 2 tablets (40 mg) by mouth once daily.      latanoprost (Xalatan) 0.005 % ophthalmic solution Administer 1 drop into affected eye(s) daily at bedtime.      lidocaine 4 % patch Place 1 patch over 12 hours on the skin once daily. Remove & discard patch within 12 hours or as directed by MD.      metoprolol tartrate (Lopressor) 25 mg tablet Take 0.5 tablets (12.5 mg) by mouth 2 times a day.      vitamin B complex-vitamin C-folic acid (Nephrocaps) 1 mg capsule Take 1 capsule by mouth once daily.         Allergies:  Cephalexin, Penicillins, and Sulfamethoxazole-trimethoprim    Social History:    Social History     Socioeconomic History    Marital status:      Spouse name: Not on file    Number of children: Not on file    Years of education: Not on file    Highest education level: Not on file   Occupational History    Not on file   Tobacco Use    Smoking status: Former     Types: Cigarettes    Smokeless tobacco: Never   Vaping Use    Vaping status: Never Used   Substance and Sexual Activity    Alcohol use: Not Currently    Drug use: Never    Sexual activity: Defer     Comment: Hysterectomy   Other Topics Concern    Not on file   Social History Narrative    Not on file     Social Drivers of Health     Financial Resource Strain: Low Risk  (2/4/2025)    Overall Financial Resource Strain (CARDIA)     Difficulty of Paying Living Expenses: Not very hard   Food  Insecurity: No Food Insecurity (2/4/2025)    Hunger Vital Sign     Worried About Running Out of Food in the Last Year: Never true     Ran Out of Food in the Last Year: Never true   Transportation Needs: No Transportation Needs (2/4/2025)    PRAPARE - Transportation     Lack of Transportation (Medical): No     Lack of Transportation (Non-Medical): No   Physical Activity: Sufficiently Active (12/20/2024)    Exercise Vital Sign     Days of Exercise per Week: 5 days     Minutes of Exercise per Session: 30 min   Recent Concern: Physical Activity - Inactive (10/27/2024)    Received from [facility]    Exercise Vital Sign     Days of Exercise per Week: 0 days     Minutes of Exercise per Session: 10 min   Stress: No Stress Concern Present (12/20/2024)    [place] [employer] - Occupational Stress Questionnaire     Feeling of Stress : Not at all   Social Connections: Moderately Isolated (12/20/2024)    Social Connection and Isolation Panel [NHANES]     Frequency of Communication with Friends and Family: More than three times a week     Frequency of Social Gatherings with Friends and Family: More than three times a week     Attends Alevism Services: Never     Active Member of Clubs or Organizations: No     Attends Club or Organization Meetings: Never     Marital Status:    Intimate Partner Violence: Not At Risk (2/4/2025)    Humiliation, Afraid, Rape, and Kick questionnaire     Fear of Current or Ex-Partner: No     Emotionally Abused: No     Physically Abused: No     Sexually Abused: No   Housing Stability: High Risk (2/4/2025)    Housing Stability Vital Sign     Unable to Pay for Housing in the Last Year: No     Number of Times Moved in the Last Year: 6     Homeless in the Last Year: No       Family History:   Family History   Problem Relation Name Age of Onset    Heart failure Mother      Other (MI) Mother      Stroke Father      Hyperlipidemia Sister      Hypertension Sister         Review  "of Systems:   Positives in bold  Constitutional: fever, chills, fatigue, malaise   HENT:  rhinorrhea, sinus pain, sore throat, epistaxis    Eyes:  photophobia, visual disturbance, eye redness  Respiratory: shortness of breath, cough, hemoptysis    Cardiovascular: chest pain, palpitations, orthopnea, leg swelling   Gastrointestinal: abdominal pain, nausea, vomiting, diarrhea, constipation   Endocrine: polydipsia, polyphagia, cold intolerance, heat intolerance  Genitourinary: dysuria, flank pain, frequency, urgency   Hematologic: easy bruising, easy bleeding  Musculoskeletal: back pain, neck pain, myalgias, arthalgias  Neurological: syncope, lightheadedness, dizziness, seizures, tremors, weakness  Psychiatric/Behavioral: anxiety, depression, hallucinations  Skin: rash, itching    Physical exam:   Constitutional:  VITALS:  /67 (BP Location: Left arm, Patient Position: Sitting)   Pulse 91   Temp 37 °C (98.6 °F) (Temporal)   Resp 18   Ht 1.581 m (5' 2.24\")   Wt 79.4 kg (175 lb)   SpO2 95%   BMI 31.76 kg/m²     General: alert, in no apparent distress  HEENT: normocephalic, atraumatic, anicteric  Lungs: non-labored respirations, clear to auscultation bilaterally  Heart: regular rate and rhythm, no murmurs   Abdomen: soft, non-tender, non-distended  Ext: no peripheral edema  Neuro: alert, follows commands    Data/  CBC:   Lab Results   Component Value Date    WBC 8.5 02/05/2025    RBC 3.77 (L) 02/05/2025    HGB 11.2 (L) 02/05/2025    HCT 38.2 02/05/2025     (H) 02/05/2025    MCH 29.7 02/05/2025    MCHC 29.3 (L) 02/05/2025    RDW 15.0 (H) 02/05/2025     02/05/2025     BMP:    Lab Results   Component Value Date     02/05/2025    K 4.6 02/05/2025     02/05/2025    CO2 26 02/05/2025    BUN 36 (H) 02/05/2025    CREATININE 4.64 (H) 02/05/2025    CALCIUM 8.5 (L) 02/05/2025    GLUCOSE 132 (H) 02/05/2025       Assessment:  84 y.o. female with history s/f dialysis dependent AIDAN on IHD, HFpEF, " A.fib on AC, recurrent C.diff who presented for generalized weakness.    ialysis dependent ATN on CKD stage IV: started HD 12/9 at [facility], gets it at [facility] under Dr. Urbina, states gets HD tues and Sat   Covid infection  Anemia of renal disease   Secondary renal hyperparathyroidism     Plan:  - will plan for HD today w/ next on Saturday if she remains inpatient   - no indication for JAMAL   - checking phos         Thank you for the consultation. Will continue to follow  Please do not hesitate to call with questions.    Gordon Garcia MD

## 2025-02-05 NOTE — PRE-PROCEDURE NOTE
Report from Sending RN:    Report From: Flavia canales   Recent Surgery of Procedure: No  Baseline Level of Consciousness (LOC): a&ox3  Oxygen Use: Yes  Type: nc  Diabetic: No  Last BP Med Given Day of Dialysis: see mar  Last Pain Med Given: see mar  Lab Tests to be Obtained with Dialysis: No  Blood Transfusion to be Given During Dialysis: No  Available IV Access: Yes  Medications to be Administered During Dialysis: No  Continuous IV Infusion Running: No  Restraints on Currently or in the Last 24 Hours: No  Hand-Off Communication:   Dialysis Catheter Dressing: yes  Last Dressing Change: 2/5/25

## 2025-02-05 NOTE — PROGRESS NOTES
"[facility] able to accept pt back. They state they were helping pt and spouse with other possible options for her needs. She does not qualify for Medicaid as she has some money available. She had been private paying for SNF for about 16 days before going home but she no longer could pay. They also tried to help assist setting [place] up in the [place] but she did not want to go somewhere she \"has to pay\". [facility] met with pt and spouse again regarding SNF. They are both still unable to decide on SNF choice. They agree they do NOT want [facility] but do not have a specific reason on why she did not want to return. [facility] asked if she would like to talk to someone at [facility] to discuss any concerns she has and if that may help her. She declined. [facility] sent multiple SNF referrals in [facility] to places with on-site HD and a few that sometimes can offer transportation to HD if family cannot. Pt's spouse is cannot manage transporting the pt as he is unable to push her wheelchair. Pt cannot transfer by wheelchair as she is unable to tolerate standing and holding herself up without her feet slipping out from underneath her. Due to needing cot for transport she will need to go to a facility with on-site HD as the other facilities will not be able to transport her safely. At this time only 2 facilities with on-site HD can accept, [facility] does currently have a private bed available d/t Covid or [facility] in [place] who is still pending confirmation of a private bed. [facility] relayed this information to pt and her spouse but they will are unsure where to choose.   "

## 2025-02-05 NOTE — PROGRESS NOTES
"Daily Progress Note    Savita Snell \"FILOMENA\" is a 84 y.o. female on day 0 of admission presenting with Weakness.    Subjective   Patient seen and examined, labs and vitals reviewed.  Patient is now requiring O2, SpO2 dropped into the 80s while working with PT.  She has also developed a cough.  Denies chest pain, shortness of breath, abdominal pain, nausea, vomiting, diarrhea, dizziness.  Nephrology following for HD.  Plan for SNF on discharge, awaiting final FOC.       Objective   Physical Exam  Constitutional:       Appearance: Normal appearance. She is obese.   HENT:      Head: Normocephalic.      Mouth/Throat:      Mouth: Mucous membranes are moist.   Eyes:      Pupils: Pupils are equal, round, and reactive to light.   Cardiovascular:      Rate and Rhythm: Normal rate and regular rhythm.      Heart sounds: Normal heart sounds, S1 normal and S2 normal.   Pulmonary:      Effort: Pulmonary effort is normal.      Breath sounds: Decreased breath sounds present.   Abdominal:      General: Bowel sounds are normal.      Palpations: Abdomen is soft.   Musculoskeletal:         General: Normal range of motion.      Cervical back: Neck supple.      Right lower leg: No edema.      Left lower leg: No edema.   Skin:     General: Skin is warm.   Neurological:      Mental Status: She is alert and oriented to person, place, and time.      Motor: Weakness present.   Psychiatric:         Mood and Affect: Mood normal.         Behavior: Behavior normal.         Last Recorded Vitals  Blood pressure 134/60, pulse 68, temperature 36.8 °C (98.2 °F), resp. rate 18, height 1.581 m (5' 2.24\"), weight 79.4 kg (175 lb), SpO2 100%.  Intake/Output last 3 Shifts:  I/O last 3 completed shifts:  In: 100 (1.3 mL/kg) [P.O.:100]  Out: - (0 mL/kg)   Weight: 79.4 kg     Medications  Scheduled medications  amLODIPine, 2.5 mg, oral, Daily  apixaban, 2.5 mg, oral, BID  calcium acetate, 1,334 mg, oral, TID  cyanocobalamin, 1,000 mcg, oral, " Daily  furosemide, 40 mg, oral, Daily  latanoprost, 1 drop, ophthalmic (eye), Nightly  metoprolol tartrate, 12.5 mg, oral, BID  nystatin, 1 Application, Topical, BID  pregabalin, 75 mg, oral, Nightly  vitamin B complex-vitamin C-folic acid, 1 capsule, oral, Daily      Continuous medications     PRN medications  PRN medications: acetaminophen **OR** acetaminophen **OR** acetaminophen, melatonin, ondansetron **OR** ondansetron, polyethylene glycol, traMADol    Labs  CBC:   Results from last 7 days   Lab Units 02/05/25 0824 02/04/25 0628 02/03/25 2119   WBC AUTO x10*3/uL 8.5 6.4 9.2   RBC AUTO x10*6/uL 3.77* 3.28* 3.96*   HEMOGLOBIN g/dL 11.2* 9.6* 11.9*   HEMATOCRIT % 38.2 31.7* 39.0   MCV fL 101* 97 99   MCH pg 29.7 29.3 30.1   MCHC g/dL 29.3* 30.3* 30.5*   RDW % 15.0* 15.0* 14.9*   PLATELETS AUTO x10*3/uL 150 156 180     CMP:    Results from last 7 days   Lab Units 02/05/25 0824 02/04/25 0628 02/03/25 2119   SODIUM mmol/L 139 143 140   POTASSIUM mmol/L 4.6 3.8 3.6   CHLORIDE mmol/L 103 106 102   CO2 mmol/L 26 29 28   BUN mg/dL 36* 33* 34*   CREATININE mg/dL 4.64* 4.08* 3.83*   GLUCOSE mg/dL 132* 145* 216*   PROTEIN TOTAL g/dL  --   --  7.4   CALCIUM mg/dL 8.5* 8.3* 9.1   BILIRUBIN TOTAL mg/dL  --   --  0.3   ALK PHOS U/L  --   --  52   AST U/L  --   --  16   ALT U/L  --   --  10     BMP:    Results from last 7 days   Lab Units 02/05/25 0824 02/04/25 0628 02/03/25 2119   SODIUM mmol/L 139 143 140   POTASSIUM mmol/L 4.6 3.8 3.6   CHLORIDE mmol/L 103 106 102   CO2 mmol/L 26 29 28   BUN mg/dL 36* 33* 34*   CREATININE mg/dL 4.64* 4.08* 3.83*   CALCIUM mg/dL 8.5* 8.3* 9.1   GLUCOSE mg/dL 132* 145* 216*     Magnesium:  Results from last 7 days   Lab Units 02/05/25  0824 02/03/25 2119   MAGNESIUM mg/dL 1.78 1.90     Troponin:    Results from last 7 days   Lab Units 02/03/25  2236 02/03/25  2119   TROPHS ng/L 6 7     BNP:   Results from last 7 days   Lab Units 02/03/25  2119   BNP pg/mL 139*     Lipid  Panel:  Results from last 7 days   Lab Units 02/03/25  2119   INR  1.1   PROTIME seconds 12.5        Nutrition             Relevant Results  Results from last 7 days   Lab Units 02/05/25  0824 02/04/25  1603 02/04/25  0628 02/04/25  0615 02/04/25  0047 02/03/25  2119   POCT GLUCOSE mg/dL  --  136*  --  144* 120*  --    GLUCOSE mg/dL 132*  --  145*  --   --  216*     Lab Results   Component Value Date    HGBA1C 6.1 (H) 10/17/2024        Assessment/Plan    COVID 19 infection  Generalized weakness  -CXR shows LLL atelectasis  -supportive care for COVID, droplet plus precautions  -new requirement for O2, will add prednisone and mucinex daily  -continuous O2, wean as tolerated  -PT/OT eval and treat  -falls precautions  -daily CBC, BMP, Mg     ESRD on HD T/TH/SAT  -nephrology consult  -continue home nephrocaps and phoslo capsule  -Cr appears near baseline     PAF on long term anticoagulation: continue home eliquis  HTN/HLD/CHF: continue home amlodipine, lasix, lopressor  Rash: likely candida, continue nystatin powder     DVTp: eliquis  PLAN: SNF    Soha Briceno PA-C    Plan of care was discussed extensively with patient.  Patient verbalized understanding through teach back method.  All question and concerns addressed upon examination.    Of note, this documentation is completed using the Dragon Dictation system (voice recognition software). There may be spelling and/or grammatical errors that were not corrected prior to final submission.

## 2025-02-05 NOTE — CARE PLAN
The patient's goals for the shift include sleep and comfort    The clinical goals for the shift include pt safety      Problem: Diabetes  Goal: Achieve decreasing blood glucose levels by end of shift  Outcome: Progressing  Goal: Increase stability of blood glucose readings by end of shift  Outcome: Progressing  Goal: Decrease in ketones present in urine by end of shift  Outcome: Progressing  Goal: Maintain electrolyte levels within acceptable range throughout shift  Outcome: Progressing  Goal: Maintain glucose levels >70mg/dl to <250mg/dl throughout shift  Outcome: Progressing  Goal: No changes in neurological exam by end of shift  Outcome: Progressing  Goal: Learn about and adhere to nutrition recommendations by end of shift  Outcome: Progressing  Goal: Vital signs within normal range for age by end of shift  Outcome: Progressing  Goal: Increase self care and/or family involovement by end of shift  Outcome: Progressing  Goal: Receive DSME education by end of shift  Outcome: Progressing     Problem: Chronic Conditions and Co-morbidities  Goal: Patient's chronic conditions and co-morbidity symptoms are monitored and maintained or improved  Outcome: Progressing     Problem: Nutrition  Goal: Nutrient intake appropriate for maintaining nutritional needs  Outcome: Progressing     Problem: Skin  Goal: Decreased wound size/increased tissue granulation at next dressing change  Outcome: Progressing  Goal: Participates in plan/prevention/treatment measures  Outcome: Progressing  Goal: Prevent/manage excess moisture  Outcome: Progressing  Goal: Prevent/minimize sheer/friction injuries  Outcome: Progressing  Goal: Promote/optimize nutrition  Outcome: Progressing  Goal: Promote skin healing  Outcome: Progressing

## 2025-02-05 NOTE — PROGRESS NOTES
"Physical Therapy    Physical Therapy Treatment    Patient Name: Savita Senll \"FILOMENA\"  MRN: [id number]  Today's Date: 2/5/2025  Time Calculation  Start Time: 1144  Stop Time: 1214  Time Calculation (min): 30 min     901/901-A    Assessment/Plan   PT Assessment  PT Assessment Results: Decreased strength, Decreased range of motion, Decreased endurance, Impaired balance, Decreased mobility, Decreased safety awareness  Rehab Prognosis: Good  Barriers to Discharge Home:  (Spouse limited in ability to assist patient)  Treatment Tolerance: Patient limited by fatigue  Medical Staff Made Aware: Yes  End of Session Communication: Bedside nurse, PCT/NA/CTA  Assessment Comment: Patient continues to require (A) for safety with bed mobility/transfers/amb. Patient will benefit from additional PT to address deficits and improve mobility.  End of Session Patient Position: Up in chair, Alarm on (Reclined in chair with LEs elevated; Call light, phone, and tray table within reach.)  PT Plan  Inpatient/Swing Bed or Outpatient: Inpatient  Treatment/Interventions: Bed mobility, Transfer training, Gait training, Balance training, Therapeutic exercise, Therapeutic activity, Strengthening   PT Frequency: 3 times per week  PT Discharge Recommendations:  (Moderate intensity at moderate frequency with 24/7 care)    PT Recommended Transfer Status: Assist x2, ww+gait belt    General Visit Information:   PT  Visit  PT Received On: 02/05/25  General  Family/Caregiver Present: No  Co-Treatment: OT  Co-Treatment Reason: Co-treat with OT to maximize patient and staff safety with transfers/amb.  Prior to Session Communication: Bedside nurse, PCT/NA/CTA  Patient Position Received: Bed, 3 rail up, Alarm on  General Comment: Agreeable to therapy. Scheduled for HD treatment this afternoon per nursing.    General Observations:   General Observation: No tele; On RA--->1-2L via NC; Joseline(urinary incontinence when standing).    Subjective "     Precautions:  Precautions  Medical Precautions: Fall precautions, Infection precautions  Precautions Comment: Droplet Plus (+)Covid    Vital Signs:  Vital Signs  Heart Rate:  (74-125bpm during session. Nursing informed.)  SpO2:  (On RA. SPO2 87-88%(HR 86bpm) at rest. Nursing informed and placed patient on 1L O2 via NC. SPO2 93% at rest. SPO2 81% with activity(HR 125bpm). Nursing informed and increased O2 to 2L. SPO2 92-98% with rest(HR 74bpm).)  Objective     Pain:  Pain Assessment  Pain Assessment:  (No c/o pain, just c/o nausea with increased activities. Nursing informed.)    Cognition:  Cognition  Overall Cognitive Status:  (Easily distracted; v/c to stay focused on tasks.)    Balance:   Static Sitting Balance  Static Sitting-Comment/Number of Minutes: F+  Dynamic Sitting Balance  Dynamic Sitting-Comments: F/F-  Static Standing Balance  Static Standing-Comment/Number of Minutes: F- with ww  Dynamic Standing Balance  Dynamic Standing-Comments: F- with ww    Treatments:  Therapeutic Exercise  Therapeutic Exercise Performed: Yes  Therapeutic Exercise Activity 1: Instructed patient in seated ther ex. AROM BLE with AP/LAQ/Heelslides x10. (L)knee ROM ~90° with v/c and (A) to keep foot from sliding. Instructed patient to perform exercises on her own outside therapy sessions to increase ROM.        Bed Mobility  Bed Mobility: Yes  Bed Mobility 1  Bed Mobility 1: Supine to sitting  Level of Assistance 1: Minimum assistance, +2  Bed Mobility Comments 1: HOB ~40°. Hand over hand (A) to reach across body to use the bed rail for support. (A) to lift UB from HOB while moving LEs over the EOB. Increased time provided for patient to shift hips towards EOB. Retropulsive.  Ambulation/Gait Training  Ambulation/Gait Training Performed: Yes  Ambulation/Gait Training 1  Surface 1: Level tile  Device 1: Rolling walker  Gait Support Devices: Gait belt  Assistance 1: Moderate assistance (x2)  Comments/Distance (ft) 1: Bed to chair  ~3ft and ~4ftx2(forwards/backwards). COG posterior to JEWEL; v/c and (A) to correct balance. Slow with wt shifting. Forward flexed posture; difficulty maintaining corrections for upright position. Decreased step height/length B. v/c and (A) for safer maneuvering of ww with transfers and short amb distance. Chair follow for safety. (+)BOLDEN and c/o nausea; Nursing aware.  Transfers  Transfer: Yes  Transfer 1  Technique 1: Sit to stand, Stand to sit  Transfer Device 1: Gait belt (ww)  Transfer Level of Assistance 1: Moderate assistance, Minimum assistance, +2  Trials/Comments 1: (5x). Mod(A)x2(4x) and Min(A)x2(last trial). v/c for safe hand placement and technique. Slow transition of hands to/from ww. Benefits from elevated surfaces and blocking of feet to prevent sliding. v/c to keep feet underneath her(~90° flexion B) and to increase forward wt shift when standing 2° being retropulsive with majority of standing trials.             Outcome Measures:     [facility] Basic Mobility  Turning from your back to your side while in a flat bed without using bedrails: A little  Moving from lying on your back to sitting on the side of a flat bed without using bedrails: A lot  Moving to and from bed to chair (including a wheelchair): A lot  Standing up from a chair using your arms (e.g. wheelchair or bedside chair): A lot  To walk in hospital room: A lot  Climbing 3-5 steps with railing: Total  Basic Mobility - Total Score: 12                                      Education Documentation  Mobility Training, taught by Rani Mitchell PTA at 2/5/2025  2:20 PM.  Learner: Patient  Readiness: Acceptance  Method: Explanation, Demonstration, Teach-back  Response: Verbalizes Understanding, Needs Reinforcement  Comment: See therapy note.           EDUCATION:  Individual(s) Educated: Patient  Education Provided: Body Mechanics, Fall Risk, Home Exercise Program, POC, Posture (Balance; Safety with bed mobility/transfers/amb.)  Patient Response to  Education: Patient/Caregiver Verbalized Understanding of Information    Encounter Problems       Encounter Problems (Active)       PT Problem       Bed mobility supine <> sit supervised (Progressing)       Start:  02/04/25    Expected End:  02/18/25            Transfers sit <> stand /bed <> chair with ww CGA (Progressing)       Start:  02/04/25    Expected End:  02/18/25            Patient to ambulate 10' with ww CGA (Progressing)       Start:  02/04/25    Expected End:  02/18/25            AAROM Left knee 0-100 degrees (Progressing)       Start:  02/04/25    Expected End:  02/18/25

## 2025-02-05 NOTE — CARE PLAN
The patient's goals for the shift include sleep and comfort    The clinical goals for the shift include patient will be free of injury or falls

## 2025-02-06 LAB
ANION GAP SERPL CALC-SCNC: 10 MMOL/L (ref 10–20)
APPEARANCE UR: ABNORMAL
BILIRUB UR STRIP.AUTO-MCNC: NEGATIVE MG/DL
BUN SERPL-MCNC: 30 MG/DL (ref 6–23)
CALCIUM SERPL-MCNC: 8.1 MG/DL (ref 8.6–10.3)
CHLORIDE SERPL-SCNC: 103 MMOL/L (ref 98–107)
CO2 SERPL-SCNC: 29 MMOL/L (ref 21–32)
COLOR UR: ABNORMAL
CREAT SERPL-MCNC: 3.04 MG/DL (ref 0.5–1.05)
EGFRCR SERPLBLD CKD-EPI 2021: 15 ML/MIN/1.73M*2
ERYTHROCYTE [DISTWIDTH] IN BLOOD BY AUTOMATED COUNT: 14.5 % (ref 11.5–14.5)
GLUCOSE SERPL-MCNC: 155 MG/DL (ref 74–99)
GLUCOSE UR STRIP.AUTO-MCNC: NORMAL MG/DL
HCT VFR BLD AUTO: 31.9 % (ref 36–46)
HGB BLD-MCNC: 9.9 G/DL (ref 12–16)
HOLD SPECIMEN: NORMAL
KETONES UR STRIP.AUTO-MCNC: NEGATIVE MG/DL
LEUKOCYTE ESTERASE UR QL STRIP.AUTO: ABNORMAL
MAGNESIUM SERPL-MCNC: 1.79 MG/DL (ref 1.6–2.4)
MCH RBC QN AUTO: 29.6 PG (ref 26–34)
MCHC RBC AUTO-ENTMCNC: 31 G/DL (ref 32–36)
MCV RBC AUTO: 95 FL (ref 80–100)
NITRITE UR QL STRIP.AUTO: ABNORMAL
NRBC BLD-RTO: 0 /100 WBCS (ref 0–0)
PH UR STRIP.AUTO: 6.5 [PH]
PHOSPHATE SERPL-MCNC: 3.3 MG/DL (ref 2.5–4.9)
PLATELET # BLD AUTO: 147 X10*3/UL (ref 150–450)
POTASSIUM SERPL-SCNC: 4.4 MMOL/L (ref 3.5–5.3)
PROT UR STRIP.AUTO-MCNC: ABNORMAL MG/DL
RBC # BLD AUTO: 3.35 X10*6/UL (ref 4–5.2)
RBC # UR STRIP.AUTO: ABNORMAL MG/DL
RBC #/AREA URNS AUTO: >20 /HPF
SODIUM SERPL-SCNC: 138 MMOL/L (ref 136–145)
SP GR UR STRIP.AUTO: 1.01
SQUAMOUS #/AREA URNS AUTO: ABNORMAL /HPF
UROBILINOGEN UR STRIP.AUTO-MCNC: NORMAL MG/DL
WBC # BLD AUTO: 3.6 X10*3/UL (ref 4.4–11.3)
WBC #/AREA URNS AUTO: >50 /HPF
WBC CLUMPS #/AREA URNS AUTO: ABNORMAL /HPF

## 2025-02-06 PROCEDURE — 82435 ASSAY OF BLOOD CHLORIDE: CPT

## 2025-02-06 PROCEDURE — 81003 URINALYSIS AUTO W/O SCOPE: CPT

## 2025-02-06 PROCEDURE — 99232 SBSQ HOSP IP/OBS MODERATE 35: CPT

## 2025-02-06 PROCEDURE — 2500000004 HC RX 250 GENERAL PHARMACY W/ HCPCS (ALT 636 FOR OP/ED)

## 2025-02-06 PROCEDURE — 1100000001 HC PRIVATE ROOM DAILY

## 2025-02-06 PROCEDURE — 83735 ASSAY OF MAGNESIUM: CPT

## 2025-02-06 PROCEDURE — 2500000001 HC RX 250 WO HCPCS SELF ADMINISTERED DRUGS (ALT 637 FOR MEDICARE OP)

## 2025-02-06 PROCEDURE — 85027 COMPLETE CBC AUTOMATED: CPT

## 2025-02-06 PROCEDURE — 2500000005 HC RX 250 GENERAL PHARMACY W/O HCPCS

## 2025-02-06 PROCEDURE — 84100 ASSAY OF PHOSPHORUS: CPT | Performed by: STUDENT IN AN ORGANIZED HEALTH CARE EDUCATION/TRAINING PROGRAM

## 2025-02-06 PROCEDURE — 36415 COLL VENOUS BLD VENIPUNCTURE: CPT

## 2025-02-06 PROCEDURE — 97530 THERAPEUTIC ACTIVITIES: CPT | Mod: GP,CQ

## 2025-02-06 PROCEDURE — 87086 URINE CULTURE/COLONY COUNT: CPT | Mod: ELYLAB

## 2025-02-06 RX ORDER — CIPROFLOXACIN 2 MG/ML
200 INJECTION, SOLUTION INTRAVENOUS
Status: DISCONTINUED | OUTPATIENT
Start: 2025-02-06 | End: 2025-02-07

## 2025-02-06 RX ORDER — L. ACIDOPHILUS/L.BULGARICUS 1MM CELL
1 TABLET ORAL DAILY
Status: DISCONTINUED | OUTPATIENT
Start: 2025-02-06 | End: 2025-02-07 | Stop reason: HOSPADM

## 2025-02-06 RX ADMIN — NYSTATIN 1 APPLICATION: 100000 POWDER TOPICAL at 21:25

## 2025-02-06 RX ADMIN — LATANOPROST 1 DROP: 50 SOLUTION OPHTHALMIC at 21:24

## 2025-02-06 RX ADMIN — APIXABAN 2.5 MG: 5 TABLET, FILM COATED ORAL at 08:52

## 2025-02-06 RX ADMIN — METOPROLOL TARTRATE 12.5 MG: 25 TABLET, FILM COATED ORAL at 21:24

## 2025-02-06 RX ADMIN — Medication 2 L/MIN: at 21:32

## 2025-02-06 RX ADMIN — APIXABAN 2.5 MG: 5 TABLET, FILM COATED ORAL at 21:24

## 2025-02-06 RX ADMIN — FUROSEMIDE 40 MG: 40 TABLET ORAL at 08:50

## 2025-02-06 RX ADMIN — CIPROFLOXACIN 200 MG: 200 INJECTION, SOLUTION INTRAVENOUS at 16:29

## 2025-02-06 RX ADMIN — CALCIUM ACETATE 1334 MG: 667 CAPSULE ORAL at 16:31

## 2025-02-06 RX ADMIN — PREGABALIN 75 MG: 50 CAPSULE ORAL at 21:23

## 2025-02-06 RX ADMIN — NYSTATIN 1 APPLICATION: 100000 POWDER TOPICAL at 08:49

## 2025-02-06 RX ADMIN — Medication 2 L/MIN: at 08:52

## 2025-02-06 RX ADMIN — Medication 1 TABLET: at 16:31

## 2025-02-06 RX ADMIN — AMLODIPINE BESYLATE 2.5 MG: 5 TABLET ORAL at 08:51

## 2025-02-06 RX ADMIN — CALCIUM ACETATE 1334 MG: 667 CAPSULE ORAL at 11:36

## 2025-02-06 RX ADMIN — PREDNISONE 40 MG: 20 TABLET ORAL at 08:51

## 2025-02-06 RX ADMIN — CALCIUM ACETATE 1334 MG: 667 CAPSULE ORAL at 08:50

## 2025-02-06 RX ADMIN — Medication 1 CAPSULE: at 08:49

## 2025-02-06 RX ADMIN — METOPROLOL TARTRATE 12.5 MG: 25 TABLET, FILM COATED ORAL at 08:50

## 2025-02-06 RX ADMIN — Medication 1000 MCG: at 08:49

## 2025-02-06 ASSESSMENT — COGNITIVE AND FUNCTIONAL STATUS - GENERAL
DRESSING REGULAR UPPER BODY CLOTHING: A LITTLE
MOVING TO AND FROM BED TO CHAIR: A LITTLE
TOILETING: A LITTLE
MOBILITY SCORE: 15
CLIMB 3 TO 5 STEPS WITH RAILING: TOTAL
CLIMB 3 TO 5 STEPS WITH RAILING: TOTAL
HELP NEEDED FOR BATHING: A LITTLE
MOVING FROM LYING ON BACK TO SITTING ON SIDE OF FLAT BED WITH BEDRAILS: A LITTLE
MOVING TO AND FROM BED TO CHAIR: A LITTLE
DAILY ACTIVITIY SCORE: 20
WALKING IN HOSPITAL ROOM: A LOT
WALKING IN HOSPITAL ROOM: A LITTLE
TURNING FROM BACK TO SIDE WHILE IN FLAT BAD: A LOT
STANDING UP FROM CHAIR USING ARMS: A LITTLE
MOBILITY SCORE: 14
STANDING UP FROM CHAIR USING ARMS: A LOT
DRESSING REGULAR LOWER BODY CLOTHING: A LITTLE
MOVING FROM LYING ON BACK TO SITTING ON SIDE OF FLAT BED WITH BEDRAILS: A LITTLE
TURNING FROM BACK TO SIDE WHILE IN FLAT BAD: A LITTLE

## 2025-02-06 ASSESSMENT — PAIN SCALES - GENERAL
PAINLEVEL_OUTOF10: 0 - NO PAIN
PAINLEVEL_OUTOF10: 0 - NO PAIN

## 2025-02-06 ASSESSMENT — PAIN - FUNCTIONAL ASSESSMENT
PAIN_FUNCTIONAL_ASSESSMENT: 0-10
PAIN_FUNCTIONAL_ASSESSMENT: 0-10

## 2025-02-06 NOTE — PROGRESS NOTES
"Daily Progress Note    Savita Snell \"FILOMENA\" is a 84 y.o. female on day 1 of admission presenting with Weakness.    Subjective   Patient seen and examined, labs and vitals reviewed.  Remains on 2 L O2 via nasal cannula.  Endorses nonproductive cough and shortness of breath with activity.  In addition, noticed dysuria this morning.  Will repeat UA.  Denies chest pain, abdominal pain, nausea, vomiting, diarrhea, hematuria.  Awaiting pre-CERT for SNF.       Objective   Physical Exam  Constitutional:       Appearance: She is obese.      Interventions: Nasal cannula in place.      Comments: 2 L   HENT:      Head: Normocephalic.      Mouth/Throat:      Mouth: Mucous membranes are moist.   Eyes:      Pupils: Pupils are equal, round, and reactive to light.   Cardiovascular:      Rate and Rhythm: Normal rate and regular rhythm.      Heart sounds: Normal heart sounds, S1 normal and S2 normal.   Pulmonary:      Effort: Pulmonary effort is normal.      Breath sounds: Normal breath sounds.      Comments: Slightly diminished breath sounds  Abdominal:      General: Bowel sounds are normal.      Palpations: Abdomen is soft.      Tenderness: There is no abdominal tenderness.   Musculoskeletal:         General: Normal range of motion.      Cervical back: Neck supple.      Right lower leg: No edema.      Left lower leg: No edema.   Skin:     General: Skin is warm.   Neurological:      Mental Status: She is alert and oriented to person, place, and time.      Motor: Weakness present.   Psychiatric:         Mood and Affect: Mood normal.         Behavior: Behavior normal.         Last Recorded Vitals  Blood pressure 164/70, pulse 64, temperature 36.2 °C (97.2 °F), temperature source Temporal, resp. rate 16, height 1.581 m (5' 2.24\"), weight 79.4 kg (175 lb), SpO2 97%.  Intake/Output last 3 Shifts:  I/O last 3 completed shifts:  In: 400 (5 mL/kg) [I.V.:400 (5 mL/kg)]  Out: 1000 (12.6 mL/kg) [Other:1000]  Weight: 79.4 kg "     Medications  Scheduled medications  amLODIPine, 2.5 mg, oral, Daily  apixaban, 2.5 mg, oral, BID  calcium acetate, 1,334 mg, oral, TID  cyanocobalamin, 1,000 mcg, oral, Daily  furosemide, 40 mg, oral, Daily  heparin, 1,700 Units, intra-catheter, After Dialysis  heparin, 1,700 Units, intra-catheter, After Dialysis  latanoprost, 1 drop, ophthalmic (eye), Nightly  metoprolol tartrate, 12.5 mg, oral, BID  nystatin, 1 Application, Topical, BID  oxygen, , inhalation, Continuous - Inhalation  predniSONE, 40 mg, oral, Daily  pregabalin, 75 mg, oral, Nightly  vitamin B complex-vitamin C-folic acid, 1 capsule, oral, Daily      Continuous medications     PRN medications  PRN medications: acetaminophen **OR** acetaminophen **OR** acetaminophen, guaiFENesin, melatonin, ondansetron **OR** ondansetron, polyethylene glycol, traMADol    Labs  CBC:   Results from last 7 days   Lab Units 02/06/25  0532 02/05/25 0824 02/04/25 0628   WBC AUTO x10*3/uL 3.6* 8.5 6.4   RBC AUTO x10*6/uL 3.35* 3.77* 3.28*   HEMOGLOBIN g/dL 9.9* 11.2* 9.6*   HEMATOCRIT % 31.9* 38.2 31.7*   MCV fL 95 101* 97   MCH pg 29.6 29.7 29.3   MCHC g/dL 31.0* 29.3* 30.3*   RDW % 14.5 15.0* 15.0*   PLATELETS AUTO x10*3/uL 147* 150 156     CMP:    Results from last 7 days   Lab Units 02/06/25  0532 02/05/25  0824 02/04/25 0628 02/03/25  2119   SODIUM mmol/L 138 139 143 140   POTASSIUM mmol/L 4.4 4.6 3.8 3.6   CHLORIDE mmol/L 103 103 106 102   CO2 mmol/L 29 26 29 28   BUN mg/dL 30* 36* 33* 34*   CREATININE mg/dL 3.04* 4.64* 4.08* 3.83*   GLUCOSE mg/dL 155* 132* 145* 216*   PROTEIN TOTAL g/dL  --   --   --  7.4   CALCIUM mg/dL 8.1* 8.5* 8.3* 9.1   BILIRUBIN TOTAL mg/dL  --   --   --  0.3   ALK PHOS U/L  --   --   --  52   AST U/L  --   --   --  16   ALT U/L  --   --   --  10     BMP:    Results from last 7 days   Lab Units 02/06/25  0532 02/05/25  0824 02/04/25  0628   SODIUM mmol/L 138 139 143   POTASSIUM mmol/L 4.4 4.6 3.8   CHLORIDE mmol/L 103 103 106   CO2  mmol/L 29 26 29   BUN mg/dL 30* 36* 33*   CREATININE mg/dL 3.04* 4.64* 4.08*   CALCIUM mg/dL 8.1* 8.5* 8.3*   GLUCOSE mg/dL 155* 132* 145*     Magnesium:  Results from last 7 days   Lab Units 02/06/25  0532 02/05/25  0824 02/03/25  2119   MAGNESIUM mg/dL 1.79 1.78 1.90     Troponin:    Results from last 7 days   Lab Units 02/03/25  2236 02/03/25  2119   TROPHS ng/L 6 7     BNP:   Results from last 7 days   Lab Units 02/03/25  2119   BNP pg/mL 139*     Lipid Panel:  Results from last 7 days   Lab Units 02/03/25 2119   INR  1.1   PROTIME seconds 12.5        Nutrition             Relevant Results  Results from last 7 days   Lab Units 02/06/25  0532 02/05/25  0824 02/04/25  1603 02/04/25  0628 02/04/25  0615 02/04/25  0047 02/03/25 2119   POCT GLUCOSE mg/dL  --   --  136*  --  144* 120*  --    GLUCOSE mg/dL 155* 132*  --  145*  --   --  216*     Lab Results   Component Value Date    HGBA1C 6.1 (H) 10/17/2024        Assessment/Plan    COVID 19 infection  Generalized weakness  -CXR shows LLL atelectasis  -supportive care for COVID, droplet plus precautions  -continue prednisone and mucinex daily  -continuous O2, wean as tolerated  -PT/OT eval and treat  -falls precautions  -daily CBC, BMP, Mg     ESRD on HD T/TH/SAT  -nephrology consult for HD  -continue home nephrocaps and phoslo capsule  -Cr appears near baseline    UTI  -UA suspicious for infection, 500 LE and 1+ nitrite  -Has a strong history of C. difficile infection after antibiotic use  -Has allergies to penicillin, cephalosporins, and Bactrim  -Will start cipro renally dosed and probiotic  -Follow urine culture     PAF on long term anticoagulation: continue home eliquis  HTN/HLD/CHF: continue home amlodipine, lasix, lopressor  Rash: likely candida, continue nystatin powder     DVTp: eliquis  PLAN: SNF, awaiting pre-cert    Soha A Briceno, PA-C    Plan of care was discussed extensively with patient.  Patient verbalized understanding through teach back method.   All question and concerns addressed upon examination.    Of note, this documentation is completed using the Dragon Dictation system (voice recognition software). There may be spelling and/or grammatical errors that were not corrected prior to final submission.

## 2025-02-06 NOTE — POST-PROCEDURE NOTE
Report to Receiving RN:    Report To: Flavia canales   Time Report Called: [id number]  Hand-Off Communication: 1 liter off  Complications During Treatment: No  Ultrafiltration Treatment: No  Medications Administered During Dialysis: No  Blood Products Administered During Dialysis: No  Labs Sent During Dialysis: No  Heparin Drip Rate Changes:   Dialysis Catheter Dressing: yes  Last Dressing Change: 2/5/25      Last Updated: 7:08 PM by ROBB CHAMBERS

## 2025-02-06 NOTE — CARE PLAN
The patient's goals for the shift include sleep and comfort    The clinical goals for the shift include remain free from injury by end of shift.

## 2025-02-06 NOTE — PROGRESS NOTES
"Renal Progress Note    Assessment:  84 y.o. female with history s/f dialysis dependent AIDAN on IHD, HFpEF, A.fib on AC, recurrent C.diff who presented for generalized weakness.     ialysis dependent ATN on CKD stage IV: started HD 12/9 at [facility], gets it at [facility] under Dr. Urbina, states gets HD tues and Sat   Covid infection  Anemia of renal disease   Secondary renal hyperparathyroidism      Plan:  -No clinical or laboratory indication for renal replacement therapy today will follow the patient renal respective send therapy while she is in the hospital       Admit Date: 2/3/2025    Interval History: Uneventful night      Medications:   Scheduled Meds:amLODIPine, 2.5 mg, oral, Daily  apixaban, 2.5 mg, oral, BID  calcium acetate, 1,334 mg, oral, TID  cyanocobalamin, 1,000 mcg, oral, Daily  furosemide, 40 mg, oral, Daily  heparin, 1,700 Units, intra-catheter, After Dialysis  heparin, 1,700 Units, intra-catheter, After Dialysis  latanoprost, 1 drop, ophthalmic (eye), Nightly  metoprolol tartrate, 12.5 mg, oral, BID  nystatin, 1 Application, Topical, BID  oxygen, , inhalation, Continuous - Inhalation  predniSONE, 40 mg, oral, Daily  pregabalin, 75 mg, oral, Nightly  vitamin B complex-vitamin C-folic acid, 1 capsule, oral, Daily      Continuous Infusions:     CBC:   Lab Results   Component Value Date    HGB 9.9 (L) 02/06/2025    HGB 11.2 (L) 02/05/2025    WBC 3.6 (L) 02/06/2025    WBC 8.5 02/05/2025     (L) 02/06/2025     02/05/2025      Anemia:  No results found for: \"FERRITIN\", \"IRON\", \"TIBC\"   BMP:    Lab Results   Component Value Date     02/06/2025     02/05/2025    K 4.4 02/06/2025    K 4.6 02/05/2025     02/06/2025     02/05/2025    CO2 29 02/06/2025    CO2 26 02/05/2025    BUN 30 (H) 02/06/2025    BUN 36 (H) 02/05/2025    CREATININE 3.04 (H) 02/06/2025    CREATININE 4.64 (H) 02/05/2025      Bone disease:   Lab Results   Component Value Date    PHOS 3.3 " "02/06/2025      Urinalysis:  No results found for: \"ADARSH\", \"PROTUR\", \"GLUCOSEU\", \"BLOODU\", \"KETONESU\", \"BILIRUBINU\", \"NITRITEU\", \"LEUKOCYTESU\", \"UTPCR\"     Objective:   Vitals: /70 (BP Location: Left arm, Patient Position: Sitting)   Pulse 64   Temp 36.2 °C (97.2 °F) (Temporal)   Resp 16   Ht 1.581 m (5' 2.24\")   Wt 79.4 kg (175 lb)   SpO2 97%   BMI 31.76 kg/m²    Wt Readings from Last 3 Encounters:   02/04/25 79.4 kg (175 lb)   12/20/24 88.9 kg (196 lb)   12/11/24 86 kg (189 lb 9.5 oz)      24HR INTAKE/OUTPUT:    Intake/Output Summary (Last 24 hours) at 2/6/2025 1128  Last data filed at 2/6/2025 0910  Gross per 24 hour   Intake 400 ml   Output 1800 ml   Net -1400 ml     Admission weight:  Weight: 79.4 kg (175 lb)          Electronically signed by Jose Enrique Dunlap MD on 2/6/2025 at 11:28 AM            "

## 2025-02-06 NOTE — PROGRESS NOTES
"Physical Therapy    Physical Therapy Treatment    Patient Name: Savita Snell \"FILOMENA\"  MRN: [id number]  Today's Date: 2/6/2025  Time Calculation  Start Time: 0833  Stop Time: 0903  Time Calculation (min): 30 min     901/901-A    Assessment/Plan   PT Assessment  PT Assessment Results: Decreased strength, Decreased range of motion, Decreased endurance, Impaired balance, Decreased mobility, Decreased safety awareness  Rehab Prognosis: Good  Barriers to Discharge Home: Caregiver assistance, Physical needs  Treatment Tolerance: Patient tolerated treatment well, Patient limited by fatigue  Medical Staff Made Aware: Yes  End of Session Communication: Bedside nurse, PCT/NA/CTA  Assessment Comment: Patient continues to require (A) for safety with bed mobility/transfers/short amb distances. Patient will benefit from additional PT to address deficits and improve mobility.  End of Session Patient Position: Up in chair, Alarm on (Reclined in chair with LEs elevated; Call light, phone, and tray table within reach.)  PT Plan  Inpatient/Swing Bed or Outpatient: Inpatient  Treatment/Interventions: Bed mobility, Transfer training, Gait training, Balance training, Therapeutic exercise, Therapeutic activity, Strengthening   PT Frequency: 3 times per week  PT Discharge Recommendations:  (Moderate intensity at moderate frequency with 24/7 care)    PT Recommended Transfer Status: Assist x1-2, ww    General Visit Information:   PT  Visit  PT Received On: 02/06/25  General  Family/Caregiver Present: No  Co-Treatment: OT  Co-Treatment Reason: Co-treat with OT to maximize patient and staff safety with transfers/amb.  Prior to Session Communication: Bedside nurse, PCT/NA/CTA  Patient Position Received: Bed, 3 rail up, Alarm on  General Comment: Pleasant and cooperative. Patient c/o burning with voiding; nursing informed.    General Observations:   General Observation: No tele; Alarm; O2 via NC; Purewick(removed for OOB " mobility).    Subjective     Precautions:  Precautions  Medical Precautions: Fall precautions  Precautions Comment: Droplet Plus (+)Covid    Vital Signs:  Vital Signs  Heart Rate:  (65-71bpm)  SpO2:  (2L O2 via NC. SPO2 96-99%. No c/o SOB. Nursing aware of readings.)    Objective     Pain:  Pain Assessment  Pain Assessment: 0-10  0-10 (Numeric) Pain Score: 0 - No pain    Cognition:  Cognition  Overall Cognitive Status: Within Functional Limits (More focused today.)    Balance:   Static Sitting Balance  Static Sitting-Comment/Number of Minutes: F+  Dynamic Sitting Balance  Dynamic Sitting-Comments: F/F-  Static Standing Balance  Static Standing-Comment/Number of Minutes: F- with ww  Dynamic Standing Balance  Dynamic Standing-Comments: F- with ww    Treatments:           Bed Mobility  Bed Mobility: Yes  Bed Mobility 1  Bed Mobility 1: Supine to sitting  Level of Assistance 1: Minimum assistance  Bed Mobility Comments 1: HOB ~35°. Hand over hand (A) to reach across body to use the bed rail for support. (A) to lift UB from HOB while moving LEs over the EOB.  Ambulation/Gait Training  Ambulation/Gait Training Performed: Yes  Ambulation/Gait Training 1  Surface 1: Level tile  Device 1: Rolling walker  Gait Support Devices: Gait belt  Assistance 1: Minimum assistance  Comments/Distance (ft) 1: ~3ft bed to BSC and ~3ft BSC to chair. WBOS; COG posterior to JEWEL. v/c and (A) to shift COG forward. Slow with wt shifting. Forward flexed posture. Decreased step height/length B. v/c and (A) for safer maneuvering of ww with 90/180° turns. Increased time provided for patient to complete tasks.  Transfers  Transfer: Yes  Transfer 1  Technique 1: Sit to stand, Stand to sit  Transfer Device 1: Gait belt (ww)  Transfer Level of Assistance 1: Moderate assistance, +2  Trials/Comments 1: (3x). v/c for safe hand placement and technique. Instructions to bend knees to 90° position to prevent feet from sliding forward. Slow transition of hands  to/from ww. Benefits from elevated surfaces.             Outcome Measures:     [facility] Basic Mobility  Turning from your back to your side while in a flat bed without using bedrails: A little  Moving from lying on your back to sitting on the side of a flat bed without using bedrails: A lot  Moving to and from bed to chair (including a wheelchair): A little  Standing up from a chair using your arms (e.g. wheelchair or bedside chair): A lot  To walk in hospital room: A little  Climbing 3-5 steps with railing: Total  Basic Mobility - Total Score: 14                                      Education Documentation  Mobility Training, taught by Rani Mitchell PTA at 2/6/2025  1:24 PM.  Learner: Patient  Readiness: Acceptance  Method: Explanation, Demonstration, Teach-back  Response: Verbalizes Understanding, Needs Reinforcement  Comment: See therapy note.         EDUCATION:  Individual(s) Educated: Patient  Education Provided: Body Mechanics, Fall Risk, Home Exercise Program, POC, Posture (Balance; Safety with bed mobility/transfers/amb.)  Patient Response to Education: Patient/Caregiver Verbalized Understanding of Information, Patient/Caregiver Performed Return Demonstration of Exercises/Activities, Patient/Caregiver Asked Appropriate Questions    Encounter Problems       Encounter Problems (Active)       PT Problem       Bed mobility supine <> sit supervised (Progressing)       Start:  02/04/25    Expected End:  02/18/25            Transfers sit <> stand /bed <> chair with ww CGA (Progressing)       Start:  02/04/25    Expected End:  02/18/25            Patient to ambulate 10' with ww CGA (Progressing)       Start:  02/04/25    Expected End:  02/18/25            AAROM Left knee 0-100 degrees (Progressing)       Start:  02/04/25    Expected End:  02/18/25

## 2025-02-06 NOTE — CARE PLAN
Over the shift, the patient did make progress toward the following goals.     The patient's goals for the shift include sleep and comfort    The clinical goals for the shift include Patient will be safe from fall or injury throughout shift.

## 2025-02-06 NOTE — PROGRESS NOTES
02/06/25 0857   Discharge Planning   Home or Post Acute Services Post acute facilities (Rehab/SNF/etc)   Type of Post Acute Facility Services Skilled nursing   Expected Discharge Disposition SNF  ([facility])   Does the patient need discharge transport arranged? Yes   RoundTrip coordination needed? Yes   Patient Choice   Provider Choice list and CMS website (https://medicare.gov/care-compare#search) for post-acute Quality and Resource Measure Data were provided and reviewed with: Patient;Family   Patient / Family choosing to utilize agency / facility established prior to hospitalization No   Stroke Family Assessment   Stroke Family Assessment Needed No   Intensity of Service   Intensity of Service 0-30 min     Pt and her spouse have decided on [facility] for dc. Sent updated clinicals to facility in [facility]. Requesting precert team to submit for auth today. Per Nephrology, pt had HD yesterday and does not need again until Saturday.

## 2025-02-07 VITALS
WEIGHT: 175 LBS | TEMPERATURE: 96.1 F | HEART RATE: 53 BPM | DIASTOLIC BLOOD PRESSURE: 75 MMHG | BODY MASS INDEX: 32.2 KG/M2 | HEIGHT: 62 IN | RESPIRATION RATE: 16 BRPM | SYSTOLIC BLOOD PRESSURE: 179 MMHG | OXYGEN SATURATION: 100 %

## 2025-02-07 LAB
ANION GAP SERPL CALC-SCNC: 13 MMOL/L (ref 10–20)
BACTERIA UR CULT: NORMAL
BUN SERPL-MCNC: 47 MG/DL (ref 6–23)
CALCIUM SERPL-MCNC: 7.9 MG/DL (ref 8.6–10.3)
CHLORIDE SERPL-SCNC: 101 MMOL/L (ref 98–107)
CO2 SERPL-SCNC: 27 MMOL/L (ref 21–32)
CREAT SERPL-MCNC: 3.48 MG/DL (ref 0.5–1.05)
EGFRCR SERPLBLD CKD-EPI 2021: 12 ML/MIN/1.73M*2
ERYTHROCYTE [DISTWIDTH] IN BLOOD BY AUTOMATED COUNT: 14.2 % (ref 11.5–14.5)
GLUCOSE SERPL-MCNC: 178 MG/DL (ref 74–99)
HCT VFR BLD AUTO: 30.4 % (ref 36–46)
HGB BLD-MCNC: 9.5 G/DL (ref 12–16)
HOLD SPECIMEN: NORMAL
HOLD SPECIMEN: NORMAL
MAGNESIUM SERPL-MCNC: 1.8 MG/DL (ref 1.6–2.4)
MCH RBC QN AUTO: 29.9 PG (ref 26–34)
MCHC RBC AUTO-ENTMCNC: 31.3 G/DL (ref 32–36)
MCV RBC AUTO: 96 FL (ref 80–100)
NRBC BLD-RTO: 0 /100 WBCS (ref 0–0)
PLATELET # BLD AUTO: 165 X10*3/UL (ref 150–450)
POTASSIUM SERPL-SCNC: 4.4 MMOL/L (ref 3.5–5.3)
RBC # BLD AUTO: 3.18 X10*6/UL (ref 4–5.2)
SODIUM SERPL-SCNC: 137 MMOL/L (ref 136–145)
STAPHYLOCOCCUS SPEC CULT: ABNORMAL
WBC # BLD AUTO: 5 X10*3/UL (ref 4.4–11.3)

## 2025-02-07 PROCEDURE — 99239 HOSP IP/OBS DSCHRG MGMT >30: CPT | Performed by: INTERNAL MEDICINE

## 2025-02-07 PROCEDURE — 2500000004 HC RX 250 GENERAL PHARMACY W/ HCPCS (ALT 636 FOR OP/ED)

## 2025-02-07 PROCEDURE — 80048 BASIC METABOLIC PNL TOTAL CA: CPT

## 2025-02-07 PROCEDURE — 85027 COMPLETE CBC AUTOMATED: CPT

## 2025-02-07 PROCEDURE — 2500000002 HC RX 250 W HCPCS SELF ADMINISTERED DRUGS (ALT 637 FOR MEDICARE OP, ALT 636 FOR OP/ED): Performed by: INTERNAL MEDICINE

## 2025-02-07 PROCEDURE — 36415 COLL VENOUS BLD VENIPUNCTURE: CPT

## 2025-02-07 PROCEDURE — 83735 ASSAY OF MAGNESIUM: CPT

## 2025-02-07 PROCEDURE — 2500000001 HC RX 250 WO HCPCS SELF ADMINISTERED DRUGS (ALT 637 FOR MEDICARE OP)

## 2025-02-07 RX ORDER — CIPROFLOXACIN 2 MG/ML
200 INJECTION, SOLUTION INTRAVENOUS EVERY 24 HOURS
Status: DISCONTINUED | OUTPATIENT
Start: 2025-02-08 | End: 2025-02-07 | Stop reason: HOSPADM

## 2025-02-07 RX ORDER — GUAIFENESIN 600 MG/1
600 TABLET, EXTENDED RELEASE ORAL 2 TIMES DAILY PRN
Qty: 10 TABLET | Refills: 0 | Status: SHIPPED | OUTPATIENT
Start: 2025-02-07 | End: 2025-02-12

## 2025-02-07 RX ORDER — PREDNISONE 20 MG/1
20 TABLET ORAL DAILY
Qty: 3 TABLET | Refills: 0 | Status: SHIPPED | OUTPATIENT
Start: 2025-02-08 | End: 2025-02-11

## 2025-02-07 RX ORDER — VANCOMYCIN HYDROCHLORIDE 125 MG/1
125 CAPSULE ORAL 2 TIMES DAILY
Status: DISCONTINUED | OUTPATIENT
Start: 2025-02-07 | End: 2025-02-07 | Stop reason: HOSPADM

## 2025-02-07 RX ADMIN — CALCIUM ACETATE 1334 MG: 667 CAPSULE ORAL at 08:33

## 2025-02-07 RX ADMIN — Medication 1 TABLET: at 08:33

## 2025-02-07 RX ADMIN — Medication 1000 MCG: at 08:36

## 2025-02-07 RX ADMIN — Medication 1 CAPSULE: at 08:33

## 2025-02-07 RX ADMIN — METOPROLOL TARTRATE 12.5 MG: 25 TABLET, FILM COATED ORAL at 08:33

## 2025-02-07 RX ADMIN — VANCOMYCIN HYDROCHLORIDE 125 MG: 125 CAPSULE ORAL at 11:21

## 2025-02-07 RX ADMIN — CALCIUM ACETATE 1334 MG: 667 CAPSULE ORAL at 11:21

## 2025-02-07 RX ADMIN — NYSTATIN 1 APPLICATION: 100000 POWDER TOPICAL at 11:33

## 2025-02-07 RX ADMIN — FUROSEMIDE 40 MG: 40 TABLET ORAL at 08:37

## 2025-02-07 RX ADMIN — AMLODIPINE BESYLATE 2.5 MG: 5 TABLET ORAL at 08:36

## 2025-02-07 RX ADMIN — APIXABAN 2.5 MG: 5 TABLET, FILM COATED ORAL at 08:34

## 2025-02-07 RX ADMIN — PREDNISONE 40 MG: 20 TABLET ORAL at 08:33

## 2025-02-07 ASSESSMENT — PAIN SCALES - GENERAL
PAINLEVEL_OUTOF10: 0 - NO PAIN
PAINLEVEL_OUTOF10: 0 - NO PAIN

## 2025-02-07 NOTE — PROGRESS NOTES
"Renal Progress Note  Assessment:  84 y.o. female with history s/f dialysis dependent AIDAN on IHD, HFpEF, A.fib on AC, recurrent C.diff who presented for generalized weakness.     ialysis dependent ATN on CKD stage IV: started HD 12/9 at [facility], gets it at [facility] under Dr. Urbina, states gets HD tues and Sat   Covid infection  Anemia of renal disease   Secondary renal hyperparathyroidism      Plan:  -No clinical or laboratory indication for renal replacement therapy today will follow the patient renal replacement therapy tomorrow   Admit Date: 2/3/2025    Interval History: Uneventful night      Medications:   Scheduled Meds:amLODIPine, 2.5 mg, oral, Daily  apixaban, 2.5 mg, oral, BID  calcium acetate, 1,334 mg, oral, TID  [START ON 2/8/2025] ciprofloxacin, 200 mg, intravenous, q24h  cyanocobalamin, 1,000 mcg, oral, Daily  furosemide, 40 mg, oral, Daily  heparin, 1,700 Units, intra-catheter, After Dialysis  heparin, 1,700 Units, intra-catheter, After Dialysis  lactobacillus acidophilus, 1 tablet, oral, Daily  latanoprost, 1 drop, ophthalmic (eye), Nightly  metoprolol tartrate, 12.5 mg, oral, BID  nystatin, 1 Application, Topical, BID  oxygen, , inhalation, Continuous - Inhalation  predniSONE, 40 mg, oral, Daily  pregabalin, 75 mg, oral, Nightly  vancomycin, 125 mg, oral, BID  vitamin B complex-vitamin C-folic acid, 1 capsule, oral, Daily      Continuous Infusions:     CBC:   Lab Results   Component Value Date    HGB 9.5 (L) 02/07/2025    HGB 9.9 (L) 02/06/2025    WBC 5.0 02/07/2025    WBC 3.6 (L) 02/06/2025     02/07/2025     (L) 02/06/2025      Anemia:  No results found for: \"FERRITIN\", \"IRON\", \"TIBC\"   BMP:    Lab Results   Component Value Date     02/07/2025     02/06/2025    K 4.4 02/07/2025    K 4.4 02/06/2025     02/07/2025     02/06/2025    CO2 27 02/07/2025    CO2 29 02/06/2025    BUN 47 (H) 02/07/2025    BUN 30 (H) 02/06/2025    CREATININE 3.48 (H) " "02/07/2025    CREATININE 3.04 (H) 02/06/2025      Bone disease:   Lab Results   Component Value Date    PHOS 3.3 02/06/2025      Urinalysis:    Lab Results   Component Value Date    ADARSH 6.5 02/06/2025    PROTUR 100 (2+) (A) 02/06/2025    GLUCOSEU Normal 02/06/2025    BLOODU 0.5 (2+) (A) 02/06/2025    KETONESU NEGATIVE 02/06/2025    BILIRUBINU NEGATIVE 02/06/2025    NITRITEU 1+ (A) 02/06/2025    LEUKOCYTESU 500 Irasema/uL (A) 02/06/2025        Objective:   Vitals: /71 (BP Location: Left arm, Patient Position: Lying)   Pulse 52   Temp 36.1 °C (97 °F) (Temporal)   Resp 18   Ht 1.581 m (5' 2.24\")   Wt 79.4 kg (175 lb)   SpO2 96%   BMI 31.76 kg/m²    Wt Readings from Last 3 Encounters:   02/04/25 79.4 kg (175 lb)   12/20/24 88.9 kg (196 lb)   12/11/24 86 kg (189 lb 9.5 oz)      24HR INTAKE/OUTPUT:    Intake/Output Summary (Last 24 hours) at 2/7/2025 1209  Last data filed at 2/6/2025 1740  Gross per 24 hour   Intake 700 ml   Output --   Net 700 ml     Admission weight:  Weight: 79.4 kg (175 lb)      Constitutional:  Alert, awake, no apparent distress   Skin:normal, no rash  HEENT:sclera anicteric.  Head atraumatic normocephalic  Neck:supple with no thyromegally  Cardiovascular:  S1, S2 without m/r/g   Respiratory:  CTA B without w/r/r   Abdomen: +bs, soft, nt  Ext: no LE edema  Musculoskeletal:Intact  Neuro:Alert and oriented with no deficit      Electronically signed by Jose Enrique Dunlap MD on 2/7/2025 at 12:09 PM            "

## 2025-02-07 NOTE — PROGRESS NOTES
02/07/25 1206   Discharge Planning   Home or Post Acute Services Post acute facilities (Rehab/SNF/etc)   Type of Post Acute Facility Services Skilled nursing   Expected Discharge Disposition SNF  ([facility])   Does the patient need discharge transport arranged? Yes   RoundTrip coordination needed? Yes   Has discharge transport been arranged? Yes   What day is the transport expected? 02/07/25   What time is the transport expected? 1500     Insurance precert received and pt medically cleared for discharge. Care team and facility updated with discharge details. Pt and spouse updated and are in agreement with plan.

## 2025-02-07 NOTE — PROGRESS NOTES
02/07/25 1036   Discharge Planning   Home or Post Acute Services Post acute facilities (Rehab/SNF/etc)   Type of Post Acute Facility Services Skilled nursing   Expected Discharge Disposition SNF  ([facility])   Does the patient need discharge transport arranged? Yes   RoundTrip coordination needed? Yes   Has discharge transport been arranged? Yes   What day is the transport expected? 02/07/25   Patient Choice   Provider Choice list and CMS website (https://medicare.gov/care-compare#search) for post-acute Quality and Resource Measure Data were provided and reviewed with: Patient;Family   Patient / Family choosing to utilize agency / facility established prior to hospitalization No   Stroke Family Assessment   Stroke Family Assessment Needed No   Intensity of Service   Intensity of Service 0-30 min     Precert obtained for [facility]. Pending if medially dc today. Updated SNF.    UPDATE 1253:  Shashank completed and signed, sent in Careport with AVS, MAR, and DC Summary. SW assisting with completion of HENS as well as setting up transport in Roundtrip. Confirmed for 3pm  by cot. Pt and family updated. Bedside RN given report number. Facility aware.

## 2025-02-07 NOTE — CARE PLAN
The patient's goals for the shift include sleep and comfort    The clinical goals for the shift include remain free from injury by end of shift.    Over the shift, the patient did  make progress toward the following goals.

## 2025-02-07 NOTE — DISCHARGE SUMMARY
"Discharge Diagnosis  Weakness    Issues Requiring Follow-Up  Follow up with PCP in a week. Nephrology/dialysis as scheduled.     Discharge Meds     Medication List      START taking these medications     guaiFENesin 600 mg 12 hr tablet; Commonly known as: Mucinex; Take 1   tablet (600 mg) by mouth 2 times a day as needed for cough or congestion   for up to 5 days. Do not crush, chew, or split.   predniSONE 20 mg tablet; Commonly known as: Deltasone; Take 1 tablet (20   mg) by mouth once daily for 3 days.; Start taking on: February 8, 2025     CONTINUE taking these medications     amLODIPine 2.5 mg tablet; Commonly known as: Norvasc; Take 1 tablet (2.5   mg) by mouth once daily.   apixaban 2.5 mg tablet; Commonly known as: Eliquis   calcium acetate 667 mg capsule; Commonly known as: Phoslo; Take 2   capsules (1,334 mg) by mouth 3 times daily (morning, midday, late   afternoon).   cyanocobalamin 1,000 mcg tablet; Commonly known as: Vitamin B-12   furosemide 20 mg tablet; Commonly known as: Lasix   latanoprost 0.005 % ophthalmic solution; Commonly known as: Xalatan   lidocaine 4 % patch; Place 1 patch over 12 hours on the skin once daily.   Remove & discard patch within 12 hours or as directed by MD.   metoprolol tartrate 25 mg tablet; Commonly known as: Lopressor   pregabalin 75 mg capsule; Commonly known as: Lyrica   traMADol 50 mg tablet; Commonly known as: Ultram; Take 1 tablet (50 mg)   by mouth every 12 hours if needed for severe pain (7 - 10).   vitamin B complex-vitamin C-folic acid 1 mg capsule; Commonly known as:   Nephrocaps; Take 1 capsule by mouth once daily.   Vitamin D3 50 mcg (2,000 unit) capsule; Generic drug: cholecalciferol       Test Results Pending At Discharge  Pending Labs       Order Current Status    Urine Culture In process            Hospital Course   Savita CARRANZA Snell \"FILOMENA\" is a 84 y.o. female with a significant past medical history of paroxysmal atrial fibrillation, long-term " anticoagulation as apixaban, diastolic CHF, ESRD on HD, recurrent C. difficile who presented to the ED with weakness.  The patient also tested positive for COVID-19 infection did require supplemental oxygen.  The patient was started with prednisone and Mucinex.  The patient started to feel better weakness did improve.  Nephrology saw the patient for dialysis as well.  Patient also had possible UTI and did complete course of ciprofloxacin.  The patient states she feels better.  The patient is otherwise stable for discharge to skilled nursing facility.    Pertinent Physical Exam At Time of Discharge  Physical Exam  HENT:      Head: Normocephalic and atraumatic.      Nose: Nose normal.      Mouth/Throat:      Mouth: Mucous membranes are dry.   Eyes:      Extraocular Movements: Extraocular movements intact.      Conjunctiva/sclera: Conjunctivae normal.   Cardiovascular:      Rate and Rhythm: Normal rate and regular rhythm.      Pulses: Normal pulses.      Heart sounds: Normal heart sounds.   Pulmonary:      Effort: Pulmonary effort is normal.      Breath sounds: Normal breath sounds.   Abdominal:      General: Bowel sounds are normal.      Palpations: Abdomen is soft.   Musculoskeletal:         General: Normal range of motion.      Cervical back: Normal range of motion and neck supple.   Skin:     General: Skin is warm and dry.   Neurological:      General: No focal deficit present.      Mental Status: She is alert. Mental status is at baseline.   Psychiatric:         Mood and Affect: Mood normal.         Outpatient Follow-Up  Future Appointments   Date Time Provider Department Center   2/24/2025 11:30 AM ANA MARIA Bryant-CNP [id number] [place]         Corinna Suarez MD

## 2025-02-07 NOTE — CARE PLAN
The patient's goals for the shift include sleep and comfort    The clinical goals for the shift include safety

## 2025-02-08 DIAGNOSIS — R52 ACUTE PAIN: ICD-10-CM

## 2025-02-08 DIAGNOSIS — G62.9 NEUROPATHY: Primary | ICD-10-CM

## 2025-02-08 RX ORDER — TRAMADOL HYDROCHLORIDE 50 MG/1
50 TABLET ORAL EVERY 12 HOURS PRN
Qty: 14 TABLET | Refills: 0 | Status: SHIPPED | OUTPATIENT
Start: 2025-02-08 | End: 2025-02-15

## 2025-02-08 RX ORDER — PREGABALIN 75 MG/1
75 CAPSULE ORAL NIGHTLY
Qty: 7 CAPSULE | Refills: 0 | Status: SHIPPED | OUTPATIENT
Start: 2025-02-08 | End: 2025-02-15

## 2025-02-10 ENCOUNTER — OFFICE VISIT (OUTPATIENT)
Dept: GERIATRIC MEDICINE | Age: 85
End: 2025-02-10
Payer: MEDICARE

## 2025-02-10 DIAGNOSIS — R53.1 WEAKNESS: Primary | ICD-10-CM

## 2025-02-10 DIAGNOSIS — I13.2 HYPERTENSIVE HEART AND CHRONIC KIDNEY DISEASE WITH HEART FAILURE AND WITH STAGE 5 CHRONIC KIDNEY DISEASE, OR END STAGE RENAL DISEASE (HCC): ICD-10-CM

## 2025-02-10 DIAGNOSIS — I48.91 ATRIAL FIBRILLATION, UNSPECIFIED TYPE (HCC): ICD-10-CM

## 2025-02-10 DIAGNOSIS — I50.30 DIASTOLIC CONGESTIVE HEART FAILURE, UNSPECIFIED HF CHRONICITY (HCC): ICD-10-CM

## 2025-02-10 DIAGNOSIS — N18.6 ESRF (END STAGE RENAL FAILURE) (HCC): ICD-10-CM

## 2025-02-10 PROCEDURE — 99306 1ST NF CARE HIGH MDM 50: CPT | Performed by: INTERNAL MEDICINE

## 2025-02-10 PROCEDURE — 1123F ACP DISCUSS/DSCN MKR DOCD: CPT | Performed by: INTERNAL MEDICINE

## 2025-02-11 ENCOUNTER — OFFICE VISIT (OUTPATIENT)
Dept: GERIATRIC MEDICINE | Age: 85
End: 2025-02-11
Payer: MEDICARE

## 2025-02-11 DIAGNOSIS — N18.6 ESRF (END STAGE RENAL FAILURE) (HCC): ICD-10-CM

## 2025-02-11 DIAGNOSIS — R53.1 WEAKNESS: Primary | ICD-10-CM

## 2025-02-11 DIAGNOSIS — I13.2 HYPERTENSIVE HEART AND CHRONIC KIDNEY DISEASE WITH HEART FAILURE AND WITH STAGE 5 CHRONIC KIDNEY DISEASE, OR END STAGE RENAL DISEASE (HCC): ICD-10-CM

## 2025-02-11 DIAGNOSIS — I50.30 DIASTOLIC CONGESTIVE HEART FAILURE, UNSPECIFIED HF CHRONICITY (HCC): ICD-10-CM

## 2025-02-11 DIAGNOSIS — I48.91 ATRIAL FIBRILLATION, UNSPECIFIED TYPE (HCC): ICD-10-CM

## 2025-02-11 DIAGNOSIS — A04.72 ENTEROCOLITIS DUE TO CLOSTRIDIUM DIFFICILE, NOT SPECIFIED AS RECURRENT: ICD-10-CM

## 2025-02-11 PROCEDURE — 99308 SBSQ NF CARE LOW MDM 20: CPT | Performed by: INTERNAL MEDICINE

## 2025-02-11 PROCEDURE — 1123F ACP DISCUSS/DSCN MKR DOCD: CPT | Performed by: INTERNAL MEDICINE

## 2025-02-11 NOTE — PROGRESS NOTES
History and Physical      CHIEF COMPLAINT:  weakness     History of Present Illness:  9     A 84 y.o. female who is being seen at who is being seen at [facility] previously for C. difficile colitis. She was originally at [facility]. She was started on hemodialysis and placed on a long tapering dose of oral vancomycin.  She tested positive for COVID and weakness on this most recent hospital stay.    Patient is sitting up in the chair and maintenance is in the room fixing something. She just got done eating.     REVIEW OF SYSTEMS:  A complete 10 Point review of systems was preformed and negative unless previously stated      PMH:  Past Medical History:   Diagnosis Date    Atrial fibrillation (HCC)     CHF (congestive heart failure) (HCC)     Chronic kidney disease     Hyperlipidemia     Hypertension     Restless legs syndrome     Type 2 diabetes mellitus without complication (HCC)        Surgical History:  Past Surgical History:   Procedure Laterality Date    APPENDECTOMY      CATARACT EXTRACTION      CHOLECYSTECTOMY      HYSTERECTOMY, TOTAL ABDOMINAL (CERVIX REMOVED)      KNEE ARTHROPLASTY      TONSILLECTOMY         Medications Prior to Admission:    Prior to Admission medications    Medication Sig Start Date End Date Taking? Authorizing Provider   pregabalin (LYRICA) 75 MG capsule Take 1 capsule by mouth at bedtime for 7 days. Max Daily Amount: 75 mg 2/8/25 2/15/25  Chelsy Bullock APRN - CNP   traMADol (ULTRAM) 50 MG tablet Take 1 tablet by mouth every 12 hours as needed for Pain for up to 7 days. Intended supply: 7 days. Take lowest dose possible to manage pain Max Daily Amount: 100 mg 2/8/25 2/15/25  Chelsy Bullock APRN - CNP       Allergies:    Cephalexin, Pcn [penicillins], and Sulfamethoxazole-trimethoprim    Social History:        Family History:       Problem Relation Age of Onset    Heart Failure Mother     Stroke Father        PHYSICAL EXAM:      Vitals were reviewed and stable

## 2025-02-12 NOTE — PROGRESS NOTES
SNF PROGRESS NOTE      Cc-  weakness       Patient is a Niru Kumar 84 y.o. female who is being seen at [facility] previously for C. difficile colitis. She was originally at [facility]. She was started on hemodialysis and placed on a long tapering dose of oral vancomycin.  She tested positive for COVID and weakness on this most recent hospital stay.     Patient is eating ok. The patient is having some diarrhea. Her pain is controlled.         Past Medical History:   Diagnosis Date    Atrial fibrillation (HCC)     CHF (congestive heart failure) ([facility])     Chronic kidney disease     Hyperlipidemia     Hypertension     Restless legs syndrome     Type 2 diabetes mellitus without complication ([facility])      Cephalexin, Pcn [penicillins], and Sulfamethoxazole-trimethoprim    VS reviewed    Gen- Alert and oriented x3   Heart- RRR no murmur no LE edema   Lungs- CTA b/l no resp distress RA oxygen   Abd- bs x 4     + R chest permacath       Assessment and Plan    1.  Weakness secondary to COVID               PT OT   2.  RLS   Requip   3.  A fib   Eliquis   Metoprolol   4.  ESRD  T,Th, Sat  Secondary to ATN   phoslo  5.  Chronic diastolic CHF   Metoprolol   6.  HTN              1. Metoprolol               2. Norvasc.      YEE Bradley DO     Electronically signed by: Taniya Genao DO on 2/11/2025

## 2025-02-14 ENCOUNTER — OFFICE VISIT (OUTPATIENT)
Dept: GERIATRIC MEDICINE | Age: 85
End: 2025-02-14

## 2025-02-14 DIAGNOSIS — R53.1 WEAKNESS: Primary | ICD-10-CM

## 2025-02-14 DIAGNOSIS — N18.6 ESRF (END STAGE RENAL FAILURE) (HCC): ICD-10-CM

## 2025-02-14 DIAGNOSIS — I50.30 DIASTOLIC CONGESTIVE HEART FAILURE, UNSPECIFIED HF CHRONICITY (HCC): ICD-10-CM

## 2025-02-14 DIAGNOSIS — I13.2 HYPERTENSIVE HEART AND CHRONIC KIDNEY DISEASE WITH HEART FAILURE AND WITH STAGE 5 CHRONIC KIDNEY DISEASE, OR END STAGE RENAL DISEASE (HCC): ICD-10-CM

## 2025-02-14 DIAGNOSIS — I48.91 ATRIAL FIBRILLATION, UNSPECIFIED TYPE (HCC): ICD-10-CM

## 2025-02-17 ENCOUNTER — OFFICE VISIT (OUTPATIENT)
Dept: GERIATRIC MEDICINE | Age: 85
End: 2025-02-17

## 2025-02-17 DIAGNOSIS — R53.1 WEAKNESS: Primary | ICD-10-CM

## 2025-02-17 DIAGNOSIS — I48.91 ATRIAL FIBRILLATION, UNSPECIFIED TYPE (HCC): ICD-10-CM

## 2025-02-17 DIAGNOSIS — I13.2 HYPERTENSIVE HEART AND CHRONIC KIDNEY DISEASE WITH HEART FAILURE AND WITH STAGE 5 CHRONIC KIDNEY DISEASE, OR END STAGE RENAL DISEASE (HCC): ICD-10-CM

## 2025-02-17 DIAGNOSIS — N18.6 ESRF (END STAGE RENAL FAILURE) (HCC): ICD-10-CM

## 2025-02-17 DIAGNOSIS — I50.30 DIASTOLIC CONGESTIVE HEART FAILURE, UNSPECIFIED HF CHRONICITY (HCC): ICD-10-CM

## 2025-02-18 ENCOUNTER — OFFICE VISIT (OUTPATIENT)
Dept: GERIATRIC MEDICINE | Age: 85
End: 2025-02-18

## 2025-02-18 DIAGNOSIS — I13.2 HYPERTENSIVE HEART AND CHRONIC KIDNEY DISEASE WITH HEART FAILURE AND WITH STAGE 5 CHRONIC KIDNEY DISEASE, OR END STAGE RENAL DISEASE (HCC): ICD-10-CM

## 2025-02-18 DIAGNOSIS — I48.91 ATRIAL FIBRILLATION, UNSPECIFIED TYPE (HCC): ICD-10-CM

## 2025-02-18 DIAGNOSIS — R53.1 WEAKNESS: Primary | ICD-10-CM

## 2025-02-18 DIAGNOSIS — N18.6 ESRF (END STAGE RENAL FAILURE) (HCC): ICD-10-CM

## 2025-02-18 DIAGNOSIS — I50.30 DIASTOLIC CONGESTIVE HEART FAILURE, UNSPECIFIED HF CHRONICITY (HCC): ICD-10-CM

## 2025-02-20 NOTE — PROGRESS NOTES
SNF PROGRESS NOTE      Cc- weakness       Patient is a Niru Kumar 84 y.o. female who is being seen at [place] previously for C. difficile colitis. She was originally at [facility]. She was started on hemodialysis and placed on a long tapering dose of oral vancomycin. She tested positive for COVID and weakness on this most recent hospital stay.     Patient is eating well and pain is controlled. She continues to get dialysis.         Past Medical History:   Diagnosis Date    Atrial fibrillation ([facility])     CHF (congestive heart failure) ([facility])     Chronic kidney disease     Hyperlipidemia     Hypertension     Restless legs syndrome     Type 2 diabetes mellitus without complication ([facility])      Cephalexin, Pcn [penicillins], and Sulfamethoxazole-trimethoprim    VS reviewed    Gen- Alert and oriented x3   Heart- RRR no murmur no LE edema   Lungs- CTA b/l no resp distress RA oxygen   Abd- bs x 4      + R chest permacath           Assessment and Plan    1.  Weakness secondary to COVID               PT OT   2.  RLS   Requip   3.  A fib   Eliquis   Metoprolol   4.  ESRD  T,Th, Sat  Secondary to ATN   phoslo  5.  Chronic diastolic CHF   Metoprolol   6.  HTN              1. Metoprolol               2. Norvasc.         YEE Bradley DO     Electronically signed by: Taniya Genao DO on 2/14/2025

## 2025-02-22 ENCOUNTER — OFFICE VISIT (OUTPATIENT)
Dept: GERIATRIC MEDICINE | Age: 85
End: 2025-02-22
Payer: MEDICARE

## 2025-02-22 DIAGNOSIS — I50.30 DIASTOLIC CONGESTIVE HEART FAILURE, UNSPECIFIED HF CHRONICITY (HCC): ICD-10-CM

## 2025-02-22 DIAGNOSIS — I48.91 ATRIAL FIBRILLATION, UNSPECIFIED TYPE (HCC): ICD-10-CM

## 2025-02-22 DIAGNOSIS — R53.1 WEAKNESS: Primary | ICD-10-CM

## 2025-02-22 DIAGNOSIS — N18.6 ESRF (END STAGE RENAL FAILURE) (HCC): ICD-10-CM

## 2025-02-22 DIAGNOSIS — A04.72 ENTEROCOLITIS DUE TO CLOSTRIDIUM DIFFICILE, NOT SPECIFIED AS RECURRENT: ICD-10-CM

## 2025-02-22 DIAGNOSIS — I13.2 HYPERTENSIVE HEART AND CHRONIC KIDNEY DISEASE WITH HEART FAILURE AND WITH STAGE 5 CHRONIC KIDNEY DISEASE, OR END STAGE RENAL DISEASE (HCC): ICD-10-CM

## 2025-02-22 PROCEDURE — 99316 NF DSCHRG MGMT 30 MIN+: CPT | Performed by: INTERNAL MEDICINE

## 2025-02-24 ENCOUNTER — APPOINTMENT (OUTPATIENT)
Dept: GASTROENTEROLOGY | Facility: CLINIC | Age: 85
End: 2025-02-24
Payer: MEDICARE

## 2025-02-24 NOTE — PROGRESS NOTES
Discharge Summary       Discharge Disposition Home with home care    Discharge Condition stable      Discharge time 32 minutes      Medications   Current Outpatient Medications   Medication Sig Dispense Refill    pregabalin (LYRICA) 75 MG capsule Take 1 capsule by mouth at bedtime for 7 days. Max Daily Amount: 75 mg 7 capsule 0     No current facility-administered medications for this visit.       Diet-as tolerated    Activity as tolerated        Brief SNF Course--     This is an 84 y.o. female who is being seen for weakness secondary to COVID.  She was noted to have C. difficile colitis and was started back on vancomycin.  This time was for a longer course.  She continued on her dialysis regimen.          Discharge Diagnosis :    1.  Weakness secondary to COVID   2.  RLS   3.  A fib   4.  ESRD  5.  Chronic diastolic CHF   6.  HTN        Follow up --     Primary care physician within 1 to 2 weeks        YEE Bradley DO     Electronically signed by: Taniya Genao DO on 2/22/2025

## 2025-02-26 LAB
ATRIAL RATE: 85 BPM
P AXIS: 35 DEGREES
P OFFSET: 204 MS
P ONSET: 144 MS
PR INTERVAL: 152 MS
Q ONSET: 220 MS
QRS COUNT: 14 BEATS
QRS DURATION: 74 MS
QT INTERVAL: 352 MS
QTC CALCULATION(BAZETT): 418 MS
QTC FREDERICIA: 395 MS
R AXIS: -13 DEGREES
T AXIS: -9 DEGREES
T OFFSET: 396 MS
VENTRICULAR RATE: 85 BPM

## 2025-02-26 NOTE — PROGRESS NOTES
SNF PROGRESS NOTE      Cc-weakness      Patient is a Niru Kumar 84 y.o. female who is being seen at [place] previously for C. difficile colitis. She was originally at [facility]. She was started on hemodialysis and placed on a long tapering dose of oral vancomycin. She tested positive for COVID and weakness on this most recent hospital stay.     Patient is eating okay.  She is sitting up in the bed.  Pain is controlled with Ultram.  No issues from nursing at this time        Past Medical History:   Diagnosis Date    Atrial fibrillation ([facility])     CHF (congestive heart failure) ([facility])     Chronic kidney disease     Hyperlipidemia     Hypertension     Restless legs syndrome     Type 2 diabetes mellitus without complication ([facility])      Cephalexin, Pcn [penicillins], and Sulfamethoxazole-trimethoprim    VS reviewed      Gen- Alert and oriented x3   Heart- RRR no murmur no LE edema   Lungs- CTA b/l no resp distress RA oxygen   Abd- bs x 4      + R chest permacath       Assessment and Plan    1.  Weakness secondary to COVID               PT OT   2.  RLS   Requip   3.  A fib   Eliquis   Metoprolol   4.  ESRD  T,Th, Sat  Secondary to ATN   phoslo  5.  Chronic diastolic CHF   Metoprolol   6.  HTN              1. Metoprolol               2. Norvasc.       Plan upcoming discharge on 2/22/2025      YEE Bradley DO     Electronically signed by: Taniya Genao DO on 2/17/2025

## 2025-02-27 NOTE — PROGRESS NOTES
SNF PROGRESS NOTE      Cc- weakness       Patient is a Niru Kumar 84 y.o. female who is being seen at [place] previously for C. difficile colitis. She was originally at [facility]. She was started on hemodialysis and placed on a long tapering dose of oral vancomycin. She tested positive for COVID and weakness on this most recent hospital stay.     Patient is eating well and sitting up in bed. She denies any complaints of pain.         Past Medical History:   Diagnosis Date    Atrial fibrillation (HCC)     CHF (congestive heart failure) ([facility])     Chronic kidney disease     Hyperlipidemia     Hypertension     Restless legs syndrome     Type 2 diabetes mellitus without complication ([facility])      Cephalexin, Pcn [penicillins], and Sulfamethoxazole-trimethoprim    VS reviewed      Gen- Alert and oriented x3   Heart- RRR no murmur no LE edema   Lungs- CTA b/l no resp distress RA oxygen   Abd- bs x 4      + R chest permacath         Assessment and Plan    1.  Weakness secondary to COVID               PT OT   2.  RLS   Requip   3.  A fib   Eliquis   Metoprolol   4.  ESRD  T,Th, Sat  Secondary to ATN   phoslo  5.  Chronic diastolic CHF   Metoprolol   6.  HTN              1. Metoprolol               2. Norvasc.        Plan upcoming discharge on 2/22/2025      YEE Bradley DO     Electronically signed by: Taniya Genao DO on 2/18/2025

## 2025-04-26 ENCOUNTER — LAB REQUISITION (OUTPATIENT)
Dept: LAB | Facility: HOSPITAL | Age: 85
End: 2025-04-26
Payer: MEDICARE

## 2025-04-26 DIAGNOSIS — Z99.2 DEPENDENCE ON RENAL DIALYSIS (CMS-HCC): ICD-10-CM

## 2025-04-26 DIAGNOSIS — N18.6 END STAGE RENAL DISEASE (MULTI): ICD-10-CM

## 2025-04-26 LAB
BUN PRE DIAL SERPL-MCNC: 37 MG/DL (ref 6–23)
HBV SURFACE AG SERPL QL IA: NONREACTIVE

## 2025-04-26 PROCEDURE — 87340 HEPATITIS B SURFACE AG IA: CPT

## 2025-06-13 ENCOUNTER — PREP FOR PROCEDURE (OUTPATIENT)
Dept: SURGERY | Facility: HOSPITAL | Age: 85
End: 2025-06-13
Payer: MEDICARE

## 2025-06-16 ENCOUNTER — OFFICE VISIT (OUTPATIENT)
Dept: WOUND CARE | Facility: CLINIC | Age: 85
End: 2025-06-16
Payer: MEDICARE

## 2025-06-16 DIAGNOSIS — L03.90 WOUND CELLULITIS: Primary | ICD-10-CM

## 2025-06-16 PROCEDURE — 11042 DBRDMT SUBQ TIS 1ST 20SQCM/<: CPT

## 2025-06-16 PROCEDURE — 99213 OFFICE O/P EST LOW 20 MIN: CPT | Mod: 25

## 2025-06-16 PROCEDURE — 29581 APPL MULTLAYER CMPRN SYS LEG: CPT | Mod: LT

## 2025-06-19 ENCOUNTER — CLINICAL SUPPORT (OUTPATIENT)
Dept: WOUND CARE | Facility: CLINIC | Age: 85
End: 2025-06-19
Payer: MEDICARE

## 2025-06-19 PROCEDURE — 99212 OFFICE O/P EST SF 10 MIN: CPT

## 2025-06-22 LAB
BACTERIA SPEC AEROBE CULT: ABNORMAL
BACTERIA SPEC ANAEROBE CULT: ABNORMAL

## 2025-06-23 ENCOUNTER — OFFICE VISIT (OUTPATIENT)
Dept: WOUND CARE | Facility: CLINIC | Age: 85
End: 2025-06-23
Payer: MEDICARE

## 2025-06-23 PROCEDURE — 11042 DBRDMT SUBQ TIS 1ST 20SQCM/<: CPT | Performed by: PLASTIC SURGERY

## 2025-06-23 PROCEDURE — 11042 DBRDMT SUBQ TIS 1ST 20SQCM/<: CPT

## 2025-06-27 ENCOUNTER — PREP FOR PROCEDURE (OUTPATIENT)
Dept: SURGERY | Facility: HOSPITAL | Age: 85
End: 2025-06-27
Payer: MEDICARE

## 2025-06-30 ENCOUNTER — OFFICE VISIT (OUTPATIENT)
Dept: WOUND CARE | Facility: CLINIC | Age: 85
End: 2025-06-30
Payer: MEDICARE

## 2025-06-30 DIAGNOSIS — L03.90 WOUND CELLULITIS: Primary | ICD-10-CM

## 2025-06-30 PROCEDURE — 11042 DBRDMT SUBQ TIS 1ST 20SQCM/<: CPT | Performed by: PLASTIC SURGERY

## 2025-06-30 PROCEDURE — 11042 DBRDMT SUBQ TIS 1ST 20SQCM/<: CPT

## 2025-07-06 LAB
BACTERIA SPEC AEROBE CULT: ABNORMAL
BACTERIA SPEC ANAEROBE CULT: ABNORMAL

## 2025-07-07 ENCOUNTER — OFFICE VISIT (OUTPATIENT)
Dept: WOUND CARE | Facility: CLINIC | Age: 85
End: 2025-07-07
Payer: MEDICARE

## 2025-07-07 PROCEDURE — 11042 DBRDMT SUBQ TIS 1ST 20SQCM/<: CPT | Performed by: PLASTIC SURGERY

## 2025-07-07 PROCEDURE — 11042 DBRDMT SUBQ TIS 1ST 20SQCM/<: CPT

## 2025-07-09 ENCOUNTER — PREP FOR PROCEDURE (OUTPATIENT)
Dept: SURGERY | Facility: HOSPITAL | Age: 85
End: 2025-07-09
Payer: MEDICARE

## 2025-07-14 ENCOUNTER — OFFICE VISIT (OUTPATIENT)
Dept: WOUND CARE | Facility: CLINIC | Age: 85
End: 2025-07-14
Payer: MEDICARE

## 2025-07-14 PROCEDURE — 11042 DBRDMT SUBQ TIS 1ST 20SQCM/<: CPT | Performed by: PLASTIC SURGERY

## 2025-07-14 PROCEDURE — 11042 DBRDMT SUBQ TIS 1ST 20SQCM/<: CPT

## 2025-07-21 ENCOUNTER — OFFICE VISIT (OUTPATIENT)
Dept: WOUND CARE | Facility: CLINIC | Age: 85
End: 2025-07-21
Payer: MEDICARE

## 2025-07-21 PROCEDURE — 11042 DBRDMT SUBQ TIS 1ST 20SQCM/<: CPT | Performed by: PLASTIC SURGERY

## 2025-07-21 PROCEDURE — 11042 DBRDMT SUBQ TIS 1ST 20SQCM/<: CPT

## 2025-07-23 ENCOUNTER — PREP FOR PROCEDURE (OUTPATIENT)
Dept: SURGERY | Facility: HOSPITAL | Age: 85
End: 2025-07-23
Payer: MEDICARE

## 2025-07-28 ENCOUNTER — OFFICE VISIT (OUTPATIENT)
Dept: WOUND CARE | Facility: CLINIC | Age: 85
End: 2025-07-28
Payer: MEDICARE

## 2025-07-28 PROCEDURE — 99212 OFFICE O/P EST SF 10 MIN: CPT

## 2025-07-28 PROCEDURE — 99212 OFFICE O/P EST SF 10 MIN: CPT | Performed by: PLASTIC SURGERY

## (undated) DEVICE — WOUND SYSTEM, DEBRIDEMENT & CLEANING, O.R DUOPAK

## (undated) DEVICE — TOWEL PACK, STERILE, 16X24, XRAY DETECTABLE, BLUE, 4/PK

## (undated) DEVICE — GLOVE, SURGICAL, PROTEXIS PI , 7.5, PF, LF

## (undated) DEVICE — Device

## (undated) DEVICE — GLOVE, SURGICAL, PROTEXIS PI , 7.0, PF, LF

## (undated) DEVICE — WRAP, DEMAYO, LEG, STERILE

## (undated) DEVICE — HOOD, SURGICAL, FLYTE, T7

## (undated) DEVICE — BATH BLANKET STERILE

## (undated) DEVICE — SUTURE, VICRYL PLUS, 1, 8X27IN, CT-1, CR, UND, BRAIDED

## (undated) DEVICE — SUTURE, VICRYL, 1, 36 IN, V-34, VIOLET

## (undated) DEVICE — BLADE, STRYKER, OSC, 19 X 90 X 1.27G

## (undated) DEVICE — SOLUTION, IRRIGATION, USP, SODIUM CHLORIDE 0.9%, 3000 ML

## (undated) DEVICE — STRAP, ARM BOARD, 32 X 1.5

## (undated) DEVICE — SUTURE, ETHIBOND, P2, V-37, 30 IN, GREEN

## (undated) DEVICE — DRESSING, MEPILEX BORDER, POST-OP AG, 4 X 12 IN

## (undated) DEVICE — GLOVE, SURGICAL, PROTEXIS PI BLUE W/NEUTHERA, 7.0, PF, LF

## (undated) DEVICE — ADHESIVE, SKIN, DERMABOND ADVANCED, 15CM, PEN-STYLE

## (undated) DEVICE — CAUTERY, PENCIL, PUSH BUTTON, SMOKE EVAC, 70MM

## (undated) DEVICE — STRAP, VELCRO, BODY, 4 X 60IN, NS

## (undated) DEVICE — PADDING, CAST, SPECIALIST, 6 IN X 4 YD, STERILE

## (undated) DEVICE — IMMOBILIZER, KNEE, 19IN, ADJUSTABLE FOAM, W/ ELASTIC STRAPS

## (undated) DEVICE — BLADE, PATELLA REAMER, W/PILOT HOLE, 26MM

## (undated) DEVICE — NEEDLE, SAFETY, 18 G X 1.5 IN

## (undated) DEVICE — SUTURE, MONOCRYL, 4-0, 27 IN, PS-2, UNDYED

## (undated) DEVICE — RESERVOIR, DRAINAGE, WOUND, JACKSON-PRATT, 400 CC, SILICONE

## (undated) DEVICE — BANDAGE, ELASTIC, 6 X 10YD, BEIGE, LF

## (undated) DEVICE — GOWN, SURGICAL, ROYAL SILK, XL, STERILE

## (undated) DEVICE — BLADE, STRYKER, 12 X 76 X 1.00L, TOOTH

## (undated) DEVICE — PREP, IODOPHOR, W/ALCOHOL, DURAPREP, W/APPLICATOR, 26 CC

## (undated) DEVICE — CEMENT, MIXEVAC III, 10S BOWL, KNEES

## (undated) DEVICE — BLADE, GEN COATED 2.75, LF

## (undated) DEVICE — COVER, MAYO STAND, W/PAD, 23 IN, DISPOSABLE, PLASTIC, LF, STERILE

## (undated) DEVICE — SYRINGE, 50 CC, LUER LOCK

## (undated) DEVICE — MANIFOLD, 4 PORT NEPTUNE STANDARD